# Patient Record
Sex: MALE | Race: WHITE | NOT HISPANIC OR LATINO | Employment: FULL TIME | URBAN - METROPOLITAN AREA
[De-identification: names, ages, dates, MRNs, and addresses within clinical notes are randomized per-mention and may not be internally consistent; named-entity substitution may affect disease eponyms.]

---

## 2017-01-02 ENCOUNTER — DOCUMENTATION ONLY (OUTPATIENT)
Dept: NEUROLOGY | Facility: CLINIC | Age: 52
End: 2017-01-02

## 2017-02-13 ENCOUNTER — TELEPHONE (OUTPATIENT)
Dept: NEUROLOGY | Facility: CLINIC | Age: 52
End: 2017-02-13

## 2017-07-05 ENCOUNTER — ALLIED HEALTH/NURSE VISIT (OUTPATIENT)
Dept: NEUROLOGY | Facility: CLINIC | Age: 52
End: 2017-07-05

## 2017-07-05 ENCOUNTER — OFFICE VISIT (OUTPATIENT)
Dept: NEUROLOGY | Facility: CLINIC | Age: 52
End: 2017-07-05

## 2017-07-05 VITALS
HEIGHT: 72 IN | WEIGHT: 210 LBS | HEART RATE: 76 BPM | DIASTOLIC BLOOD PRESSURE: 83 MMHG | BODY MASS INDEX: 28.44 KG/M2 | SYSTOLIC BLOOD PRESSURE: 124 MMHG

## 2017-07-05 DIAGNOSIS — G61.81 CIDP (CHRONIC INFLAMMATORY DEMYELINATING POLYNEUROPATHY) (H): Primary | ICD-10-CM

## 2017-07-05 DIAGNOSIS — Z71.9 VISIT FOR COUNSELING: Primary | ICD-10-CM

## 2017-07-05 RX ORDER — CALCIUM CARBONATE 500(1250)
TABLET,CHEWABLE ORAL
COMMUNITY
Start: 2016-02-23 | End: 2019-08-20

## 2017-07-05 RX ORDER — DIPHENHYDRAMINE HCL 25 MG
25 CAPSULE ORAL EVERY 6 HOURS PRN
COMMUNITY
Start: 2015-12-01 | End: 2021-05-11

## 2017-07-05 RX ORDER — ACETAMINOPHEN 500 MG
1000 TABLET ORAL EVERY 6 HOURS PRN
COMMUNITY
Start: 2013-09-25

## 2017-07-05 ASSESSMENT — ENCOUNTER SYMPTOMS
DIZZINESS: 1
POLYPHAGIA: 0
MYALGIAS: 1
LIGHT-HEADEDNESS: 0
NUMBNESS: 1
EYE WATERING: 0
TASTE DISTURBANCE: 0
SYNCOPE: 0
NECK MASS: 0
SHORTNESS OF BREATH: 0
POSTURAL DYSPNEA: 1
INCREASED ENERGY: 1
TREMORS: 0
SNORES LOUDLY: 0
DOUBLE VISION: 0
DISTURBANCES IN COORDINATION: 1
TROUBLE SWALLOWING: 0
POLYDIPSIA: 1
EXERCISE INTOLERANCE: 1
NIGHT SWEATS: 0
HALLUCINATIONS: 0
SINUS PAIN: 0
DECREASED CONCENTRATION: 1
WEIGHT LOSS: 0
ORTHOPNEA: 1
INSOMNIA: 1
CLAUDICATION: 0
WHEEZING: 0
DECREASED APPETITE: 0
NERVOUS/ANXIOUS: 1
ARTHRALGIAS: 1
SMELL DISTURBANCE: 0
HYPERTENSION: 0
SLEEP DISTURBANCES DUE TO BREATHING: 0
WEAKNESS: 1
FEVER: 0
WEIGHT GAIN: 0
EYE PAIN: 0
COUGH: 1
HEADACHES: 1
MEMORY LOSS: 0
CHILLS: 0
JOINT SWELLING: 1
PANIC: 1
EYE REDNESS: 0
DYSPNEA ON EXERTION: 1
ALTERED TEMPERATURE REGULATION: 1
NECK PAIN: 1
NAIL CHANGES: 0
STIFFNESS: 1
SEIZURES: 0
EYE IRRITATION: 0
MUSCLE CRAMPS: 0
TINGLING: 1
MUSCLE WEAKNESS: 1
SPEECH CHANGE: 0
LOSS OF CONSCIOUSNESS: 0
HYPOTENSION: 0
SPUTUM PRODUCTION: 1
BACK PAIN: 1
HOARSE VOICE: 1
LEG SWELLING: 1
SKIN CHANGES: 1
SORE THROAT: 0
COUGH DISTURBING SLEEP: 0
SINUS CONGESTION: 1
RESPIRATORY PAIN: 0
FATIGUE: 1
PARALYSIS: 0
DEPRESSION: 1
LEG PAIN: 1
POOR WOUND HEALING: 0
HEMOPTYSIS: 0
TACHYCARDIA: 0
PALPITATIONS: 0

## 2017-07-05 ASSESSMENT — PAIN SCALES - GENERAL: PAINLEVEL: NO PAIN (0)

## 2017-07-05 NOTE — PROGRESS NOTES
"2017      Son MYRONNikia MD Cindy   [facility]    [address] [place]  [address]         RE:                Emiliano Aguiar    MRN:             [id number]    :             1965             Dear Dr. White:         I had the pleasure to see Emiliano Aguiar in followup at the [facility].  He has CIDP and he is on IVIG treatment 1 g/kg every 4 weeks, which has produced major improvement and has maintained him in a state of clinical remission based on his objective neurological examination. The last 4 months have been quite eventful. He now has near constant numbness and tingling at his hands and feet, which has been gradually occurring over the last 1 year. In 2017, he took a new job at the [employer]. It was a full time job, 40 hours a week. He soon realized he did not have the endurance required for this job position. At the end of his shift, his entire legs would feel numb from the hip down, his balance was worse and he sustained a few falls, fortunately without serious injury. 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 He cannot go to his work the week he receives the single IVIG infusion, because two to three days later, he develops joint pains and headaches. Please note that he is currently on Privigen. He developed a serious reaction to Gammagard in 2013 with headache, vomiting, and \"anaphylaxis\". We have attempted premedication with Tylenol/Benadryl, and he even takes analgesics for a couple of days after the infusion is completed, but the emergence of the pain syndrome cannot be prevented. Please also note that 8 weeks of pulse IV weekly methyprednisolone did not help his CIDP in , and before switching to Privigen, he was on every 2 week plasmapheresis; the reason I wanted him to restart IVIG was to prevent complications " associated with long term PLEX and catheter placement. He lost his long term disability coverage, and he is very worried about the possibility of losing his SSDI. He has not spoken to a  about those issues yet.    Medications were reviewed and are as per Epic record       On a focused physical examination, vitals are as follows: /83 (BP Location: Left arm, Patient Position: Sitting, Cuff Size: Adult Large)  Pulse 76  Ht 1.829 m (6')  Wt 95.3 kg (210 lb)  BMI 28.48 kg/m2.    On neuro exam he has intact strength in proximal and distal upper and lower extremities.  He has 12+ seconds of vibration at the toes and over 25 at the index fingers.  Position sensation is normal.  He stands with feet abducted with mild instability and a possible positive Romberg sign. His tandem gait is worse than last year; he cannot even take 2 steps without assistance, whereas he could take 5-6 previously.      In summary, Mr. Aguiar has CIDP, and he likely has a relapse; his tandem gait is worse than last year, and he associates the worsening with the short time he worked full time in the [employer] in 4/2017. I would like to increase his IVIG dose from 1g/kg every 4 weeks, to 1g/kg every 3 weeks. Because of the headaches and joint pains after IVIG, it may be helpful to break the dose in two, and infuse him with 0.5 g/kg daily x2 days, instead of 1g/kg at a single infusion. He will complete a RODS-CIDP score form today and we will scan it into the EMR and repeat the same assessment upon follow-up. I will also have him discuss his disability options with our . It is my impression from today's visit that he currently is unable to work full time, but capable of working on a less physically demanding job position part-time, for no more than 20 hours/week, 3 weeks per month.   He will return to our Clinic for follow-up in 3-4 months or earlier if necessary. Total time spent for patient care 25 minutes; more than  half was counseling.      Sincerely,        MD NICHOLAS Juarez MD      Answers for HPI/ROS submitted by the patient on 7/5/2017   General Symptoms: Yes  Skin Symptoms: Yes  HENT Symptoms: Yes  EYE SYMPTOMS: Yes  HEART SYMPTOMS: Yes  LUNG SYMPTOMS: Yes  INTESTINAL SYMPTOMS: No  URINARY SYMPTOMS: No  REPRODUCTIVE SYMPTOMS: No  SKELETAL SYMPTOMS: Yes  BLOOD SYMPTOMS: No  NERVOUS SYSTEM SYMPTOMS: Yes  MENTAL HEALTH SYMPTOMS: Yes  Fever: No  Loss of appetite: No  Weight loss: No  Weight gain: No  Fatigue: Yes  Night sweats: No  Chills: No  Increased stress: Yes  Excessive hunger: No  Excessive thirst: Yes  Feeling hot or cold when others believe the temperature is normal: Yes  Loss of height: No  Post-operative complications: No  Surgical site pain: No  Hallucinations: No  Change in or Loss of Energy: Yes  Hyperactivity: No  Confusion: No  Changes in hair: No  Changes in moles/birth marks: Yes  Itching: Yes  Rashes: No  Changes in nails: No  Acne: No  Change in facial hair: No  Warts: No  Non-healing sores: No  Scarring: No  Flaking of skin: Yes  Color changes of hands/feet in cold : No  Sun sensitivity: No  Skin thickening: No  Ear pain: No  Ear discharge: No  Hearing loss: No  Tinnitus: Yes  Nosebleeds: No  Congestion: Yes  Sinus pain: No  Trouble swallowing: No   Voice hoarseness: Yes  Mouth sores: No  Sore throat: No  Tooth pain: No  Gum tenderness: No  Bleeding gums: No  Change in taste: No  Change in sense of smell: No  Dry mouth: No  Hearing aid used: No  Neck lump: No  Eye pain: No  Vision loss: No  Dry eyes: No  Watery eyes: No  Eye bulging: No  Double vision: No  Flashing of lights: No  Spots: No  Floaters: Yes  Redness: No  Crossed eyes: No  Tunnel Vision: No  Yellowing of eyes: No  Eye irritation: No  Cough: Yes  Sputum or phlegm: Yes  Coughing up blood: No  Difficulty breating or shortness of breath: No  Snoring: No  Wheezing: No  Difficulty breathing on exertion:  Yes  Respiratory pain: No  Nighttime Cough: No  Difficulty breathing when lying flat: Yes  Chest pain or pressure: No  Fast or irregular heartbeat: No  Pain in legs with walking: Yes  Swelling in feet or ankles: Yes  Trouble breathing while lying down: Yes  Fingers or Toes appear blue: No  High blood pressure: No  Low blood pressure: No  Fainting: No  Murmurs: No  Chest pain on exertion: No  Chest pain at rest: No  Cramping pain in leg during exercise: No  Pacemaker: No  Varicose veins: No  Edema or swelling: No  Fast heart beat: No  Wake up at night with shortness of breath: No  Heart flutters: No  Light-headedness: No  Exercise intolerance: Yes  Back pain: Yes  Muscle aches: Yes  Neck pain: Yes  Swollen joints: Yes  Joint pain: Yes  Bone pain: No  Muscle cramps: No  Muscle weakness: Yes  Joint stiffness: Yes  Bone fracture: No  Trouble with coordination: Yes  Dizziness or trouble with balance: Yes  Fainting or black-out spells: No  Memory loss: No  Headache: Yes  Seizures: No  Speech problems: No  Tingling: Yes  Tremor: No  Weakness: Yes  Difficulty walking: Yes  Paralysis: No  Numbness: Yes  Nervous or Anxious: Yes  Depression: Yes  Trouble sleeping: Yes  Trouble thinking or concentrating: Yes  Mood changes: Yes  Panic attacks: Yes

## 2017-07-05 NOTE — MR AVS SNAPSHOT
After Visit Summary   7/5/2017    Emiliano Aguiar    MRN: [id number]           Patient Information     Date Of Birth          1965        Visit Information        Provider Department      7/5/2017 11:34 AM Marilu Szymanski LICSW [facility]        Today's Diagnoses     Visit for counseling    -  1       Follow-ups after your visit        Your next 10 appointments already scheduled     Oct 18, 2017 10:20 AM CDT   (Arrive by 10:05 AM)   Return Neuropathy Visit with MD QUYEN [facility] ([facility] [place])    [address] [place] [id number] [phone]              Who to contact     Please call your clinic at [phone] to:    Ask questions about your health    Make or cancel appointments    Discuss your medicines    Learn about your test results    Speak to your doctor   If you have compliments or concerns about an experience at your clinic, or if you wish to file a complaint, please contact [facility] Patient Relations at [phone] or email us at [email or web address]         Additional Information About Your Visit        MyChart Information     [employer] gives you secure access to your electronic health record. If you see a primary care provider, you can also send messages to your care team and make appointments. If you have questions, please call your primary care clinic.  If you do not have a primary care provider, please call [phone] and they will assist you.      [employer] is an electronic gateway that provides easy, online access to your medical records. With [employer], you can request a clinic appointment, read your test results, renew a prescription or communicate with your care team.     To access your existing account, please contact your [facility] or call [phone] for assistance.        Care EveryWhere ID     This is your  Care EveryWhere ID. This could be used by other organizations to access your [facility] medical records  [id number]         Blood Pressure from Last 3 Encounters:   07/05/17 124/83   07/06/16 137/76   01/06/16 131/69    Weight from Last 3 Encounters:   07/05/17 95.3 kg (210 lb)   12/28/16 95.3 kg (210 lb)   01/06/16 95.3 kg (210 lb)              Today, you had the following     No orders found for display       Primary Care Provider Office Phone # Fax #    Alexander Spenser White [phone]       [facility] [address] [place] [address]        Equal Access to Services     MOISES SUBRAMANIAN : Tanner Roberts, wamary smyth, qaybta kaalmada adecam, concepción haddad. So [facility] [phone].    ATENCIÓN: Si habla español, tiene a krishnamurthy disposición servicios gratuitos de asistencia lingüística. Llame al [phone].    We comply with applicable federal civil rights laws and [place] laws. We do not discriminate on the basis of race, color, national origin, age, disability sex, sexual orientation or gender identity.            Thank you!     Thank you for choosing [facility]UR[facility]  for your care. Our goal is always to provide you with excellent care. Hearing back from our patients is one way we can continue to improve our services. Please take a few minutes to complete the written survey that you may receive in the mail after your visit with us. Thank you!             Your Updated Medication List - Protect others around you: Learn how to safely use, store and throw away your medicines at www.disposemymeds.org.          This list is accurate as of: 7/5/17 11:59 AM.  Always use your most recent med list.                   Brand Name Dispense Instructions for use Diagnosis    acetaminophen 500 MG tablet    TYLENOL     Take 1,000 mg by mouth        CALCI-CHEW 1250 (500 CA) MG Chew   Generic drug:  calcium carbonate           diphenhydrAMINE 25 MG capsule     BENADRYL     Take 25 mg by mouth        TUMS PO      Take by mouth as needed

## 2017-07-05 NOTE — PATIENT INSTRUCTIONS
If you have questions or concerns following today's appointment, please contact   Winter [facility]tt at [phone].    For more urgent concerns, contact the neurology department triage line at [phone] option 3.    Follow up with Dr. Godoy in 4 months.    Medication:   IVIG 1gram/kg every 3 weeks (broken into 0.5gm/kg daily over 2 days). We will update orders with your infusion center.     We now have a  available to help patients with psychosocial needs, supportive counseling, advanced care planning, and insurance and/or disability questions. You can reach JEFFREY Zapata, LICSW at [phone].

## 2017-07-05 NOTE — NURSING NOTE
Chief Complaint   Patient presents with     RECHECK     [facility] RETURN - NEUROPATHY     Sheba Garcia MA

## 2017-07-05 NOTE — LETTER
"2017        RE: Emiliano Aguiar  [address]   [facility] [address]     Dear Colleague,    Thank you for referring your patient, Emiliano Aguiar, to the [facility] NEUROLOGY at [facility]. Please see a copy of my visit note below.    2017      Son DEBBIE White MD   [facility]    [address] [place]  [address]         RE:                Emiliano Aguiar    MRN:             [id number]    :             1965             Dear Dr. White:         I had the pleasure to see Emiliano Aguiar in followup at the [facility].  He has CIDP and he is on IVIG treatment 1 g/kg every 4 weeks, which has produced major improvement and has maintained him in a state of clinical remission based on his objective neurological examination. The last 4 months have been quite eventful. He now has near constant numbness and tingling at his hands and feet, which has been gradually occurring over the last 1 year. In 2017, he took a new job at the [employer]. It was a full time job, 40 hours a week. He soon realized he did not have the endurance required for this job position. At the end of his shift, his entire legs would feel numb from the hip down, his balance was worse and he sustained a few falls, fortunately without serious injury. 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 He cannot go to his work the week he receives the single IVIG infusion, because two to three days later, he develops joint pains and headaches. Please note that he is currently on Privigen. He developed a serious reaction to Gammagard in 2013 with headache, vomiting, and \"anaphylaxis\". We have attempted premedication with Tylenol/Benadryl, and he even takes analgesics for a couple of days after the infusion is completed, but the emergence of the " pain syndrome cannot be prevented. Please also note that 8 weeks of pulse IV weekly methyprednisolone did not help his CIDP in 2014, and before switching to Privigen, he was on every 2 week plasmapheresis; the reason I wanted him to restart IVIG was to prevent complications associated with long term PLEX and catheter placement. He lost his long term disability coverage, and he is very worried about the possibility of losing his SSDI. He has not spoken to a  about those issues yet.    Medications were reviewed and are as per Epic record       On a focused physical examination, vitals are as follows: /83 (BP Location: Left arm, Patient Position: Sitting, Cuff Size: Adult Large)  Pulse 76  Ht 1.829 m (6')  Wt 95.3 kg (210 lb)  BMI 28.48 kg/m2.    On neuro exam he has intact strength in proximal and distal upper and lower extremities.  He has 12+ seconds of vibration at the toes and over 25 at the index fingers.  Position sensation is normal.  He stands with feet abducted with mild instability and a possible positive Romberg sign. His tandem gait is worse than last year; he cannot even take 2 steps without assistance, whereas he could take 5-6 previously.      In summary, Mr. Aguiar has CIDP, and he likely has a relapse; his tandem gait is worse than last year, and he associates the worsening with the short time he worked full time in the [employer] in 4/2017. I would like to increase his IVIG dose from 1g/kg every 4 weeks, to 1g/kg every 3 weeks. Because of the headaches and joint pains after IVIG, it may be helpful to break the dose in two, and infuse him with 0.5 g/kg daily x2 days, instead of 1g/kg at a single infusion. He will complete a RODS-CIDP score form today and we will scan it into the EMR and repeat the same assessment upon follow-up. I will also have him discuss his disability options with our . It is my impression from today's visit that he currently is unable to work full  time, but capable of working on a less physically demanding job position part-time, for no more than 20 hours/week, 3 weeks per month.   He will return to our Clinic for follow-up in 3-4 months or earlier if necessary. Total time spent for patient care 25 minutes; more than half was counseling.      Sincerely,        Nixon Godoy MD

## 2017-07-05 NOTE — PROGRESS NOTES
Social Work:  D/I: Request from Dr Go to see Pt related to his disability benefits. Met w/Pt and his friend Chayo in Neuro clinic. He receives SSDI and was receiving LTD from a former employer until last week due to conflicting info they had from physicians related to his ability to work. He has had more issues and now Dr NAPIER indicates that he can only work part time. He has found a job he can do that also gives him flexibility related to how he is health is. He has engaged in Vocational Rehab and is on the ticket to work program.   Encouraged him to appeal the denial from his LTD insurance and Dr NAPIER would provide the needed documentation re his ability to work only part time. He will contact them and request that they fax Dr NAPIER forms he needs to complete.  Also provided info re [employer] a foundation that assists pts with CIDP with premiums and copays.  A/P: Pt experiencing difficulty working due to health issues. Wants to be able to work and feels he can manage now on a part time basis. Discussed his LTD and encouraged him to appeal the denial. Pt plans to do that and f/u as needed.

## 2017-07-07 ENCOUNTER — CARE COORDINATION (OUTPATIENT)
Dept: NEUROLOGY | Facility: CLINIC | Age: 52
End: 2017-07-07

## 2017-07-07 DIAGNOSIS — G61.81 CIDP (CHRONIC INFLAMMATORY DEMYELINATING POLYNEUROPATHY) (H): Primary | ICD-10-CM

## 2017-07-07 RX ORDER — DIPHENHYDRAMINE HCL 25 MG
25-50 CAPSULE ORAL ONCE
Status: CANCELLED
Start: 2017-07-07 | End: 2017-07-07

## 2017-07-07 RX ORDER — DIPHENHYDRAMINE HCL 25 MG
50 CAPSULE ORAL EVERY 6 HOURS PRN
Status: CANCELLED | OUTPATIENT
Start: 2017-07-07

## 2017-07-07 RX ORDER — ACETAMINOPHEN 325 MG/1
650 TABLET ORAL ONCE
Status: CANCELLED
Start: 2017-07-07 | End: 2017-07-07

## 2017-11-01 ENCOUNTER — TELEPHONE (OUTPATIENT)
Dept: NEUROLOGY | Facility: CLINIC | Age: 52
End: 2017-11-01

## 2017-11-01 ENCOUNTER — OFFICE VISIT (OUTPATIENT)
Dept: NEUROLOGY | Facility: CLINIC | Age: 52
End: 2017-11-01

## 2017-11-01 VITALS
RESPIRATION RATE: 18 BRPM | WEIGHT: 210 LBS | HEART RATE: 71 BPM | HEIGHT: 72 IN | DIASTOLIC BLOOD PRESSURE: 84 MMHG | BODY MASS INDEX: 28.44 KG/M2 | OXYGEN SATURATION: 98 % | SYSTOLIC BLOOD PRESSURE: 135 MMHG

## 2017-11-01 DIAGNOSIS — G61.81 CIDP (CHRONIC INFLAMMATORY DEMYELINATING POLYNEUROPATHY) (H): Primary | ICD-10-CM

## 2017-11-01 ASSESSMENT — PAIN SCALES - GENERAL: PAINLEVEL: NO PAIN (1)

## 2017-11-01 NOTE — TELEPHONE ENCOUNTER
Updated IVIG plan to reflect 1 g/kg every 4 weeks given in a single day. Routed to Dr. Godoy for review and signature. Will fax to infusion center once done.     Sandra Esteban RN Care Coordinator

## 2017-11-01 NOTE — PROGRESS NOTES
2017      Son DEBBIE White MD   [facility]    [address] [place] [address]      RE: Emiliano Aguiar   MRN: [id number]   : 1965      Dear Dr. White:      I had the pleasure to see Mr. Aguiar in followup at the [facility] today.  He has CIDP on IVIG treatment.  We have made some changes lately.  I last saw him in 2017.  At that time he was on 1 g/kg every 4 weeks, but since 2017 he was reporting increasing fatigue, gait unsteadiness, falls, numbness and tingling.  This was associated with taking a new job at the [employer] working full-time.  He then had to switch to part-time job, 20 hours a week 3 weeks per month.  He applied for long-term disability, but was declined.  He was also describing reactions after IVIG infusion consisting of headaches, arthralgias and myalgias which were peaking in a delayed fashion, occurring about 3-4 days after the IVIG infusion and lasting for the whole weekend or for a good 3 days after that.  Those occur despite premedication with Tylenol and Benadryl, which the patient continues the next days after receiving the infusion.  Because of worsening of his CIDP, I felt it would be appropriate to increase his IVIG frequency to every 3 weeks.  Because of his side effects which are somewhat dose dependent, I suggested breaking the IVIG dose down to 2 doses of 0.5 gram per kilogram and administer them over 2 consecutive days.  He did so, but he felt that the side effects were worse after this approach. The peak of the headaches and myalgias/arthralgias was still occurring 3-4 days after the infusion, but they would last through half of the next week.  He does not feel that his activities of daily living, balance, sensory symptoms, etc., have improved by increasing the IVIG from 1 gram g/kg every 4 weeks to 1 g/kg every 3.  At the same time he does not have any new neurological symptoms. He does not want to  appeal his long-term disability denial at this point.      CURRENT MEDICATIONS:   Reviewed and are as per Epic record.        His RODS- CIDP score as completed by the patient today was 34/48.  When he submitted the score in July it was 30/48, so this is a 4-point improvement indeed, despite the fact the patient says his overall impression is unchanged.     PHYSICAL EXAMINATION:   VITAL SIGNS:   Blood pressure of 135/84.  Pulse is 71 and regular.  Respiratory rate 18.  O2 sat 98% on room air.  Weight 95.3 kilos.  Height is 182 and he now endorses no pain.   NEUROLOGIC EXAMINATION:   Things are about the same as last time.  Vibration is detected for a good 10 seconds at both great toes, and over 25 at the index fingers.  Position sensation is normal at the toes.  Strength is 5/5 in proximal and distal muscle groups of upper and lower extremities.  He has a possible positive Romberg sign and difficulty standing with feet adducted.  His tandem gait is hard.  He can do about 3 or 4 steps with little assistance, but he is unsteady.      In summary, Mr. Aguiar has CIDP.  There were some interesting observations the patient reported, that are quite difficult to explain medically and physiologically.  First of all, he did not enjoy any significant subjective overall improvement by increasing the IVIG from 1 gram g/kg every 4 weeks to every 3 weeks, yet his RODS CIDP score reported by the patient himself is improved by 4-points, which is a meaningful change. I also have a hard time understanding why breaking down the IVIG into 2 doses led to more severe and protracted delayed side effects including headaches, joint pains, etc.  In any case, based on the patient's report, I think it is appropriate to do the IVIG in a single dose of 1 g/kg and he himself asked to be tapered to every 4 weeks.  It is my understanding that probably the side effects of the IVIG infusions are more disabling than the CIDP itself at this point of  time.  The joint pains and headaches that he gets are difficult to treat and they occur despite taking Tylenol regularly.  He does not feel he needs Tylenol #3 or oxycodone to deal with those and I would advise not taking NSAIDs because they can increase the risk of aseptic meningitis in individuals taking IVIG.  Those side effects are not life threatening and do not preclude him from getting the infusions.  I think he must remain on maintenance IVIG because previous attempts of tapering the dose led to clinical worsening and I think he is dependent.  He will continue on 1g/kg q4 weeks and he will return to clinic for followup in 6 months.      Sincerely,       MD NICHOLAS Andrade MD             D: 2017 09:33   T: 2017 10:53   MT: KATJA      Name:     LUTHER HUGHES   MRN:      [id number]        Account:      [id number]   :      1965           Service Date: 2017      Document: [id number]

## 2017-11-01 NOTE — MR AVS SNAPSHOT
After Visit Summary   11/1/2017    Emiliano Aguiar    MRN: [id number]           Patient Information     Date Of Birth          1965        Visit Information        Provider Department      11/1/2017 9:20 AM Nixon Godoy MD [facility] Neurology         Follow-ups after your visit        Your next 10 appointments already scheduled     May 02, 2018  9:50 AM CDT   (Arrive by 9:35 AM)   Return Neuropathy Visit with Nixon Godoy MD   [facility] Neurology ([facility])    [address] [place] [address] [phone]              Who to contact     Please call your clinic at [phone] to:    Ask questions about your health    Make or cancel appointments    Discuss your medicines    Learn about your test results    Speak to your doctor   If you have compliments or concerns about an experience at your clinic, or if you wish to file a complaint, please contact [facility] Patient Relations at [phone] or email us at [email or web address]         Additional Information About Your Visit        MyChart Information     [email or web address] gives you secure access to your electronic health record. If you see a primary care provider, you can also send messages to your care team and make appointments. If you have questions, please call your primary care clinic.  If you do not have a primary care provider, please call [phone] and they will assist you.      [employer] is an electronic gateway that provides easy, online access to your medical records. With [employer], you can request a clinic appointment, read your test results, renew a prescription or communicate with your care team.     To access your existing account, please contact your [facility] or call [phone] for assistance.        Care EveryWhere ID     This is your Care EveryWhere ID. This could be used by other  organizations to access your [place] medical records  [id number]        Your Vitals Were     Pulse Respirations Height Pulse Oximetry BMI (Body Mass Index)       71 18 1.829 m (6') 98% 28.48 kg/m2        Blood Pressure from Last 3 Encounters:   11/01/17 135/84   07/05/17 124/83   07/06/16 137/76    Weight from Last 3 Encounters:   11/01/17 95.3 kg (210 lb)   07/05/17 95.3 kg (210 lb)   12/28/16 95.3 kg (210 lb)              Today, you had the following     No orders found for display       Primary Care Provider Office Phone # Fax #    Alexander Spenser White [phone]       [facility] [address] [place] [address]        Equal Access to Services     [facility]: Hadii aad ku hadasho Soomaali, waaxda luqadaha, qaybta kaalmada adeegyada, concepción cano . So [facility] [phone].    ATENCIÓN: Si habla español, tiene a krishnamurthy disposición servicios gratuitos de asistencia lingüística. Sincere al [phone].    We comply with applicable federal civil rights laws and [place] laws. We do not discriminate on the basis of race, color, national origin, age, disability, sex, sexual orientation, or gender identity.            Thank you!     Thank you for choosing [facility] NEUROLOGY  for your care. Our goal is always to provide you with excellent care. Hearing back from our patients is one way we can continue to improve our services. Please take a few minutes to complete the written survey that you may receive in the mail after your visit with us. Thank you!             Your Updated Medication List - Protect others around you: Learn how to safely use, store and throw away your medicines at www.disposemymeds.org.          This list is accurate as of: 11/1/17  9:30 AM.  Always use your most recent med list.                   Brand Name Dispense Instructions for use Diagnosis    acetaminophen 500 MG tablet    TYLENOL     Take 1,000 mg by mouth        CALCI-CHEW 1250 (500 CA)  MG Chew   Generic drug:  calcium carbonate           diphenhydrAMINE 25 MG capsule    BENADRYL     Take 25 mg by mouth        TUMS PO      Take by mouth as needed

## 2017-11-01 NOTE — LETTER
2017       RE: Emiliano Aguiar  [address]   [place] [address]     Dear Colleague,    Thank you for referring your patient, Emiliano Aguiar, to the [facility] NEUROLOGY at [facility]. Please see a copy of my visit note below.    2017      Son DEBBIE White MD   [facility]    [address] [place] [address]      RE: Emiliano Aguiar   MRN: [id number]   : 1965      Dear Dr. White:      I had the pleasure to see Mr. Aguiar in followup at the [facility] today.  He has CIDP on IVIG treatment.  We have made some changes lately.  I last saw him in 2017.  At that time he was on 1 g/kg every 4 weeks, but since 2017 he was reporting increasing fatigue, gait unsteadiness, falls, numbness and tingling.  This was associated with taking a new job at the [employer] working full-time.  He then had to switch to part-time job, 20 hours a week 3 weeks per month.  He applied for long-term disability, but was declined.  He was also describing reactions after IVIG infusion consisting of headaches, arthralgias and myalgias which were peaking in a delayed fashion, occurring about 3-4 days after the IVIG infusion and lasting for the whole weekend or for a good 3 days after that.  Those occur despite premedication with Tylenol and Benadryl, which the patient continues the next days after receiving the infusion.  Because of worsening of his CIDP, I felt it would be appropriate to increase his IVIG frequency to every 3 weeks.  Because of his side effects which are somewhat dose dependent, I suggested breaking the IVIG dose down to 2 doses of 0.5 gram per kilogram and administer them over 2 consecutive days.  He did so, but he felt that the side effects were worse after this approach. The peak of the headaches and myalgias/arthralgias was still occurring 3-4 days after the infusion, but they would last through half of  the next week.  He does not feel that his activities of daily living, balance, sensory symptoms, etc., have improved by increasing the IVIG from 1 gram g/kg every 4 weeks to 1 g/kg every 3.  At the same time he does not have any new neurological symptoms. He does not want to appeal his long-term disability denial at this point.      CURRENT MEDICATIONS:   Reviewed and are as per Epic record.        His RODS- CIDP score as completed by the patient today was 34/48.  When he submitted the score in July it was 30/48, so this is a 4-point improvement indeed, despite the fact the patient says his overall impression is unchanged.     PHYSICAL EXAMINATION:   VITAL SIGNS:   Blood pressure of 135/84.  Pulse is 71 and regular.  Respiratory rate 18.  O2 sat 98% on room air.  Weight 95.3 kilos.  Height is 182 and he now endorses no pain.   NEUROLOGIC EXAMINATION:   Things are about the same as last time.  Vibration is detected for a good 10 seconds at both great toes, and over 25 at the index fingers.  Position sensation is normal at the toes.  Strength is 5/5 in proximal and distal muscle groups of upper and lower extremities.  He has a possible positive Romberg sign and difficulty standing with feet adducted.  His tandem gait is hard.  He can do about 3 or 4 steps with little assistance, but he is unsteady.      In summary, Mr. Aguiar has CIDP.  There were some interesting observations the patient reported, that are quite difficult to explain medically and physiologically.  First of all, he did not enjoy any significant subjective overall improvement by increasing the IVIG from 1 gram g/kg every 4 weeks to every 3 weeks, yet his RODS CIDP score reported by the patient himself is improved by 4-points, which is a meaningful change. I also have a hard time understanding why breaking down the IVIG into 2 doses led to more severe and protracted delayed side effects including headaches, joint pains, etc.  In any case, based on the  patient's report, I think it is appropriate to do the IVIG in a single dose of 1 g/kg and he himself asked to be tapered to every 4 weeks.  It is my understanding that probably the side effects of the IVIG infusions are more disabling than the CIDP itself at this point of time.  The joint pains and headaches that he gets are difficult to treat and they occur despite taking Tylenol regularly.  He does not feel he needs Tylenol #3 or oxycodone to deal with those and I would advise not taking NSAIDs because they can increase the risk of aseptic meningitis in individuals taking IVIG.  Those side effects are not life threatening and do not preclude him from getting the infusions.  I think he must remain on maintenance IVIG because previous attempts of tapering the dose led to clinical worsening and I think he is dependent.  He will continue on 1g/kg q4 weeks and he will return to clinic for followup in 6 months.      Sincerely,       MD NICHOLAS Andrade MD             D: 2017 09:33   T: 2017 10:53   MT: [id number]      Name:     LUTHER HUGHES   MRN:      [id number]        Account:      [id number]   :      1965           Service Date: 2017      Document: [id number]        Again, thank you for allowing me to participate in the care of your patient.      Sincerely,    Nicholas Godoy MD

## 2017-11-03 NOTE — TELEPHONE ENCOUNTER
Updated therapy plan faxed to Dr. Solorzano at [facility] (fax: [phone]).    Sandra Esteban, JAIRON Care Coordinator

## 2017-11-15 NOTE — TELEPHONE ENCOUNTER
Received the following message from the triage nurse:     Caller name: Mariam [facility] [phone]   fax [phone]     scheduled for 12/12     Description of issue/question:   New IVIG orders for 4 weeks     Dr. Solorzano is to cosign therapy plan. I received a fax last week stating that he cosigned these orders but never received the signed copy. Call placed and left [facility] for Dr. Solorzano's nurse (phone: [phone]; fax: ) requesting a copy of this.

## 2017-11-16 NOTE — TELEPHONE ENCOUNTER
Refaxed IVIG therapy plan to Dr. Solorzano (phone: [phone]; fax: [phone]) to have him physically sign the hard copy and asked his nurse to forward to [facility] (fax: [phone]).     Sandra Esteban RN Care Coordinator

## 2018-06-19 ASSESSMENT — ENCOUNTER SYMPTOMS
MUSCLE WEAKNESS: 0
WHEEZING: 0
POOR WOUND HEALING: 0
NECK MASS: 0
DIARRHEA: 0
POLYPHAGIA: 0
MYALGIAS: 0
NECK PAIN: 0
COUGH DISTURBING SLEEP: 0
RECTAL PAIN: 0
STIFFNESS: 1
SORE THROAT: 0
HEMOPTYSIS: 0
TREMORS: 0
MEMORY LOSS: 0
FATIGUE: 1
TINGLING: 1
MUSCLE CRAMPS: 0
VOMITING: 0
INCREASED ENERGY: 1
POLYDIPSIA: 0
TASTE DISTURBANCE: 0
NIGHT SWEATS: 0
HEARTBURN: 1
BLOOD IN STOOL: 0
BACK PAIN: 1
PANIC: 0
ARTHRALGIAS: 1
JAUNDICE: 0
PARALYSIS: 0
SMELL DISTURBANCE: 0
CONSTIPATION: 1
BOWEL INCONTINENCE: 0
NAIL CHANGES: 0
LOSS OF CONSCIOUSNESS: 0
SINUS CONGESTION: 1
SPEECH CHANGE: 0
COUGH: 1
ALTERED TEMPERATURE REGULATION: 1
CHILLS: 0
WEAKNESS: 1
WEIGHT LOSS: 0
NUMBNESS: 1
SINUS PAIN: 0
POSTURAL DYSPNEA: 0
DYSPNEA ON EXERTION: 0
DECREASED APPETITE: 0
BLOATING: 1
SPUTUM PRODUCTION: 1
ABDOMINAL PAIN: 0
HALLUCINATIONS: 0
SEIZURES: 0
SNORES LOUDLY: 1
DISTURBANCES IN COORDINATION: 0
DIZZINESS: 1
WEIGHT GAIN: 1
SHORTNESS OF BREATH: 0
HOARSE VOICE: 1
SKIN CHANGES: 0
TROUBLE SWALLOWING: 0
FEVER: 0
INSOMNIA: 1
HEADACHES: 1
DECREASED CONCENTRATION: 0
JOINT SWELLING: 0
NAUSEA: 0
DEPRESSION: 0
NERVOUS/ANXIOUS: 1

## 2018-06-20 ENCOUNTER — OFFICE VISIT (OUTPATIENT)
Dept: NEUROLOGY | Facility: CLINIC | Age: 53
End: 2018-06-20
Payer: COMMERCIAL

## 2018-06-20 VITALS
HEART RATE: 86 BPM | SYSTOLIC BLOOD PRESSURE: 132 MMHG | HEIGHT: 72 IN | BODY MASS INDEX: 28.7 KG/M2 | DIASTOLIC BLOOD PRESSURE: 81 MMHG | WEIGHT: 211.9 LBS

## 2018-06-20 DIAGNOSIS — G61.81 CIDP (CHRONIC INFLAMMATORY DEMYELINATING POLYNEUROPATHY) (H): ICD-10-CM

## 2018-06-20 DIAGNOSIS — M54.16 RIGHT LUMBAR RADICULOPATHY: Primary | ICD-10-CM

## 2018-06-20 ASSESSMENT — PAIN SCALES - GENERAL: PAINLEVEL: NO PAIN (0)

## 2018-06-20 NOTE — MR AVS SNAPSHOT
After Visit Summary   6/20/2018    Emiliano Aguiar    MRN: [id number]           Patient Information     Date Of Birth          1965        Visit Information        Provider Department      6/20/2018 9:20 AM Nixon Godoy MD [facility] Neurology        Today's Diagnoses     Right lumbar radiculopathy    -  1       Follow-ups after your visit        Follow-up notes from your care team     Return in about 4 months (around 10/20/2018).      Your next 10 appointments already scheduled     Oct 10, 2018  9:20 AM CDT   (Arrive by 9:05 AM)   Return Neuropathy Visit with Nixon Godoy MD   [facility] Neurology ([facility])    [address] [address] [phone]              Who to contact     Please call your clinic at [phone] to:    Ask questions about your health    Make or cancel appointments    Discuss your medicines    Learn about your test results    Speak to your doctor            Additional Information About Your Visit        MyChart Information     [employer] gives you secure access to your electronic health record. If you see a primary care provider, you can also send messages to your care team and make appointments. If you have questions, please call your primary care clinic.  If you do not have a primary care provider, please call [phone] and they will assist you.      [employer] is an electronic gateway that provides easy, online access to your medical records. With [employer], you can request a clinic appointment, read your test results, renew a prescription or communicate with your care team.     To access your existing account, please contact your [facility] or call [phone] for assistance.        Care EveryWhere ID     This is your Care EveryWhere ID. This could be used by other organizations to access your [place] medical records  [id number]        Your Vitals  Were     Pulse Height BMI (Body Mass Index)             86 1.829 m (6') 28.74 kg/m2          Blood Pressure from Last 3 Encounters:   06/20/18 132/81   11/01/17 135/84   07/05/17 124/83    Weight from Last 3 Encounters:   06/20/18 96.1 kg (211 lb 14.4 oz)   11/01/17 95.3 kg (210 lb)   07/05/17 95.3 kg (210 lb)               Primary Care Provider Office Phone # Fax #    Alexander Spenser White [phone]       [facility] [address] [place] [address]        Equal Access to Services     [facility] : Hadii too Roberts, wamary smyth, qaybta kaalmada fausto, concepción haddad. So [facility] [phone].    ATENCIÓN: Si habla español, tiene a krishnamurthy disposición servicios gratuitos de asistencia lingüística. Llame al [phone].    We comply with applicable federal civil rights laws and [place] laws. We do not discriminate on the basis of race, color, national origin, age, disability, sex, sexual orientation, or gender identity.            Thank you!     Thank you for choosing [facility] NEUROLOGY  for your care. Our goal is always to provide you with excellent care. Hearing back from our patients is one way we can continue to improve our services. Please take a few minutes to complete the written survey that you may receive in the mail after your visit with us. Thank you!             Your Updated Medication List - Protect others around you: Learn how to safely use, store and throw away your medicines at www.disposemymeds.org.          This list is accurate as of 6/20/18  9:57 AM.  Always use your most recent med list.                   Brand Name Dispense Instructions for use Diagnosis    acetaminophen 500 MG tablet    TYLENOL     Take 1,000 mg by mouth every 6 hours as needed        CALCI-CHEW 1250 (500 Ca) MG Chew   Generic drug:  calcium carbonate           diphenhydrAMINE 25 MG capsule    BENADRYL     Take 25 mg by mouth every 6 hours as needed         TUMS PO      Take by mouth as needed

## 2018-06-20 NOTE — PROGRESS NOTES
Service Date: 2018        Son MYRON Cindy JORGE   [facility]    [address]    [place] [address]      RE: Emiliano Aguiar   MRN: [id number]    : 1965              Dear Dr. White:        I had the pleasure to see Mr. Aguiar in followup at the [facility] for his CIDP for which he is on IVIG treatment now 1 g/kg every 4 weeks. He has had some fluctuations of his CIDP in the last year.  At his last visit we had him on 1 g/kg every 3 weeks and he was not necessarily better, but he complained that the side effects of IVIG were lasting too long. Those included headaches which have a migrainous quality, joint pains and occasional back pain as well as malaise, fatigue, nausea and they can last up to 5-6 days after each infusion.  Because he is still working it was very impractical for him to do those infusions every 3 weeks; the side effects ended up costing him several days off his job. Therefore, we transitioned him back to every 4 weeks.  His overall impression compared to 2017 is that things have not changed a lot.  His gait is about the same and so is his hand function and sensation.  He has not had any falls, and denies dysphagia. He has a little more back pain now, which sometimes radiates to the right thigh, posterior calf down and the foot with more numbness and tingling in the distal right toes.  He says that this symptom occurred early on when he was diagnosed with the CIDP, and it initially was thought to represent compressive lumbar radiculopathy.  I do not really have any images of that time available to know what is the case.  He emphasizes that the back pain again occurs the day after he receives the IVIG infusion and typically lasts about a week to 8 days and then gradually resolves, so it is not a constant symptom for the whole month.  He has not been on subcutaneous IVIG.  He told me that he had a history of an arteriovenous  malformation of the brain for which he was last imaged 10 years ago.  He has not had constant headaches, seizures, episodes of loss of consciousness, hemiparesis, visual defect, speech disturbance or anything else suggestive that the malformation would be active.  Medications were reviewed and are as per Epic record.     RODS CIDP score as filled out by the patient a few days ago was 29 out of 48 which was quite lower than his last visit with me in 11/2017 when it was 34, but about the same as 07/2017 when it was 30.       On physical exam his blood pressure is 132/81, pulse 86 and regular.  Weight 96.1 kilos, height is 182 and he endorses no pain.  On neuro exam he is awake, alert and oriented x3.  His strength is 5 out of 5 in upper extremities proximally and distally.  In the lower extremities, he has some motor impersistence with hip flexion, knee extension, but actually 5 out of 5 strength in both muscles.  Knee flexion is also 5 and so is foot dorsiflexion and great toe extension.  Tone normal.  His vibration is still detectable for 12+ seconds at the toes and 25+ seconds at the index fingers, unchanged from last visit.  Position sensation at the toes is probably normal.  When he stands, he has a positive Romberg and his tandem gait is even more difficult than his last visit.  He can barely take a step in tandem now.        My impression is that Emiliano's CIDP may actually be slightly worse than his previous visit.  His RODS score is 5 points down from 11/2017, although it similar to 07/2017. His postural stability is also slightly worse.  Point is what to do with him.  The obvious first reaction would be to increase his IVIG frequency but he cannot do this for the reasons mentioned above.  A trial of pulse corticosteroids that we did 3 years ago was unsuccessful with appropriate doses given for 3 months.  He was initially on plasmapheresis in 2015, but this cannot be administered at the same time with IVIG, and  I really do not want him to stop the immunoglobulin because it was the only treatment that helped bring his CIDP into remission.   I told Luther that while there is some objective deterioration from 2017, he himself does not feel that this has impacted his ADLs to such a dramatic extent. Therefore, it may be prudent to do nothing right now, other than renewing his current treatment. I told him, however, that if he continues to decline, we should consider additional treatment- if not steroids or PLEX, this would be some sort of oral or IV immunosuppression. I would also like him to undergo an MRI of the lumbar spine with contrast because I want to see if his back pain radiating to the right leg is a compressive radiculopathy or an inflammatory one.  Actually, imaging with contrast would be very helpful to tell between the two, and this can affect our treatment plan.      He will follow up in our clinic in 3 months or sooner if necessary.  One additional thought may be to transition him to subcutaneous immunoglobulin.  I do not know if he will experience less of the joint pains or headaches with the subcutaneous treatment, but it is possible.  I did not have time to discuss this with the patient today, but I will call him or send him a [employer] message to touch on this topic.  Lastly, I will contact my Neurosurgery or stroke colleagues and inquire whether there is any indication to reimage his arteriovenous malformation of the brain which has not been checked in almost a decade.   TT spent for patient care 15 minutes; more than half was counseling.     Sincerely,        MD NICHOLAS Juarez MD             D: 2018   T: 2018   MT: ratna      Name:     LUTHER HUGHES   MRN:      [id number]        Account:      [id number]   :      1965           Service Date: 2018      Document: [id number]      Answers for HPI/ROS submitted by the patient on 2018    General Symptoms: Yes  Skin Symptoms: Yes  HENT Symptoms: Yes  EYE SYMPTOMS: No  HEART SYMPTOMS: No  LUNG SYMPTOMS: Yes  INTESTINAL SYMPTOMS: Yes  URINARY SYMPTOMS: No  REPRODUCTIVE SYMPTOMS: No  SKELETAL SYMPTOMS: Yes  BLOOD SYMPTOMS: No  NERVOUS SYSTEM SYMPTOMS: Yes  MENTAL HEALTH SYMPTOMS: Yes  Fever: No  Loss of appetite: No  Weight loss: No  Weight gain: Yes  Fatigue: Yes  Night sweats: No  Chills: No  Increased stress: Yes  Excessive hunger: No  Excessive thirst: No  Feeling hot or cold when others believe the temperature is normal: Yes  Loss of height: No  Post-operative complications: No  Surgical site pain: No  Hallucinations: No  Change in or Loss of Energy: Yes  Hyperactivity: No  Confusion: No  Changes in hair: No  Changes in moles/birth marks: No  Itching: Yes  Rashes: No  Changes in nails: No  Acne: No  Change in facial hair: No  Warts: No  Non-healing sores: No  Scarring: No  Flaking of skin: Yes  Color changes of hands/feet in cold : No  Sun sensitivity: No  Skin thickening: No  Ear pain: No  Ear discharge: No  Hearing loss: No  Tinnitus: Yes  Nosebleeds: No  Congestion: Yes  Sinus pain: No  Trouble swallowing: No   Voice hoarseness: Yes  Mouth sores: No  Sore throat: No  Tooth pain: Yes  Gum tenderness: Yes  Bleeding gums: No  Change in taste: No  Change in sense of smell: No  Dry mouth: No  Hearing aid used: No  Neck lump: No  Cough: Yes  Sputum or phlegm: Yes  Coughing up blood: No  Difficulty breating or shortness of breath: No  Snoring: Yes  Wheezing: No  Difficulty breathing on exertion: No  Nighttime Cough: No  Difficulty breathing when lying flat: No  Heart burn or indigestion: Yes  Nausea: No  Vomiting: No  Abdominal pain: No  Bloating: Yes  Constipation: Yes  Diarrhea: No  Blood in stool: No  Black stools: No  Rectal or Anal pain: No  Fecal incontinence: No  Yellowing of skin or eyes: No  Vomit with blood: No  Change in stools: No  Back pain: Yes  Muscle aches: No  Neck pain: No  Swollen  joints: No  Joint pain: Yes  Bone pain: No  Muscle cramps: No  Muscle weakness: No  Joint stiffness: Yes  Bone fracture: No  Trouble with coordination: No  Dizziness or trouble with balance: Yes  Fainting or black-out spells: No  Memory loss: No  Headache: Yes  Seizures: No  Speech problems: No  Tingling: Yes  Tremor: No  Weakness: Yes  Difficulty walking: Yes  Paralysis: No  Numbness: Yes  Nervous or Anxious: Yes  Depression: No  Trouble sleeping: Yes  Trouble thinking or concentrating: No  Mood changes: No  Panic attacks: No

## 2018-06-20 NOTE — LETTER
2018       RE: Emiliano Aguiar  [address]  [facility] [address]     Dear Colleague,    Thank you for referring your patient, Emiliano Aguiar, to the [facility] NEUROLOGY at [facility]. Please see a copy of my visit note below.    Krishna  STEF        Service Date: 2018        Son MYRON White MD   [facility]    [address] [place] [address]      RE: Emiliano Aguiar   MRN: [id number]    : 1965              Dear Dr. White:        I had the pleasure to see Mr. 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 He has had some fluctuations of his CIDP in the last year.  At his last visit we had him on 1 g/kg every 3 weeks and he was not necessarily better, but he complained that the side effects of IVIG were lasting too long. Those included headaches which have a migrainous quality, joint pains and occasional back pain as well as malaise, fatigue, nausea and they can last up to 5-6 days after each infusion.  Because he is still working it was very impractical for him to do those infusions every 3 weeks; the side effects ended up costing him several days off his job. Therefore, we transitioned him back to every 4 weeks.  His overall impression compared to 2017 is that things have not changed a lot.  His gait is about the same and so is his hand function and sensation.  He has not had any falls, and denies dysphagia. He has a little more back pain now, which sometimes radiates to the right thigh, posterior calf down and the foot with more numbness and tingling in the distal right toes.  He says that this symptom occurred early on when he was diagnosed with the CIDP, and it initially was thought to represent compressive lumbar radiculopathy.  I do not really have any images of that time available to know what is the case.  He  emphasizes that the back pain again occurs the day after he receives the IVIG infusion and typically lasts about a week to 8 days and then gradually resolves, so it is not a constant symptom for the whole month.  He has not been on subcutaneous IVIG.  He told me that he had a history of an arteriovenous malformation of the brain for which he was last imaged 10 years ago.  He has not had constant headaches, seizures, episodes of loss of consciousness, hemiparesis, visual defect, speech disturbance or anything else suggestive that the malformation would be active.  Medications were reviewed and are as per Epic record.     RODS CIDP score as filled out by the patient a few days ago was 29 out of 48 which was quite lower than his last visit with me in 11/2017 when it was 34, but about the same as 07/2017 when it was 30.       On physical exam his blood pressure is 132/81, pulse 86 and regular.  Weight 96.1 kilos, height is 182 and he endorses no pain.  On neuro exam he is awake, alert and oriented x3.  His strength is 5 out of 5 in upper extremities proximally and distally.  In the lower extremities, he has some motor impersistence with hip flexion, knee extension, but actually 5 out of 5 strength in both muscles.  Knee flexion is also 5 and so is foot dorsiflexion and great toe extension.  Tone normal.  His vibration is still detectable for 12+ seconds at the toes and 25+ seconds at the index fingers, unchanged from last visit.  Position sensation at the toes is probably normal.  When he stands, he has a positive Romberg and his tandem gait is even more difficult than his last visit.  He can barely take a step in tandem now.        My impression is that Emiliano's CIDP may actually be slightly worse than his previous visit.  His RODS score is 5 points down from 11/2017, although it similar to 07/2017. His postural stability is also slightly worse.  Point is what to do with him.  The obvious first reaction would be to  increase his IVIG frequency but he cannot do this for the reasons mentioned above.  A trial of pulse corticosteroids that we did 3 years ago was unsuccessful with appropriate doses given for 3 months.  He was initially on plasmapheresis in 2015, but this cannot be administered at the same time with IVIG, and I really do not want him to stop the immunoglobulin because it was the only treatment that helped bring his CIDP into remission.   I told Emiliano that while there is some objective deterioration from 11/2017, he himself does not feel that this has impacted his ADLs to such a dramatic extent. Therefore, it may be prudent to do nothing right now, other than renewing his current treatment. I told him, however, that if he continues to decline, we should consider additional treatment- if not steroids or PLEX, this would be some sort of oral or IV immunosuppression. I would also like him to undergo an MRI of the lumbar spine with contrast because I want to see if his back pain radiating to the right leg is a compressive radiculopathy or an inflammatory one.  Actually, imaging with contrast would be very helpful to tell between the two, and this can affect our treatment plan.      He will follow up in our clinic in 3 months or sooner if necessary.  One additional thought may be to transition him to subcutaneous immunoglobulin.  I do not know if he will experience less of the joint pains or headaches with the subcutaneous treatment, but it is possible.  I did not have time to discuss this with the patient today, but I will call him or send him a Ratify message to touch on this topic.  Lastly, I will contact my Neurosurgery or stroke colleagues and inquire whether there is any indication to reimage his arteriovenous malformation of the brain which has not been checked in almost a decade.   TT spent for patient care 15 minutes; more than half was counseling.     Sincerely,        MD NICHOLAS Juarez  MELO BOWDEN MD             D: 2018   T: 2018   MT: tb      Name:     LUTHER HUGHES   MRN:      [id number]        Account:      [id number]   :      1965           Service Date: 2018      Document: [id number]      Again, thank you for allowing me to participate in the care of your patient.      Sincerely,    Nixon Bowden MD

## 2018-07-16 RX ORDER — DIPHENHYDRAMINE HCL 25 MG
50 CAPSULE ORAL ONCE
Status: CANCELLED | OUTPATIENT
Start: 2018-07-16

## 2018-07-16 RX ORDER — ACETAMINOPHEN 325 MG/1
650 TABLET ORAL ONCE
Status: CANCELLED
Start: 2018-07-16

## 2018-10-09 ENCOUNTER — PATIENT OUTREACH (OUTPATIENT)
Dept: CARE COORDINATION | Facility: CLINIC | Age: 53
End: 2018-10-09

## 2018-10-10 ENCOUNTER — OFFICE VISIT (OUTPATIENT)
Dept: NEUROLOGY | Facility: CLINIC | Age: 53
End: 2018-10-10
Payer: COMMERCIAL

## 2018-10-10 VITALS
HEIGHT: 72 IN | SYSTOLIC BLOOD PRESSURE: 148 MMHG | RESPIRATION RATE: 16 BRPM | DIASTOLIC BLOOD PRESSURE: 88 MMHG | HEART RATE: 71 BPM | OXYGEN SATURATION: 97 % | TEMPERATURE: 97.9 F | BODY MASS INDEX: 28.65 KG/M2 | WEIGHT: 211.5 LBS

## 2018-10-10 DIAGNOSIS — G61.81 CIDP (CHRONIC INFLAMMATORY DEMYELINATING POLYNEUROPATHY) (H): Primary | ICD-10-CM

## 2018-10-10 ASSESSMENT — PAIN SCALES - GENERAL: PAINLEVEL: NO PAIN (0)

## 2018-10-10 NOTE — LETTER
10/10/2018       RE: Emiliano Aguiar  [address]  [facility] [address]     Dear Colleague,    Thank you for referring your patient, Emiliano Aguiar, to the [facility] NEUROLOGY at [facility]. Please see a copy of my visit note below.    Service Date: 10/10/2018      Son DEBBIE White MD   [facility]    [address] [place] [address]      RE: Emiliano Aguiar     MRN: [id number]   : 1965      Dear Dr. White::      I had the pleasure to see Emiliano Aguiar in followup at the [facility] for his CIDP, for which he is on IVIG 1 g/kg every 4 weeks.  Compared to a few months ago, he does not appreciate any significant change.  A major problem remains side effects from IVIG including arthralgia, knee pain, back pain and tingling that occur predictably after the IV infusions for up to 5-6 days, and cost him a few days at work in the past.  That is the reason I did not want him to be treated every 3 weeks, because he was losing a lot of time from work.  Previously, pulse corticosteroids given for 3 months failed.  Plasmapheresis worked for a short period of time, but I did not like the idea of administering this as long-term treatment.  He tells me he has some pain in the hands and feet too, with neuropathic qualities which lately occurs more frequently.     MEDICATIONS:  Reviewed and are as per Epic record.      PHYSICAL EXAMINATION:   VITAL SIGNS:  His blood pressure is 148/88.  Pulse 71 and regular.  Respiratory rate 16.  Temperature 97.9 Fahrenheit oral.  O2 sat 97% on room air.  Weight 95.9 kilos.  Height is 182.  He endorses no pain.      His last IVIG dose was 2018, close to 4 weeks prior to today.  A RODS CIDP score filled by the patient was 27/48, 2 points lower than his last visit.     NEUROLOGIC:  His strength is 5/5 in upper and lower extremities proximally and distally.  In the lower extremities, there is a  quite prominent motor impersistence but he is able to momentarily give full power in all muscle groups.  Tone is normal.  Vibration is a little less than his last visit, now about 9 seconds at the right great toe and 7-8 at the left.  It is over 23 seconds at the index fingers, unchanged.  Position sensation of the toes is mildly impaired on both sides, a little worse than last time.  He has a positive Romberg sign.  He can take about 2-3 steps of tandem but very hesitantly, about the same as last time.      In summary, Mr. Aguiar's CIDP in my opinion has shown a mild decline over the last year as indicated by gradual decline of the RODS CIDP score and slight worsening of his balance and distal vibration sensation; however, a lot of the dysfunction he reports may really have to do with the side effects of the IVIG treatment.      We discussed what the alternative options would be.  One would be to provide oral steroids sparing immunosuppressant drugs or IV monoclonal antibodies such as rituximab, although the evidence for efficacy of those in CIDP is modest at the very best.  The other option is to try subcutaneous immunoglobulin.  A recent randomized controlled trial showed that stable patients with CIDP can generally be transitioned safely from IVIG to ScIg.  I wonder whether giving a weekly smaller load of immunoglobulin may lead to less of the side effects including back pain and arthralgia that he was experiencing with intravenous immunoglobulin.  I told him that this is my main motivation for switching his treatment and he is willing to do this trial.  I will make sure it is approved by his insurance and contact him.  I calculated the weekly dose of subcutaneous immunoglobulin (Hizentra) that he will require at 32.8 grams.  He will return to our clinic for followup in 3 months or sooner if needed.      Sincerely,       Nixon Godoy MD

## 2018-10-10 NOTE — MR AVS SNAPSHOT
After Visit Summary   10/10/2018    Emiliano Aguiar    MRN: [id number]           Patient Information     Date Of Birth          1965        Visit Information        Provider Department      10/10/2018 9:20 AM Nixon Godoy MD [facility] Neurology         Follow-ups after your visit        Follow-up notes from your care team     Return in about 3 months (around 1/10/2019).      Your next 10 appointments already scheduled     Jan 16, 2019  9:50 AM CST   (Arrive by 9:35 AM)   Return Neuropathy Visit with Nixon Godoy MD   [facility] Neurology ([facility])    [address]  [address] [facility] [address] [phone]              Who to contact     Please call your clinic at [phone] to:    Ask questions about your health    Make or cancel appointments    Discuss your medicines    Learn about your test results    Speak to your doctor            Additional Information About Your Visit        MyChart Information     VISUALPLANT gives you secure access to your electronic health record. If you see a primary care provider, you can also send messages to your care team and make appointments. If you have questions, please call your primary care clinic.  If you do not have a primary care provider, please call [phone] and they will assist you.      VISUALPLANT is an electronic gateway that provides easy, online access to your medical records. With [employer]ANT, you can request a clinic appointment, read your test results, renew a prescription or communicate with your care team.     To access your existing account, please contact your [facility] or call [phone] for assistance.        Care EveryWhere ID     This is your Care EveryWhere ID. This could be used by other organizations to access your [place] medical records  [id number]        Your Vitals Were     Pulse Temperature Respirations Height Pulse Oximetry  BMI (Body Mass Index)    71 97.9  F (36.6  C) (Oral) 16 1.829 m (6') 97% 28.68 kg/m2       Blood Pressure from Last 3 Encounters:   10/10/18 148/88   06/20/18 132/81   11/01/17 135/84    Weight from Last 3 Encounters:   10/10/18 95.9 kg (211 lb 8 oz)   06/20/18 96.1 kg (211 lb 14.4 oz)   11/01/17 95.3 kg (210 lb)              Today, you had the following     No orders found for display       Primary Care Provider Office Phone # Fax #    Alexander Spenser White [phone]       [facility] [address] [place] [address]        Equal Access to Services     MOISES SUBRAMANIAN : Hadii too melendezo Sosammy, waaxda luqadaha, qaybta kaalmada adeegyada, concepción cano . So [facility] [phone].    ATENCIÓN: Si habla español, tiene a krishnamurthy disposición servicios gratuitos de asistencia lingüística. Llame al [phone].    We comply with applicable federal civil rights laws and [place] laws. We do not discriminate on the basis of race, color, national origin, age, disability, sex, sexual orientation, or gender identity.            Thank you!     Thank you for choosing [facility] NEUROLOGY  for your care. Our goal is always to provide you with excellent care. Hearing back from our patients is one way we can continue to improve our services. Please take a few minutes to complete the written survey that you may receive in the mail after your visit with us. Thank you!             Your Updated Medication List - Protect others around you: Learn how to safely use, store and throw away your medicines at www.disposemymeds.org.          This list is accurate as of 10/10/18  9:33 AM.  Always use your most recent med list.                   Brand Name Dispense Instructions for use Diagnosis    acetaminophen 500 MG tablet    TYLENOL     Take 1,000 mg by mouth every 6 hours as needed        CALCI-CHEW 1250 (500 Ca) MG Chew   Generic drug:  calcium carbonate           diphenhydrAMINE 25 MG  capsule    BENADRYL     Take 25 mg by mouth every 6 hours as needed        TUMS PO      Take by mouth as needed

## 2018-10-10 NOTE — NURSING NOTE
Chief Complaint   Patient presents with     NEUROPATHY     [facility] RETURN- NEUROPATHY- CIDP 4 MONTH F/U   Preventive Care:    Colorectal Cancer Screening: During our visit today, we discussed that it is recommended you receive colorectal cancer screening. Please call or make an appointment with your primary care provider to discuss this. You may also call the [employer] [employer] scheduling line ([phone]) to set up a colonoscopy appointment.      Carlos Becerra, EMT

## 2018-10-10 NOTE — PROGRESS NOTES
Service Date: 10/10/2018      Son DEBBIE MD Cindy   [facility]    [address] [place] [address]      RE: Emiliano Aguiar     MRN: [id number]   : 1965      Dear Dr. White::      I had the pleasure to see Emiliano Aguiar in followup at the [facility] for his CIDP, for which he is on IVIG 1 g/kg every 4 weeks.  Compared to a few months ago, he does not appreciate any significant change.  A major problem remains side effects from IVIG including arthralgia, knee pain, back pain and tingling that occur predictably after the IV infusions for up to 5-6 days, and cost him a few days at work in the past.  That is the reason I did not want him to be treated every 3 weeks, because he was losing a lot of time from work.  Previously, pulse corticosteroids given for 3 months failed.  Plasmapheresis worked for a short period of time, but I did not like the idea of administering this as long-term treatment.  He tells me he has some pain in the hands and feet too, with neuropathic qualities which lately occurs more frequently.     MEDICATIONS:  Reviewed and are as per Epic record.      PHYSICAL EXAMINATION:   VITAL SIGNS:  His blood pressure is 148/88.  Pulse 71 and regular.  Respiratory rate 16.  Temperature 97.9 Fahrenheit oral.  O2 sat 97% on room air.  Weight 95.9 kilos.  Height is 182.  He endorses no pain.      His last IVIG dose was 2018, close to 4 weeks prior to today.  A RODS CIDP score filled by the patient was 27/48, 2 points lower than his last visit.     NEUROLOGIC:  His strength is 5/5 in upper and lower extremities proximally and distally.  In the lower extremities, there is a quite prominent motor impersistence but he is able to momentarily give full power in all muscle groups.  Tone is normal.  Vibration is a little less than his last visit, now about 9 seconds at the right great toe and 7-8 at the left.  It is over 23 seconds at the index fingers,  unchanged.  Position sensation of the toes is mildly impaired on both sides, a little worse than last time.  He has a positive Romberg sign.  He can take about 2-3 steps of tandem but very hesitantly, about the same as last time.      In summary, Mr. Aguiar's CIDP in my opinion has shown a mild decline over the last year as indicated by gradual decline of the RODS CIDP score and slight worsening of his balance and distal vibration sensation; however, a lot of the dysfunction he reports may really have to do with the side effects of the IVIG treatment.      We discussed what the alternative options would be.  One would be to provide oral steroids sparing immunosuppressant drugs or IV monoclonal antibodies such as rituximab, although the evidence for efficacy of those in CIDP is modest at the very best.  The other option is to try subcutaneous immunoglobulin.  A recent randomized controlled trial showed that stable patients with CIDP can generally be transitioned safely from IVIG to ScIg.  I wonder whether giving a weekly smaller load of immunoglobulin may lead to less of the side effects including back pain and arthralgia that he was experiencing with intravenous immunoglobulin.  I told him that this is my main motivation for switching his treatment and he is willing to do this trial.  I will make sure it is approved by his insurance and contact him.  I calculated the weekly dose of subcutaneous immunoglobulin (Hizentra) that he will require at 32.8 grams.  He will return to our clinic for followup in 3 months or sooner if needed.      Sincerely,       MD NICHOLAS Andrade MD             D: 10/10/2018   T: 10/10/2018   MT: KATJA      Name:     LUTHER AGUIAR   MRN:      [id number]        Account:      [id number]   :      1965           Service Date: 10/10/2018      Document: [id number]

## 2018-10-11 ENCOUNTER — TELEPHONE (OUTPATIENT)
Dept: NEUROLOGY | Facility: CLINIC | Age: 53
End: 2018-10-11

## 2018-10-11 NOTE — TELEPHONE ENCOUNTER
Patient was seen by Dr. Godoy yesterday and the decision was made to transition him from IVIg to SCIg 32g weekly. Hizentra order placed by Dr. Godoy. Contacted [facility] to see if they can service the patient. They will look into this and get back to me.     His next IVIg dose will be 10/16/18 and patient should plan to receive this. He should start SCIg 1 week after his last IVIg dose.

## 2018-10-12 NOTE — TELEPHONE ENCOUNTER
Spoke with Emiliano and informed him of lack of coverage for SCIg by Medicare. Although he is disappointed, he understands. He will continue IVIg as ordered until Dr. Godoy says otherwise.     He is wondering if there is anything he could take prior to his infusions to offset the side effects of: headache, weakness, fatigue, joint pain, nausea. He currently pre-medicates with APAP and Benadryl. No steroid pre-med.   Dr. Godoy, let me know if you recommend anything for him. I also told him you would be in touch with further suggestions.

## 2018-10-12 NOTE — TELEPHONE ENCOUNTER
[facility] Call Center    Phone Message    May a detailed message be left on voicemail: yes    Reason for Call: Other: Pt returning call to Sandra.     Action Taken: Message routed to:  [facility]

## 2018-10-12 NOTE — TELEPHONE ENCOUNTER
If taking tylenol and Benadryl pre-infusion does not help, it may be reasonable to try a Tylenol #3 (contains hydrocodone) every 8 hours on the day of the infusion as well as for 2-3 more days thereafter. I d rather the Tylenol #3 prescription comes through his primary's office because it is a controlled substance. Thanks

## 2018-10-12 NOTE — TELEPHONE ENCOUNTER
Called patient and left M asking for a call back. We will have to continue IVIg for the time being as SCIg is not covered by Medicare.

## 2018-10-12 NOTE — TELEPHONE ENCOUNTER
Yes, renew IVIG order, and I will call Emiliano with further suggestions after I think about it a little bit ( I am not exactly sure what further medications we could add to control his CIDP better). Thanks

## 2018-10-15 ENCOUNTER — HOME INFUSION (PRE-WILLOW HOME INFUSION) (OUTPATIENT)
Dept: PHARMACY | Facility: CLINIC | Age: 53
End: 2018-10-15

## 2018-10-15 ENCOUNTER — TELEPHONE (OUTPATIENT)
Dept: NEUROLOGY | Facility: CLINIC | Age: 53
End: 2018-10-15

## 2018-10-15 NOTE — TELEPHONE ENCOUNTER
Patient's insurance will not cover SCIg and this Rx was mistakenly sent to [facility] pharmacy. Called pharmacy and let them know they can disregard.

## 2018-10-15 NOTE — TELEPHONE ENCOUNTER
Health Call Center    Phone Message    May a detailed message be left on voicemail: yes    Reason for Call: Medication Question or concern regarding medication   Prescription Clarification  Name of Medication: Immune Globulin, Human, (HIZENTRA) 10 GM/50ML SOLN  Prescribing Provider: Dr. Godoy    Pharmacy: [facility] #[id number] - [place], [place] - [address]   What on the order needs clarification? [facility] needs complete orders for medication. They are faxing over a form that needs to be filled out.    Action Taken: Message routed to:  [facility] ([facility] Neurology

## 2018-10-15 NOTE — TELEPHONE ENCOUNTER
Left [facility] for Emiliano informing him of Dr. Godoy recommendation below for Tylenol #3 and that his PCP can prescribe this. Asked for a call back with questions.

## 2018-10-15 NOTE — PROGRESS NOTES
Therapy: SQ IG (IVIG - but given SQ)   Insurance: [employer]   IVIG is Dx driven  and method must be on [place] (per Medicare) if pt met criteria, however, pt is getting subcutaneous no coverage in home w Medicare  Ded: $  Met: $    Co-Insurance:   Max Out of Pocket: $  Met: $    Please contact Intake with any questions, [phone] or In Basket pool, [facility] ([phone]).  In reference to clinic referral made on 10/12/18 to check IVIG subcutaneous coverage

## 2019-01-02 ENCOUNTER — OFFICE VISIT (OUTPATIENT)
Dept: NEUROLOGY | Facility: CLINIC | Age: 54
End: 2019-01-02
Payer: COMMERCIAL

## 2019-01-02 VITALS
BODY MASS INDEX: 27.22 KG/M2 | WEIGHT: 201 LBS | HEART RATE: 68 BPM | HEIGHT: 72 IN | DIASTOLIC BLOOD PRESSURE: 80 MMHG | SYSTOLIC BLOOD PRESSURE: 133 MMHG | OXYGEN SATURATION: 96 %

## 2019-01-02 DIAGNOSIS — M79.2 NEUROPATHIC PAIN: Primary | ICD-10-CM

## 2019-01-02 DIAGNOSIS — G61.81 CIDP (CHRONIC INFLAMMATORY DEMYELINATING POLYNEUROPATHY) (H): ICD-10-CM

## 2019-01-02 RX ORDER — DULOXETIN HYDROCHLORIDE 30 MG/1
CAPSULE, DELAYED RELEASE ORAL
Qty: 60 CAPSULE | Refills: 1 | Status: SHIPPED | OUTPATIENT
Start: 2019-01-02 | End: 2019-08-20

## 2019-01-02 ASSESSMENT — MIFFLIN-ST. JEOR: SCORE: 1789.73

## 2019-01-02 ASSESSMENT — PAIN SCALES - GENERAL: PAINLEVEL: NO PAIN (0)

## 2019-01-02 NOTE — LETTER
2019       RE: Emiliano Aguiar  [address]  [facility] [address]     Dear Colleague,    Thank you for referring your patient, Emiliano Aguiar, to the [facility] NEUROLOGY at [facility]. Please see a copy of my visit note below.    Service Date: 2019       Son MARTHA White MD    [facility]   [address] [place] [address]       RE:    Emiliano Aguiar   MRN:  [id number]   :  1965      Dear Dr. White:      I had the pleasure to see Emiliano Aguiar in followup at the [facility] for his CIDP for which he is on IVIG 1 g/kg every 4 weeks.  Compared to a few months ago, he does not appreciate any changes.  A major problem remains side effects from IVIG including headaches, arthralgias, knee and back pain and tingling that occur predictably after the infusions for a good week and cost him several days of work.  Previously, pulse corticosteroids given as 1000 mg of Solu-Medrol weekly x8 weeks failed, and plasmapheresis worked for a short period of time.  Because of the possibility that subcutaneous immunoglobulin may have less of the aforementioned side effects, we tried to transition him to SCIG last time, but I heard that it was not approved by his insurance.  I will look into this again.  Therefore, we continued IVIG.        He feels his balance and strength are about the same. He told me that he tried CBD oil that he purchased through [place], at 90 mg daily. his had a positive effect and the headaches did not occur after the IVIG infusions.  The joint pains were much better.  He is wondering whether this is an option in [place].      MEDICATIONS:  Reviewed and are as per Epic record.      RODS CIDP score is 29/48; see EMR for detailed answers.     PHYSICAL EXAMINATION:   VITAL SIGNS:  Blood pressure 133/80, pulse 68 and regular, O2 sat 96% on room air, weight 91.2 kg, height is 182 cm and he  endorses no pain.   NEUROLOGIC:  Things are unchanged from last visit.  He has 5/5 strength in upper and lower extremities proximally and distally.  Tone is normal.  Vibration is 11 seconds at the toes bilaterally.  It is over 23 at the index fingers.  Position sensation at the toes look normal to me today, but he does have a positive Romberg sign and he can take no more than 2-3 steps of tandem, with very significant hesitance.        In summary, Mr. Aguiar's CIDP is stable.  He has a lot of side effects from the IVIG and a little wearing off, especially between the 2nd and 4th week of the infusion.  At this point, I will reattempt to contact his insurance and see why subcutaneous immunoglobulin was denied and whether we can appeal this.  Pending this, I will prescribe him duloxetine for neuropathic and other forms of pain.  We went over in detail the side effects of SNRIs including anorexia, weight loss, nausea, insomnia, erectile dysfunction and mood changes.  He was a little leery to take an SSRI because 28 years ago, he took an antidepressant and he attempted suicide.  I told him that the risk of suicidality with SNRIs used to treat neuropathic pain is generally very low, to the point it is not a major factor in my decision to administer it to patients.  Overall, I have prescribed Cymbalta for years with good safety record.  I would elect this drug over gabapentin or Lyrica because it has less of the sedative side effects of the latter two drugs.  We will start at 30 mg daily for 2 weeks and then increase to 60 mg daily.        Mr. Aguiar concurs with the above plan.  He will return to clinic for followup in 3 months. TT spent for patient care 15 minutes; more than half was counseling.      Sincerely,      Nixon Godoy MD       D: 2019   T: 2019   MT: GEORGINA      Name:     LUTHER AGUIAR   MRN:      [id number]        Account:      [id number]   :      1965           Service Date:  01/02/2019      Document: [id number]

## 2019-01-02 NOTE — NURSING NOTE
Chief Complaint   Patient presents with     RECHECK     FOLLOW UP NEUROPATHY       Erma Person MA

## 2019-01-02 NOTE — PROGRESS NOTES
Service Date: 2019       Son MARTHA White MD    [facility]   [address] [place] [address]       RE:    Emiliano Aguiar   MRN:  [id number]   :  1965      Dear Dr. White:      I had the pleasure to see Emiliano Aguiar in followup at the [facility] for his CIDP for which he is on IVIG 1 g/kg every 4 weeks.  Compared to a few months ago, he does not appreciate any changes.  A major problem remains side effects from IVIG including headaches, arthralgias, knee and back pain and tingling that occur predictably after the infusions for a good week and cost him several days of work.  Previously, pulse corticosteroids given as 1000 mg of Solu-Medrol weekly x8 weeks failed, and plasmapheresis worked for a short period of time.  Because of the possibility that subcutaneous immunoglobulin may have less of the aforementioned side effects, we tried to transition him to SCIG last time, but I heard that it was not approved by his insurance.  I will look into this again.  Therefore, we continued IVIG.        He feels his balance and strength are about the same. He told me that he tried CBD oil that he purchased through [place], at 90 mg daily. his had a positive effect and the headaches did not occur after the IVIG infusions.  The joint pains were much better.  He is wondering whether this is an option in [place].      MEDICATIONS:  Reviewed and are as per Epic record.      RODS CIDP score is 29/48; see EMR for detailed answers.     PHYSICAL EXAMINATION:   VITAL SIGNS:  Blood pressure 133/80, pulse 68 and regular, O2 sat 96% on room air, weight 91.2 kg, height is 182 cm and he endorses no pain.   NEUROLOGIC:  Things are unchanged from last visit.  He has 5/5 strength in upper and lower extremities proximally and distally.  Tone is normal.  Vibration is 11 seconds at the toes bilaterally.  It is over 23 at the index fingers.  Position sensation at  the toes look normal to me today, but he does have a positive Romberg sign and he can take no more than 2-3 steps of tandem, with very significant hesitance.        In summary, Mr. Aguiar's CIDP is stable.  He has a lot of side effects from the IVIG and a little wearing off, especially between the 2nd and 4th week of the infusion.  At this point, I will reattempt to contact his insurance and see why subcutaneous immunoglobulin was denied and whether we can appeal this.  Pending this, I will prescribe him duloxetine for neuropathic and other forms of pain.  We went over in detail the side effects of SNRIs including anorexia, weight loss, nausea, insomnia, erectile dysfunction and mood changes.  He was a little leery to take an SSRI because 28 years ago, he took an antidepressant and he attempted suicide.  I told him that the risk of suicidality with SNRIs used to treat neuropathic pain is generally very low, to the point it is not a major factor in my decision to administer it to patients.  Overall, I have prescribed Cymbalta for years with good safety record.  I would elect this drug over gabapentin or Lyrica because it has less of the sedative side effects of the latter two drugs.  We will start at 30 mg daily for 2 weeks and then increase to 60 mg daily.        Mr. Aguiar concurs with the above plan.  He will return to clinic for followup in 3 months. TT spent for patient care 15 minutes; more than half was counseling.      Sincerely,         MD NICHOLAS Andrade MD             D: 2019   T: 2019   MT: GEORGINA      Name:     LUTHER AGUIAR   MRN:      [id number]        Account:      [id number]   :      1965           Service Date: 2019      Document: [id number]

## 2019-04-03 ENCOUNTER — OFFICE VISIT (OUTPATIENT)
Dept: NEUROLOGY | Facility: CLINIC | Age: 54
End: 2019-04-03
Payer: COMMERCIAL

## 2019-04-03 VITALS — SYSTOLIC BLOOD PRESSURE: 158 MMHG | HEART RATE: 69 BPM | DIASTOLIC BLOOD PRESSURE: 89 MMHG | OXYGEN SATURATION: 97 %

## 2019-04-03 DIAGNOSIS — R27.0 ATAXIA: Primary | ICD-10-CM

## 2019-04-03 DIAGNOSIS — R74.8 ABNORMAL LEVELS OF OTHER SERUM ENZYMES: ICD-10-CM

## 2019-04-03 DIAGNOSIS — G61.81 CIDP (CHRONIC INFLAMMATORY DEMYELINATING POLYNEUROPATHY) (H): ICD-10-CM

## 2019-04-03 DIAGNOSIS — R27.0 ATAXIA: ICD-10-CM

## 2019-04-03 LAB
FOLATE SERPL-MCNC: 8.7 NG/ML
VIT B12 SERPL-MCNC: 487 PG/ML (ref 193–986)

## 2019-04-03 ASSESSMENT — PAIN SCALES - GENERAL: PAINLEVEL: MODERATE PAIN (4)

## 2019-04-03 NOTE — LETTER
4/3/2019       RE: Emiliano Aguiar  [address]  [facility] [address]     Dear Colleague,    Thank you for referring your patient, Emiliano Aguiar, to the [facility] NEUROLOGY at [facility]. Please see a copy of my visit note below.    [facility]rom[facility]uropathy [facility]  04/03/19    Mr. Emiliano Aguiar presents in followup at the [facility] for his CIDP for which he is on IVIG 1 g/kg every 4 weeks. Patient was last seen January 2, 2019.  Compared to a few months ago, he does not appreciate any major changes. He does not fall and walks independently. He feels his balance and strength are about the same. His last IVIg dose was yesterday. Wear off occurs 1 week prior to his next dose. He will get worsening of his balance, fatigue and numbness and tingling in the fingertips and feet. He has not tried Cymbalta that was prescribed during his prior visit as he is apprehensive of the side effects. A major problem remains side effects from IVIG including headaches, arthralgias, knee and back pain and tingling that occur predictably after the infusions for a good week and cost him several days of work per month. He reports that CBD oil that he purchased through [place], at 90 mg daily has had a positive effect and the headaches did not occur after the IVIG infusions and joint pains were not as severe. Previously, pulse corticosteroids given as 1000 mg of Solu-Medrol weekly x8 weeks failed, and plasmapheresis worked for a short period of time.  Because of the possibility that subcutaneous immunoglobulin may have less of the aforementioned side effects, we tried to transition him to SCIG last time, but it was denied by his insurance.    Past Medical History:   Diagnosis Date     Anxiety state, unspecified      Bipolar affective (H)      Depressive disorder, not elsewhere classified      GERD (gastroesophageal reflux  disease)        Current Outpatient Medications   Medication     acetaminophen (TYLENOL) 500 MG tablet     calcium carbonate (CALCI-CHEW) 1250 (500 CA) MG CHEW     Calcium Carbonate Antacid (TUMS PO)     diphenhydrAMINE (BENADRYL) 25 MG capsule     DULoxetine (CYMBALTA) 30 MG capsule     Immune Globulin, Human, (HIZENTRA) 10 GM/50ML SOLN     No current facility-administered medications for this visit.      January 2 2019- RODS CIDP score is 29/48; see EMR for detailed answers.  April 3 2019- RODS CIDP score is 25/48    PHYSICAL EXAMINATION:     /89 (BP Location: Right arm, Patient Position: Sitting, Cuff Size: Adult Regular)   Pulse 69   SpO2 97%     NEUROLOGIC:  Things are unchanged from last visit.  He has 5/5 strength in upper and lower extremities proximally and distally.      Tone is normal.  Vibration is 11 seconds at the toes bilaterally.  It is over 23 at the index fingers.  Position sensation was normal, but he does have a positive Romberg sign and he can take no more than 2-3 steps of tandem, with very significant hesitance.     In summary, Mr. Aguiar's CIDP is stable.  He has a lot of side effects from the IVIG and a little wearing off, especially after 3rd week post infusion. SCIg was denied by his insurance. We discussed a trial of Lyrica or Gabapentin for paresthesias and pain but he would like to defer trying any medications at this time. We will check B12, folate, Vitamin E and copper levels to rule out nutritional causes of unsteadiness. He will continue his current IVIg regimen of 1g/kg every 4 weeks and return to clinic in 6 months.      Patient seen and evaluated with Dr. Jayne Bejarano DO  Neuromuscular Fellow    ATTENDING ADDENDUM: Patient seen and examined with Fellow Dr Bejarano at the Neuropathy Clinic today. Agree with her assessment and plan as above. TT spent for patient care 15 minutes; more than half was counseling.     Sincerely,    Nixon Godoy MD

## 2019-04-03 NOTE — NURSING NOTE
Chief Complaint   Patient presents with     RECHECK     [facility] RETURN - 3 MONTH FOLLOW UP       Osorio Issa, EMT

## 2019-04-03 NOTE — PROGRESS NOTES
[facility] Neuromuscular Neuropathy Clinic  04/03/19    Mr. Emiliano Aguiar presents in followup at the [facility] for his CIDP for which he is on IVIG 1 g/kg every 4 weeks. Patient was last seen January 2, 2019.  Compared to a few months ago, he does not appreciate any major changes. He does not fall and walks independently. He feels his balance and strength are about the same. His last IVIg dose was yesterday. Wear off occurs 1 week prior to his next dose. He will get worsening of his balance, fatigue and numbness and tingling in the fingertips and feet. He has not tried Cymbalta that was prescribed during his prior visit as he is apprehensive of the side effects. A major problem remains side effects from IVIG including headaches, arthralgias, knee and back pain and tingling that occur predictably after the infusions for a good week and cost him several days of work per month. He reports that CBD oil that he purchased through [place], at 90 mg daily has had a positive effect and the headaches did not occur after the IVIG infusions and joint pains were not as severe. Previously, pulse corticosteroids given as 1000 mg of Solu-Medrol weekly x8 weeks failed, and plasmapheresis worked for a short period of time.  Because of the possibility that subcutaneous immunoglobulin may have less of the aforementioned side effects, we tried to transition him to SCIG last time, but it was denied by his insurance.    Past Medical History:   Diagnosis Date     Anxiety state, unspecified      Bipolar affective (H)      Depressive disorder, not elsewhere classified      GERD (gastroesophageal reflux disease)        Current Outpatient Medications   Medication     acetaminophen (TYLENOL) 500 MG tablet     calcium carbonate (CALCI-CHEW) 1250 (500 CA) MG CHEW     Calcium Carbonate Antacid (TUMS PO)     diphenhydrAMINE (BENADRYL) 25 MG capsule     DULoxetine (CYMBALTA) 30 MG capsule      Immune Globulin, Human, (HIZENTRA) 10 GM/50ML SOLN     No current facility-administered medications for this visit.      January 2 2019- RODS CIDP score is 29/48; see EMR for detailed answers.  April 3 2019- RODS CIDP score is 25/48    PHYSICAL EXAMINATION:     /89 (BP Location: Right arm, Patient Position: Sitting, Cuff Size: Adult Regular)   Pulse 69   SpO2 97%     NEUROLOGIC:  Things are unchanged from last visit.  He has 5/5 strength in upper and lower extremities proximally and distally.      Tone is normal.  Vibration is 11 seconds at the toes bilaterally.  It is over 23 at the index fingers.  Position sensation was normal, but he does have a positive Romberg sign and he can take no more than 2-3 steps of tandem, with very significant hesitance.     In summary, Mr. Aguiar's CIDP is stable.  He has a lot of side effects from the IVIG and a little wearing off, especially after 3rd week post infusion. SCIg was denied by his insurance. We discussed a trial of Lyrica or Gabapentin for paresthesias and pain but he would like to defer trying any medications at this time. We will check B12, folate, Vitamin E and copper levels to rule out nutritional causes of unsteadiness. He will continue his current IVIg regimen of 1g/kg every 4 weeks and return to clinic in 6 months.      Patient seen and evaluated with Dr. Jayne Bejarano DO  Neuromuscular Fellow    ATTENDING ADDENDUM: Patient seen and examined with Fellow Dr Bejarano at the Neuropathy Clinic today. Agree with her assessment and plan as above. TT spent for patient care 15 minutes; more than half was counseling. Nixon Godoy MD

## 2019-04-05 LAB
A-TOCOPHEROL VIT E SERPL-MCNC: 8.2 MG/L (ref 5.5–18)
BETA+GAMMA TOCOPHEROL SERPL-MCNC: 1.2 MG/L (ref 0–6)
COPPER SERPL-MCNC: 90 UG/DL (ref 70–140)

## 2019-06-13 ENCOUNTER — OFFICE VISIT (OUTPATIENT)
Dept: NEUROLOGY | Facility: CLINIC | Age: 54
End: 2019-06-13
Payer: COMMERCIAL

## 2019-06-13 ENCOUNTER — ANCILLARY PROCEDURE (OUTPATIENT)
Dept: GENERAL RADIOLOGY | Facility: CLINIC | Age: 54
End: 2019-06-13
Attending: FAMILY MEDICINE
Payer: COMMERCIAL

## 2019-06-13 ENCOUNTER — OFFICE VISIT (OUTPATIENT)
Dept: ORTHOPEDICS | Facility: CLINIC | Age: 54
End: 2019-06-13
Payer: COMMERCIAL

## 2019-06-13 VITALS
DIASTOLIC BLOOD PRESSURE: 75 MMHG | WEIGHT: 204 LBS | HEART RATE: 64 BPM | SYSTOLIC BLOOD PRESSURE: 127 MMHG | BODY MASS INDEX: 27.67 KG/M2

## 2019-06-13 VITALS
BODY MASS INDEX: 27.72 KG/M2 | WEIGHT: 204.4 LBS | OXYGEN SATURATION: 97 % | DIASTOLIC BLOOD PRESSURE: 75 MMHG | SYSTOLIC BLOOD PRESSURE: 127 MMHG | HEART RATE: 63 BPM

## 2019-06-13 DIAGNOSIS — M25.562 ACUTE PAIN OF BOTH KNEES: Primary | ICD-10-CM

## 2019-06-13 DIAGNOSIS — G61.81 CIDP (CHRONIC INFLAMMATORY DEMYELINATING POLYNEUROPATHY) (H): Primary | ICD-10-CM

## 2019-06-13 DIAGNOSIS — M25.561 ACUTE PAIN OF BOTH KNEES: Primary | ICD-10-CM

## 2019-06-13 DIAGNOSIS — R45.2: ICD-10-CM

## 2019-06-13 ASSESSMENT — PATIENT HEALTH QUESTIONNAIRE - PHQ9: SUM OF ALL RESPONSES TO PHQ QUESTIONS 1-9: 13

## 2019-06-13 ASSESSMENT — PAIN SCALES - GENERAL: PAINLEVEL: MODERATE PAIN (4)

## 2019-06-13 NOTE — NURSING NOTE
Chief Complaint   Patient presents with     RECHECK     [facility] RETURN - NEUROPATHY       Osorio Issa, EMT

## 2019-06-13 NOTE — PROGRESS NOTES
SPORTS & ORTHOPEDIC WALK-IN VISIT 6/13/2019    Primary Care Physician: Dr. White     Reason for visit:     What part of your body is injured / painful?  left hip, knee and ankle - AND lower back     What caused the injury /pain? Thought it was from his Disease but DR doesn't think that    How long ago did your injury occur or pain begin? Consistent with disease- 5 years     What are your most bothersome symptoms? Pain, Numbness, Tingling and Giving way or instability    How would you characterize your symptom?  Irritation and numbness- localized in joint     What makes your symptoms better? Rest    What makes your symptoms worse? Standing, Walking and Movement    Have you been previously seen for this problem? Yes, Neurology     Medical History:    Any recent changes to your medical history? No    Any new medication prescribed since last visit? No    Have you had surgery on this body part before? No    Social History:    Occupation: [employer]     Handedness: Right    Exercise: None    Review of Systems:    Do you have fever, chills, weight loss? No    Do you have any vision problems? No    Do you have any chest pain or edema? No    Do you have any shortness of breath or wheezing?  No    Do you have stomach problems? No    Do you have any numbness or focal weakness? Yes, in legs and feet     Do you have diabetes? No    Do you have problems with bleeding or clotting? No    Do you have an rashes or other skin lesions? No

## 2019-06-13 NOTE — LETTER
6/13/2019       RE: Emiliano Aguiar  [address]  [facility] [address]     Dear Colleague,    Thank you for referring your patient, Emiliano Aguiar, to the [facility] NEUROLOGY [facility]. Please see a copy of my visit note below.    [facility]rom[facility] Neuropathy [facility]  06/13/19    Mr. Emiliano Aguiar presents in followup at the [facility]athy Clinic for his CIDP for which he is on IVIG 1 g/kg every 4 weeks. Since he was last seen April 3, 2019. He has had lower back pain in the past which is mild but gets worse after IVIg. The increased intensity lasts for 5 days after then subsides. Now for the past 2-3 weeks, it is constant at that higher intensity. The pain does not radiate. For the past 2-3 weeks, he feels he has gradually developed felt your knee and ankle would give out when walking. His last IVIg dose was 5/28/2019. Balance seems to be a bit worse. He has more twitching in the arms and he feels he's dropping objects from his hands for this same time period as well. No saddle anesthesia, change in bowel or bladder habits, new weakness or new paresthesias. He has had no illness or recent travel before or during this time period as well. No major stressors in his life; his mom is getting surgery next week.     Treatment history:   He has side effects from IVIG including headaches, arthralgias, knee and back pain and tingling that occur predictably after the infusions for a good week and cost him several days of work per month. Previously, pulse corticosteroids given as 1000 mg of Solu-Medrol weekly x8 weeks failed, and plasmapheresis worked for a short period of time. SCIg was denied by his insurance. We discussed a trial of Lyrica or Gabapentin for paresthesias and pain but he would like to defer trying any medications at this time    Labs: 4/2019 B12, folate, Vitamin E and copper levels- normal     Past Medical  History:   Diagnosis Date     Anxiety state, unspecified      Bipolar affective (H)      Depressive disorder, not elsewhere classified      GERD (gastroesophageal reflux disease)        Current Outpatient Medications   Medication     acetaminophen (TYLENOL) 500 MG tablet     calcium carbonate (CALCI-CHEW) 1250 (500 CA) MG CHEW     Calcium Carbonate Antacid (TUMS PO)     diphenhydrAMINE (BENADRYL) 25 MG capsule     DULoxetine (CYMBALTA) 30 MG capsule     Immune Globulin, Human, (HIZENTRA) 10 GM/50ML SOLN     No current facility-administered medications for this visit.        January 2 2019- RODS CIDP score is 29/48; see EMR for detailed answers.  April 3 2019- RODS CIDP score is 25/48  June 13 2019 RODS CIDP score is 25/48    : 29 kg on the right, 30kg on the left.     PHYSICAL EXAMINATION:     /75 (BP Location: Left arm, Patient Position: Sitting, Cuff Size: Adult Regular)   Pulse 63   Wt 92.7 kg (204 lb 6.4 oz)   SpO2 97%   BMI 27.72 kg/m       NEUROLOGIC:  Things are unchanged from last visit.  He has 5/5 strength in upper and lower extremities proximally and distally.      Tone is normal.  Vibration is 11 seconds at the toes bilaterally.  It is over 20 seconds at the index fingers. Position sensation was normal, but he does have a positive Romberg sign and he can take no more than 2-3 steps of tandem, with very significant hesitance. Reduced pinprick in the distal dosrum of the feet bilaterally. He has an antalgic gait. He had exquisite pain with palpation of the lateral right knee and medial left knee. He had pain in the hip with Fabry's maneuver on the left.     In summary, Mr. An CIDP is stable. His primary concern today is left hip, knee and ankle buckling and pain that he perceives as weakness. His neuromuscular exam and RODS CIDP score are unchanged. His symptoms seem to be of a musculoskeletal origin. He was agreeable to evaluation at the walk-In sports medicine clinic. He will  continue his current IVIg regimen of 1g/kg every 4 weeks and return to clinic in 6 months.      Patient seen and evaluated with Dr. Jayne Bejarano DO  Neuromuscular Fellow    ATTENDING ADDENDUM: Patient seen and examined with Fellow Dr Bejarano on 6/13/2019 at the Neuropathy Clinic. I agree with her assessment and plan as above. TT spent for patient care 15 minutes; more than half was counseling. I am not convinced that the worsening symptoms Mr Aguiar is reporting are due to worsening CIDP. I think they may be musculoskeletal in origin. I asked him to see Sports Medicine. I will continue the same IVIG regimen. Follow up as above. Nixon Godoy MD    Again, thank you for allowing me to participate in the care of your patient.      Sincerely,    Nixon Godoy MD

## 2019-06-13 NOTE — LETTER
2019       RE: Emiliano Aguiar  [address]  [facility] [address]     Dear Colleague,    Thank you for referring your patient, Emiliano Aguiar, to the [facility] SPORTS AND ORTHOPAEDIC WALK [facility] CLINIC [facility]. Please see a copy of my visit note below.          SPORTS & ORTHOPEDIC WALK-IN VISIT 2019    Primary Care Physician: Dr. White     Reason for visit:     What part of your body is injured / painful?  left hip, knee and ankle - AND lower back     What caused the injury /pain? Thought it was from his Disease but DR doesn't think that    How long ago did your injury occur or pain begin? Consistent with disease- 5 years     What are your most bothersome symptoms? Pain, Numbness, Tingling and Giving way or instability    How would you characterize your symptom?  Irritation and numbness- localized in joint     What makes your symptoms better? Rest    What makes your symptoms worse? Standing, Walking and Movement    Have you been previously seen for this problem? Yes, Neurology     Medical History:    Any recent changes to your medical history? No    Any new medication prescribed since last visit? No    Have you had surgery on this body part before? No    Social History:    Occupation: Direct Support directive     Handedness: Right    Exercise: None    Review of Systems:    Do you have fever, chills, weight loss? No    Do you have any vision problems? No    Do you have any chest pain or edema? No    Do you have any shortness of breath or wheezing?  No    Do you have stomach problems? No    Do you have any numbness or focal weakness? Yes, in legs and feet     Do you have diabetes? No    Do you have problems with bleeding or clotting? No    Do you have an rashes or other skin lesions? No           [facility]  Orthopedics  Herman Garsia MD  2019     Name: Emiliano Aguiar  MRN: [id number]  Age: 54 year old  : 1965  Referring provider: Referred Self      Chief Complaint: Pain of the Left Ankle; Pain of the Left Hip; and Pain of the Left Knee    History of Present Illness:   Emiliano Aguiar is a 54 year old male with a history of CIPD who presents today for evaluation of left ankle, hip and knee pain. The patient reports for the past 5 years he has experienced left ankle and hip and knee pain that began around the same time as CIPD. Today he specifically reports distal left thigh and lateral left hip and left ankle. The patient also associates weakness with the pain. He was seen by neurology earlier today where clinical findings did not align with the pain symptoms that he presents with today. Increased activity level, ascending stairs, and bending exacerbates the pain. He also reports that the pain symptoms are exacerbated around the same time as his monthly IVIG treatments for CIPD. Pain is localized over the posterior aspect of the left knee with radiation up to the left hip and down to the left ankle. 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 He has tried to treat the pain with chiropractic intervention.     Review of Systems:   A 10-point review of systems was obtained and is negative except for as noted in the HPI.     Medications:     Current Outpatient Medications:      acetaminophen (TYLENOL) 500 MG tablet, Take 1,000 mg by mouth every 6 hours as needed , Disp: , Rfl:      calcium carbonate (CALCI-CHEW) 1250 (500 CA) MG CHEW, , Disp: , Rfl:      Calcium Carbonate Antacid (TUMS PO), Take by mouth as needed, Disp: , Rfl:      diphenhydrAMINE (BENADRYL) 25 MG capsule, Take 25 mg by mouth every 6 hours as needed , Disp: , Rfl:      DULoxetine (CYMBALTA) 30 MG capsule, Take 1 capsule daily for 2 weeks, then 2 capsules daily (in the morning) (Patient not taking: Reported on 4/3/2019), Disp: 60 capsule, Rfl: 1     Immune Globulin, Human, (HIZENTRA) 10 GM/50ML SOLN, Inject 160 mLs  (32 g) Subcutaneous once a week (Patient not taking: Reported on 6/13/2019), Disp: 640 mL, Rfl: 3    Allergies:  Gammagard    Past Medical History:  Anxiety state  Bipolar affective  Depressive disorder  GERD  CIPD    Past Surgical History:  No pertinent surgical history reported.     Social History:  Patient lives with wife. Doesn't exercise. Works as a  with people with disabilities. He admits to prior tobacco use and admits to weekend alcohol use.     Family History:  No pertinent family history reported.    Physical Examination:  Blood pressure 127/75, pulse 64, weight 92.5 kg (204 lb).  General: Patient is alert, No acute distress, pleasant and conversational.  Skin: Intact without erythema or ecchymosis.   Left Knee:  No effusion or soft tissue swelling. Range of motion 0-135 degrees without restriction or reported pain, though some crepitus noted. No medial or lateral facet joint tenderness. No posterior medial or posterior lateral joint line tenderness. Negative bounce test. Negative forced flexion test. Negative Halina's. No ligamentous laxity or pain with valgus or varus stress. Negative Lachman's, anterior drawer, and posterior drawer. Full Isometric quad strength, extensor mechanism in place. Neurovascularly intact in the lower extremity. Hip and ankle with full active range of motion and are non-tender.    Gait: Antalgic gait    General: Alert, pleasant, no distress. sitting comfortably in chair  Back/Spine: No tenderness to palpation of spinous processes, or paraspinous musculature of lumbar spine. Full range of motion with flexion, extension, twisting, and side bending without pain. Straight leg raise negative bilaterally. Mild hamstring tightness noted. no pain in back with OSMANI testing bilaterally.   Neuro: Sensation intact to light tough on bilateral lower extremities. Can stand on toes and heel walk without difficulty. Able to squat without difficulty.     PHQ-9 SCORE  9/26/2007 6/13/2019   PHQ-9 Total Score 9 -   PHQ-9 Total Score - 13     Imaging:   Radiographs of the bilateral knees - 3 views (06/13/2019)  Impression:  1. No acute osseous abnormality.  2. No substantial degenerative change.    I have independently reviewed the above imaging studies; the results were discussed with the patient.     Assessment:   54 year old male with left knee instability and buckling. 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    Plan:   Discussed the diagnosis and treatment options with the patient.   - Recommended course of physical therapy and use of a knee sleeve for symptomatic relief  - Follow up in 6 weeks     Additionally discussed elevated PHQ-9 with patient. He contracts for safety and denies feeling depressed, but does admit to feeling more anxious about the progression of his disease recently. He was provided contact information and a behavioral health referral should he be interested in follow up.     Herman Garsia MD    Scribe Disclosure:  I, Elizabeth Barber, am serving as a scribe to document services personally performed by Herman Garsia MD at this visit, based upon the provider's statements to me. All documentation has been reviewed by the aforementioned provider prior to being entered into the official medical record.    Again, thank you for allowing me to participate in the care of your patient.      Sincerely,    Herman Garsia MD

## 2019-06-13 NOTE — PROGRESS NOTES
[facility] Neuromuscular Neuropathy Clinic  06/13/19    Mr. Emiliano Aguiar presents in followup at the [facility] for his CIDP for which he is on IVIG 1 g/kg every 4 weeks. Since he was last seen April 3, 2019. He has had lower back pain in the past which is mild but gets worse after IVIg. The increased intensity lasts for 5 days after then subsides. Now for the past 2-3 weeks, it is constant at that higher intensity. The pain does not radiate. For the past 2-3 weeks, he feels he has gradually developed felt your knee and ankle would give out when walking. His last IVIg dose was 5/28/2019. Balance seems to be a bit worse. He has more twitching in the arms and he feels he's dropping objects from his hands for this same time period as well. No saddle anesthesia, change in bowel or bladder habits, new weakness or new paresthesias. He has had no illness or recent travel before or during this time period as well. No major stressors in his life; his mom is getting surgery next week.     Treatment history:   He has side effects from IVIG including headaches, arthralgias, knee and back pain and tingling that occur predictably after the infusions for a good week and cost him several days of work per month. Previously, pulse corticosteroids given as 1000 mg of Solu-Medrol weekly x8 weeks failed, and plasmapheresis worked for a short period of time. SCIg was denied by his insurance. We discussed a trial of Lyrica or Gabapentin for paresthesias and pain but he would like to defer trying any medications at this time    Labs: 4/2019 B12, folate, Vitamin E and copper levels- normal     Past Medical History:   Diagnosis Date     Anxiety state, unspecified      Bipolar affective (H)      Depressive disorder, not elsewhere classified      GERD (gastroesophageal reflux disease)        Current Outpatient Medications   Medication     acetaminophen (TYLENOL) 500 MG tablet      calcium carbonate (CALCI-CHEW) 1250 (500 CA) MG CHEW     Calcium Carbonate Antacid (TUMS PO)     diphenhydrAMINE (BENADRYL) 25 MG capsule     DULoxetine (CYMBALTA) 30 MG capsule     Immune Globulin, Human, (HIZENTRA) 10 GM/50ML SOLN     No current facility-administered medications for this visit.        January 2 2019- RODS CIDP score is 29/48; see EMR for detailed answers.  April 3 2019- RODS CIDP score is 25/48  June 13 2019 RODS CIDP score is 25/48    : 29 kg on the right, 30kg on the left.     PHYSICAL EXAMINATION:     /75 (BP Location: Left arm, Patient Position: Sitting, Cuff Size: Adult Regular)   Pulse 63   Wt 92.7 kg (204 lb 6.4 oz)   SpO2 97%   BMI 27.72 kg/m      NEUROLOGIC:  Things are unchanged from last visit.  He has 5/5 strength in upper and lower extremities proximally and distally.      Tone is normal.  Vibration is 11 seconds at the toes bilaterally.  It is over 20 seconds at the index fingers. Position sensation was normal, but he does have a positive Romberg sign and he can take no more than 2-3 steps of tandem, with very significant hesitance. Reduced pinprick in the distal dosrum of the feet bilaterally. He has an antalgic gait. He had exquisite pain with palpation of the lateral right knee and medial left knee. He had pain in the hip with Fabry's maneuver on the left.     In summary, Mr. Eisenbergs CIDP is stable. His primary concern today is left hip, knee and ankle buckling and pain that he perceives as weakness. His neuromuscular exam and RODS CIDP score are unchanged. His symptoms seem to be of a musculoskeletal origin. He was agreeable to evaluation at the walk-In sports medicine clinic. He will continue his current IVIg regimen of 1g/kg every 4 weeks and return to clinic in 6 months.      Patient seen and evaluated with Dr. Jayne Bejarano DO  Neuromuscular Fellow    ATTENDING ADDENDUM: Patient seen and examined with Fellow Dr Bejarano on 6/13/2019 at the Neuropathy  Clinic. I agree with her assessment and plan as above. TT spent for patient care 15 minutes; more than half was counseling. I am not convinced that the worsening symptoms Mr Aguiar is reporting are due to worsening CIDP. I think they may be musculoskeletal in origin. I asked him to see Sports Medicine. I will continue the same IVIG regimen. Follow up as above. Nixon Godoy MD

## 2019-06-13 NOTE — PATIENT INSTRUCTIONS
I think your recent problem is most likely orthopedic (problems with the hip and knee joints) and not so much worsening CIDP.    Please continue your current IVIG regimen, and go to the Sports Medicine Clinic today at the 4th Floor to see an Orthopedic. They accept walk-ins.    Follow up as scheduled.

## 2019-06-14 ENCOUNTER — TELEPHONE (OUTPATIENT)
Dept: BEHAVIORAL HEALTH | Facility: CLINIC | Age: 54
End: 2019-06-14

## 2019-06-14 NOTE — TELEPHONE ENCOUNTER
I spoke with client regarding Dr. Herman Quintero's referral to the Behavioral/Spiritual  Health Team due to client scoring 13 on PHQ 9. Client denies ever feeling depressed and did not want to set up an appointment with either a therapist or for a psychiatric consultation.

## 2019-06-24 ENCOUNTER — THERAPY VISIT (OUTPATIENT)
Dept: PHYSICAL THERAPY | Facility: CLINIC | Age: 54
End: 2019-06-24
Payer: COMMERCIAL

## 2019-06-24 DIAGNOSIS — M25.561 ACUTE PAIN OF BOTH KNEES: ICD-10-CM

## 2019-06-24 DIAGNOSIS — M25.562 ACUTE PAIN OF BOTH KNEES: ICD-10-CM

## 2019-06-24 DIAGNOSIS — G89.29 CHRONIC BILATERAL LOW BACK PAIN, WITH SCIATICA PRESENCE UNSPECIFIED: ICD-10-CM

## 2019-06-24 DIAGNOSIS — G89.29 CHRONIC PAIN OF LEFT KNEE: ICD-10-CM

## 2019-06-24 DIAGNOSIS — M25.551 HIP PAIN, BILATERAL: ICD-10-CM

## 2019-06-24 DIAGNOSIS — M25.562 CHRONIC PAIN OF LEFT KNEE: ICD-10-CM

## 2019-06-24 DIAGNOSIS — M25.552 HIP PAIN, BILATERAL: ICD-10-CM

## 2019-06-24 DIAGNOSIS — M54.5 CHRONIC BILATERAL LOW BACK PAIN, WITH SCIATICA PRESENCE UNSPECIFIED: ICD-10-CM

## 2019-06-24 PROBLEM — M54.50 CHRONIC LOW BACK PAIN: Status: ACTIVE | Noted: 2019-06-24

## 2019-06-24 PROCEDURE — 97163 PT EVAL HIGH COMPLEX 45 MIN: CPT | Mod: GP | Performed by: PHYSICAL THERAPIST

## 2019-06-24 PROCEDURE — 97110 THERAPEUTIC EXERCISES: CPT | Mod: GP | Performed by: PHYSICAL THERAPIST

## 2019-06-24 ASSESSMENT — ACTIVITIES OF DAILY LIVING (ADL)
GO UP STAIRS: ACTIVITY IS FAIRLY DIFFICULT
WEAKNESS: THE SYMPTOM AFFECTS MY ACTIVITY MODERATELY
GIVING WAY, BUCKLING OR SHIFTING OF KNEE: THE SYMPTOM AFFECTS MY ACTIVITY MODERATELY
SQUAT: ACTIVITY IS FAIRLY DIFFICULT
STIFFNESS: THE SYMPTOM AFFECTS MY ACTIVITY MODERATELY
LIMPING: THE SYMPTOM AFFECTS MY ACTIVITY MODERATELY
GO DOWN STAIRS: ACTIVITY IS FAIRLY DIFFICULT
SWELLING: THE SYMPTOM AFFECTS MY ACTIVITY SLIGHTLY
HOW_WOULD_YOU_RATE_THE_OVERALL_FUNCTION_OF_YOUR_KNEE_DURING_YOUR_USUAL_DAILY_ACTIVITIES?: NEARLY NORMAL
STAND: ACTIVITY IS SOMEWHAT DIFFICULT
KNEE_ACTIVITY_OF_DAILY_LIVING_SUM: 35
RISE FROM A CHAIR: ACTIVITY IS MINIMALLY DIFFICULT
KNEEL ON THE FRONT OF YOUR KNEE: ACTIVITY IS FAIRLY DIFFICULT
SIT WITH YOUR KNEE BENT: ACTIVITY IS MINIMALLY DIFFICULT
HOW_WOULD_YOU_RATE_THE_CURRENT_FUNCTION_OF_YOUR_KNEE_DURING_YOUR_USUAL_DAILY_ACTIVITIES_ON_A_SCALE_FROM_0_TO_100_WITH_100_BEING_YOUR_LEVEL_OF_KNEE_FUNCTION_PRIOR_TO_YOUR_INJURY_AND_0_BEING_THE_INABILITY_TO_PERFORM_ANY_OF_YOUR_USUAL_DAILY_ACTIVITIES?: 0
AS_A_RESULT_OF_YOUR_KNEE_INJURY,_HOW_WOULD_YOU_RATE_YOUR_CURRENT_LEVEL_OF_DAILY_ACTIVITY?: ABNORMAL
RAW_SCORE: 35
KNEE_ACTIVITY_OF_DAILY_LIVING_SCORE: 50
PAIN: THE SYMPTOM AFFECTS MY ACTIVITY MODERATELY
WALK: ACTIVITY IS SOMEWHAT DIFFICULT

## 2019-06-24 NOTE — PROGRESS NOTES
[facility] Initial Evaluation  Subjective:  Pt reports that last week when he went to work, his L LE gave out on him and he was concerned about a relapse of CIDP.  Typically he gets B low back and hip pain with his CIDP, however last week his pain was all focused into the L LE from his low back down to his ankle.  Currently pain is in B hips, knees and low back with some pain into L ankle.  He always gets exacerbations of pain with IVIG treatments - he will be getting a treatment tomorrow -once/month.    The history is provided by the patient. No  was used.   Emiliano Aguiar being seen for To see a strengthening program will help with pain.   Problem began 6/17/2019 (re-exacerabation). Where condition occurred: other.Problem occurred: initially began with diagnosis of CIDP and has gradually worsened over time  and reported as 8/10 on pain scale.  Pertinent medical history includes:  Other. Other medical history details: CIDP - has had since 2013.       Other medications details: IVIC treatments once/month for CIDP.   Primary job tasks include:  Driving. Other job/home tasks details: prolonged sitting.  Pain is described as aching and stabbing (stabbing pain with exacerbations or high levels of activity) and is intermittent. Pain is the same all the time. Since onset symptoms are gradually worsening. Special tests:  X-ray (normal of knees). Previous treatment includes physical therapy. There was moderate improvement following previous treatment.   Patient is Support professional for disabled. Restrictions include:  Working in normal job without restrictions.    Barriers include:  None as reported by patient.  Red flags:  None as reported by patient.  Type of problem:  Bilateral knees (L>R)   Occurance: being diagnosed with CIDP. This is a chronic condition    Patient reports pain:  In the joint. Radiates to:  Ankle, knee, hip and low back (medial L ankle, lateral/medial knee,  middle of low back, B lateral hip at illiac crest). Associated symptoms:  Loss of strength, numbness and tingling (numbness/tingling in LE's and UE's with CIDP). Symptoms are exacerbated by activity (heat/humidity) and relieved by rest.                      Objective:  System         Lumbar/SI Evaluation  ROM:      Strength: poor core strength                                                      Hip Evaluation    Hip Strength:    Flexion:   Left: 4/5   Pain:  Right: 4/5   Pain:                    Extension:  Left: 3+/5  Pain:Right: 3/5    Pain:    Abduction:  Left: 4+/5     Pain:Right: 4/5    Pain:    Internal Rotation:  Left: 4+/5    Pain:Right: 4/5   Pain:  External Rotation:  Left: 4+/5   Pain:  Right: 4+/5   Pain:  Knee Flexion:  Left: 5/5   Pain:Right: 5/5   Pain:  Knee Extension:  Left: 4+/5   Pain:Right: 4+/5    Pain:                       General     ROS    Assessment/Plan:    Patient is a 54 year old male with lumbar, both sides hip and both sides knee complaints.    Patient has the following significant findings with corresponding treatment plan.                Diagnosis 1:  LBP/B hip pain/B knee pain  Pain -  hot/cold therapy  Decreased strength - therapeutic exercise, therapeutic activities and home program  Impaired muscle performance - neuro re-education and home program  Decreased function - therapeutic activities and home program    Therapy Evaluation Codes:   Cumulative Therapy Evaluation is: Low complexity.    Previous and current functional limitations:  (See Goal Flow Sheet for this information)    Short term and Long term goals: (See Goal Flow Sheet for this information)     Communication ability:  Patient appears to be able to clearly communicate and understand verbal and written communication and follow directions correctly.  Treatment Explanation - The following has been discussed with the patient:   RX ordered/plan of care  Anticipated outcomes  Possible risks and side effects  This patient  would benefit from PT intervention to resume normal activities.   Rehab potential is fair due to progressive neurological disease as co-morbidity.    Frequency:  2 X a month, once daily  Duration:  for 3 months  Discharge Plan:  Achieve all LTG.  Independent in home treatment program.  Reach maximal therapeutic benefit.    Please refer to the daily flowsheet for treatment today, total treatment time and time spent performing 1:1 timed codes.

## 2019-07-10 ENCOUNTER — THERAPY VISIT (OUTPATIENT)
Dept: PHYSICAL THERAPY | Facility: CLINIC | Age: 54
End: 2019-07-10
Payer: COMMERCIAL

## 2019-07-10 DIAGNOSIS — M25.562 CHRONIC PAIN OF LEFT KNEE: ICD-10-CM

## 2019-07-10 DIAGNOSIS — G89.29 CHRONIC BILATERAL LOW BACK PAIN, WITH SCIATICA PRESENCE UNSPECIFIED: ICD-10-CM

## 2019-07-10 DIAGNOSIS — M25.552 HIP PAIN, BILATERAL: ICD-10-CM

## 2019-07-10 DIAGNOSIS — M54.5 CHRONIC BILATERAL LOW BACK PAIN, WITH SCIATICA PRESENCE UNSPECIFIED: ICD-10-CM

## 2019-07-10 DIAGNOSIS — G89.29 CHRONIC PAIN OF LEFT KNEE: ICD-10-CM

## 2019-07-10 DIAGNOSIS — M25.551 HIP PAIN, BILATERAL: ICD-10-CM

## 2019-07-10 PROCEDURE — 97110 THERAPEUTIC EXERCISES: CPT | Mod: GP | Performed by: PHYSICAL THERAPIST

## 2019-07-10 PROCEDURE — 97112 NEUROMUSCULAR REEDUCATION: CPT | Mod: GP | Performed by: PHYSICAL THERAPIST

## 2019-07-18 ENCOUNTER — TELEPHONE (OUTPATIENT)
Dept: NEUROLOGY | Facility: CLINIC | Age: 54
End: 2019-07-18

## 2019-07-18 RX ORDER — ACETAMINOPHEN 325 MG/1
650 TABLET ORAL ONCE
Status: CANCELLED
Start: 2019-07-18

## 2019-07-18 RX ORDER — DIPHENHYDRAMINE HCL 25 MG
50 CAPSULE ORAL ONCE
Status: CANCELLED | OUTPATIENT
Start: 2019-07-18

## 2019-07-18 NOTE — TELEPHONE ENCOUNTER
Orders signed by Dr. Godoy. These have been faxed to Mariam at [facility] at [phone]; she will work to get Dr. Solorzano's cosignature.

## 2019-07-18 NOTE — TELEPHONE ENCOUNTER
Rec'd a vmm from Mariam (direct:[phone]) at [facility] [place] at [phone], Fax:  [phone], who states that the patient's IVIG orders are expiring. IVIG therapy plan routed to Dr. Godoy for signature; once signed, will fax to Mariam.

## 2019-07-23 ENCOUNTER — INFUSION THERAPY VISIT (OUTPATIENT)
Dept: INFUSION THERAPY | Facility: CLINIC | Age: 54
End: 2019-07-23
Attending: PSYCHIATRY & NEUROLOGY
Payer: COMMERCIAL

## 2019-07-23 VITALS
BODY MASS INDEX: 27.72 KG/M2 | HEART RATE: 59 BPM | RESPIRATION RATE: 16 BRPM | SYSTOLIC BLOOD PRESSURE: 143 MMHG | WEIGHT: 204.4 LBS | OXYGEN SATURATION: 97 % | DIASTOLIC BLOOD PRESSURE: 80 MMHG | TEMPERATURE: 97.7 F

## 2019-07-23 DIAGNOSIS — G61.81 CIDP (CHRONIC INFLAMMATORY DEMYELINATING POLYNEUROPATHY) (H): Primary | ICD-10-CM

## 2019-07-23 PROCEDURE — 96375 TX/PRO/DX INJ NEW DRUG ADDON: CPT

## 2019-07-23 PROCEDURE — 96366 THER/PROPH/DIAG IV INF ADDON: CPT

## 2019-07-23 PROCEDURE — 25000128 H RX IP 250 OP 636: Performed by: PSYCHIATRY & NEUROLOGY

## 2019-07-23 PROCEDURE — 96365 THER/PROPH/DIAG IV INF INIT: CPT

## 2019-07-23 PROCEDURE — 25000132 ZZH RX MED GY IP 250 OP 250 PS 637: Performed by: PSYCHIATRY & NEUROLOGY

## 2019-07-23 RX ORDER — ACETAMINOPHEN 325 MG/1
650 TABLET ORAL ONCE
Status: COMPLETED | OUTPATIENT
Start: 2019-07-23 | End: 2019-07-23

## 2019-07-23 RX ORDER — DIPHENHYDRAMINE HCL 25 MG
25 CAPSULE ORAL ONCE
Status: COMPLETED | OUTPATIENT
Start: 2019-07-23 | End: 2019-07-23

## 2019-07-23 RX ORDER — ACETAMINOPHEN 325 MG/1
650 TABLET ORAL ONCE
Status: CANCELLED
Start: 2019-07-23

## 2019-07-23 RX ORDER — DIPHENHYDRAMINE HCL 25 MG
50 CAPSULE ORAL ONCE
Status: CANCELLED | OUTPATIENT
Start: 2019-07-23

## 2019-07-23 RX ADMIN — SODIUM CHLORIDE 250 ML: 9 INJECTION, SOLUTION INTRAVENOUS at 08:56

## 2019-07-23 RX ADMIN — HYDROCORTISONE SODIUM SUCCINATE 100 MG: 100 INJECTION, POWDER, FOR SOLUTION INTRAMUSCULAR; INTRAVENOUS at 09:04

## 2019-07-23 RX ADMIN — HUMAN IMMUNOGLOBULIN G 80 G: 40 LIQUID INTRAVENOUS at 09:07

## 2019-07-23 RX ADMIN — ACETAMINOPHEN 650 MG: 325 TABLET ORAL at 08:55

## 2019-07-23 RX ADMIN — DIPHENHYDRAMINE HYDROCHLORIDE 25 MG: 25 CAPSULE ORAL at 08:56

## 2019-07-23 NOTE — PATIENT INSTRUCTIONS
Biologic Infusion Post Education:   Call the triage nurse at your clinic or seek medical attention if you have chills and/or temperature greater than or equal to 100.5, uncontrolled nausea/vomiting, diarrhea, constipation, dizziness, shortness of breath, chest pain, heart palpitations, weakness or any other new or concerning symptoms, questions or concerns.    You cannot have any live virus vaccines prior to or during treatment or up to 6 months post infusion.    If you have an upcoming surgery, medical procedure or dental procedure during treatment, this should be discussed with your ordering physician and your surgeon/dentist.    If you are having any concerning symptom, if you are unsure if you should get your next infusion or wish to speak to a provider before your next infusion, please call your care coordinator or triage nurse at your clinic to notify them so we can adequately serve you.

## 2019-07-23 NOTE — PROGRESS NOTES
Infusion Nursing Note:  Emiliano Aguiar presents today for IVIG.    Patient seen by provider today: No    present during visit today: Not Applicable.    Note:  weakness, numbness, tingling start retuning when it gets close to time for next IVIG infusion.  Typically gets intense head pressure, fatigue and chills for up to 5 days following IVIG.  Takes 25 mg po Benadryl, tylenol, solu cortef as premeds    Intravenous Access:  Peripheral IV placed.    Treatment Conditions:  Biological Infusion Checklist:  ~~~ NOTE: If the patient answers yes to any of the questions below, hold the infusion and contact ordering provider or on-call provider.    1. Have you recently had an elevated temperature, fever, chills, productive cough, coughing for 3 weeks or longer or hemoptysis, abnormal vital signs, night sweats,  chest pain or have you noticed a decrease in your appetite, unexplained weight loss or fatigue? No  2. Do you have any open wounds or new incisions? No  3. Do you have any recent or upcoming hospitalizations, surgeries or dental procedures? No  4. Do you currently have or recently have had any signs of illness or infection or are you on any antibiotics? No  5. Have you had any new, sudden or worsening abdominal pain? No  6. Have you or anyone in your household received a live vaccination in the past 4 weeks? Please note:  No live vaccines while on biologic/chemotherapy until 6 months after the last treatment.  Patient can receive the flu vaccine (shot only) and the pneumovax.  It is optimal for the patient to get these vaccines mid cycle, but they can be given at any time as long as it is not on the day of the infusion. No  7. Have you recently been diagnosed with any new nervous system diseases (ie. Multiple sclerosis, Guillain [place], seizures, neurological changes) or cancer diagnosis? No  8. Are you on any form of radiation or chemotherapy? No  9. Are you pregnant or breast feeding or do you have plans  of pregnancy in the future? No  10. Have you been having any signs of worsening depression or suicidal ideations?  (benlysta only) No  11. Have there been any other new onset medical symptoms? No        Post Infusion Assessment:  Patient tolerated infusion without incident.  Infusion started at 0.5 ml/kg/hr  Increased by 0.5 ml/kg/hr every 15 minutes to max of 4 ml/kg/hr     Blood return noted pre and post infusion.  Site patent and intact, free from redness, edema or discomfort.  No evidence of extravasations.  Access discontinued per protocol.    Biologic Infusion Post Education: Call the triage nurse at your clinic or seek medical attention if you have chills and/or temperature greater than or equal to 100.5, uncontrolled nausea/vomiting, diarrhea, constipation, dizziness, shortness of breath, chest pain, heart palpitations, weakness or any other new or concerning symptoms, questions or concerns.  You cannot have any live virus vaccines prior to or during treatment or up to 6 months post infusion.  If you have an upcoming surgery, medical procedure or dental procedure during treatment, this should be discussed with your ordering physician and your surgeon/dentist.  If you are having any concerning symptom, if you are unsure if you should get your next infusion or wish to speak to a provider before your next infusion, please call your care coordinator or triage nurse at your clinic to notify them so we can adequately serve you.       Discharge Plan:   Discharge instructions reviewed with: Patient.  Patient and/or family verbalized understanding of discharge instructions and all questions answered.  Copy of AVS reviewed with patient and/or family.  Patient will return in 4 weeks for next appointment.  Patient discharged in stable condition accompanied by: self.  Departure Mode: Ambulatory.    Whit Riddle RN

## 2019-07-25 ENCOUNTER — TELEPHONE (OUTPATIENT)
Dept: NEUROLOGY | Facility: CLINIC | Age: 54
End: 2019-07-25

## 2019-07-25 NOTE — TELEPHONE ENCOUNTER
Prior Authorization Infusion/Clinic Administered Request    Location: [facility]  Diagnosis and ICD: CIDP G61.81  Drug/Therapy: IVIG 1 g/kg every 4 weeks    Previously Tried and Failed Therapies:     Date of provider note with supporting information: 6/13/19    Urgency (When is the patient scheduled?):     Would you like to include any research articles?         If yes please call [phone] for further instructions about sending that information

## 2019-08-20 ENCOUNTER — INFUSION THERAPY VISIT (OUTPATIENT)
Dept: INFUSION THERAPY | Facility: CLINIC | Age: 54
End: 2019-08-20
Attending: PSYCHIATRY & NEUROLOGY
Payer: COMMERCIAL

## 2019-08-20 VITALS
DIASTOLIC BLOOD PRESSURE: 82 MMHG | HEART RATE: 59 BPM | BODY MASS INDEX: 27.4 KG/M2 | SYSTOLIC BLOOD PRESSURE: 145 MMHG | RESPIRATION RATE: 16 BRPM | TEMPERATURE: 97.8 F | WEIGHT: 202 LBS | OXYGEN SATURATION: 97 %

## 2019-08-20 DIAGNOSIS — G61.81 CIDP (CHRONIC INFLAMMATORY DEMYELINATING POLYNEUROPATHY) (H): Primary | ICD-10-CM

## 2019-08-20 PROCEDURE — 96365 THER/PROPH/DIAG IV INF INIT: CPT

## 2019-08-20 PROCEDURE — 96366 THER/PROPH/DIAG IV INF ADDON: CPT

## 2019-08-20 PROCEDURE — 25000132 ZZH RX MED GY IP 250 OP 250 PS 637: Performed by: PSYCHIATRY & NEUROLOGY

## 2019-08-20 PROCEDURE — 25000128 H RX IP 250 OP 636: Performed by: PSYCHIATRY & NEUROLOGY

## 2019-08-20 PROCEDURE — 96375 TX/PRO/DX INJ NEW DRUG ADDON: CPT

## 2019-08-20 RX ORDER — ACETAMINOPHEN 325 MG/1
650 TABLET ORAL ONCE
Status: CANCELLED
Start: 2019-08-20

## 2019-08-20 RX ORDER — DIPHENHYDRAMINE HCL 25 MG
50 CAPSULE ORAL ONCE
Status: CANCELLED | OUTPATIENT
Start: 2019-08-20

## 2019-08-20 RX ORDER — ACETAMINOPHEN 325 MG/1
650 TABLET ORAL ONCE
Status: COMPLETED | OUTPATIENT
Start: 2019-08-20 | End: 2019-08-20

## 2019-08-20 RX ORDER — DIPHENHYDRAMINE HCL 25 MG
50 CAPSULE ORAL ONCE
Status: COMPLETED | OUTPATIENT
Start: 2019-08-20 | End: 2019-08-20

## 2019-08-20 RX ADMIN — ACETAMINOPHEN 650 MG: 325 TABLET ORAL at 08:31

## 2019-08-20 RX ADMIN — HYDROCORTISONE SODIUM SUCCINATE 100 MG: 100 INJECTION, POWDER, FOR SOLUTION INTRAMUSCULAR; INTRAVENOUS at 08:32

## 2019-08-20 RX ADMIN — DIPHENHYDRAMINE HYDROCHLORIDE 25 MG: 25 CAPSULE ORAL at 08:31

## 2019-08-20 RX ADMIN — HUMAN IMMUNOGLOBULIN G 80 G: 40 LIQUID INTRAVENOUS at 08:50

## 2019-08-20 NOTE — PROGRESS NOTES
Infusion Nursing Note:  Emiliano Aguiar presents today for IVIG and pre-meds.    Patient seen by provider today: No   present during visit today: Not Applicable.    Note: Feels like the IV steroid helped the initial symptoms of headache for around 24 hours but after 24H the headache came and lasted about a wk.  The neuropathy feels worse now.  Will try another dose of the lower IVIG and if his symptoms continue to get worse he will contact his MD for a larger dose.      Intravenous Access:  Peripheral IV placed.    Treatment Conditions:  Biological Infusion Checklist:  ~~~ NOTE: If the patient answers yes to any of the questions below, hold the infusion and contact ordering provider or on-call provider.    1. Have you recently had an elevated temperature, fever, chills, productive cough, coughing for 3 weeks or longer or hemoptysis, abnormal vital signs, night sweats,  chest pain or have you noticed a decrease in your appetite, unexplained weight loss or fatigue? No  2. Do you have any open wounds or new incisions? No  3. Do you have any recent or upcoming hospitalizations, surgeries or dental procedures? No  4. Do you currently have or recently have had any signs of illness or infection or are you on any antibiotics? No  5. Have you had any new, sudden or worsening abdominal pain? No  6. Have you or anyone in your household received a live vaccination in the past 4 weeks? Please note:  No live vaccines while on biologic/chemotherapy until 6 months after the last treatment.  Patient can receive the flu vaccine (shot only) and the pneumovax.  It is optimal for the patient to get these vaccines mid cycle, but they can be given at any time as long as it is not on the day of the infusion. No  7. Have you recently been diagnosed with any new nervous system diseases (ie. Multiple sclerosis, Guillain [place], seizures, neurological changes) or cancer diagnosis? No  8. Are you on any form of radiation or  chemotherapy? No  9. Are you pregnant or breast feeding or do you have plans of pregnancy in the future? No  10. Have you been having any signs of worsening depression or suicidal ideations?  (benlysta only) No  11. Have there been any other new onset medical symptoms? No        Post Infusion Assessment:  Patient tolerated infusion without incident.  Blood return noted pre and post infusion.  Site patent and intact, free from redness, edema or discomfort.  No evidence of extravasations.  Access discontinued per protocol.  Biologic Infusion Post Education: Call the triage nurse at your clinic or seek medical attention if you have chills and/or temperature greater than or equal to 100.5, uncontrolled nausea/vomiting, diarrhea, constipation, dizziness, shortness of breath, chest pain, heart palpitations, weakness or any other new or concerning symptoms, questions or concerns.  You cannot have any live virus vaccines prior to or during treatment or up to 6 months post infusion.  If you have an upcoming surgery, medical procedure or dental procedure during treatment, this should be discussed with your ordering physician and your surgeon/dentist.  If you are having any concerning symptom, if you are unsure if you should get your next infusion or wish to speak to a provider before your next infusion, please call your care coordinator or triage nurse at your clinic to notify them so we can adequately serve you.       Discharge Plan:   Discharge instructions reviewed with: Patient.  Patient discharged in stable condition accompanied by: self.  Departure Mode: Ambulatory.  Next infusion scheduled for 9/17/19.    ADELE TAYLOR RN

## 2019-09-17 ENCOUNTER — INFUSION THERAPY VISIT (OUTPATIENT)
Dept: INFUSION THERAPY | Facility: CLINIC | Age: 54
End: 2019-09-17
Attending: PSYCHIATRY & NEUROLOGY
Payer: COMMERCIAL

## 2019-09-17 VITALS
HEART RATE: 74 BPM | WEIGHT: 202.7 LBS | RESPIRATION RATE: 16 BRPM | BODY MASS INDEX: 27.49 KG/M2 | OXYGEN SATURATION: 96 % | DIASTOLIC BLOOD PRESSURE: 80 MMHG | TEMPERATURE: 98.1 F | SYSTOLIC BLOOD PRESSURE: 151 MMHG

## 2019-09-17 DIAGNOSIS — G61.81 CIDP (CHRONIC INFLAMMATORY DEMYELINATING POLYNEUROPATHY) (H): Primary | ICD-10-CM

## 2019-09-17 PROCEDURE — 96365 THER/PROPH/DIAG IV INF INIT: CPT

## 2019-09-17 PROCEDURE — 25000128 H RX IP 250 OP 636: Performed by: PSYCHIATRY & NEUROLOGY

## 2019-09-17 PROCEDURE — 25000132 ZZH RX MED GY IP 250 OP 250 PS 637: Performed by: PSYCHIATRY & NEUROLOGY

## 2019-09-17 PROCEDURE — 96366 THER/PROPH/DIAG IV INF ADDON: CPT

## 2019-09-17 RX ORDER — ACETAMINOPHEN 325 MG/1
650 TABLET ORAL ONCE
Status: CANCELLED
Start: 2019-10-15

## 2019-09-17 RX ORDER — DIPHENHYDRAMINE HCL 25 MG
50 CAPSULE ORAL ONCE
Status: COMPLETED | OUTPATIENT
Start: 2019-09-17 | End: 2019-09-17

## 2019-09-17 RX ORDER — DIPHENHYDRAMINE HCL 25 MG
50 CAPSULE ORAL ONCE
Status: CANCELLED | OUTPATIENT
Start: 2019-10-15

## 2019-09-17 RX ORDER — ACETAMINOPHEN 325 MG/1
650 TABLET ORAL ONCE
Status: COMPLETED | OUTPATIENT
Start: 2019-09-17 | End: 2019-09-17

## 2019-09-17 RX ADMIN — HUMAN IMMUNOGLOBULIN G 80 G: 40 LIQUID INTRAVENOUS at 09:05

## 2019-09-17 RX ADMIN — SODIUM CHLORIDE 250 ML: 9 INJECTION, SOLUTION INTRAVENOUS at 08:36

## 2019-09-17 RX ADMIN — DIPHENHYDRAMINE HYDROCHLORIDE 25 MG: 25 CAPSULE ORAL at 08:32

## 2019-09-17 RX ADMIN — ACETAMINOPHEN 650 MG: 325 TABLET ORAL at 08:32

## 2019-09-17 NOTE — PROGRESS NOTES
Infusion Nursing Note:  Emiliano Aguiar presents today for IVIG.    Patient seen by provider today: No   present during visit today: Not Applicable.    Note: Patient has been getting IVIG for at least 5 years.  He continues to struggle with significant side effects from the IVIG.  He has to take one week off of work every time he gets it due to the side effects.  He gets headaches, joint aches, chills and hives, all for several days.  He did get IV solucortef as a premed with the two previous infusions which did not help and he thought it actually prolonged the symptoms.  Xin in pharmacy suggested a few things.  He could try subcutaneous IVIG or splitting the dose over two days or IV hydration.  Patient said he can't get subcutaneous because it's not covered by insurance and he has tried splitting the dose and it made the symptoms worse.  I called Dr. Godoy to see if he can try IV hydration.  He said no as he doesn't think any of the symptoms are due to dehydration and doesn't think it will help.  Patient has appointment to see him in October and will discuss again.    Increased infusion by 0.5mg/kg/hr with max dose of 4mg/kg/hr.    Intravenous Access:  Peripheral IV placed.    Treatment Conditions:  Biological Infusion Checklist:  ~~~ NOTE: If the patient answers yes to any of the questions below, hold the infusion and contact ordering provider or on-call provider.    1. Have you recently had an elevated temperature, fever, chills, productive cough, coughing for 3 weeks or longer or hemoptysis, abnormal vital signs, night sweats,  chest pain or have you noticed a decrease in your appetite, unexplained weight loss or fatigue? No  2. Do you have any open wounds or new incisions? No  3. Do you have any recent or upcoming hospitalizations, surgeries or dental procedures? No  4. Do you currently have or recently have had any signs of illness or infection or are you on any antibiotics? No  5. Have  you had any new, sudden or worsening abdominal pain? No  6. Have you or anyone in your household received a live vaccination in the past 4 weeks? Please note:  No live vaccines while on biologic/chemotherapy until 6 months after the last treatment.  Patient can receive the flu vaccine (shot only) and the pneumovax.  It is optimal for the patient to get these vaccines mid cycle, but they can be given at any time as long as it is not on the day of the infusion. No  7. Have you recently been diagnosed with any new nervous system diseases (ie. Multiple sclerosis, Guillain [place], seizures, neurological changes) or cancer diagnosis? No  8. Are you on any form of radiation or chemotherapy? No  9. Are you pregnant or breast feeding or do you have plans of pregnancy in the future? No  10. Have you been having any signs of worsening depression or suicidal ideations?  (benlysta only) No  11. Have there been any other new onset medical symptoms? No        Post Infusion Assessment:  Patient tolerated infusion without incident.  Blood return noted pre and post infusion.  Site patent and intact, free from redness, edema or discomfort.  No evidence of extravasations.  Access discontinued per protocol.      Biologic Infusion Post Education: Call the triage nurse at your clinic or seek medical attention if you have chills and/or temperature greater than or equal to 100.5, uncontrolled nausea/vomiting, diarrhea, constipation, dizziness, shortness of breath, chest pain, heart palpitations, weakness or any other new or concerning symptoms, questions or concerns.  You cannot have any live virus vaccines prior to or during treatment or up to 6 months post infusion.  If you have an upcoming surgery, medical procedure or dental procedure during treatment, this should be discussed with your ordering physician and your surgeon/dentist.  If you are having any concerning symptom, if you are unsure if you should get your next infusion or wish to  speak to a provider before your next infusion, please call your care coordinator or triage nurse at your clinic to notify them so we can adequately serve you.       Discharge Plan:   Copy of AVS reviewed with patient and/or family.  Patient will return 10/15/19 for next appointment.  Patient discharged in stable condition accompanied by: self.  Departure Mode: Ambulatory.    Brenda Bui, RN, RN

## 2019-10-02 ENCOUNTER — OFFICE VISIT (OUTPATIENT)
Dept: NEUROLOGY | Facility: CLINIC | Age: 54
End: 2019-10-02
Payer: COMMERCIAL

## 2019-10-02 VITALS
WEIGHT: 202 LBS | RESPIRATION RATE: 16 BRPM | HEIGHT: 71 IN | HEART RATE: 64 BPM | DIASTOLIC BLOOD PRESSURE: 86 MMHG | SYSTOLIC BLOOD PRESSURE: 137 MMHG | OXYGEN SATURATION: 98 % | BODY MASS INDEX: 28.28 KG/M2

## 2019-10-02 DIAGNOSIS — G61.81 CIDP (CHRONIC INFLAMMATORY DEMYELINATING POLYNEUROPATHY) (H): Primary | ICD-10-CM

## 2019-10-02 ASSESSMENT — PAIN SCALES - GENERAL: PAINLEVEL: NO PAIN (0)

## 2019-10-02 ASSESSMENT — MIFFLIN-ST. JEOR: SCORE: 1773.4

## 2019-10-02 NOTE — PROGRESS NOTES
"Service Date: 10/02/2019      Son MARTHA White MD    [facility]   [address] [place] [address]       RE:      Emiliano Aguiar   MRN:   [id number]   :   1965      Dear Dr. White:      I had the pleasure to see Emiliano Aguiar in followup at the [facility] for his CIDP for which he is on IVIG 1 g/kg every 4 weeks.  Compared to a few months ago, he does not appreciate any changes. Recently there was a shortage of IVIG nationally and he was transitioned to the [facility]. His dose had to be adjusted from 95 g to 80 g due to the shortage. Fortunately the change did not lead to a CIDP relapse, although he feels that he may have a mild \"wear-off\" around the third week after infusion with more paresthesias and pain. His balance is still not very good but he does not want to use a cane; he prefers holding on a firm surface when needed. He has not had any falls lately. His insurance denied subcutaneous Ig coverarge.     MEDICATIONS:  Reviewed and are as per Epic record.      RODS CIDP score is 27/48, unchanged from previous visits; see EMR for detailed answers.   score with James Dynamometer is 30 kg on the right hand, 24 on the left hand (right unchanged, left slightly lower than previous measurement).     PHYSICAL EXAMINATION:   VITAL SIGNS:  /86   Pulse 64   Resp 16   Ht 1.803 m (5' 11\")   Wt 91.6 kg (202 lb)   SpO2 98%   BMI 28.17 kg/m       NEUROLOGIC:  Things are unchanged from last visit.  He has 5/5 strength in upper and lower extremities proximally and distally.  Tone is normal.  Vibration is 11 seconds at the right great toe, 9 left, and over 23 seconds at the index fingers.  Position sensation at the toes look normal to me today, but he does have a positive Romberg sign and he can take no more than 2-3 steps of tandem, with very significant hesitance.        In summary, Mr. Aguiar's CIDP is stable.  We will continue IVIG at 1 " g/kg every 4 weeks. When the shortage issue has resolved, I would like him to return to the 95 gram dose. He will return to clinic for followup in 4 months. TT spent for patient care 10 minutes; more than half was counseling.      Sincerely,         Nixon Godoy MD

## 2019-10-02 NOTE — NURSING NOTE
Chief Complaint   Patient presents with     NEUROPATHY     [facility] RETURN NEUROPATHY 6 MONTH F/U CIDP       Carlos Becerra, EMT

## 2019-10-02 NOTE — LETTER
"     RE: Emiliano Aguiar  [address]  [facility] [address]     Dear Colleague,    Thank you for referring your patient, Emiliano Aguiar, to the [facility] at [facility]. Please see a copy of my visit note below.    Service Date: 10/02/2019      Son MARTHA White MD    [facility]   [address] [place] [address]       RE:      Emiliano Aguiar   MRN:   [id number]   :   1965      Dear Dr. White:      I had the pleasure to see Emiliano Aguiar in followup at the [facility] for his CIDP for which he is on IVIG 1 g/kg every 4 weeks.  Compared to a few months ago, he does not appreciate any changes. Recently there was a shortage of IVIG nationally and he was transitioned to the [facility]. His dose had to be adjusted from 95 g to 80 g due to the shortage. Fortunately the change did not lead to a CIDP relapse, although he feels that he may have a mild \"wear-off\" around the third week after infusion with more paresthesias and pain. His balance is still not very good but he does not want to use a cane; he prefers holding on a firm surface when needed. He has not had any falls lately. His insurance denied subcutaneous Ig coverarge.     MEDICATIONS:  Reviewed and are as per Epic record.      RODS CIDP score is 27/48, unchanged from previous visits; see EMR for detailed answers.   score with James Dynamometer is 30 kg on the right hand, 24 on the left hand (right unchanged, left slightly lower than previous measurement).     PHYSICAL EXAMINATION:   VITAL SIGNS:  /86   Pulse 64   Resp 16   Ht 1.803 m (5' 11\")   Wt 91.6 kg (202 lb)   SpO2 98%   BMI 28.17 kg/m        NEUROLOGIC:  Things are unchanged from last visit.  He has 5/5 strength in upper and lower extremities proximally and distally.  Tone is normal.  Vibration is 11 seconds at the right great toe, 9 left, and over 23 seconds at the index " fingers.  Position sensation at the toes look normal to me today, but he does have a positive Romberg sign and he can take no more than 2-3 steps of tandem, with very significant hesitance.        In summary, Mr. Aguiar's CIDP is stable.   We will continue IVIG at 1 g/kg every 4 weeks. When the shortage issue has resolved, I would like him to return to the 95 gram dose. He will return to clinic for followup in 4 months. TT spent for patient care 10 minutes; more than half was counseling.      Sincerely,      Nixon Godoy MD

## 2019-10-15 ENCOUNTER — INFUSION THERAPY VISIT (OUTPATIENT)
Dept: INFUSION THERAPY | Facility: CLINIC | Age: 54
End: 2019-10-15
Attending: PSYCHIATRY & NEUROLOGY
Payer: COMMERCIAL

## 2019-10-15 VITALS
OXYGEN SATURATION: 98 % | DIASTOLIC BLOOD PRESSURE: 86 MMHG | WEIGHT: 207.2 LBS | BODY MASS INDEX: 28.9 KG/M2 | RESPIRATION RATE: 16 BRPM | SYSTOLIC BLOOD PRESSURE: 136 MMHG | TEMPERATURE: 97.8 F | HEART RATE: 68 BPM

## 2019-10-15 DIAGNOSIS — G61.81 CIDP (CHRONIC INFLAMMATORY DEMYELINATING POLYNEUROPATHY) (H): Primary | ICD-10-CM

## 2019-10-15 PROCEDURE — 96365 THER/PROPH/DIAG IV INF INIT: CPT

## 2019-10-15 PROCEDURE — 25000128 H RX IP 250 OP 636: Performed by: PSYCHIATRY & NEUROLOGY

## 2019-10-15 PROCEDURE — 96366 THER/PROPH/DIAG IV INF ADDON: CPT

## 2019-10-15 PROCEDURE — 25000132 ZZH RX MED GY IP 250 OP 250 PS 637: Performed by: PSYCHIATRY & NEUROLOGY

## 2019-10-15 RX ORDER — DIPHENHYDRAMINE HCL 25 MG
50 CAPSULE ORAL ONCE
Status: CANCELLED | OUTPATIENT
Start: 2019-11-12

## 2019-10-15 RX ORDER — DIPHENHYDRAMINE HCL 25 MG
50 CAPSULE ORAL ONCE
Status: COMPLETED | OUTPATIENT
Start: 2019-10-15 | End: 2019-10-15

## 2019-10-15 RX ORDER — ACETAMINOPHEN 325 MG/1
650 TABLET ORAL ONCE
Status: CANCELLED
Start: 2019-11-12

## 2019-10-15 RX ORDER — ACETAMINOPHEN 325 MG/1
650 TABLET ORAL ONCE
Status: COMPLETED | OUTPATIENT
Start: 2019-10-15 | End: 2019-10-15

## 2019-10-15 RX ADMIN — ACETAMINOPHEN 650 MG: 325 TABLET ORAL at 08:28

## 2019-10-15 RX ADMIN — DIPHENHYDRAMINE HYDROCHLORIDE 25 MG: 25 CAPSULE ORAL at 08:28

## 2019-10-15 RX ADMIN — HUMAN IMMUNOGLOBULIN G 75 G: 40 LIQUID INTRAVENOUS at 08:50

## 2019-10-15 ASSESSMENT — PAIN SCALES - GENERAL: PAINLEVEL: NO PAIN (0)

## 2019-10-15 NOTE — PROGRESS NOTES
Infusion Nursing Note:  Emiliano Aguiar presents today for IVIG and pre-meds.    Patient seen by provider today: No   present during visit today: Not Applicable.    Note: No change in neuropathy.  IVIG started at 0.5ml/kg/hr and increased by 0.5ml/kg/hr every 15min. for a max of 4ml/kg/hr.    Intravenous Access:  Peripheral IV placed.    Treatment Conditions:  Biological Infusion Checklist:  ~~~ NOTE: If the patient answers yes to any of the questions below, hold the infusion and contact ordering provider or on-call provider.    1. Have you recently had an elevated temperature, fever, chills, productive cough, coughing for 3 weeks or longer or hemoptysis, abnormal vital signs, night sweats,  chest pain or have you noticed a decrease in your appetite, unexplained weight loss or fatigue? No  2. Do you have any open wounds or new incisions? No  3. Do you have any recent or upcoming hospitalizations, surgeries or dental procedures? No  4. Do you currently have or recently have had any signs of illness or infection or are you on any antibiotics? No  5. Have you had any new, sudden or worsening abdominal pain? No  6. Have you or anyone in your household received a live vaccination in the past 4 weeks? Please note:  No live vaccines while on biologic/chemotherapy until 6 months after the last treatment.  Patient can receive the flu vaccine (shot only) and the pneumovax.  It is optimal for the patient to get these vaccines mid cycle, but they can be given at any time as long as it is not on the day of the infusion. No  7. Have you recently been diagnosed with any new nervous system diseases (ie. Multiple sclerosis, Guillain [place], seizures, neurological changes) or cancer diagnosis? No  8. Are you on any form of radiation or chemotherapy? No  9. Are you pregnant or breast feeding or do you have plans of pregnancy in the future? No  10. Have you been having any signs of worsening depression or suicidal ideations?   (benlysta only) No  11. Have there been any other new onset medical symptoms? No        Post Infusion Assessment:  Patient tolerated infusion without incident.  Blood return noted pre and post infusion.  Site patent and intact, free from redness, edema or discomfort.  No evidence of extravasations.  Access discontinued per protocol.  Biologic Infusion Post Education: Call the triage nurse at your clinic or seek medical attention if you have chills and/or temperature greater than or equal to 100.5, uncontrolled nausea/vomiting, diarrhea, constipation, dizziness, shortness of breath, chest pain, heart palpitations, weakness or any other new or concerning symptoms, questions or concerns.  You cannot have any live virus vaccines prior to or during treatment or up to 6 months post infusion.  If you have an upcoming surgery, medical procedure or dental procedure during treatment, this should be discussed with your ordering physician and your surgeon/dentist.  If you are having any concerning symptom, if you are unsure if you should get your next infusion or wish to speak to a provider before your next infusion, please call your care coordinator or triage nurse at your clinic to notify them so we can adequately serve you.       Discharge Plan:   Discharge instructions reviewed with: Patient.  Patient discharged in stable condition accompanied by: self.  Departure Mode: Ambulatory.  Next infusion scheduled for 11/12/19.      ADELE TAYLOR, RN, RN

## 2019-10-16 PROBLEM — M25.552 HIP PAIN, BILATERAL: Status: RESOLVED | Noted: 2019-06-24 | Resolved: 2019-10-16

## 2019-10-16 PROBLEM — M54.50 CHRONIC LOW BACK PAIN: Status: RESOLVED | Noted: 2019-06-24 | Resolved: 2019-10-16

## 2019-10-16 PROBLEM — G89.29 CHRONIC LOW BACK PAIN: Status: RESOLVED | Noted: 2019-06-24 | Resolved: 2019-10-16

## 2019-10-16 PROBLEM — M25.551 HIP PAIN, BILATERAL: Status: RESOLVED | Noted: 2019-06-24 | Resolved: 2019-10-16

## 2019-10-16 PROBLEM — M25.562 CHRONIC PAIN OF LEFT KNEE: Status: RESOLVED | Noted: 2019-06-24 | Resolved: 2019-10-16

## 2019-10-16 PROBLEM — G89.29 CHRONIC PAIN OF LEFT KNEE: Status: RESOLVED | Noted: 2019-06-24 | Resolved: 2019-10-16

## 2019-10-16 NOTE — PROGRESS NOTES
Emiliano Aguiar was seen 2 times for evaluation and treatment.  Patient did not return for further treatment and current status is unknown.  Due to short treatment duration, no objective or functional changes were made.  Please see goal flow sheet from episode noted date below and initial evaluation for further information.    Linked Episodes   Type: Episode: Status: Noted: Resolved: Last update: Updated by:   PHYSICAL THERAPY LBP/Brown/[id number] Active 6/24/2019  7/10/2019  2:18 PM Brandy Dickinson PT      Comments:

## 2019-11-08 ENCOUNTER — HEALTH MAINTENANCE LETTER (OUTPATIENT)
Age: 54
End: 2019-11-08

## 2019-11-12 ENCOUNTER — INFUSION THERAPY VISIT (OUTPATIENT)
Dept: INFUSION THERAPY | Facility: CLINIC | Age: 54
End: 2019-11-12
Attending: PSYCHIATRY & NEUROLOGY
Payer: COMMERCIAL

## 2019-11-12 VITALS
WEIGHT: 205.03 LBS | HEART RATE: 67 BPM | TEMPERATURE: 97.2 F | DIASTOLIC BLOOD PRESSURE: 93 MMHG | BODY MASS INDEX: 28.6 KG/M2 | RESPIRATION RATE: 16 BRPM | SYSTOLIC BLOOD PRESSURE: 162 MMHG

## 2019-11-12 DIAGNOSIS — G61.81 CIDP (CHRONIC INFLAMMATORY DEMYELINATING POLYNEUROPATHY) (H): Primary | ICD-10-CM

## 2019-11-12 PROCEDURE — 25000132 ZZH RX MED GY IP 250 OP 250 PS 637: Performed by: PSYCHIATRY & NEUROLOGY

## 2019-11-12 PROCEDURE — 96366 THER/PROPH/DIAG IV INF ADDON: CPT

## 2019-11-12 PROCEDURE — 96365 THER/PROPH/DIAG IV INF INIT: CPT

## 2019-11-12 PROCEDURE — 25000128 H RX IP 250 OP 636: Performed by: PSYCHIATRY & NEUROLOGY

## 2019-11-12 RX ORDER — DIPHENHYDRAMINE HCL 25 MG
50 CAPSULE ORAL ONCE
Status: CANCELLED | OUTPATIENT
Start: 2019-12-04

## 2019-11-12 RX ORDER — ACETAMINOPHEN 325 MG/1
650 TABLET ORAL ONCE
Status: COMPLETED | OUTPATIENT
Start: 2019-11-12 | End: 2019-11-12

## 2019-11-12 RX ORDER — DIPHENHYDRAMINE HCL 25 MG
50 CAPSULE ORAL ONCE
Status: COMPLETED | OUTPATIENT
Start: 2019-11-12 | End: 2019-11-12

## 2019-11-12 RX ORDER — ACETAMINOPHEN 325 MG/1
650 TABLET ORAL ONCE
Status: CANCELLED
Start: 2019-12-04

## 2019-11-12 RX ADMIN — DIPHENHYDRAMINE HYDROCHLORIDE 25 MG: 25 CAPSULE ORAL at 08:23

## 2019-11-12 RX ADMIN — HUMAN IMMUNOGLOBULIN G 75 G: 40 LIQUID INTRAVENOUS at 08:37

## 2019-11-12 RX ADMIN — ACETAMINOPHEN 650 MG: 325 TABLET ORAL at 08:23

## 2019-11-12 NOTE — PROGRESS NOTES
Infusion Nursing Note:  Emiliano Aguiar presents today for IVIG and premeds (Tylenol and 25 mg of Benadryl only).    Patient seen by provider today: No   present during visit today: Not Applicable.    Note: Patient feels like neuropathy is worse since dose of IVIG has been lowered (IVIG shortage). Patient states he gets horrible headaches that make him nauseated and sick for the next 4-6 days; doctors are aware.    Infusion Rate: Started at 0.5 ml/kg/hr every 15 minutes, increased by 0.5 ml/kg/hr every 15 minutes, to a max of 4 ml/kg/hr.    Intravenous Access:  Peripheral IV placed.    Treatment Conditions:  Biological Infusion Checklist:  ~~~ NOTE: If the patient answers yes to any of the questions below, hold the infusion and contact ordering provider or on-call provider.    1. Have you recently had an elevated temperature, fever, chills, productive cough, coughing for 3 weeks or longer or hemoptysis, abnormal vital signs, night sweats,  chest pain or have you noticed a decrease in your appetite, unexplained weight loss or fatigue? No  2. Do you have any open wounds or new incisions? No  3. Do you have any recent or upcoming hospitalizations, surgeries or dental procedures? No  4. Do you currently have or recently have had any signs of illness or infection or are you on any antibiotics? No  5. Have you had any new, sudden or worsening abdominal pain? No  6. Have you or anyone in your household received a live vaccination in the past 4 weeks? Please note:  No live vaccines while on biologic/chemotherapy until 6 months after the last treatment.  Patient can receive the flu vaccine (shot only) and the pneumovax.  It is optimal for the patient to get these vaccines mid cycle, but they can be given at any time as long as it is not on the day of the infusion. No  7. Have you recently been diagnosed with any new nervous system diseases (ie. Multiple sclerosis, Guillain [place], seizures, neurological changes) or  cancer diagnosis? No  8. Are you on any form of radiation or chemotherapy? No  9. Are you pregnant or breast feeding or do you have plans of pregnancy in the future? No  10. Have you been having any signs of worsening depression or suicidal ideations?  (benlysta only) No  11. Have there been any other new onset medical symptoms? No        Post Infusion Assessment:  Patient tolerated infusion without incident.  Blood return noted pre and post infusion.  Site patent and intact, free from redness, edema or discomfort.  No evidence of extravasations.  Access discontinued per protocol.    Biologic Infusion Post Education: Call the triage nurse at your clinic or seek medical attention if you have chills and/or temperature greater than or equal to 100.5, uncontrolled nausea/vomiting, diarrhea, constipation, dizziness, shortness of breath, chest pain, heart palpitations, weakness or any other new or concerning symptoms, questions or concerns.  You cannot have any live virus vaccines prior to or during treatment or up to 6 months post infusion.  If you have an upcoming surgery, medical procedure or dental procedure during treatment, this should be discussed with your ordering physician and your surgeon/dentist.  If you are having any concerning symptom, if you are unsure if you should get your next infusion or wish to speak to a provider before your next infusion, please call your care coordinator or triage nurse at your clinic to notify them so we can adequately serve you.       Discharge Plan:   Discharge instructions reviewed with: Patient.  Patient and/or family verbalized understanding of discharge instructions and all questions answered.  AVS to patient via [employer]  Patient will return 12/3/19 for next appointment. Patient will stop at the  on the way out to reschedule his appt to the 3rd. He is receiving this every 3 weeks right now while there is a shortage.  Patient discharged in stable condition  accompanied by: self.  Departure Mode: Ambulatory.    Lynda Lion RN

## 2019-12-04 ENCOUNTER — INFUSION THERAPY VISIT (OUTPATIENT)
Dept: INFUSION THERAPY | Facility: CLINIC | Age: 54
End: 2019-12-04
Attending: PSYCHIATRY & NEUROLOGY
Payer: COMMERCIAL

## 2019-12-04 VITALS
TEMPERATURE: 97.9 F | DIASTOLIC BLOOD PRESSURE: 75 MMHG | WEIGHT: 206.1 LBS | HEART RATE: 61 BPM | OXYGEN SATURATION: 98 % | RESPIRATION RATE: 16 BRPM | BODY MASS INDEX: 28.75 KG/M2 | SYSTOLIC BLOOD PRESSURE: 120 MMHG

## 2019-12-04 DIAGNOSIS — G61.81 CIDP (CHRONIC INFLAMMATORY DEMYELINATING POLYNEUROPATHY) (H): Primary | ICD-10-CM

## 2019-12-04 PROCEDURE — 96366 THER/PROPH/DIAG IV INF ADDON: CPT

## 2019-12-04 PROCEDURE — 96365 THER/PROPH/DIAG IV INF INIT: CPT

## 2019-12-04 PROCEDURE — 25000132 ZZH RX MED GY IP 250 OP 250 PS 637: Performed by: PSYCHIATRY & NEUROLOGY

## 2019-12-04 PROCEDURE — 25000128 H RX IP 250 OP 636: Performed by: PSYCHIATRY & NEUROLOGY

## 2019-12-04 RX ORDER — ACETAMINOPHEN 325 MG/1
650 TABLET ORAL ONCE
Status: CANCELLED
Start: 2019-12-04

## 2019-12-04 RX ORDER — DIPHENHYDRAMINE HCL 25 MG
50 CAPSULE ORAL ONCE
Status: CANCELLED | OUTPATIENT
Start: 2019-12-04

## 2019-12-04 RX ORDER — DIPHENHYDRAMINE HCL 25 MG
50 CAPSULE ORAL ONCE
Status: COMPLETED | OUTPATIENT
Start: 2019-12-04 | End: 2019-12-04

## 2019-12-04 RX ORDER — ACETAMINOPHEN 325 MG/1
650 TABLET ORAL ONCE
Status: COMPLETED | OUTPATIENT
Start: 2019-12-04 | End: 2019-12-04

## 2019-12-04 RX ADMIN — ACETAMINOPHEN 650 MG: 325 TABLET ORAL at 08:56

## 2019-12-04 RX ADMIN — HUMAN IMMUNOGLOBULIN G 80 G: 40 LIQUID INTRAVENOUS at 09:02

## 2019-12-04 RX ADMIN — DIPHENHYDRAMINE HYDROCHLORIDE 25 MG: 25 CAPSULE ORAL at 08:56

## 2019-12-04 NOTE — PROGRESS NOTES
Infusion Nursing Note:  Emiliano Aguiar presents today for IVIG.    Patient seen by provider today: No   present during visit today: Not Applicable.    Note: since IVIG shortage, Emiliano has gotten a smaller dose.  Has developed more weakness and unsteadiness.  Will start on Q 3 weeks schedule.  Receiving 80 Gm today.    Intravenous Access:  Peripheral IV placed.    Treatment Conditions:  Biological Infusion Checklist:  ~~~ NOTE: If the patient answers yes to any of the questions below, hold the infusion and contact ordering provider or on-call provider.    1. Have you recently had an elevated temperature, fever, chills, productive cough, coughing for 3 weeks or longer or hemoptysis, abnormal vital signs, night sweats,  chest pain or have you noticed a decrease in your appetite, unexplained weight loss or fatigue? No  2. Do you have any open wounds or new incisions? No  3. Do you have any recent or upcoming hospitalizations, surgeries or dental procedures? No  4. Do you currently have or recently have had any signs of illness or infection or are you on any antibiotics? No  5. Have you had any new, sudden or worsening abdominal pain? No  6. Have you or anyone in your household received a live vaccination in the past 4 weeks? Please note:  No live vaccines while on biologic/chemotherapy until 6 months after the last treatment.  Patient can receive the flu vaccine (shot only) and the pneumovax.  It is optimal for the patient to get these vaccines mid cycle, but they can be given at any time as long as it is not on the day of the infusion. No  7. Have you recently been diagnosed with any new nervous system diseases (ie. Multiple sclerosis, Guillain [place], seizures, neurological changes) or cancer diagnosis? No  8. Are you on any form of radiation or chemotherapy? No  9. Are you pregnant or breast feeding or do you have plans of pregnancy in the future? No  10. Have you been having any signs of worsening  depression or suicidal ideations?  (benlysta only) No  11. Have there been any other new onset medical symptoms? No  Infusion started at 0.5 ml/kg/hr  Increased by 0.5 ml/kg/hr every 15 minutes to max of 4 ml/kg/hr         Post Infusion Assessment:  Patient tolerated infusion without incident.  Blood return noted pre and post infusion.  Site patent and intact, free from redness, edema or discomfort.  No evidence of extravasations.  Access discontinued per protocol.  Biologic Infusion Post Education: Call the triage nurse at your clinic or seek medical attention if you have chills and/or temperature greater than or equal to 100.5, uncontrolled nausea/vomiting, diarrhea, constipation, dizziness, shortness of breath, chest pain, heart palpitations, weakness or any other new or concerning symptoms, questions or concerns.  You cannot have any live virus vaccines prior to or during treatment or up to 6 months post infusion.  If you have an upcoming surgery, medical procedure or dental procedure during treatment, this should be discussed with your ordering physician and your surgeon/dentist.  If you are having any concerning symptom, if you are unsure if you should get your next infusion or wish to speak to a provider before your next infusion, please call your care coordinator or triage nurse at your clinic to notify them so we can adequately serve you.     Discharge Plan:   Discharge instructions reviewed with: Patient.  AVS to patient via [employer].  Patient will return 12/26 for next appointment.   Patient discharged in stable condition accompanied by: self.  Departure Mode: Ambulatory.  Whit Riddle RN

## 2019-12-26 ENCOUNTER — INFUSION THERAPY VISIT (OUTPATIENT)
Dept: INFUSION THERAPY | Facility: CLINIC | Age: 54
End: 2019-12-26
Attending: PSYCHIATRY & NEUROLOGY
Payer: COMMERCIAL

## 2019-12-26 VITALS
BODY MASS INDEX: 28.45 KG/M2 | DIASTOLIC BLOOD PRESSURE: 84 MMHG | WEIGHT: 204 LBS | SYSTOLIC BLOOD PRESSURE: 143 MMHG | RESPIRATION RATE: 16 BRPM | HEART RATE: 66 BPM | TEMPERATURE: 97.6 F | OXYGEN SATURATION: 98 %

## 2019-12-26 DIAGNOSIS — G61.81 CIDP (CHRONIC INFLAMMATORY DEMYELINATING POLYNEUROPATHY) (H): Primary | ICD-10-CM

## 2019-12-26 PROCEDURE — 96366 THER/PROPH/DIAG IV INF ADDON: CPT

## 2019-12-26 PROCEDURE — 96365 THER/PROPH/DIAG IV INF INIT: CPT

## 2019-12-26 PROCEDURE — 25000128 H RX IP 250 OP 636: Performed by: PSYCHIATRY & NEUROLOGY

## 2019-12-26 PROCEDURE — 25000132 ZZH RX MED GY IP 250 OP 250 PS 637: Performed by: PSYCHIATRY & NEUROLOGY

## 2019-12-26 RX ORDER — ACETAMINOPHEN 325 MG/1
650 TABLET ORAL ONCE
Status: CANCELLED
Start: 2019-12-26

## 2019-12-26 RX ORDER — ACETAMINOPHEN 325 MG/1
650 TABLET ORAL ONCE
Status: COMPLETED | OUTPATIENT
Start: 2019-12-26 | End: 2019-12-26

## 2019-12-26 RX ORDER — DIPHENHYDRAMINE HCL 25 MG
50 CAPSULE ORAL ONCE
Status: COMPLETED | OUTPATIENT
Start: 2019-12-26 | End: 2019-12-26

## 2019-12-26 RX ORDER — DIPHENHYDRAMINE HCL 25 MG
50 CAPSULE ORAL ONCE
Status: CANCELLED | OUTPATIENT
Start: 2019-12-26

## 2019-12-26 RX ADMIN — DIPHENHYDRAMINE HYDROCHLORIDE 25 MG: 25 CAPSULE ORAL at 08:17

## 2019-12-26 RX ADMIN — ACETAMINOPHEN 650 MG: 325 TABLET ORAL at 08:17

## 2019-12-26 RX ADMIN — HUMAN IMMUNOGLOBULIN G 75 G: 40 LIQUID INTRAVENOUS at 08:28

## 2019-12-26 NOTE — PROGRESS NOTES
Infusion Nursing Note:  Emiliano Aguiar presents today for IVIG.    Patient seen by provider today: No   present during visit today: Not Applicable.    Note: IVIG increased by 0.5 ml/kg/hr every 15 minutes with max of 4 ml/kg/hr.    Intravenous Access:  Peripheral IV placed.    Treatment Conditions:  Biological Infusion Checklist:  ~~~ NOTE: If the patient answers yes to any of the questions below, hold the infusion and contact ordering provider or on-call provider.    1. Have you recently had an elevated temperature, fever, chills, productive cough, coughing for 3 weeks or longer or hemoptysis, abnormal vital signs, night sweats,  chest pain or have you noticed a decrease in your appetite, unexplained weight loss or fatigue? No  2. Do you have any open wounds or new incisions? No  3. Do you have any recent or upcoming hospitalizations, surgeries or dental procedures? No  4. Do you currently have or recently have had any signs of illness or infection or are you on any antibiotics? No  5. Have you had any new, sudden or worsening abdominal pain? No  6. Have you or anyone in your household received a live vaccination in the past 4 weeks? Please note:  No live vaccines while on biologic/chemotherapy until 6 months after the last treatment.  Patient can receive the flu vaccine (shot only) and the pneumovax.  It is optimal for the patient to get these vaccines mid cycle, but they can be given at any time as long as it is not on the day of the infusion. No  7. Have you recently been diagnosed with any new nervous system diseases (ie. Multiple sclerosis, Guillain [place], seizures, neurological changes) or cancer diagnosis? No  8. Are you on any form of radiation or chemotherapy? No  9. Are you pregnant or breast feeding or do you have plans of pregnancy in the future? No  10. Have you been having any signs of worsening depression or suicidal ideations?  (benlysta only) No  11. Have there been any other new onset  medical symptoms? No        Post Infusion Assessment:  Patient tolerated infusion without incident.  Blood return noted pre and post infusion.  Site patent and intact, free from redness, edema or discomfort.  No evidence of extravasations.  Access discontinued per protocol.  Biologic Infusion Post Education: Call the triage nurse at your clinic or seek medical attention if you have chills and/or temperature greater than or equal to 100.5, uncontrolled nausea/vomiting, diarrhea, constipation, dizziness, shortness of breath, chest pain, heart palpitations, weakness or any other new or concerning symptoms, questions or concerns.  You cannot have any live virus vaccines prior to or during treatment or up to 6 months post infusion.  If you have an upcoming surgery, medical procedure or dental procedure during treatment, this should be discussed with your ordering physician and your surgeon/dentist.  If you are having any concerning symptom, if you are unsure if you should get your next infusion or wish to speak to a provider before your next infusion, please call your care coordinator or triage nurse at your clinic to notify them so we can adequately serve you.       Discharge Plan:   Patient declined prescription refills.  Discharge instructions reviewed with: Patient.  Patient and/or family verbalized understanding of discharge instructions and all questions answered.  Copy of AVS reviewed with patient and/or family.  Patient will make next appointment upon leaving.  Patient discharged in stable condition accompanied by: self.  Departure Mode: Ambulatory.    Neida Dolan RN

## 2020-01-15 ENCOUNTER — INFUSION THERAPY VISIT (OUTPATIENT)
Dept: INFUSION THERAPY | Facility: CLINIC | Age: 55
End: 2020-01-15
Attending: PSYCHIATRY & NEUROLOGY
Payer: COMMERCIAL

## 2020-01-15 VITALS
OXYGEN SATURATION: 98 % | SYSTOLIC BLOOD PRESSURE: 143 MMHG | RESPIRATION RATE: 16 BRPM | TEMPERATURE: 97.4 F | WEIGHT: 207 LBS | DIASTOLIC BLOOD PRESSURE: 89 MMHG | BODY MASS INDEX: 28.87 KG/M2 | HEART RATE: 67 BPM

## 2020-01-15 DIAGNOSIS — G61.81 CIDP (CHRONIC INFLAMMATORY DEMYELINATING POLYNEUROPATHY) (H): Primary | ICD-10-CM

## 2020-01-15 PROCEDURE — 96365 THER/PROPH/DIAG IV INF INIT: CPT

## 2020-01-15 PROCEDURE — 96375 TX/PRO/DX INJ NEW DRUG ADDON: CPT

## 2020-01-15 PROCEDURE — 25000128 H RX IP 250 OP 636: Performed by: PSYCHIATRY & NEUROLOGY

## 2020-01-15 PROCEDURE — 96366 THER/PROPH/DIAG IV INF ADDON: CPT

## 2020-01-15 PROCEDURE — 25000132 ZZH RX MED GY IP 250 OP 250 PS 637: Performed by: PSYCHIATRY & NEUROLOGY

## 2020-01-15 RX ORDER — DIPHENHYDRAMINE HCL 25 MG
50 CAPSULE ORAL ONCE
Status: DISCONTINUED | OUTPATIENT
Start: 2020-01-15 | End: 2020-01-15 | Stop reason: DRUGHIGH

## 2020-01-15 RX ORDER — ACETAMINOPHEN 325 MG/1
650 TABLET ORAL ONCE
Status: CANCELLED
Start: 2020-02-04

## 2020-01-15 RX ORDER — DIPHENHYDRAMINE HCL 25 MG
50 CAPSULE ORAL ONCE
Status: CANCELLED | OUTPATIENT
Start: 2020-01-15

## 2020-01-15 RX ORDER — DIPHENHYDRAMINE HCL 25 MG
25 CAPSULE ORAL ONCE
Status: COMPLETED | OUTPATIENT
Start: 2020-01-15 | End: 2020-01-15

## 2020-01-15 RX ORDER — ACETAMINOPHEN 325 MG/1
650 TABLET ORAL ONCE
Status: COMPLETED | OUTPATIENT
Start: 2020-01-15 | End: 2020-01-15

## 2020-01-15 RX ADMIN — ACETAMINOPHEN 650 MG: 325 TABLET ORAL at 08:11

## 2020-01-15 RX ADMIN — HYDROCORTISONE SODIUM SUCCINATE 100 MG: 100 INJECTION, POWDER, FOR SOLUTION INTRAMUSCULAR; INTRAVENOUS at 08:29

## 2020-01-15 RX ADMIN — HUMAN IMMUNOGLOBULIN G 75 G: 40 LIQUID INTRAVENOUS at 08:50

## 2020-01-15 RX ADMIN — DIPHENHYDRAMINE HYDROCHLORIDE 25 MG: 25 CAPSULE ORAL at 08:29

## 2020-01-15 NOTE — PROGRESS NOTES
Infusion Nursing Note:  Emiliano Aguiar presents today for IVIG.    Patient seen by provider today: No   present during visit today: Not Applicable.    Note: IVIG started at 0.5 ml/kg/hr and increased by 0.5 ml/kg/hr every 15 minutes with max of 4 ml/kg/hr .    Intravenous Access:  Peripheral IV placed.    Treatment Conditions:  Biological Infusion Checklist:  ~~~ NOTE: If the patient answers yes to any of the questions below, hold the infusion and contact ordering provider or on-call provider.    1. Have you recently had an elevated temperature, fever, chills, productive cough, coughing for 3 weeks or longer or hemoptysis, abnormal vital signs, night sweats,  chest pain or have you noticed a decrease in your appetite, unexplained weight loss or fatigue? No  2. Do you have any open wounds or new incisions? No  3. Do you have any recent or upcoming hospitalizations, surgeries or dental procedures? No  4. Do you currently have or recently have had any signs of illness or infection or are you on any antibiotics? No  5. Have you had any new, sudden or worsening abdominal pain? No  6. Have you or anyone in your household received a live vaccination in the past 4 weeks? Please note:  No live vaccines while on biologic/chemotherapy until 6 months after the last treatment.  Patient can receive the flu vaccine (shot only) and the pneumovax.  It is optimal for the patient to get these vaccines mid cycle, but they can be given at any time as long as it is not on the day of the infusion. No  7. Have you recently been diagnosed with any new nervous system diseases (ie. Multiple sclerosis, Guillain [place], seizures, neurological changes) or cancer diagnosis? No  8. Are you on any form of radiation or chemotherapy? No  9. Are you pregnant or breast feeding or do you have plans of pregnancy in the future? No  10. Have you been having any signs of worsening depression or suicidal ideations?  (benlysta only) No  11. Have  there been any other new onset medical symptoms? No        Post Infusion Assessment:  Patient tolerated infusion without incident.  Blood return noted pre and post infusion.  Site patent and intact, free from redness, edema or discomfort.  No evidence of extravasations.  Access discontinued per protocol.  Biologic Infusion Post Education: Call the triage nurse at your clinic or seek medical attention if you have chills and/or temperature greater than or equal to 100.5, uncontrolled nausea/vomiting, diarrhea, constipation, dizziness, shortness of breath, chest pain, heart palpitations, weakness or any other new or concerning symptoms, questions or concerns.  You cannot have any live virus vaccines prior to or during treatment or up to 6 months post infusion.  If you have an upcoming surgery, medical procedure or dental procedure during treatment, this should be discussed with your ordering physician and your surgeon/dentist.  If you are having any concerning symptom, if you are unsure if you should get your next infusion or wish to speak to a provider before your next infusion, please call your care coordinator or triage nurse at your clinic to notify them so we can adequately serve you.       Discharge Plan:   Patient declined prescription refills.  Discharge instructions reviewed with: Patient.  Patient and/or family verbalized understanding of discharge instructions and all questions answered.  Copy of AVS reviewed with patient and/or family.  Patient will return 2/4/2020 for next appointment.  Patient discharged in stable condition accompanied by: self.  Departure Mode: Ambulatory.    Neida Dolan RN

## 2020-02-04 ENCOUNTER — INFUSION THERAPY VISIT (OUTPATIENT)
Dept: INFUSION THERAPY | Facility: CLINIC | Age: 55
End: 2020-02-04
Attending: PSYCHIATRY & NEUROLOGY
Payer: COMMERCIAL

## 2020-02-04 VITALS
HEART RATE: 60 BPM | DIASTOLIC BLOOD PRESSURE: 83 MMHG | WEIGHT: 205.1 LBS | OXYGEN SATURATION: 98 % | TEMPERATURE: 98.3 F | SYSTOLIC BLOOD PRESSURE: 130 MMHG | RESPIRATION RATE: 16 BRPM | BODY MASS INDEX: 28.61 KG/M2

## 2020-02-04 DIAGNOSIS — G61.81 CIDP (CHRONIC INFLAMMATORY DEMYELINATING POLYNEUROPATHY) (H): Primary | ICD-10-CM

## 2020-02-04 PROCEDURE — 25000132 ZZH RX MED GY IP 250 OP 250 PS 637: Performed by: PSYCHIATRY & NEUROLOGY

## 2020-02-04 PROCEDURE — 25000128 H RX IP 250 OP 636: Performed by: PSYCHIATRY & NEUROLOGY

## 2020-02-04 PROCEDURE — 96366 THER/PROPH/DIAG IV INF ADDON: CPT

## 2020-02-04 PROCEDURE — 96375 TX/PRO/DX INJ NEW DRUG ADDON: CPT

## 2020-02-04 PROCEDURE — 96365 THER/PROPH/DIAG IV INF INIT: CPT

## 2020-02-04 RX ORDER — DIPHENHYDRAMINE HCL 25 MG
50 CAPSULE ORAL ONCE
Status: COMPLETED | OUTPATIENT
Start: 2020-02-04 | End: 2020-02-04

## 2020-02-04 RX ORDER — DIPHENHYDRAMINE HCL 25 MG
50 CAPSULE ORAL ONCE
Status: CANCELLED | OUTPATIENT
Start: 2020-02-25

## 2020-02-04 RX ORDER — ACETAMINOPHEN 325 MG/1
650 TABLET ORAL ONCE
Status: CANCELLED
Start: 2020-02-25

## 2020-02-04 RX ORDER — ACETAMINOPHEN 325 MG/1
650 TABLET ORAL ONCE
Status: COMPLETED | OUTPATIENT
Start: 2020-02-04 | End: 2020-02-04

## 2020-02-04 RX ADMIN — ACETAMINOPHEN 650 MG: 325 TABLET ORAL at 08:33

## 2020-02-04 RX ADMIN — DIPHENHYDRAMINE HYDROCHLORIDE 25 MG: 25 CAPSULE ORAL at 08:33

## 2020-02-04 RX ADMIN — HUMAN IMMUNOGLOBULIN G 75 G: 40 LIQUID INTRAVENOUS at 08:58

## 2020-02-04 ASSESSMENT — PAIN SCALES - GENERAL: PAINLEVEL: NO PAIN (0)

## 2020-02-04 NOTE — PROGRESS NOTES
Infusion Nursing Note:  Emiliano Aguiar presents today for IVIG/Tylenol/Benadryl.    Patient seen by provider today: No   present during visit today: Not Applicable.    Note: Patient states the every 3 wk. infusions are helping his numbness/weakness.  This has been improving.  Continues to have SE from IVIG for around 6 days after each infusion.  IVIG started at 0.5ml/kg/hr and increased by 0.5ml/kg/hr every 15 minutes for a max of 4ml/kg/hr.    Intravenous Access:  Peripheral IV placed.    Treatment Conditions:  Biological Infusion Checklist:  ~~~ NOTE: If the patient answers yes to any of the questions below, hold the infusion and contact ordering provider or on-call provider.    1. Have you recently had an elevated temperature, fever, chills, productive cough, coughing for 3 weeks or longer or hemoptysis, abnormal vital signs, night sweats,  chest pain or have you noticed a decrease in your appetite, unexplained weight loss or fatigue? No  2. Do you have any open wounds or new incisions? No  3. Do you have any recent or upcoming hospitalizations, surgeries or dental procedures? No  4. Do you currently have or recently have had any signs of illness or infection or are you on any antibiotics? No  5. Have you had any new, sudden or worsening abdominal pain? No  6. Have you or anyone in your household received a live vaccination in the past 4 weeks? Please note:  No live vaccines while on biologic/chemotherapy until 6 months after the last treatment.  Patient can receive the flu vaccine (shot only) and the pneumovax.  It is optimal for the patient to get these vaccines mid cycle, but they can be given at any time as long as it is not on the day of the infusion. No  7. Have you recently been diagnosed with any new nervous system diseases (ie. Multiple sclerosis, Guillain [place], seizures, neurological changes) or cancer diagnosis? No  8. Are you on any form of radiation or chemotherapy? No  9. Are you  pregnant or breast feeding or do you have plans of pregnancy in the future? No  10. Have you been having any signs of worsening depression or suicidal ideations?  (benlysta only) No  11. Have there been any other new onset medical symptoms? No        Post Infusion Assessment:  Patient tolerated infusion without incident.  Blood return noted pre and post infusion.  Site patent and intact, free from redness, edema or discomfort.  No evidence of extravasations.  Access discontinued per protocol.       Discharge Plan:   Discharge instructions reviewed with: Patient.  Patient discharged in stable condition accompanied by: self.  Departure Mode: Ambulatory.  Next infusion scheduled for 2/25/2020.    ADELE TAYLOR RN

## 2020-02-05 ENCOUNTER — OFFICE VISIT (OUTPATIENT)
Dept: NEUROLOGY | Facility: CLINIC | Age: 55
End: 2020-02-05
Payer: COMMERCIAL

## 2020-02-05 VITALS
BODY MASS INDEX: 28.59 KG/M2 | SYSTOLIC BLOOD PRESSURE: 138 MMHG | WEIGHT: 205 LBS | HEART RATE: 63 BPM | OXYGEN SATURATION: 98 % | DIASTOLIC BLOOD PRESSURE: 83 MMHG

## 2020-02-05 DIAGNOSIS — G61.81 CIDP (CHRONIC INFLAMMATORY DEMYELINATING POLYNEUROPATHY) (H): ICD-10-CM

## 2020-02-05 DIAGNOSIS — G61.81 CIDP (CHRONIC INFLAMMATORY DEMYELINATING POLYNEUROPATHY) (H): Primary | ICD-10-CM

## 2020-02-05 LAB
BASOPHILS # BLD AUTO: 0 10E9/L (ref 0–0.2)
BASOPHILS NFR BLD AUTO: 0.6 %
CREAT SERPL-MCNC: 0.84 MG/DL (ref 0.66–1.25)
DIFFERENTIAL METHOD BLD: ABNORMAL
EOSINOPHIL # BLD AUTO: 0 10E9/L (ref 0–0.7)
EOSINOPHIL NFR BLD AUTO: 1.1 %
ERYTHROCYTE [DISTWIDTH] IN BLOOD BY AUTOMATED COUNT: 12.8 % (ref 10–15)
GFR SERPL CREATININE-BSD FRML MDRD: >90 ML/MIN/{1.73_M2}
HCT VFR BLD AUTO: 45.4 % (ref 40–53)
HGB BLD-MCNC: 15 G/DL (ref 13.3–17.7)
IMM GRANULOCYTES # BLD: 0 10E9/L (ref 0–0.4)
IMM GRANULOCYTES NFR BLD: 0.6 %
LYMPHOCYTES # BLD AUTO: 1 10E9/L (ref 0.8–5.3)
LYMPHOCYTES NFR BLD AUTO: 29 %
MCH RBC QN AUTO: 30.2 PG (ref 26.5–33)
MCHC RBC AUTO-ENTMCNC: 33 G/DL (ref 31.5–36.5)
MCV RBC AUTO: 91 FL (ref 78–100)
MONOCYTES # BLD AUTO: 0.3 10E9/L (ref 0–1.3)
MONOCYTES NFR BLD AUTO: 8.8 %
NEUTROPHILS # BLD AUTO: 2.1 10E9/L (ref 1.6–8.3)
NEUTROPHILS NFR BLD AUTO: 59.9 %
NRBC # BLD AUTO: 0 10*3/UL
NRBC BLD AUTO-RTO: 0 /100
PLATELET # BLD AUTO: 211 10E9/L (ref 150–450)
RBC # BLD AUTO: 4.97 10E12/L (ref 4.4–5.9)
WBC # BLD AUTO: 3.5 10E9/L (ref 4–11)

## 2020-02-05 ASSESSMENT — PATIENT HEALTH QUESTIONNAIRE - PHQ9: SUM OF ALL RESPONSES TO PHQ QUESTIONS 1-9: 3

## 2020-02-05 ASSESSMENT — PAIN SCALES - GENERAL: PAINLEVEL: NO PAIN (0)

## 2020-02-05 NOTE — PROGRESS NOTES
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  Emiliano was stable until about 3 months ago, when he noticed some worsening of his symptoms between the third and fourth week after the IVIG infusion, with more numbness, weakness, gait instability and pain.  I had intended to give him 1 g/kg of IVIG every 4 weeks based on actual body weight, but because of the national shortage of IVIG, he was dosed based on ideal weight, so he ended up taking instead of 95 grams, 75 grams.  This was probably the factor behind his mild worsening.  I then decided to switch him to every 3 weeks of IVIG and he started doing this in 11/2019.  He is getting the same dose, but every 21 days (75 grams).  He feels that this has clearly helped and overall his neurological symptoms are now stable and he is pleased with the effect.  He continues to have headaches and joint pains for a good 5-6 days after each IVIG dose, but he would rather take this as opposed to the worsening of his CIDP symptoms.      CURRENT MEDICATIONS:  Reviewed and are as per Epic record.      PHYSICAL EXAMINATION:   VITAL SIGNS:  Blood pressure is 138/83, pulse 63 and regular.  Weight 92.9 kg.  O2 sat 98% on room air.  RODS CIDP score was 27, entirely unchanged from his last visit and so is  score which was 30 kg on the right and 24 on the left.     NEUROLOGIC:  Neurologic exam is stable, showing strength of 5/5 in upper and lower extremities proximally and distally.  His vibration is about 8-9 seconds at the toes bilaterally.  Joint position sensation is intact at the toes.  Vibration is intact at the index fingers at 23+ seconds.  He can stand up from a chair without assistance even with arms crossed on the chest, but he has a positive Romberg sign and great difficulty doing tandem; he can barely do 2 steps.      In summary, Mr. Aguiar's CIDP  is neurologically stable.  I will see him in followup in 4 months and we will not change anything in his IVIG dose right now.  As a precaution, I will check a CBC and creatinine because rarely we see mild kidney dysfunction or leukopenia related to chronic IVIG use.  This is uncommon and I would not plan to screen him regularly unless lab abnormalities are identified today. TT spent for patient care 15 minutes; more than half was counseling.       cc:      Alexander White MD    [facility] [address] [place] [address]      Addendum: Creatinine normal. CBC showed mild leukopenia (total WBC 3.5, ANC 2.1). The number is not immediately concerning and I would not change his IVIG dose right now. I will recheck this in about 6 months. ANDREIA BOWDEN MD             D: 2020   T: 2020   MT: GEORGINA      Name:     LUTHER HUGHES   MRN:      [id number]        Account:      [id number]   :      1965           Service Date: 2020      Document: [id number]

## 2020-02-05 NOTE — NURSING NOTE
Chief Complaint   Patient presents with     NEUROPATHY     [facility] RETURN NEUROPATHY - 4 MO F/U      Madeleine Odom, EMT

## 2020-02-05 NOTE — LETTER
2/5/2020       RE: Emiliano Aguiar  [address]  [facility] [address]     Dear Colleague,    Thank you for referring your patient, Emiliano Aguiar, to the [facility]UR[facility] at [facility]. Please see a copy of my visit note below.    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  Emiliano was stable until about 3 months ago, when he noticed some worsening of his symptoms between the third and fourth week after the IVIG infusion, with more numbness, weakness, gait instability and pain.  I had intended to give him 1 g/kg of IVIG every 4 weeks based on actual body weight, but because of the national shortage of IVIG, he was dosed based on ideal weight, so he ended up taking instead of 95 grams, 75 grams.  This was probably the factor behind his mild worsening.  I then decided to switch him to every 3 weeks of IVIG and he started doing this in 11/2019.  He is getting the same dose, but every 21 days (75 grams).  He feels that this has clearly helped and overall his neurological symptoms are now stable and he is pleased with the effect.  He continues to have headaches and joint pains for a good 5-6 days after each IVIG dose, but he would rather take this as opposed to the worsening of his CIDP symptoms.      CURRENT MEDICATIONS:  Reviewed and are as per Epic record.      PHYSICAL EXAMINATION:   VITAL SIGNS:  Blood pressure is 138/83, pulse 63 and regular.  Weight 92.9 kg.  O2 sat 98% on room air.  RODS CIDP score was 27, entirely unchanged from his last visit and so is  score which was 30 kg on the right and 24 on the left.     NEUROLOGIC:  Neurologic exam is stable, showing strength of 5/5 in upper and lower extremities proximally and distally.  His vibration is about 8-9 seconds at the toes bilaterally.  Joint position sensation is intact at the toes.   Vibration is intact at the index fingers at 23+ seconds.  He can stand up from a chair without assistance even with arms crossed on the chest, but he has a positive Romberg sign and great difficulty doing tandem; he can barely do 2 steps.      In summary, Mr. Aguiar's CIDP is neurologically stable.  I will see him in followup in 4 months and we will not change anything in his IVIG dose right now.  As a precaution, I will check a CBC and creatinine because rarely we see mild kidney dysfunction or leukopenia related to chronic IVIG use.  This is uncommon and I would not plan to screen him regularly unless lab abnormalities are identified today. TT spent for patient care 15 minutes; more than half was counseling.       cc:      Alexander White MD    [facility] [address] [place] [address]      Addendum: Creatinine normal. CBC showed mild leukopenia (total WBC 3.5, ANC 2.1). The number is not immediately concerning and I would not change his IVIG dose right now. I will recheck this in about 6 months. ANDREIA BOWDEN MD          D: 2020   T: 2020   MT: GEORGINA      Name:     LUTHER AGUIAR   MRN:      [id number]        Account:      [id number]   :      1965           Service Date: 2020      Document: [id number]

## 2020-02-23 ENCOUNTER — HEALTH MAINTENANCE LETTER (OUTPATIENT)
Age: 55
End: 2020-02-23

## 2020-02-25 ENCOUNTER — INFUSION THERAPY VISIT (OUTPATIENT)
Dept: INFUSION THERAPY | Facility: CLINIC | Age: 55
End: 2020-02-25
Attending: PSYCHIATRY & NEUROLOGY
Payer: COMMERCIAL

## 2020-02-25 VITALS
SYSTOLIC BLOOD PRESSURE: 141 MMHG | HEART RATE: 98 BPM | DIASTOLIC BLOOD PRESSURE: 91 MMHG | TEMPERATURE: 97 F | OXYGEN SATURATION: 98 % | WEIGHT: 209.22 LBS | BODY MASS INDEX: 29.18 KG/M2 | RESPIRATION RATE: 16 BRPM

## 2020-02-25 DIAGNOSIS — G61.81 CIDP (CHRONIC INFLAMMATORY DEMYELINATING POLYNEUROPATHY) (H): Primary | ICD-10-CM

## 2020-02-25 PROCEDURE — 25000132 ZZH RX MED GY IP 250 OP 250 PS 637: Performed by: PSYCHIATRY & NEUROLOGY

## 2020-02-25 PROCEDURE — 96365 THER/PROPH/DIAG IV INF INIT: CPT

## 2020-02-25 PROCEDURE — 25000128 H RX IP 250 OP 636: Performed by: PSYCHIATRY & NEUROLOGY

## 2020-02-25 PROCEDURE — 96366 THER/PROPH/DIAG IV INF ADDON: CPT

## 2020-02-25 RX ORDER — DIPHENHYDRAMINE HCL 25 MG
50 CAPSULE ORAL ONCE
Status: CANCELLED | OUTPATIENT
Start: 2020-03-17

## 2020-02-25 RX ORDER — ACETAMINOPHEN 325 MG/1
650 TABLET ORAL ONCE
Status: CANCELLED
Start: 2020-03-17

## 2020-02-25 RX ORDER — ACETAMINOPHEN 325 MG/1
650 TABLET ORAL ONCE
Status: COMPLETED | OUTPATIENT
Start: 2020-02-25 | End: 2020-02-25

## 2020-02-25 RX ORDER — DIPHENHYDRAMINE HCL 25 MG
50 CAPSULE ORAL ONCE
Status: COMPLETED | OUTPATIENT
Start: 2020-02-25 | End: 2020-02-25

## 2020-02-25 RX ADMIN — ACETAMINOPHEN 650 MG: 325 TABLET ORAL at 08:25

## 2020-02-25 RX ADMIN — DIPHENHYDRAMINE HYDROCHLORIDE 25 MG: 25 CAPSULE ORAL at 08:24

## 2020-02-25 RX ADMIN — HUMAN IMMUNOGLOBULIN G 75 G: 40 LIQUID INTRAVENOUS at 08:37

## 2020-02-25 NOTE — PROGRESS NOTES
Infusion Nursing Note:  Emiliano Aguiar presents today for IVIG.    Patient seen by provider today: No   present during visit today: Not Applicable.    Note: Patient feeling much better since back on a every 3 week infusion schedule (helping with numbness and weakness). Continues to have headaches following infusion for about the next 6 days.   IVIG started at 0.5 ml/kg/hr and increased by 0.5 ml/kg/hr every 15 minutes, to a max of 4 ml/kg/hr.    Intravenous Access:  Peripheral IV placed.    Treatment Conditions:  Biological Infusion Checklist:  ~~~ NOTE: If the patient answers yes to any of the questions below, hold the infusion and contact ordering provider or on-call provider.    1. Have you recently had an elevated temperature, fever, chills, productive cough, coughing for 3 weeks or longer or hemoptysis, abnormal vital signs, night sweats,  chest pain or have you noticed a decrease in your appetite, unexplained weight loss or fatigue? No  2. Do you have any open wounds or new incisions? No  3. Do you have any recent or upcoming hospitalizations, surgeries or dental procedures? No  4. Do you currently have or recently have had any signs of illness or infection or are you on any antibiotics? No  5. Have you had any new, sudden or worsening abdominal pain? No  6. Have you or anyone in your household received a live vaccination in the past 4 weeks? Please note:  No live vaccines while on biologic/chemotherapy until 6 months after the last treatment.  Patient can receive the flu vaccine (shot only) and the pneumovax.  It is optimal for the patient to get these vaccines mid cycle, but they can be given at any time as long as it is not on the day of the infusion. No  7. Have you recently been diagnosed with any new nervous system diseases (ie. Multiple sclerosis, Guillain [place], seizures, neurological changes) or cancer diagnosis? No  8. Are you on any form of radiation or chemotherapy? No  9. Are you  pregnant or breast feeding or do you have plans of pregnancy in the future? No  10. Have you been having any signs of worsening depression or suicidal ideations?  (benlysta only) No  11. Have there been any other new onset medical symptoms? No        Post Infusion Assessment:  Patient tolerated infusion without incident.  Blood return noted pre and post infusion.  Site patent and intact, free from redness, edema or discomfort.  No evidence of extravasations.  Access discontinued per protocol.  Biologic Infusion Post Education: Call the triage nurse at your clinic or seek medical attention if you have chills and/or temperature greater than or equal to 100.5, uncontrolled nausea/vomiting, diarrhea, constipation, dizziness, shortness of breath, chest pain, heart palpitations, weakness or any other new or concerning symptoms, questions or concerns.  You cannot have any live virus vaccines prior to or during treatment or up to 6 months post infusion.  If you have an upcoming surgery, medical procedure or dental procedure during treatment, this should be discussed with your ordering physician and your surgeon/dentist.  If you are having any concerning symptom, if you are unsure if you should get your next infusion or wish to speak to a provider before your next infusion, please call your care coordinator or triage nurse at your clinic to notify them so we can adequately serve you.       Discharge Plan:   Discharge instructions reviewed with: Patient.  Copy of AVS reviewed with patient and/or family.  Patient will return 3/17/20 for next appointment.  Patient discharged in stable condition accompanied by: self.  Departure Mode: Ambulatory.    Lynda Lion RN

## 2020-03-17 ENCOUNTER — INFUSION THERAPY VISIT (OUTPATIENT)
Dept: INFUSION THERAPY | Facility: CLINIC | Age: 55
End: 2020-03-17
Attending: PSYCHIATRY & NEUROLOGY
Payer: COMMERCIAL

## 2020-03-17 VITALS
TEMPERATURE: 97 F | SYSTOLIC BLOOD PRESSURE: 141 MMHG | HEART RATE: 66 BPM | WEIGHT: 207.67 LBS | BODY MASS INDEX: 28.96 KG/M2 | RESPIRATION RATE: 16 BRPM | OXYGEN SATURATION: 97 % | DIASTOLIC BLOOD PRESSURE: 86 MMHG

## 2020-03-17 DIAGNOSIS — G61.81 CIDP (CHRONIC INFLAMMATORY DEMYELINATING POLYNEUROPATHY) (H): Primary | ICD-10-CM

## 2020-03-17 PROCEDURE — 96365 THER/PROPH/DIAG IV INF INIT: CPT

## 2020-03-17 PROCEDURE — 96366 THER/PROPH/DIAG IV INF ADDON: CPT

## 2020-03-17 PROCEDURE — 25000128 H RX IP 250 OP 636: Performed by: PSYCHIATRY & NEUROLOGY

## 2020-03-17 PROCEDURE — 25000132 ZZH RX MED GY IP 250 OP 250 PS 637: Performed by: PSYCHIATRY & NEUROLOGY

## 2020-03-17 RX ORDER — DIPHENHYDRAMINE HCL 25 MG
50 CAPSULE ORAL ONCE
Status: CANCELLED | OUTPATIENT
Start: 2020-04-07

## 2020-03-17 RX ORDER — DIPHENHYDRAMINE HCL 25 MG
50 CAPSULE ORAL ONCE
Status: COMPLETED | OUTPATIENT
Start: 2020-03-17 | End: 2020-03-17

## 2020-03-17 RX ORDER — ACETAMINOPHEN 325 MG/1
650 TABLET ORAL ONCE
Status: COMPLETED | OUTPATIENT
Start: 2020-03-17 | End: 2020-03-17

## 2020-03-17 RX ORDER — ACETAMINOPHEN 325 MG/1
650 TABLET ORAL ONCE
Status: CANCELLED
Start: 2020-04-07

## 2020-03-17 RX ADMIN — HUMAN IMMUNOGLOBULIN G 75 G: 40 LIQUID INTRAVENOUS at 08:47

## 2020-03-17 RX ADMIN — ACETAMINOPHEN 650 MG: 325 TABLET ORAL at 08:37

## 2020-03-17 RX ADMIN — DIPHENHYDRAMINE HYDROCHLORIDE 25 MG: 25 CAPSULE ORAL at 08:37

## 2020-03-17 NOTE — PROGRESS NOTES
Infusion Nursing Note:  Emiliano Aguiar presents today for IVIG, and premeds.    Patient seen by provider today: No   present during visit today: Not Applicable.    Note: Continues to have headaches following infusion for about the next 6 days.   IVIG started at 0.5 ml/kg/hr and increased by 0.5 ml/kg/hr every 15 minutes, to a max of 4 ml/kg/hr.    Intravenous Access:  Peripheral IV placed.    Treatment Conditions:  Biological Infusion Checklist:  ~~~ NOTE: If the patient answers yes to any of the questions below, hold the infusion and contact ordering provider or on-call provider.    1. Have you recently had an elevated temperature, fever, chills, productive cough, coughing for 3 weeks or longer or hemoptysis, abnormal vital signs, night sweats,  chest pain or have you noticed a decrease in your appetite, unexplained weight loss or fatigue? No  2. Do you have any open wounds or new incisions? No  3. Do you have any recent or upcoming hospitalizations, surgeries or dental procedures? No  4. Do you currently have or recently have had any signs of illness or infection or are you on any antibiotics? Yes, Patient was on Augmentin for a sinus infection. Pharmacy aware and not concerned and stated ok to go ahead with infusion. (Patient finished with antibiotic on 3/14/20)  5. Have you had any new, sudden or worsening abdominal pain? No  6. Have you or anyone in your household received a live vaccination in the past 4 weeks? Please note:  No live vaccines while on biologic/chemotherapy until 6 months after the last treatment.  Patient can receive the flu vaccine (shot only) and the pneumovax.  It is optimal for the patient to get these vaccines mid cycle, but they can be given at any time as long as it is not on the day of the infusion. No  7. Have you recently been diagnosed with any new nervous system diseases (ie. Multiple sclerosis, Guillain [place], seizures, neurological changes) or cancer diagnosis?  No  8. Are you on any form of radiation or chemotherapy? No  9. Are you pregnant or breast feeding or do you have plans of pregnancy in the future? No  10. Have you been having any signs of worsening depression or suicidal ideations?  (benlysta only) No  11. Have there been any other new onset medical symptoms? No        Post Infusion Assessment:  Patient tolerated infusion without incident.  Blood return noted pre and post infusion.  Site patent and intact, free from redness, edema or discomfort.  No evidence of extravasations.  Access discontinued per protocol.    Biologic Infusion Post Education: Call the triage nurse at your clinic or seek medical attention if you have chills and/or temperature greater than or equal to 100.5, uncontrolled nausea/vomiting, diarrhea, constipation, dizziness, shortness of breath, chest pain, heart palpitations, weakness or any other new or concerning symptoms, questions or concerns.  You cannot have any live virus vaccines prior to or during treatment or up to 6 months post infusion.  If you have an upcoming surgery, medical procedure or dental procedure during treatment, this should be discussed with your ordering physician and your surgeon/dentist.  If you are having any concerning symptom, if you are unsure if you should get your next infusion or wish to speak to a provider before your next infusion, please call your care coordinator or triage nurse at your clinic to notify them so we can adequately serve you.       Discharge Plan:   Discharge instructions reviewed with: Patient.  AVS to patient via [employer].  Patient will return 4/7/20 for next appointment.   Patient discharged in stable condition accompanied by: self.  Departure Mode: Ambulatory.    Lynda Lion RN

## 2020-04-07 ENCOUNTER — INFUSION THERAPY VISIT (OUTPATIENT)
Dept: INFUSION THERAPY | Facility: CLINIC | Age: 55
End: 2020-04-07
Attending: PSYCHIATRY & NEUROLOGY
Payer: COMMERCIAL

## 2020-04-07 VITALS
SYSTOLIC BLOOD PRESSURE: 113 MMHG | OXYGEN SATURATION: 96 % | TEMPERATURE: 96.8 F | RESPIRATION RATE: 16 BRPM | HEART RATE: 66 BPM | WEIGHT: 208.6 LBS | DIASTOLIC BLOOD PRESSURE: 77 MMHG | BODY MASS INDEX: 29.09 KG/M2

## 2020-04-07 DIAGNOSIS — G61.81 CIDP (CHRONIC INFLAMMATORY DEMYELINATING POLYNEUROPATHY) (H): Primary | ICD-10-CM

## 2020-04-07 PROCEDURE — 96365 THER/PROPH/DIAG IV INF INIT: CPT

## 2020-04-07 PROCEDURE — 96366 THER/PROPH/DIAG IV INF ADDON: CPT

## 2020-04-07 PROCEDURE — 25000132 ZZH RX MED GY IP 250 OP 250 PS 637: Mod: GY | Performed by: PSYCHIATRY & NEUROLOGY

## 2020-04-07 PROCEDURE — 25000128 H RX IP 250 OP 636: Performed by: PSYCHIATRY & NEUROLOGY

## 2020-04-07 RX ORDER — DIPHENHYDRAMINE HCL 25 MG
50 CAPSULE ORAL ONCE
Status: CANCELLED | OUTPATIENT
Start: 2020-04-07

## 2020-04-07 RX ORDER — DIPHENHYDRAMINE HCL 25 MG
25 CAPSULE ORAL ONCE
Status: COMPLETED | OUTPATIENT
Start: 2020-04-07 | End: 2020-04-07

## 2020-04-07 RX ORDER — DIPHENHYDRAMINE HCL 25 MG
25 CAPSULE ORAL ONCE
Status: CANCELLED | OUTPATIENT
Start: 2020-04-07

## 2020-04-07 RX ORDER — ACETAMINOPHEN 325 MG/1
650 TABLET ORAL ONCE
Status: CANCELLED
Start: 2020-04-07

## 2020-04-07 RX ORDER — DIPHENHYDRAMINE HCL 25 MG
50 CAPSULE ORAL ONCE
Status: DISCONTINUED | OUTPATIENT
Start: 2020-04-07 | End: 2020-04-07 | Stop reason: DRUGHIGH

## 2020-04-07 RX ORDER — ACETAMINOPHEN 325 MG/1
650 TABLET ORAL ONCE
Status: COMPLETED | OUTPATIENT
Start: 2020-04-07 | End: 2020-04-07

## 2020-04-07 RX ADMIN — DIPHENHYDRAMINE HYDROCHLORIDE 25 MG: 25 CAPSULE ORAL at 08:26

## 2020-04-07 RX ADMIN — ACETAMINOPHEN 650 MG: 325 TABLET ORAL at 08:26

## 2020-04-07 RX ADMIN — HUMAN IMMUNOGLOBULIN G 75 G: 40 LIQUID INTRAVENOUS at 08:40

## 2020-04-07 NOTE — PROGRESS NOTES
Infusion Nursing Note:  Emiliano Aguiar presents today for IVIG.    Patient seen by provider today: No   present during visit today: Not Applicable.    Note: N/A.    Intravenous Access:  Peripheral IV placed.    Treatment Conditions:  Biological Infusion Checklist:  ~~~ NOTE: If the patient answers yes to any of the questions below, hold the infusion and contact ordering provider or on-call provider.    1. Have you recently had an elevated temperature, fever, chills, productive cough, coughing for 3 weeks or longer or hemoptysis, abnormal vital signs, night sweats,  chest pain or have you noticed a decrease in your appetite, unexplained weight loss or fatigue? No  2. Do you have any open wounds or new incisions? No  3. Do you have any recent or upcoming hospitalizations, surgeries or dental procedures? No  4. Do you currently have or recently have had any signs of illness or infection or are you on any antibiotics? No  5. Have you had any new, sudden or worsening abdominal pain? No  6. Have you or anyone in your household received a live vaccination in the past 4 weeks? Please note:  No live vaccines while on biologic/chemotherapy until 6 months after the last treatment.  Patient can receive the flu vaccine (shot only) and the pneumovax.  It is optimal for the patient to get these vaccines mid cycle, but they can be given at any time as long as it is not on the day of the infusion. No  7. Have you recently been diagnosed with any new nervous system diseases (ie. Multiple sclerosis, Guillain [place], seizures, neurological changes) or cancer diagnosis? No  8. Are you on any form of radiation or chemotherapy? No  9. Are you pregnant or breast feeding or do you have plans of pregnancy in the future? No  10. Have you been having any signs of worsening depression or suicidal ideations?  (benlysta only) No  11. Have there been any other new onset medical symptoms? No        Post Infusion Assessment:  Patient  tolerated infusion without incident.  Site patent and intact, free from redness, edema or discomfort.  No evidence of extravasations.  Access discontinued per protocol.    Biologic Infusion Post Education: Call the triage nurse at your clinic or seek medical attention if you have chills and/or temperature greater than or equal to 100.5, uncontrolled nausea/vomiting, diarrhea, constipation, dizziness, shortness of breath, chest pain, heart palpitations, weakness or any other new or concerning symptoms, questions or concerns.  You cannot have any live virus vaccines prior to or during treatment or up to 6 months post infusion.  If you have an upcoming surgery, medical procedure or dental procedure during treatment, this should be discussed with your ordering physician and your surgeon/dentist.  If you are having any concerning symptom, if you are unsure if you should get your next infusion or wish to speak to a provider before your next infusion, please call your care coordinator or triage nurse at your clinic to notify them so we can adequately serve you.       Discharge Plan:   AVS to patient via "[employer]HART.  Patient will return 4/28 for next appointment.   Patient discharged in stable condition accompanied by: self.  Departure Mode: Ambulatory.    Brenda Chew RN

## 2020-04-28 ENCOUNTER — INFUSION THERAPY VISIT (OUTPATIENT)
Dept: INFUSION THERAPY | Facility: CLINIC | Age: 55
End: 2020-04-28
Attending: PSYCHIATRY & NEUROLOGY
Payer: COMMERCIAL

## 2020-04-28 VITALS
BODY MASS INDEX: 29.37 KG/M2 | WEIGHT: 210.6 LBS | RESPIRATION RATE: 16 BRPM | SYSTOLIC BLOOD PRESSURE: 125 MMHG | DIASTOLIC BLOOD PRESSURE: 80 MMHG | TEMPERATURE: 97.7 F | HEART RATE: 64 BPM | OXYGEN SATURATION: 97 %

## 2020-04-28 DIAGNOSIS — G61.81 CIDP (CHRONIC INFLAMMATORY DEMYELINATING POLYNEUROPATHY) (H): Primary | ICD-10-CM

## 2020-04-28 PROCEDURE — 25000132 ZZH RX MED GY IP 250 OP 250 PS 637: Performed by: PSYCHIATRY & NEUROLOGY

## 2020-04-28 PROCEDURE — 96366 THER/PROPH/DIAG IV INF ADDON: CPT

## 2020-04-28 PROCEDURE — 25000128 H RX IP 250 OP 636: Performed by: PSYCHIATRY & NEUROLOGY

## 2020-04-28 PROCEDURE — 96365 THER/PROPH/DIAG IV INF INIT: CPT

## 2020-04-28 RX ORDER — DIPHENHYDRAMINE HCL 25 MG
25 CAPSULE ORAL ONCE
Status: COMPLETED | OUTPATIENT
Start: 2020-04-28 | End: 2020-04-28

## 2020-04-28 RX ORDER — DIPHENHYDRAMINE HCL 25 MG
25 CAPSULE ORAL ONCE
Status: CANCELLED | OUTPATIENT
Start: 2020-05-19

## 2020-04-28 RX ORDER — ACETAMINOPHEN 325 MG/1
650 TABLET ORAL ONCE
Status: CANCELLED
Start: 2020-05-19

## 2020-04-28 RX ORDER — ACETAMINOPHEN 325 MG/1
650 TABLET ORAL ONCE
Status: COMPLETED | OUTPATIENT
Start: 2020-04-28 | End: 2020-04-28

## 2020-04-28 RX ADMIN — DIPHENHYDRAMINE HYDROCHLORIDE 25 MG: 25 CAPSULE ORAL at 08:27

## 2020-04-28 RX ADMIN — HUMAN IMMUNOGLOBULIN G 75 G: 40 LIQUID INTRAVENOUS at 08:45

## 2020-04-28 RX ADMIN — ACETAMINOPHEN 650 MG: 325 TABLET ORAL at 08:27

## 2020-04-28 NOTE — PROGRESS NOTES
Infusion Nursing Note:  Emiliano Aguiar presents today for IVIG.    Patient seen by provider today: No   present during visit today: Not Applicable.    Note: IVIG started at 0.5 mg/kg/hr and increased by 0.5 every 15 minutes for max of 4 mg/kg/hr..    Intravenous Access:  Peripheral IV placed.    Treatment Conditions:  Biological Infusion Checklist:  ~~~ NOTE: If the patient answers yes to any of the questions below, hold the infusion and contact ordering provider or on-call provider.    1. Have you recently had an elevated temperature, fever, chills, productive cough, coughing for 3 weeks or longer or hemoptysis, abnormal vital signs, night sweats,  chest pain or have you noticed a decrease in your appetite, unexplained weight loss or fatigue? No  2. Do you have any open wounds or new incisions? No  3. Do you have any recent or upcoming hospitalizations, surgeries or dental procedures? No  4. Do you currently have or recently have had any signs of illness or infection or are you on any antibiotics? No  5. Have you had any new, sudden or worsening abdominal pain? No  6. Have you or anyone in your household received a live vaccination in the past 4 weeks? Please note:  No live vaccines while on biologic/chemotherapy until 6 months after the last treatment.  Patient can receive the flu vaccine (shot only) and the pneumovax.  It is optimal for the patient to get these vaccines mid cycle, but they can be given at any time as long as it is not on the day of the infusion. No  7. Have you recently been diagnosed with any new nervous system diseases (ie. Multiple sclerosis, Guillain [place], seizures, neurological changes) or cancer diagnosis? No  8. Are you on any form of radiation or chemotherapy? No  9. Are you pregnant or breast feeding or do you have plans of pregnancy in the future? No  10. Have you been having any signs of worsening depression or suicidal ideations?  (benlysta only) No  11. Have there been  any other new onset medical symptoms? No        Post Infusion Assessment:  Patient tolerated infusion without incident.  Blood return noted pre and post infusion.  Site patent and intact, free from redness, edema or discomfort.  No evidence of extravasations.  Access discontinued per protocol.  Biologic Infusion Post Education: Call the triage nurse at your clinic or seek medical attention if you have chills and/or temperature greater than or equal to 100.5, uncontrolled nausea/vomiting, diarrhea, constipation, dizziness, shortness of breath, chest pain, heart palpitations, weakness or any other new or concerning symptoms, questions or concerns.  You cannot have any live virus vaccines prior to or during treatment or up to 6 months post infusion.  If you have an upcoming surgery, medical procedure or dental procedure during treatment, this should be discussed with your ordering physician and your surgeon/dentist.  If you are having any concerning symptom, if you are unsure if you should get your next infusion or wish to speak to a provider before your next infusion, please call your care coordinator or triage nurse at your clinic to notify them so we can adequately serve you.       Discharge Plan:   Patient declined prescription refills.  Copy of AVS reviewed with patient and/or family.  Patient will return 5/19 for next appointment.  Patient discharged in stable condition accompanied by: self.  Departure Mode: Ambulatory.    Carli Pulido RN

## 2020-05-19 ENCOUNTER — INFUSION THERAPY VISIT (OUTPATIENT)
Dept: INFUSION THERAPY | Facility: CLINIC | Age: 55
End: 2020-05-19
Attending: PSYCHIATRY & NEUROLOGY
Payer: COMMERCIAL

## 2020-05-19 VITALS
SYSTOLIC BLOOD PRESSURE: 127 MMHG | DIASTOLIC BLOOD PRESSURE: 79 MMHG | OXYGEN SATURATION: 97 % | BODY MASS INDEX: 28.72 KG/M2 | WEIGHT: 205.9 LBS | HEART RATE: 58 BPM | TEMPERATURE: 98 F | RESPIRATION RATE: 16 BRPM

## 2020-05-19 DIAGNOSIS — G61.81 CIDP (CHRONIC INFLAMMATORY DEMYELINATING POLYNEUROPATHY) (H): Primary | ICD-10-CM

## 2020-05-19 PROCEDURE — 25000128 H RX IP 250 OP 636: Performed by: PSYCHIATRY & NEUROLOGY

## 2020-05-19 PROCEDURE — 96365 THER/PROPH/DIAG IV INF INIT: CPT

## 2020-05-19 PROCEDURE — 96366 THER/PROPH/DIAG IV INF ADDON: CPT

## 2020-05-19 PROCEDURE — 25000132 ZZH RX MED GY IP 250 OP 250 PS 637: Performed by: PSYCHIATRY & NEUROLOGY

## 2020-05-19 RX ORDER — DIPHENHYDRAMINE HCL 25 MG
25 CAPSULE ORAL ONCE
Status: CANCELLED | OUTPATIENT
Start: 2020-05-19

## 2020-05-19 RX ORDER — DIPHENHYDRAMINE HCL 25 MG
25 CAPSULE ORAL ONCE
Status: COMPLETED | OUTPATIENT
Start: 2020-05-19 | End: 2020-05-19

## 2020-05-19 RX ORDER — ACETAMINOPHEN 325 MG/1
650 TABLET ORAL ONCE
Status: CANCELLED
Start: 2020-05-19

## 2020-05-19 RX ORDER — ACETAMINOPHEN 325 MG/1
650 TABLET ORAL ONCE
Status: COMPLETED | OUTPATIENT
Start: 2020-05-19 | End: 2020-05-19

## 2020-05-19 RX ADMIN — ACETAMINOPHEN 650 MG: 325 TABLET ORAL at 08:26

## 2020-05-19 RX ADMIN — HUMAN IMMUNOGLOBULIN G 75 G: 40 LIQUID INTRAVENOUS at 08:40

## 2020-05-19 RX ADMIN — DIPHENHYDRAMINE HYDROCHLORIDE 25 MG: 25 CAPSULE ORAL at 08:26

## 2020-05-19 ASSESSMENT — PAIN SCALES - GENERAL: PAINLEVEL: NO PAIN (0)

## 2020-05-19 NOTE — PROGRESS NOTES
Infusion Nursing Note:  Emiliano Aguiar presents today for IVIG.    Patient seen by provider today: No   present during visit today: Not Applicable.    Note: IVIG started at 0.5 mg/kg/hr and increased by 0.5 mg/kg/he every 15 minutes for max of 4 mg/kg/hr..    Intravenous Access:  Peripheral IV placed.    Treatment Conditions:  Biological Infusion Checklist:  ~~~ NOTE: If the patient answers yes to any of the questions below, hold the infusion and contact ordering provider or on-call provider.    1. Have you recently had an elevated temperature, fever, chills, productive cough, coughing for 3 weeks or longer or hemoptysis, abnormal vital signs, night sweats,  chest pain or have you noticed a decrease in your appetite, unexplained weight loss or fatigue? No  2. Do you have any open wounds or new incisions? No  3. Do you have any recent or upcoming hospitalizations, surgeries or dental procedures? No  4. Do you currently have or recently have had any signs of illness or infection or are you on any antibiotics? No  5. Have you had any new, sudden or worsening abdominal pain? No  6. Have you or anyone in your household received a live vaccination in the past 4 weeks? Please note:  No live vaccines while on biologic/chemotherapy until 6 months after the last treatment.  Patient can receive the flu vaccine (shot only) and the pneumovax.  It is optimal for the patient to get these vaccines mid cycle, but they can be given at any time as long as it is not on the day of the infusion. No  7. Have you recently been diagnosed with any new nervous system diseases (ie. Multiple sclerosis, Guillain [place], seizures, neurological changes) or cancer diagnosis? No  8. Are you on any form of radiation or chemotherapy? No  9. Are you pregnant or breast feeding or do you have plans of pregnancy in the future? No  10. Have you been having any signs of worsening depression or suicidal ideations?  (benlysta only) No  11. Have  there been any other new onset medical symptoms? No        Post Infusion Assessment:  Patient tolerated infusion without incident.  Blood return noted pre and post infusion.  Site patent and intact, free from redness, edema or discomfort.  No evidence of extravasations.  Access discontinued per protocol.       Discharge Plan:   Patient declined prescription refills.  Copy of AVS reviewed with patient and/or family.  Patient will return 6/9 for next appointment.  Patient discharged in stable condition accompanied by: self.  Departure Mode: Ambulatory.    Carli Pulido RN

## 2020-06-09 ENCOUNTER — INFUSION THERAPY VISIT (OUTPATIENT)
Dept: INFUSION THERAPY | Facility: CLINIC | Age: 55
End: 2020-06-09
Attending: PSYCHIATRY & NEUROLOGY
Payer: COMMERCIAL

## 2020-06-09 VITALS
TEMPERATURE: 97.6 F | OXYGEN SATURATION: 94 % | RESPIRATION RATE: 16 BRPM | SYSTOLIC BLOOD PRESSURE: 122 MMHG | BODY MASS INDEX: 28.76 KG/M2 | HEART RATE: 61 BPM | DIASTOLIC BLOOD PRESSURE: 75 MMHG | WEIGHT: 206.2 LBS

## 2020-06-09 DIAGNOSIS — G61.81 CIDP (CHRONIC INFLAMMATORY DEMYELINATING POLYNEUROPATHY) (H): Primary | ICD-10-CM

## 2020-06-09 PROCEDURE — 25000132 ZZH RX MED GY IP 250 OP 250 PS 637: Performed by: PSYCHIATRY & NEUROLOGY

## 2020-06-09 PROCEDURE — 96365 THER/PROPH/DIAG IV INF INIT: CPT

## 2020-06-09 PROCEDURE — A9270 NON-COVERED ITEM OR SERVICE: HCPCS | Mod: GY | Performed by: PSYCHIATRY & NEUROLOGY

## 2020-06-09 PROCEDURE — 96366 THER/PROPH/DIAG IV INF ADDON: CPT

## 2020-06-09 PROCEDURE — 25000128 H RX IP 250 OP 636: Performed by: PSYCHIATRY & NEUROLOGY

## 2020-06-09 RX ORDER — ACETAMINOPHEN 325 MG/1
650 TABLET ORAL ONCE
Status: CANCELLED
Start: 2020-06-30

## 2020-06-09 RX ORDER — ACETAMINOPHEN 325 MG/1
650 TABLET ORAL ONCE
Status: COMPLETED | OUTPATIENT
Start: 2020-06-09 | End: 2020-06-09

## 2020-06-09 RX ORDER — DIPHENHYDRAMINE HCL 25 MG
25 CAPSULE ORAL ONCE
Status: COMPLETED | OUTPATIENT
Start: 2020-06-09 | End: 2020-06-09

## 2020-06-09 RX ORDER — DIPHENHYDRAMINE HCL 25 MG
25 CAPSULE ORAL ONCE
Status: CANCELLED | OUTPATIENT
Start: 2020-06-30

## 2020-06-09 RX ADMIN — HUMAN IMMUNOGLOBULIN G 75 G: 40 LIQUID INTRAVENOUS at 08:53

## 2020-06-09 RX ADMIN — DIPHENHYDRAMINE HYDROCHLORIDE 25 MG: 25 CAPSULE ORAL at 08:25

## 2020-06-09 RX ADMIN — ACETAMINOPHEN 650 MG: 325 TABLET ORAL at 08:25

## 2020-06-09 ASSESSMENT — PAIN SCALES - GENERAL: PAINLEVEL: NO PAIN (0)

## 2020-06-09 NOTE — PROGRESS NOTES
Infusion Nursing Note:  Emiliano Aguiar presents today for IVIG.    Patient seen by provider today: No   present during visit today: Not Applicable.    Note: Pt reports that he has developed hives, fatigue, and headache for a few days after the infusion. Benadryl and Tylenol are effective. This has happened for the past 5-gamal infusions, patient believes. He thinks this started happening when the Privigen was put in a bag rather than in multiple glass bottles. Discussed with pharmacy; nothing is added to the Privigen and bag is latex-free. Did give today's IVIG from the glass bottles rather than putting into the bag; instructed patient to monitor symptoms and let us know if any improvements this time. He will also discuss with Dr Godoy at tomorrow's appointment. He declined the PRN SoluCortef, stating it made the symptoms last longer when he used it before.    Feels that inflammation and neuropathy have improved overall since moving from every 4 week infusions to every 3 weeks, but still persist.    Intravenous Access:  Peripheral IV placed.    Treatment Conditions:  Biological Infusion Checklist:  ~~~ NOTE: If the patient answers yes to any of the questions below, hold the infusion and contact ordering provider or on-call provider.    1. Have you recently had an elevated temperature, fever, chills, productive cough, coughing for 3 weeks or longer or hemoptysis, abnormal vital signs, night sweats,  chest pain or have you noticed a decrease in your appetite, unexplained weight loss or fatigue? No  2. Do you have any open wounds or new incisions? No  3. Do you have any recent or upcoming hospitalizations, surgeries or dental procedures? No  4. Do you currently have or recently have had any signs of illness or infection or are you on any antibiotics? No  5. Have you had any new, sudden or worsening abdominal pain? No  6. Have you or anyone in your household received a live vaccination in the past 4  weeks? Please note:  No live vaccines while on biologic/chemotherapy until 6 months after the last treatment.  Patient can receive the flu vaccine (shot only) and the pneumovax.  It is optimal for the patient to get these vaccines mid cycle, but they can be given at any time as long as it is not on the day of the infusion. No  7. Have you recently been diagnosed with any new nervous system diseases (ie. Multiple sclerosis, Guillain [place], seizures, neurological changes) or cancer diagnosis? No  8. Are you on any form of radiation or chemotherapy? No  9. Are you pregnant or breast feeding or do you have plans of pregnancy in the future? No  10. Have you been having any signs of worsening depression or suicidal ideations?  (benlysta only) No  11. Have there been any other new onset medical symptoms?     Post Infusion Assessment:  Patient tolerated infusion without incident.  Blood return noted pre and post infusion.  Site patent and intact, free from redness, edema or discomfort.  No evidence of extravasations.  Access discontinued per protocol.     Discharge Plan:   Discharge instructions reviewed with: Patient.  Patient and/or family verbalized understanding of discharge instructions and all questions answered.  AVS to patient via [employer].  Patient will return 6/30 for next appointment.   Patient discharged in stable condition accompanied by: self.  Departure Mode: Ambulatory.    Josselin Chavira RN

## 2020-06-10 ENCOUNTER — TELEPHONE (OUTPATIENT)
Dept: NEUROLOGY | Facility: CLINIC | Age: 55
End: 2020-06-10

## 2020-06-10 ENCOUNTER — VIRTUAL VISIT (OUTPATIENT)
Dept: NEUROLOGY | Facility: CLINIC | Age: 55
End: 2020-06-10
Payer: COMMERCIAL

## 2020-06-10 DIAGNOSIS — G61.81 CIDP (CHRONIC INFLAMMATORY DEMYELINATING POLYNEUROPATHY) (H): Primary | ICD-10-CM

## 2020-06-10 NOTE — LETTER
"6/10/2020     RE: Emiliano Aguiar  [address] [place] [id number]     Dear Colleague,    Thank you for referring your patient, Emiliano Aguiar, to the [facility] at [facility]. Please see a copy of my visit note below.    Emiliano Aguiar is a 55 year old male who is being evaluated via a billable video visit.      The patient has been notified of following:     \"This video visit will be conducted via a call between you and your physician/provider. We have found that certain health care needs can be provided without the need for an in-person physical exam.  This service lets us provide the care you need with a video conversation.  If a prescription is necessary we can send it directly to your pharmacy.  If lab work is needed we can place an order for that and you can then stop by our lab to have the test done at a later time.    Video visits are billed at different rates depending on your insurance coverage.  Please reach out to your insurance provider with any questions.    If during the course of the call the physician/provider feels a video visit is not appropriate, you will not be charged for this service.\"    Patient has given verbal consent for Video visit? Yes    How would you like to obtain your AVS? LailaGladstone    Patient would like the video invitation sent by: Send to e-mail at: [email or web address]    Will anyone else be joining your video visit? No        Video-Visit Details    Type of service:  Video Visit    Video Start Time: 9.28 am  Video End Time: 9.39 am    Originating Location (pt. Location): Home    Distant Location (provider location):  [facility]     Platform used for Video Visit: Jayna Godoy MD       Service Date: 06/10/2020      HISTORY OF PRESENT ILLNESS:  I had the pleasure to evaluate Emiliano via billable video visit today. In person visit was not recommended due to ongoing COVID-19 pandemic. 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 He is currently on IVIG 1 g/kg every 3 weeks and he is doing well on it. He feels this frequency and dose serves him better than every 4 weeks- he has no wearing off phenomena. Overall his symptoms are stable. He continues to experience joint pains and occasional headaches that last several days after each IVIG infusion. Interestingly, he developed hives after the last infusion. He has never had hives with the same IVIG product (Privigen) which he has been taking for a long time now. He observed that during the last infusion, they administered him a bag of IVIG instead of the usual bottle. The hives are not too serious and tend to dissipate quickly after he takes 1 Benadryl tablet. No respiratory symptoms reported.  When we met on 2/5/2020, I had checked a CBC and it showed mild leukopenia with WBC count of 3.5. He rechecked it a month later (3/2020) and it was normal- 5.6.      CURRENT MEDICATIONS:  Reviewed and are as per Epic record.      RODS CIDP today was 27, identical to last visit.     In summary, Mr. Aguiar's CIDP is neurologically stable.  I will see him in followup in 6 months and we will not change anything in his IVIG dose right now.  He should observe whether getting the IVIG the usual way (bottle administered IV)- leads to hives or not and he will let me know. I am not rechecking his CBC since his leukopenia was transient and resolved while continuing the IVIG. TT spent on video call 11 minutes (start 9.28 am, end 9.39 am); more than half was counseling.      NICHOLAS BOWDEN MD      cc:      Alexander White MD    [facility]   [id number] [place] [id number]                Again, thank you for allowing me to participate in the care of your patient.      Sincerely,    Nicholas Bowden MD

## 2020-06-10 NOTE — TELEPHONE ENCOUNTER
Patient had a virtual visit with Dr. Godoy today and per MD, he would like to continue IVIG 1 g/kg every 3 weeks.     Therapy plan updated on behalf of MD. Pt infuses at [facility].

## 2020-06-10 NOTE — PROGRESS NOTES
"Emiliano Aguiar is a 55 year old male who is being evaluated via a billable video visit.      The patient has been notified of following:     \"This video visit will be conducted via a call between you and your physician/provider. We have found that certain health care needs can be provided without the need for an in-person physical exam.  This service lets us provide the care you need with a video conversation.  If a prescription is necessary we can send it directly to your pharmacy.  If lab work is needed we can place an order for that and you can then stop by our lab to have the test done at a later time.    Video visits are billed at different rates depending on your insurance coverage.  Please reach out to your insurance provider with any questions.    If during the course of the call the physician/provider feels a video visit is not appropriate, you will not be charged for this service.\"    Patient has given verbal consent for Video visit? Yes    How would you like to obtain your AVS? Kortney    Patient would like the video invitation sent by: Send to e-mail at: [email or web address]    Will anyone else be joining your video visit? No        Video-Visit Details    Type of service:  Video Visit    Video Start Time: 9.28 am  Video End Time: 9.39 am    Originating Location (pt. Location): Home    Distant Location (provider location):   [facility]     Platform used for Video Visit: Jayna Godoy MD       Service Date: 06/10/2020      HISTORY OF PRESENT ILLNESS:  I had the pleasure to evaluate Emiliano via billable video visit today. In person visit was not recommended due to ongoing COVID-19 pandemic. 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 He is currently on IVIG 1 g/kg every 3 weeks and he is doing well on it. He feels this frequency and dose serves him better than every 4 weeks- he has no wearing off phenomena. Overall his symptoms are stable. He continues to experience joint pains and occasional " headaches that last several days after each IVIG infusion. Interestingly, he developed hives after the last infusion. He has never had hives with the same IVIG product (Privigen) which he has been taking for a long time now. He observed that during the last infusion, they administered him a bag of IVIG instead of the usual bottle. The hives are not too serious and tend to dissipate quickly after he takes 1 Benadryl tablet. No respiratory symptoms reported.  When we met on 2/5/2020, I had checked a CBC and it showed mild leukopenia with WBC count of 3.5. He rechecked it a month later (3/2020) and it was normal- 5.6.      CURRENT MEDICATIONS:  Reviewed and are as per Epic record.      RODS CIDP today was 27, identical to last visit.     In summary, Mr. Aguiar's CIDP is neurologically stable.  I will see him in followup in 6 months and we will not change anything in his IVIG dose right now.  He should observe whether getting the IVIG the usual way (bottle administered IV)- leads to hives or not and he will let me know. I am not rechecking his CBC since his leukopenia was transient and resolved while continuing the IVIG. TT spent on video call 11 minutes (start 9.28 am, end 9.39 am); more than half was counseling.       cc:      Alexander White MD    [facility]   [address] [place] [address]         NICHOLAS BOWDEN MD

## 2020-06-30 ENCOUNTER — INFUSION THERAPY VISIT (OUTPATIENT)
Dept: INFUSION THERAPY | Facility: CLINIC | Age: 55
End: 2020-06-30
Attending: PSYCHIATRY & NEUROLOGY
Payer: COMMERCIAL

## 2020-06-30 VITALS
HEART RATE: 52 BPM | WEIGHT: 204.8 LBS | DIASTOLIC BLOOD PRESSURE: 82 MMHG | BODY MASS INDEX: 28.56 KG/M2 | TEMPERATURE: 97.6 F | SYSTOLIC BLOOD PRESSURE: 135 MMHG | OXYGEN SATURATION: 100 % | RESPIRATION RATE: 16 BRPM

## 2020-06-30 DIAGNOSIS — G61.81 CIDP (CHRONIC INFLAMMATORY DEMYELINATING POLYNEUROPATHY) (H): Primary | ICD-10-CM

## 2020-06-30 PROCEDURE — 96365 THER/PROPH/DIAG IV INF INIT: CPT

## 2020-06-30 PROCEDURE — 25800030 ZZH RX IP 258 OP 636: Performed by: PSYCHIATRY & NEUROLOGY

## 2020-06-30 PROCEDURE — 96366 THER/PROPH/DIAG IV INF ADDON: CPT

## 2020-06-30 PROCEDURE — 25000132 ZZH RX MED GY IP 250 OP 250 PS 637: Performed by: PSYCHIATRY & NEUROLOGY

## 2020-06-30 PROCEDURE — 25000128 H RX IP 250 OP 636: Performed by: PSYCHIATRY & NEUROLOGY

## 2020-06-30 PROCEDURE — 96375 TX/PRO/DX INJ NEW DRUG ADDON: CPT

## 2020-06-30 RX ORDER — ACETAMINOPHEN 325 MG/1
650 TABLET ORAL ONCE
Status: COMPLETED | OUTPATIENT
Start: 2020-06-30 | End: 2020-06-30

## 2020-06-30 RX ORDER — ACETAMINOPHEN 325 MG/1
650 TABLET ORAL ONCE
Status: CANCELLED
Start: 2020-07-21

## 2020-06-30 RX ORDER — DIPHENHYDRAMINE HCL 25 MG
25 CAPSULE ORAL ONCE
Status: CANCELLED | OUTPATIENT
Start: 2020-07-21

## 2020-06-30 RX ORDER — DIPHENHYDRAMINE HCL 25 MG
25 CAPSULE ORAL ONCE
Status: COMPLETED | OUTPATIENT
Start: 2020-06-30 | End: 2020-06-30

## 2020-06-30 RX ORDER — DIPHENHYDRAMINE HYDROCHLORIDE 50 MG/ML
50 INJECTION INTRAMUSCULAR; INTRAVENOUS
Status: COMPLETED | OUTPATIENT
Start: 2020-06-30 | End: 2020-06-30

## 2020-06-30 RX ADMIN — DIPHENHYDRAMINE HYDROCHLORIDE 25 MG: 25 CAPSULE ORAL at 08:27

## 2020-06-30 RX ADMIN — ACETAMINOPHEN 650 MG: 325 TABLET ORAL at 08:27

## 2020-06-30 RX ADMIN — SODIUM CHLORIDE 250 ML: 9 INJECTION, SOLUTION INTRAVENOUS at 09:05

## 2020-06-30 RX ADMIN — HYDROCORTISONE SODIUM SUCCINATE 100 MG: 100 INJECTION, POWDER, FOR SOLUTION INTRAMUSCULAR; INTRAVENOUS at 09:12

## 2020-06-30 RX ADMIN — DIPHENHYDRAMINE HYDROCHLORIDE 25 MG: 50 INJECTION INTRAMUSCULAR; INTRAVENOUS at 09:26

## 2020-06-30 RX ADMIN — HUMAN IMMUNOGLOBULIN G 75 G: 40 LIQUID INTRAVENOUS at 08:34

## 2020-06-30 ASSESSMENT — PAIN SCALES - GENERAL: PAINLEVEL: NO PAIN (0)

## 2020-06-30 NOTE — PROGRESS NOTES
Infusion Nursing Note:  Emiliano Aguiar presents today for IVIG.    Patient seen by provider today: No   present during visit today: Not Applicable.    Note: Patient denies s/sx of infection.  Indicated he didn't get hives after his last IVIG infusion and received the IVIG in the bottles instead of the bag.  Will administer IVIG in the bottles again today.   Patient had been getting hives after the infusions and wasn't sure what had changed or why he was getting them.  Thought maybe it was from the bags we had been putting the IVIG in.  Didn't get hives after his last IVIG.  Around 30 minutes after starting IVIG infusion patient was noted to have hives on forehead, neck  and face.  Thought they probably started around 5 minutes after starting the IVIG but didn't mention it.   IVIG stopped.  Solu-Cortef given and 25mg of IV Benadryl given.  Hives improved.  Denied difficulty breathing or any other symptoms.  IVIG restarted at 0.25ml/kg/hr and increased by 0.5ml/kg/hr every 15 minutes for a max dose of 4ml/kg/hr.  Patient tolerated the remainder of the infusion.  Dr. Godoy notified and will plan to call patient to discuss pre-medication prior to next IVIG infusion.  Patient aware he should take Benadryl at home if needed.  Indicated we can put the IVIG in a bag with his next infusion.      Intravenous Access:  Peripheral IV placed.    Treatment Conditions:  Biological Infusion Checklist:  ~~~ NOTE: If the patient answers yes to any of the questions below, hold the infusion and contact ordering provider or on-call provider.    1. Have you recently had an elevated temperature, fever, chills, productive cough, coughing for 3 weeks or longer or hemoptysis, abnormal vital signs, night sweats,  chest pain or have you noticed a decrease in your appetite, unexplained weight loss or fatigue? No  2. Do you have any open wounds or new incisions? No  3. Do you have any recent or upcoming hospitalizations,  surgeries or dental procedures? No  4. Do you currently have or recently have had any signs of illness or infection or are you on any antibiotics? No  5. Have you had any new, sudden or worsening abdominal pain? No  6. Have you or anyone in your household received a live vaccination in the past 4 weeks? Please note:  No live vaccines while on biologic/chemotherapy until 6 months after the last treatment.  Patient can receive the flu vaccine (shot only) and the pneumovax.  It is optimal for the patient to get these vaccines mid cycle, but they can be given at any time as long as it is not on the day of the infusion. No  7. Have you recently been diagnosed with any new nervous system diseases (ie. Multiple sclerosis, Guillain [place], seizures, neurological changes) or cancer diagnosis? No  8. Are you on any form of radiation or chemotherapy? No  9. Are you pregnant or breast feeding or do you have plans of pregnancy in the future? No  10. Have you been having any signs of worsening depression or suicidal ideations?  (benlysta only) No  11. Have there been any other new onset medical symptoms? No        Post Infusion Assessment:  Blood return noted pre and post infusion.  Site patent and intact, free from redness, edema or discomfort.  No evidence of extravasations.  Access discontinued per protocol.  Biologic Infusion Post Education: Call the triage nurse at your clinic or seek medical attention if you have chills and/or temperature greater than or equal to 100.5, uncontrolled nausea/vomiting, diarrhea, constipation, dizziness, shortness of breath, chest pain, heart palpitations, weakness or any other new or concerning symptoms, questions or concerns.  You cannot have any live virus vaccines prior to or during treatment or up to 6 months post infusion.  If you have an upcoming surgery, medical procedure or dental procedure during treatment, this should be discussed with your ordering physician and your  surgeon/dentist.  If you are having any concerning symptom, if you are unsure if you should get your next infusion or wish to speak to a provider before your next infusion, please call your care coordinator or triage nurse at your clinic to notify them so we can adequately serve you.       Discharge Plan:   Discharge instructions reviewed with: Patient.  Patient discharged in stable condition accompanied by: self.  Departure Mode: Ambulatory.  Next infusion scheduled for 7/21/2020.    ADELE TAYLOR RN

## 2020-07-21 ENCOUNTER — INFUSION THERAPY VISIT (OUTPATIENT)
Dept: INFUSION THERAPY | Facility: CLINIC | Age: 55
End: 2020-07-21
Attending: PSYCHIATRY & NEUROLOGY
Payer: COMMERCIAL

## 2020-07-21 VITALS
DIASTOLIC BLOOD PRESSURE: 81 MMHG | SYSTOLIC BLOOD PRESSURE: 139 MMHG | HEART RATE: 68 BPM | OXYGEN SATURATION: 97 % | RESPIRATION RATE: 16 BRPM | WEIGHT: 203.96 LBS | TEMPERATURE: 97.6 F | BODY MASS INDEX: 28.45 KG/M2

## 2020-07-21 DIAGNOSIS — G61.81 CIDP (CHRONIC INFLAMMATORY DEMYELINATING POLYNEUROPATHY) (H): Primary | ICD-10-CM

## 2020-07-21 PROCEDURE — 25000128 H RX IP 250 OP 636: Performed by: PSYCHIATRY & NEUROLOGY

## 2020-07-21 PROCEDURE — 99207 ZZC NO CHARGE NURSE ONLY: CPT

## 2020-07-21 PROCEDURE — 96365 THER/PROPH/DIAG IV INF INIT: CPT

## 2020-07-21 PROCEDURE — 25800030 ZZH RX IP 258 OP 636: Performed by: PSYCHIATRY & NEUROLOGY

## 2020-07-21 PROCEDURE — 96375 TX/PRO/DX INJ NEW DRUG ADDON: CPT

## 2020-07-21 PROCEDURE — 96366 THER/PROPH/DIAG IV INF ADDON: CPT

## 2020-07-21 PROCEDURE — 25000132 ZZH RX MED GY IP 250 OP 250 PS 637: Mod: GY | Performed by: PSYCHIATRY & NEUROLOGY

## 2020-07-21 RX ORDER — ACETAMINOPHEN 325 MG/1
650 TABLET ORAL ONCE
Status: COMPLETED | OUTPATIENT
Start: 2020-07-21 | End: 2020-07-21

## 2020-07-21 RX ORDER — DIPHENHYDRAMINE HCL 25 MG
25 CAPSULE ORAL ONCE
Status: CANCELLED | OUTPATIENT
Start: 2020-08-11

## 2020-07-21 RX ORDER — ACETAMINOPHEN 325 MG/1
650 TABLET ORAL ONCE
Status: CANCELLED
Start: 2020-08-11

## 2020-07-21 RX ORDER — DIPHENHYDRAMINE HCL 25 MG
50 CAPSULE ORAL ONCE
Status: COMPLETED | OUTPATIENT
Start: 2020-07-21 | End: 2020-07-21

## 2020-07-21 RX ADMIN — HYDROCORTISONE SODIUM SUCCINATE 100 MG: 100 INJECTION, POWDER, FOR SOLUTION INTRAMUSCULAR; INTRAVENOUS at 08:54

## 2020-07-21 RX ADMIN — ACETAMINOPHEN 650 MG: 325 TABLET ORAL at 08:51

## 2020-07-21 RX ADMIN — SODIUM CHLORIDE 250 ML: 9 INJECTION, SOLUTION INTRAVENOUS at 08:54

## 2020-07-21 RX ADMIN — HUMAN IMMUNOGLOBULIN G 75 G: 40 LIQUID INTRAVENOUS at 09:40

## 2020-07-21 RX ADMIN — DIPHENHYDRAMINE HYDROCHLORIDE 50 MG: 25 CAPSULE ORAL at 08:52

## 2020-07-21 NOTE — PROGRESS NOTES
Infusion Nursing Note:  Emiliano Aguiar presents today for Q 3 week IVIG.    Patient seen by provider today: No   present during visit today: Not Applicable.    Note:  Patient reports is feeling well. Denies fevers, chills or signs of infection.  Hives:  Patient reports that he needed to take Tylenol/Benadryl at home after IVIG, which is his normal routine, and it resolved his hives.  Patient is asking to receive Benadryl 50mg PO instead of 25mg PO.    Order for Pre-Meds:  Writer contacted Dr. Go to clarify if any additional pre-meds were needed due to patient experiencing hives while receiving his IVIG on 6/30/20 (current pre-med orders are for Tylenol 650mg PO + Benadryl 25mg PO). TORB Dr. Go/Aubree Varner RN:  Give Tylenol 650mg PO, Benadryl 50mg PO + Solu-Cortef 100mg IVP. Prescription for 2-3 day course of Benadryl and steroid will be sent to pharmacy per Dr. Go.    IVIG started at 0.5ml/kg/hr for 15 minutes, then increased by 0.5ml/kg/hr Q 15 minutes until maximum rate of 4ml/kg/hr.  Patient tolerated IVIG today without incident.  No hives noted.    Benadryl + Medrol Dose Pack sent to [facility] in [place] per Dr. Go.    Intravenous Access:  Peripheral IV placed.  PIV site C/D/I.  PIV flushes well + excellent blood return.    Treatment Conditions:  Biological Infusion Checklist:  ~~~ NOTE: If the patient answers yes to any of the questions below, hold the infusion and contact ordering provider or on-call provider.    1. Have you recently had an elevated temperature, fever, chills, productive cough, coughing for 3 weeks or longer or hemoptysis, abnormal vital signs, night sweats,  chest pain or have you noticed a decrease in your appetite, unexplained weight loss or fatigue? No  2. Do you have any open wounds or new incisions? No  3. Do you have any recent or upcoming hospitalizations, surgeries or dental procedures? No  4. Do you currently have or recently have  had any signs of illness or infection or are you on any antibiotics? No  5. Have you had any new, sudden or worsening abdominal pain? No  6. Have you or anyone in your household received a live vaccination in the past 4 weeks? Please note:  No live vaccines while on biologic/chemotherapy until 6 months after the last treatment.  Patient can receive the flu vaccine (shot only) and the pneumovax.  It is optimal for the patient to get these vaccines mid cycle, but they can be given at any time as long as it is not on the day of the infusion. No  7. Have you recently been diagnosed with any new nervous system diseases (ie. Multiple sclerosis, Guillain [place], seizures, neurological changes) or cancer diagnosis? No  8. Are you on any form of radiation or chemotherapy? No  9. Are you pregnant or breast feeding or do you have plans of pregnancy in the future? N/A  10. Have you been having any signs of worsening depression or suicidal ideations?  (benlysta only) No  11. Have there been any other new onset medical symptoms? No    Post Infusion Assessment:  Patient tolerated infusion without incident.  Blood return noted pre and post infusion.  Site patent and intact, free from redness, edema or discomfort.  No evidence of extravasations.  Access discontinued per protocol.     Discharge Plan:   Discharge instructions reviewed with: Patient.  Patient and/or family verbalized understanding of discharge instructions and all questions answered.  Patient discharged in stable condition accompanied by: self.  Departure Mode: Ambulatory.  8/11/20 + 9/1/20: IVIG.    Aubree Varner RN, BSN, OCN on 7/21/2020 at 2:20 PM

## 2020-08-11 ENCOUNTER — INFUSION THERAPY VISIT (OUTPATIENT)
Dept: INFUSION THERAPY | Facility: CLINIC | Age: 55
End: 2020-08-11
Attending: PSYCHIATRY & NEUROLOGY
Payer: COMMERCIAL

## 2020-08-11 VITALS
OXYGEN SATURATION: 98 % | WEIGHT: 202.8 LBS | DIASTOLIC BLOOD PRESSURE: 73 MMHG | BODY MASS INDEX: 28.28 KG/M2 | TEMPERATURE: 97.9 F | RESPIRATION RATE: 16 BRPM | HEART RATE: 61 BPM | SYSTOLIC BLOOD PRESSURE: 118 MMHG

## 2020-08-11 DIAGNOSIS — G61.81 CIDP (CHRONIC INFLAMMATORY DEMYELINATING POLYNEUROPATHY) (H): Primary | ICD-10-CM

## 2020-08-11 PROCEDURE — 25000128 H RX IP 250 OP 636: Performed by: PSYCHIATRY & NEUROLOGY

## 2020-08-11 PROCEDURE — 96375 TX/PRO/DX INJ NEW DRUG ADDON: CPT

## 2020-08-11 PROCEDURE — 25000132 ZZH RX MED GY IP 250 OP 250 PS 637: Performed by: PSYCHIATRY & NEUROLOGY

## 2020-08-11 PROCEDURE — 96365 THER/PROPH/DIAG IV INF INIT: CPT

## 2020-08-11 PROCEDURE — 96366 THER/PROPH/DIAG IV INF ADDON: CPT

## 2020-08-11 RX ORDER — DIPHENHYDRAMINE HCL 25 MG
50 CAPSULE ORAL ONCE
Status: CANCELLED | OUTPATIENT
Start: 2020-08-11

## 2020-08-11 RX ORDER — ACETAMINOPHEN 325 MG/1
650 TABLET ORAL ONCE
Status: CANCELLED
Start: 2020-08-11

## 2020-08-11 RX ORDER — ACETAMINOPHEN 325 MG/1
650 TABLET ORAL ONCE
Status: COMPLETED | OUTPATIENT
Start: 2020-08-11 | End: 2020-08-11

## 2020-08-11 RX ORDER — DIPHENHYDRAMINE HCL 25 MG
25 CAPSULE ORAL ONCE
Status: CANCELLED | OUTPATIENT
Start: 2020-08-11

## 2020-08-11 RX ORDER — DIPHENHYDRAMINE HCL 25 MG
50 CAPSULE ORAL ONCE
Status: COMPLETED | OUTPATIENT
Start: 2020-08-11 | End: 2020-08-11

## 2020-08-11 RX ADMIN — HYDROCORTISONE SODIUM SUCCINATE 100 MG: 100 INJECTION, POWDER, FOR SOLUTION INTRAMUSCULAR; INTRAVENOUS at 08:41

## 2020-08-11 RX ADMIN — DIPHENHYDRAMINE HYDROCHLORIDE 50 MG: 25 CAPSULE ORAL at 08:40

## 2020-08-11 RX ADMIN — HUMAN IMMUNOGLOBULIN G 75 G: 40 LIQUID INTRAVENOUS at 09:37

## 2020-08-11 RX ADMIN — ACETAMINOPHEN 650 MG: 325 TABLET ORAL at 08:39

## 2020-08-11 ASSESSMENT — PAIN SCALES - GENERAL: PAINLEVEL: NO PAIN (0)

## 2020-08-11 NOTE — PROGRESS NOTES
"Infusion Nursing Note:  Emiliano Aguiar presents today for IVIG.    Patient seen by provider today: No   present during visit today: Not Applicable.    Note: After reviewing recent infusion notes, asked Dr Godoy what he would like done going forward for Benadryl premed and post-infusion steroid script:    \"ok to change Benadryl to 50 mg. I will refill his dose pack. \"   per InBasket message from Dr Godoy    When talking with patient upon arrival, the hives had persisted beyond the 6-day steroid dose pack, and he had required Benadryl as recently as yesterday morning. Per Dr Godoy, daily Zyrtec may be needed, and he will call patient.    Today's infusion:  Premeds given at 0840. At 0905, patient began itching and developed hives on forehead and L neck. By 0930, hives and itching had resolved. After discussing with patient, IVIG started at 0937 and tolerated infusion well. Infusion started at 0.5 ml/kg/hr. Increased by 0.5 ml/kg/hr every 15 minutes to max of 4 ml/kg/hr. Patent will continue with Benadryl and steroid dose pack, and will try daily Zyrtec. If he doesn't hear from Dr Godyo by tomorrow afternoon, will call his office.    Intravenous Access:  Peripheral IV placed.    Treatment Conditions:  Biological Infusion Checklist:  ~~~ NOTE: If the patient answers yes to any of the questions below, hold the infusion and contact ordering provider or on-call provider.    1. Have you recently had an elevated temperature, fever, chills, productive cough, coughing for 3 weeks or longer or hemoptysis, abnormal vital signs, night sweats,  chest pain or have you noticed a decrease in your appetite, unexplained weight loss or fatigue? No  2. Do you have any open wounds or new incisions? No  3. Do you have any recent or upcoming hospitalizations, surgeries or dental procedures? No  4. Do you currently have or recently have had any signs of illness or infection or are you on any antibiotics? " No  5. Have you had any new, sudden or worsening abdominal pain? No  6. Have you or anyone in your household received a live vaccination in the past 4 weeks? Please note:  No live vaccines while on biologic/chemotherapy until 6 months after the last treatment.  Patient can receive the flu vaccine (shot only) and the pneumovax.  It is optimal for the patient to get these vaccines mid cycle, but they can be given at any time as long as it is not on the day of the infusion. No  7. Have you recently been diagnosed with any new nervous system diseases (ie. Multiple sclerosis, Guillain [place], seizures, neurological changes) or cancer diagnosis? No  8. Are you on any form of radiation or chemotherapy? No  9. Are you pregnant or breast feeding or do you have plans of pregnancy in the future? No  10. Have you been having any signs of worsening depression or suicidal ideations?  (benlysta only) No  11. Have there been any other new onset medical symptoms? No  Post Infusion Assessment:  Patient tolerated infusion without incident.  Blood return noted pre and post infusion.  Site patent and intact, free from redness, edema or discomfort.  No evidence of extravasations.  Access discontinued per protocol.     Discharge Plan:   Discharge instructions reviewed with: Patient.  Patient and/or family verbalized understanding of discharge instructions and all questions answered.  AVS to patient via [employer].  Patient will return 9/1 for next appointment.   Patient discharged in stable condition accompanied by: self (stated he felt ok to drive). Ambulatory.    Josselin Chavira RN

## 2020-09-01 ENCOUNTER — INFUSION THERAPY VISIT (OUTPATIENT)
Dept: INFUSION THERAPY | Facility: CLINIC | Age: 55
End: 2020-09-01
Attending: PSYCHIATRY & NEUROLOGY
Payer: COMMERCIAL

## 2020-09-01 VITALS
WEIGHT: 203.5 LBS | BODY MASS INDEX: 28.38 KG/M2 | TEMPERATURE: 97.4 F | HEART RATE: 66 BPM | OXYGEN SATURATION: 99 % | DIASTOLIC BLOOD PRESSURE: 82 MMHG | RESPIRATION RATE: 16 BRPM | SYSTOLIC BLOOD PRESSURE: 150 MMHG

## 2020-09-01 DIAGNOSIS — G61.81 CIDP (CHRONIC INFLAMMATORY DEMYELINATING POLYNEUROPATHY) (H): Primary | ICD-10-CM

## 2020-09-01 PROCEDURE — 25800030 ZZH RX IP 258 OP 636: Performed by: PSYCHIATRY & NEUROLOGY

## 2020-09-01 PROCEDURE — 96366 THER/PROPH/DIAG IV INF ADDON: CPT

## 2020-09-01 PROCEDURE — 25000128 H RX IP 250 OP 636: Performed by: PSYCHIATRY & NEUROLOGY

## 2020-09-01 PROCEDURE — 25000132 ZZH RX MED GY IP 250 OP 250 PS 637: Mod: GY | Performed by: PSYCHIATRY & NEUROLOGY

## 2020-09-01 PROCEDURE — 96365 THER/PROPH/DIAG IV INF INIT: CPT

## 2020-09-01 PROCEDURE — 96375 TX/PRO/DX INJ NEW DRUG ADDON: CPT

## 2020-09-01 RX ORDER — DIPHENHYDRAMINE HCL 25 MG
50 CAPSULE ORAL ONCE
Status: COMPLETED | OUTPATIENT
Start: 2020-09-01 | End: 2020-09-01

## 2020-09-01 RX ORDER — DIPHENHYDRAMINE HCL 25 MG
50 CAPSULE ORAL ONCE
Status: CANCELLED | OUTPATIENT
Start: 2020-09-22

## 2020-09-01 RX ORDER — ACETAMINOPHEN 325 MG/1
650 TABLET ORAL ONCE
Status: COMPLETED | OUTPATIENT
Start: 2020-09-01 | End: 2020-09-01

## 2020-09-01 RX ORDER — ACETAMINOPHEN 325 MG/1
650 TABLET ORAL ONCE
Status: CANCELLED
Start: 2020-09-22

## 2020-09-01 RX ADMIN — SODIUM CHLORIDE 250 ML: 9 INJECTION, SOLUTION INTRAVENOUS at 08:53

## 2020-09-01 RX ADMIN — HUMAN IMMUNOGLOBULIN G 75 G: 40 LIQUID INTRAVENOUS at 09:05

## 2020-09-01 RX ADMIN — HYDROCORTISONE SODIUM SUCCINATE 100 MG: 100 INJECTION, POWDER, FOR SOLUTION INTRAMUSCULAR; INTRAVENOUS at 08:40

## 2020-09-01 RX ADMIN — ACETAMINOPHEN 650 MG: 325 TABLET ORAL at 08:30

## 2020-09-01 RX ADMIN — DIPHENHYDRAMINE HYDROCHLORIDE 50 MG: 25 CAPSULE ORAL at 08:30

## 2020-09-01 ASSESSMENT — PAIN SCALES - GENERAL: PAINLEVEL: NO PAIN (0)

## 2020-09-01 NOTE — PROGRESS NOTES
Infusion Nursing Note:  Emiliano Aguiar presents today for IVIG and pre-meds.    Patient seen by provider today: No   present during visit today: Not Applicable.    Note: Tylenol/Benadryl/Solu-Cortef given 30 min prior to start of IVIG per orders.  Patient states he took oral Benadryl for 3 days after his last IVIG and didn't get hives.  Once he stopped the Benadryl the hives returned.  They resolve once he takes Benadryl.  Had hives last on Thursday.  Usually happen in the AM and resolve with Benadryl.  Hasn't tried Zyrtec yet but will plan to try that once he uses more of the Benadryl.  Didn't receive the Medrol Dose Pack after his last infusion.  Doesn't think he needs it because the Benadryl controls the hives.      Intravenous Access:  Peripheral IV placed.    Treatment Conditions:  Biological Infusion Checklist:  ~~~ NOTE: If the patient answers yes to any of the questions below, hold the infusion and contact ordering provider or on-call provider.    1. Have you recently had an elevated temperature, fever, chills, productive cough, coughing for 3 weeks or longer or hemoptysis, abnormal vital signs, night sweats,  chest pain or have you noticed a decrease in your appetite, unexplained weight loss or fatigue? No  2. Do you have any open wounds or new incisions? No  3. Do you have any recent or upcoming hospitalizations, surgeries or dental procedures? No  4. Do you currently have or recently have had any signs of illness or infection or are you on any antibiotics? No  5. Have you had any new, sudden or worsening abdominal pain? No  6. Have you or anyone in your household received a live vaccination in the past 4 weeks? Please note:  No live vaccines while on biologic/chemotherapy until 6 months after the last treatment.  Patient can receive the flu vaccine (shot only) and the pneumovax.  It is optimal for the patient to get these vaccines mid cycle, but they can be given at any time as long as it is  not on the day of the infusion. No  7. Have you recently been diagnosed with any new nervous system diseases (ie. Multiple sclerosis, Guillain [place], seizures, neurological changes) or cancer diagnosis? No  8. Are you on any form of radiation or chemotherapy? No  9. Are you pregnant or breast feeding or do you have plans of pregnancy in the future? No  10. Have you been having any signs of worsening depression or suicidal ideations?  (benlysta only) No  11. Have there been any other new onset medical symptoms? No        Post Infusion Assessment:  Patient tolerated infusion without incident.  Blood return noted pre and post infusion.  Site patent and intact, free from redness, edema or discomfort.  No evidence of extravasations.  Access discontinued per protocol.  Biologic Infusion Post Education: Call the triage nurse at your clinic or seek medical attention if you have chills and/or temperature greater than or equal to 100.5, uncontrolled nausea/vomiting, diarrhea, constipation, dizziness, shortness of breath, chest pain, heart palpitations, weakness or any other new or concerning symptoms, questions or concerns.  You cannot have any live virus vaccines prior to or during treatment or up to 6 months post infusion.  If you have an upcoming surgery, medical procedure or dental procedure during treatment, this should be discussed with your ordering physician and your surgeon/dentist.  If you are having any concerning symptom, if you are unsure if you should get your next infusion or wish to speak to a provider before your next infusion, please call your care coordinator or triage nurse at your clinic to notify them so we can adequately serve you.       Discharge Plan:   Discharge instructions reviewed with: Patient.  Patient discharged in stable condition accompanied by: self.  Departure Mode: Ambulatory.  Next infusion scheduled for 9/22/2020.    ADELE TAYLOR RN

## 2020-09-22 ENCOUNTER — INFUSION THERAPY VISIT (OUTPATIENT)
Dept: INFUSION THERAPY | Facility: CLINIC | Age: 55
End: 2020-09-22
Attending: PSYCHIATRY & NEUROLOGY
Payer: COMMERCIAL

## 2020-09-22 VITALS
DIASTOLIC BLOOD PRESSURE: 80 MMHG | OXYGEN SATURATION: 97 % | SYSTOLIC BLOOD PRESSURE: 126 MMHG | HEART RATE: 60 BPM | TEMPERATURE: 97.1 F

## 2020-09-22 DIAGNOSIS — G61.81 CIDP (CHRONIC INFLAMMATORY DEMYELINATING POLYNEUROPATHY) (H): Primary | ICD-10-CM

## 2020-09-22 PROCEDURE — 25000128 H RX IP 250 OP 636: Performed by: PSYCHIATRY & NEUROLOGY

## 2020-09-22 PROCEDURE — 96365 THER/PROPH/DIAG IV INF INIT: CPT

## 2020-09-22 PROCEDURE — 25000132 ZZH RX MED GY IP 250 OP 250 PS 637: Performed by: PSYCHIATRY & NEUROLOGY

## 2020-09-22 PROCEDURE — 96366 THER/PROPH/DIAG IV INF ADDON: CPT

## 2020-09-22 RX ORDER — ACETAMINOPHEN 325 MG/1
650 TABLET ORAL ONCE
Status: COMPLETED | OUTPATIENT
Start: 2020-09-22 | End: 2020-09-22

## 2020-09-22 RX ORDER — ACETAMINOPHEN 325 MG/1
650 TABLET ORAL ONCE
Status: CANCELLED
Start: 2020-10-13

## 2020-09-22 RX ORDER — DIPHENHYDRAMINE HCL 25 MG
50 CAPSULE ORAL ONCE
Status: CANCELLED | OUTPATIENT
Start: 2020-10-13

## 2020-09-22 RX ORDER — DIPHENHYDRAMINE HCL 25 MG
50 CAPSULE ORAL ONCE
Status: COMPLETED | OUTPATIENT
Start: 2020-09-22 | End: 2020-09-22

## 2020-09-22 RX ADMIN — DIPHENHYDRAMINE HYDROCHLORIDE 50 MG: 25 CAPSULE ORAL at 08:17

## 2020-09-22 RX ADMIN — ACETAMINOPHEN 650 MG: 325 TABLET ORAL at 08:17

## 2020-09-22 RX ADMIN — HUMAN IMMUNOGLOBULIN G 75 G: 40 LIQUID INTRAVENOUS at 08:35

## 2020-09-22 NOTE — PROGRESS NOTES
"Infusion Nursing Note:  Emiliano Aguiar presents today for IVIG with premeds.    Patient seen by provider today: No   present during visit today: Not Applicable.    Note: Pt states he continues to take po Benadryl post IVIG as needed.  States hives will come and go.  May be gone for a few days and then return.  Last needed to take Benadryl 5 days ago    States the IVIG really helps with his fatigue and LE weakness.  Starts to really notice the LE weakness typically a few days prior to receiving next IVIG dose.  Describes his legs as feeling \"wobbly like a baby deer learning to walk\".    Intravenous Access:  Peripheral IV placed.    Treatment Conditions:  Biological Infusion Checklist:  ~~~ NOTE: If the patient answers yes to any of the questions below, hold the infusion and contact ordering provider or on-call provider.    1. Have you recently had an elevated temperature, fever, chills, productive cough, coughing for 3 weeks or longer or hemoptysis, abnormal vital signs, night sweats,  chest pain or have you noticed a decrease in your appetite, unexplained weight loss or fatigue? No  2. Do you have any open wounds or new incisions? No  3. Do you have any recent or upcoming hospitalizations, surgeries or dental procedures? No  4. Do you currently have or recently have had any signs of illness or infection or are you on any antibiotics? No  5. Have you had any new, sudden or worsening abdominal pain? No  6. Have you or anyone in your household received a live vaccination in the past 4 weeks? Please note:  No live vaccines while on biologic/chemotherapy until 6 months after the last treatment.  Patient can receive the flu vaccine (shot only) and the pneumovax.  It is optimal for the patient to get these vaccines mid cycle, but they can be given at any time as long as it is not on the day of the infusion. No  7. Have you recently been diagnosed with any new nervous system diseases (ie. Multiple sclerosis, " Guillain [place], seizures, neurological changes) or cancer diagnosis? No  8. Are you on any form of radiation or chemotherapy? No  9. Are you pregnant or breast feeding or do you have plans of pregnancy in the future? No  10. Have you been having any signs of worsening depression or suicidal ideations?  (benlysta only) No  11. Have there been any other new onset medical symptoms? No        Post Infusion Assessment:  Patient tolerated infusion without incident.  Blood return noted pre and post infusion.  Site patent and intact, free from redness, edema or discomfort.  No evidence of extravasations.  Access discontinued per protocol.       Discharge Plan:   Discharge instructions reviewed with: Patient.  Patient and/or family verbalized understanding of discharge instructions and all questions answered.  AVS to patient via [employer].  Patient will return 10/13/20 for next dose of IVIG for next appointment.   Patient discharged in stable condition accompanied by: self.  Departure Mode: Ambulatory.    Carli Lopez RN

## 2020-10-13 ENCOUNTER — INFUSION THERAPY VISIT (OUTPATIENT)
Dept: INFUSION THERAPY | Facility: CLINIC | Age: 55
End: 2020-10-13
Attending: PSYCHIATRY & NEUROLOGY
Payer: COMMERCIAL

## 2020-10-13 VITALS
DIASTOLIC BLOOD PRESSURE: 79 MMHG | HEART RATE: 63 BPM | TEMPERATURE: 97.7 F | OXYGEN SATURATION: 96 % | WEIGHT: 207.5 LBS | RESPIRATION RATE: 16 BRPM | BODY MASS INDEX: 28.94 KG/M2 | SYSTOLIC BLOOD PRESSURE: 123 MMHG

## 2020-10-13 DIAGNOSIS — G61.81 CIDP (CHRONIC INFLAMMATORY DEMYELINATING POLYNEUROPATHY) (H): Primary | ICD-10-CM

## 2020-10-13 PROCEDURE — 96365 THER/PROPH/DIAG IV INF INIT: CPT

## 2020-10-13 PROCEDURE — 96366 THER/PROPH/DIAG IV INF ADDON: CPT

## 2020-10-13 PROCEDURE — 250N000013 HC RX MED GY IP 250 OP 250 PS 637: Performed by: PSYCHIATRY & NEUROLOGY

## 2020-10-13 PROCEDURE — 96375 TX/PRO/DX INJ NEW DRUG ADDON: CPT

## 2020-10-13 PROCEDURE — 250N000011 HC RX IP 250 OP 636: Performed by: PSYCHIATRY & NEUROLOGY

## 2020-10-13 RX ORDER — DIPHENHYDRAMINE HCL 25 MG
50 CAPSULE ORAL ONCE
Status: CANCELLED | OUTPATIENT
Start: 2020-11-03

## 2020-10-13 RX ORDER — ACETAMINOPHEN 325 MG/1
650 TABLET ORAL ONCE
Status: COMPLETED | OUTPATIENT
Start: 2020-10-13 | End: 2020-10-13

## 2020-10-13 RX ORDER — ACETAMINOPHEN 325 MG/1
650 TABLET ORAL ONCE
Status: CANCELLED
Start: 2020-11-03

## 2020-10-13 RX ORDER — DIPHENHYDRAMINE HCL 25 MG
50 CAPSULE ORAL ONCE
Status: COMPLETED | OUTPATIENT
Start: 2020-10-13 | End: 2020-10-13

## 2020-10-13 RX ADMIN — DIPHENHYDRAMINE HYDROCHLORIDE 50 MG: 25 CAPSULE ORAL at 08:28

## 2020-10-13 RX ADMIN — ACETAMINOPHEN 650 MG: 325 TABLET ORAL at 08:29

## 2020-10-13 RX ADMIN — HYDROCORTISONE SODIUM SUCCINATE 100 MG: 100 INJECTION, POWDER, FOR SOLUTION INTRAMUSCULAR; INTRAVENOUS at 08:46

## 2020-10-13 RX ADMIN — HUMAN IMMUNOGLOBULIN G 75 G: 40 LIQUID INTRAVENOUS at 09:05

## 2020-10-13 ASSESSMENT — PAIN SCALES - GENERAL: PAINLEVEL: NO PAIN (0)

## 2020-10-13 NOTE — PROGRESS NOTES
Infusion Nursing Note:  Emiliano Aguiar presents today for IVIG.    Patient seen by provider today: No   present during visit today: Not Applicable.    Note: Patient reported itching post prior infusion.Solu-CORTEF given prior to this infusion.   Intravenous Access:  Peripheral IV placed.    Treatment Conditions:  Biological Infusion Checklist:  ~~~ NOTE: If the patient answers yes to any of the questions below, hold the infusion and contact ordering provider or on-call provider.    1. Have you recently had an elevated temperature, fever, chills, productive cough, coughing for 3 weeks or longer or hemoptysis, abnormal vital signs, night sweats,  chest pain or have you noticed a decrease in your appetite, unexplained weight loss or fatigue? No  2. Do you have any open wounds or new incisions? No  3. Do you have any recent or upcoming hospitalizations, surgeries or dental procedures? No  4. Do you currently have or recently have had any signs of illness or infection or are you on any antibiotics? No  5. Have you had any new, sudden or worsening abdominal pain? No  6. Have you or anyone in your household received a live vaccination in the past 4 weeks? Please note:  No live vaccines while on biologic/chemotherapy until 6 months after the last treatment.  Patient can receive the flu vaccine (shot only) and the pneumovax.  It is optimal for the patient to get these vaccines mid cycle, but they can be given at any time as long as it is not on the day of the infusion. No  7. Have you recently been diagnosed with any new nervous system diseases (ie. Multiple sclerosis, Guillain [place], seizures, neurological changes) or cancer diagnosis? No  8. Are you on any form of radiation or chemotherapy? No  9. Are you pregnant or breast feeding or do you have plans of pregnancy in the future? No  10. Have you been having any signs of worsening depression or suicidal ideations?  (benlysta only) No  11. Have there been any  other new onset medical symptoms? No        Post Infusion Assessment:  Patient tolerated infusion without incident.  Blood return noted pre and post infusion.  Site patent and intact, free from redness, edema or discomfort.  No evidence of extravasations.  Access discontinued per protocol.       Discharge Plan:   Copy of AVS reviewed with patient and/or family.  Patient will return 11/3 for next appointment.  Departure Mode: Ambulatory.    Brenda Dumont RN

## 2020-11-03 ENCOUNTER — INFUSION THERAPY VISIT (OUTPATIENT)
Dept: INFUSION THERAPY | Facility: CLINIC | Age: 55
End: 2020-11-03
Attending: PSYCHIATRY & NEUROLOGY
Payer: COMMERCIAL

## 2020-11-03 VITALS
HEART RATE: 63 BPM | TEMPERATURE: 97.2 F | WEIGHT: 206.3 LBS | RESPIRATION RATE: 16 BRPM | SYSTOLIC BLOOD PRESSURE: 179 MMHG | DIASTOLIC BLOOD PRESSURE: 94 MMHG | BODY MASS INDEX: 28.77 KG/M2

## 2020-11-03 DIAGNOSIS — G61.81 CIDP (CHRONIC INFLAMMATORY DEMYELINATING POLYNEUROPATHY) (H): Primary | ICD-10-CM

## 2020-11-03 PROCEDURE — 250N000013 HC RX MED GY IP 250 OP 250 PS 637: Performed by: PSYCHIATRY & NEUROLOGY

## 2020-11-03 PROCEDURE — 96375 TX/PRO/DX INJ NEW DRUG ADDON: CPT

## 2020-11-03 PROCEDURE — 250N000011 HC RX IP 250 OP 636: Performed by: PSYCHIATRY & NEUROLOGY

## 2020-11-03 PROCEDURE — 96366 THER/PROPH/DIAG IV INF ADDON: CPT

## 2020-11-03 PROCEDURE — 96365 THER/PROPH/DIAG IV INF INIT: CPT

## 2020-11-03 RX ORDER — ACETAMINOPHEN 325 MG/1
650 TABLET ORAL ONCE
Status: CANCELLED
Start: 2020-11-03

## 2020-11-03 RX ORDER — DIPHENHYDRAMINE HCL 25 MG
25 CAPSULE ORAL ONCE
Status: COMPLETED | OUTPATIENT
Start: 2020-11-03 | End: 2020-11-03

## 2020-11-03 RX ORDER — DIPHENHYDRAMINE HCL 25 MG
50 CAPSULE ORAL ONCE
Status: CANCELLED | OUTPATIENT
Start: 2020-11-03

## 2020-11-03 RX ORDER — ACETAMINOPHEN 325 MG/1
650 TABLET ORAL ONCE
Status: COMPLETED | OUTPATIENT
Start: 2020-11-03 | End: 2020-11-03

## 2020-11-03 RX ORDER — DIPHENHYDRAMINE HCL 25 MG
50 CAPSULE ORAL ONCE
Status: DISCONTINUED | OUTPATIENT
Start: 2020-11-03 | End: 2020-11-03

## 2020-11-03 RX ADMIN — HUMAN IMMUNOGLOBULIN G 75 G: 40 LIQUID INTRAVENOUS at 08:57

## 2020-11-03 RX ADMIN — DIPHENHYDRAMINE HYDROCHLORIDE 25 MG: 25 CAPSULE ORAL at 08:43

## 2020-11-03 RX ADMIN — HYDROCORTISONE SODIUM SUCCINATE 100 MG: 100 INJECTION, POWDER, FOR SOLUTION INTRAMUSCULAR; INTRAVENOUS at 08:45

## 2020-11-03 RX ADMIN — ACETAMINOPHEN 650 MG: 325 TABLET ORAL at 08:43

## 2020-11-03 NOTE — PROGRESS NOTES
Infusion Nursing Note:  Emiliano Aguiar presents today for IVIG.    Patient seen by provider today: No   present during visit today: Not Applicable.    Note: Headaches noted for several days post infusion..    Intravenous Access:  Peripheral IV placed.    Treatment Conditions:  Biological Infusion Checklist:  ~~~ NOTE: If the patient answers yes to any of the questions below, hold the infusion and contact ordering provider or on-call provider.    1. Have you recently had an elevated temperature, fever, chills, productive cough, coughing for 3 weeks or longer or hemoptysis, abnormal vital signs, night sweats,  chest pain or have you noticed a decrease in your appetite, unexplained weight loss or fatigue? No  2. Do you have any open wounds or new incisions? No  3. Do you have any recent or upcoming hospitalizations, surgeries or dental procedures? No  4. Do you currently have or recently have had any signs of illness or infection or are you on any antibiotics? No  5. Have you had any new, sudden or worsening abdominal pain? No  6. Have you or anyone in your household received a live vaccination in the past 4 weeks? Please note:  No live vaccines while on biologic/chemotherapy until 6 months after the last treatment.  Patient can receive the flu vaccine (shot only) and the pneumovax.  It is optimal for the patient to get these vaccines mid cycle, but they can be given at any time as long as it is not on the day of the infusion. No  7. Have you recently been diagnosed with any new nervous system diseases (ie. Multiple sclerosis, Guillain [place], seizures, neurological changes) or cancer diagnosis? No  8. Are you on any form of radiation or chemotherapy? No  9. Are you pregnant or breast feeding or do you have plans of pregnancy in the future? No  10. Have you been having any signs of worsening depression or suicidal ideations?  (benlysta only) No  11. Have there been any other new onset medical symptoms?  No        Post Infusion Assessment:  Patient tolerated infusion without incident.  Site patent and intact, free from redness, edema or discomfort.  No evidence of extravasations.  Access discontinued per protocol.       Discharge Plan:   Copy of AVS reviewed with patient and/or family.  Patient will return 11/24/2020 for next appointment.  Patient discharged in stable condition accompanied by: self.  Departure Mode: Ambulatory.    Brenda Dumont RN

## 2020-11-24 ENCOUNTER — INFUSION THERAPY VISIT (OUTPATIENT)
Dept: INFUSION THERAPY | Facility: CLINIC | Age: 55
End: 2020-11-24
Attending: PSYCHIATRY & NEUROLOGY
Payer: COMMERCIAL

## 2020-11-24 VITALS
TEMPERATURE: 97.4 F | OXYGEN SATURATION: 97 % | SYSTOLIC BLOOD PRESSURE: 124 MMHG | HEART RATE: 67 BPM | DIASTOLIC BLOOD PRESSURE: 81 MMHG

## 2020-11-24 DIAGNOSIS — G61.81 CIDP (CHRONIC INFLAMMATORY DEMYELINATING POLYNEUROPATHY) (H): Primary | ICD-10-CM

## 2020-11-24 PROCEDURE — 96365 THER/PROPH/DIAG IV INF INIT: CPT

## 2020-11-24 PROCEDURE — 96366 THER/PROPH/DIAG IV INF ADDON: CPT

## 2020-11-24 PROCEDURE — 250N000011 HC RX IP 250 OP 636: Performed by: PSYCHIATRY & NEUROLOGY

## 2020-11-24 PROCEDURE — 250N000013 HC RX MED GY IP 250 OP 250 PS 637: Performed by: PSYCHIATRY & NEUROLOGY

## 2020-11-24 RX ORDER — ACETAMINOPHEN 325 MG/1
650 TABLET ORAL ONCE
Status: CANCELLED
Start: 2020-11-24

## 2020-11-24 RX ORDER — DIPHENHYDRAMINE HCL 25 MG
50 CAPSULE ORAL ONCE
Status: COMPLETED | OUTPATIENT
Start: 2020-11-24 | End: 2020-11-24

## 2020-11-24 RX ORDER — ACETAMINOPHEN 325 MG/1
650 TABLET ORAL ONCE
Status: COMPLETED | OUTPATIENT
Start: 2020-11-24 | End: 2020-11-24

## 2020-11-24 RX ORDER — DIPHENHYDRAMINE HCL 25 MG
50 CAPSULE ORAL ONCE
Status: CANCELLED | OUTPATIENT
Start: 2020-11-24

## 2020-11-24 RX ADMIN — DIPHENHYDRAMINE HYDROCHLORIDE 25 MG: 25 CAPSULE ORAL at 08:22

## 2020-11-24 RX ADMIN — HUMAN IMMUNOGLOBULIN G 75 G: 40 LIQUID INTRAVENOUS at 08:45

## 2020-11-24 RX ADMIN — ACETAMINOPHEN 650 MG: 325 TABLET ORAL at 08:22

## 2020-11-24 NOTE — PROGRESS NOTES
"Infusion Nursing Note:  Emiliano Aguiar presents today for IVIG with premeds.    Patient seen by provider today: No   present during visit today: Not Applicable.    Note: Pt did not want steroid as premed today.  Requested only 25mg Benadryl today.    States he is typically \"down and out\" for about a week after receiving IVIG. Develops terrible meningitis headaches r/t IVIG.  States the alternative of not receiving IVIG is worth the side effects.  Per pt, if he didn't receive IVIG he \"wouldn't be able to walk\".    Infusion started at 0.5mg/kg/hr.  Increased by 0.5mg/kg/hr every 15 minutes to max rate of 4mg/kg/hr    Intravenous Access:  Peripheral IV placed.    Treatment Conditions:  Biological Infusion Checklist:  ~~~ NOTE: If the patient answers yes to any of the questions below, hold the infusion and contact ordering provider or on-call provider.    1. Have you recently had an elevated temperature, fever, chills, productive cough, coughing for 3 weeks or longer or hemoptysis, abnormal vital signs, night sweats,  chest pain or have you noticed a decrease in your appetite, unexplained weight loss or fatigue? No  2. Do you have any open wounds or new incisions? No  3. Do you have any recent or upcoming hospitalizations, surgeries or dental procedures? No  4. Do you currently have or recently have had any signs of illness or infection or are you on any antibiotics? No  5. Have you had any new, sudden or worsening abdominal pain? No   6. Have you or anyone in your household received a live vaccination in the past 4 weeks? Please note:  No live vaccines while on biologic/chemotherapy until 6 months after the last treatment.  Patient can receive the flu vaccine (shot only) and the pneumovax.  It is optimal for the patient to get these vaccines mid cycle, but they can be given at any time as long as it is not on the day of the infusion. No  7. Have you recently been diagnosed with any new nervous system " diseases (ie. Multiple sclerosis, Guillain [place], seizures, neurological changes) or cancer diagnosis? No  8. Are you on any form of radiation or chemotherapy? No  9. Are you pregnant or breast feeding or do you have plans of pregnancy in the future? No  10. Have you been having any signs of worsening depression or suicidal ideations?  (benlysta only) No  11. Have there been any other new onset medical symptoms? No        Post Infusion Assessment:  Patient tolerated infusion without incident.  Blood return noted pre and post infusion.  Site patent and intact, free from redness, edema or discomfort.  No evidence of extravasations.  Access discontinued per protocol.       Discharge Plan:   Discharge instructions reviewed with: Patient.  Patient and/or family verbalized understanding of discharge instructions and all questions answered.  AVS to patient via [employer].  Patient will return 12/15/20 for next dose of IVIG for next appointment.   Patient discharged in stable condition accompanied by: self.  Departure Mode: Ambulatory.    Carli Lopez RN

## 2020-12-06 ENCOUNTER — HEALTH MAINTENANCE LETTER (OUTPATIENT)
Age: 55
End: 2020-12-06

## 2020-12-09 ENCOUNTER — VIRTUAL VISIT (OUTPATIENT)
Dept: NEUROLOGY | Facility: CLINIC | Age: 55
End: 2020-12-09
Payer: COMMERCIAL

## 2020-12-09 DIAGNOSIS — G61.81 CIDP (CHRONIC INFLAMMATORY DEMYELINATING POLYNEUROPATHY) (H): Primary | ICD-10-CM

## 2020-12-09 PROCEDURE — 99213 OFFICE O/P EST LOW 20 MIN: CPT | Mod: 95 | Performed by: PSYCHIATRY & NEUROLOGY

## 2020-12-09 NOTE — LETTER
"12/9/2020       RE: Emiliano Aguiar  [address] [place] [id number]     Dear Colleague,    Thank you for referring your patient, Emiliano Aguiar, to the [facility] [place] at [facility]. Please see a copy of my visit note below.    Emiliano Aguiar is a 55 year old male who is being evaluated via a billable video visit.      The patient has been notified of following:     \"This video visit will be conducted via a call between you and your physician/provider. We have found that certain health care needs can be provided without the need for an in-person physical exam.  This service lets us provide the care you need with a video conversation.  If a prescription is necessary we can send it directly to your pharmacy.  If lab work is needed we can place an order for that and you can then stop by our lab to have the test done at a later time.    Video visits are billed at different rates depending on your insurance coverage.  Please reach out to your insurance provider with any questions.    If during the course of the call the physician/provider feels a video visit is not appropriate, you will not be charged for this service.\"    Patient has given verbal consent for Video visit? yes  How would you like to obtain your AVS? Mychart  If you are dropped from the video visit, the video invite should be resent to: cell  Will anyone else be joining your video visit? no    Video-Visit Details    Type of service:  Video Visit    Video Start Time: 9.22 am  Video End Time: 9.40 am    Originating Location (pt. Location): Home    Distant Location (provider location):  [facility] NEUROLOGY [facility]     Platform used for Video Visit: GODFREY Pruitt      Service Date: 12/09/2020      HISTORY OF PRESENT ILLNESS:  I had the pleasure to evaluate Emiliano via billable video visit today. In person visit was not recommended due to ongoing " COVID-19 pandemic. 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 He is currently on IVIG 1 g/kg every 3 weeks and he is doing well on it. He feels this frequency and dose serves him better than every 4 weeks- he has no wearing off phenomena. Overall his symptoms are stable. He continues to experience joint pains, headaches, and hives (redness, itching and blotching on his face) that last several days after each IVIG infusion. Interestingly, he has found that taking THC/CBD gummies (10 mg/10 mg), often just half or a quarter of a tablet, works much better to reduce those uncomfortable post infusion reactions than steroids or Benadryl. No respiratory symptoms reported. Regarding the CIDP, he feels he is stable. He has had some near-falls lately and he has a cane which he may use when expecting to walk on uneven/slippery surfaces.     CURRENT MEDICATIONS:  Reviewed and are as per Epic record.      RODS CIDP today was 26, only one point lower than 6 months ago.      In summary, Mr. Aguiar's CIDP is neurologically stable.  I will see him in followup in 6 months and we will not change anything in his IVIG dose right now. It is intriguing that taking cannabis minimizes post IVIG headaches and allergic reactions. I am fine if he continues this approach. TT spent on video call 18 minutes (start 9.22 am, end 9.40 am); more than half was counseling.       cc:      Alexander White MD    [facility]   [address] [place] [address]         NICHOLAS BOWDEN MD

## 2020-12-09 NOTE — PROGRESS NOTES
"Emiliano Aguiar is a 55 year old male who is being evaluated via a billable video visit.      The patient has been notified of following:     \"This video visit will be conducted via a call between you and your physician/provider. We have found that certain health care needs can be provided without the need for an in-person physical exam.  This service lets us provide the care you need with a video conversation.  If a prescription is necessary we can send it directly to your pharmacy.  If lab work is needed we can place an order for that and you can then stop by our lab to have the test done at a later time.    Video visits are billed at different rates depending on your insurance coverage.  Please reach out to your insurance provider with any questions.    If during the course of the call the physician/provider feels a video visit is not appropriate, you will not be charged for this service.\"    Patient has given verbal consent for Video visit? yes  How would you like to obtain your AVS? Mychart  If you are dropped from the video visit, the video invite should be resent to: cell  Will anyone else be joining your video visit? no        Video-Visit Details    Type of service:  Video Visit    Video Start Time: 9.22 am  Video End Time: 9.40 am    Originating Location (pt. Location): Home    Distant Location (provider location):  [facility] [place]     Platform used for Video Visit: Jayna Becerra, EMT      Service Date: 12/09/2020      HISTORY OF PRESENT ILLNESS:  I had the pleasure to evaluate Emiliano via billable video visit today. In person visit was not recommended due to ongoing COVID-19 pandemic. 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 He is currently on IVIG 1 g/kg every 3 weeks and he is doing well on it. He feels this frequency and dose serves him better than every 4 weeks- he has no wearing off phenomena. Overall his symptoms are stable. He continues to experience joint pains, headaches, and hives " (redness, itching and blotching on his face) that last several days after each IVIG infusion. Interestingly, he has found that taking THC/CBD gummies (10 mg/10 mg), often just half or a quarter of a tablet, works much better to reduce those uncomfortable post infusion reactions than steroids or Benadryl. No respiratory symptoms reported. Regarding the CIDP, he feels he is stable. He has had some near-falls lately and he has a cane which he may use when expecting to walk on uneven/slippery surfaces.     CURRENT MEDICATIONS:  Reviewed and are as per Epic record.      RODS CIDP today was 26, only one point lower than 6 months ago.      In summary, Mr. Aguiar's CIDP is neurologically stable.  I will see him in followup in 6 months and we will not change anything in his IVIG dose right now. It is intriguing that taking cannabis minimizes post IVIG headaches and allergic reactions. I am fine if he continues this approach. TT spent on video call 18 minutes (start 9.22 am, end 9.40 am); more than half was counseling.       cc:      Alexander White MD    [facility]   [address] [place] [address]         NICHOLAS BOWDEN MD

## 2020-12-15 ENCOUNTER — INFUSION THERAPY VISIT (OUTPATIENT)
Dept: INFUSION THERAPY | Facility: CLINIC | Age: 55
End: 2020-12-15
Attending: PSYCHIATRY & NEUROLOGY
Payer: COMMERCIAL

## 2020-12-15 VITALS
RESPIRATION RATE: 16 BRPM | WEIGHT: 207.2 LBS | BODY MASS INDEX: 28.9 KG/M2 | TEMPERATURE: 96.7 F | OXYGEN SATURATION: 97 % | SYSTOLIC BLOOD PRESSURE: 156 MMHG | DIASTOLIC BLOOD PRESSURE: 79 MMHG | HEART RATE: 61 BPM

## 2020-12-15 DIAGNOSIS — G61.81 CIDP (CHRONIC INFLAMMATORY DEMYELINATING POLYNEUROPATHY) (H): Primary | ICD-10-CM

## 2020-12-15 PROCEDURE — 96366 THER/PROPH/DIAG IV INF ADDON: CPT

## 2020-12-15 PROCEDURE — 250N000011 HC RX IP 250 OP 636: Performed by: PSYCHIATRY & NEUROLOGY

## 2020-12-15 PROCEDURE — 250N000013 HC RX MED GY IP 250 OP 250 PS 637: Performed by: PSYCHIATRY & NEUROLOGY

## 2020-12-15 PROCEDURE — 96375 TX/PRO/DX INJ NEW DRUG ADDON: CPT

## 2020-12-15 PROCEDURE — 96365 THER/PROPH/DIAG IV INF INIT: CPT

## 2020-12-15 RX ORDER — ACETAMINOPHEN 325 MG/1
650 TABLET ORAL ONCE
Status: CANCELLED
Start: 2021-01-05

## 2020-12-15 RX ORDER — DIPHENHYDRAMINE HCL 25 MG
50 CAPSULE ORAL ONCE
Status: CANCELLED | OUTPATIENT
Start: 2021-01-05

## 2020-12-15 RX ORDER — DIPHENHYDRAMINE HCL 25 MG
50 CAPSULE ORAL ONCE
Status: COMPLETED | OUTPATIENT
Start: 2020-12-15 | End: 2020-12-15

## 2020-12-15 RX ORDER — ACETAMINOPHEN 325 MG/1
650 TABLET ORAL ONCE
Status: COMPLETED | OUTPATIENT
Start: 2020-12-15 | End: 2020-12-15

## 2020-12-15 RX ADMIN — DIPHENHYDRAMINE HYDROCHLORIDE 50 MG: 25 CAPSULE ORAL at 08:25

## 2020-12-15 RX ADMIN — ACETAMINOPHEN 650 MG: 325 TABLET ORAL at 08:25

## 2020-12-15 RX ADMIN — HUMAN IMMUNOGLOBULIN G 75 G: 40 LIQUID INTRAVENOUS at 08:49

## 2020-12-15 RX ADMIN — HYDROCORTISONE SODIUM SUCCINATE 100 MG: 100 INJECTION, POWDER, FOR SOLUTION INTRAMUSCULAR; INTRAVENOUS at 08:26

## 2020-12-15 NOTE — PROGRESS NOTES
Infusion Nursing Note:  Emiliano Aguiar presents today for IVIG.    Patient seen by provider today: No   present during visit today: Not Applicable.    Note: Patient requested tylenol, benadryl and solu-cortef for premeds this time.  Continues to have itching and headaches for a few days after IVIG.  Has tried multiple things but nothing has helped.     Started infusion at 0.5ml/kg/hr and increased by 0.5ml/kg/hr, max rate 4ml/kg/hr.    Intravenous Access:  Peripheral IV placed.    Treatment Conditions:  Biological Infusion Checklist:  ~~~ NOTE: If the patient answers yes to any of the questions below, hold the infusion and contact ordering provider or on-call provider.    1. Have you recently had an elevated temperature, fever, chills, productive cough, coughing for 3 weeks or longer or hemoptysis, abnormal vital signs, night sweats,  chest pain or have you noticed a decrease in your appetite, unexplained weight loss or fatigue? No  2. Do you have any open wounds or new incisions? No  3. Do you have any recent or upcoming hospitalizations, surgeries or dental procedures? No  4. Do you currently have or recently have had any signs of illness or infection or are you on any antibiotics? No  5. Have you had any new, sudden or worsening abdominal pain? No  6. Have you or anyone in your household received a live vaccination in the past 4 weeks? Please note:  No live vaccines while on biologic/chemotherapy until 6 months after the last treatment.  Patient can receive the flu vaccine (shot only) and the pneumovax.  It is optimal for the patient to get these vaccines mid cycle, but they can be given at any time as long as it is not on the day of the infusion. No  7. Have you recently been diagnosed with any new nervous system diseases (ie. Multiple sclerosis, Guillain [place], seizures, neurological changes) or cancer diagnosis? No  8. Are you on any form of radiation or chemotherapy? No  9. Are you pregnant or  breast feeding or do you have plans of pregnancy in the future? No  10. Have you been having any signs of worsening depression or suicidal ideations?  (benlysta only) No  11. Have there been any other new onset medical symptoms? No        Post Infusion Assessment:  Patient tolerated infusion without incident.  Blood return noted pre and post infusion.  Site patent and intact, free from redness, edema or discomfort.  No evidence of extravasations.  Access discontinued per protocol.       Discharge Plan:   Copy of AVS reviewed with patient and/or family.  Patient will return 1/5/2021 for next appointment.  Patient discharged in stable condition accompanied by: self.  Departure Mode: Ambulatory.    Brenda Bui RN

## 2021-01-05 ENCOUNTER — HOSPITAL ENCOUNTER (OUTPATIENT)
Facility: CLINIC | Age: 56
Setting detail: SPECIMEN
Discharge: HOME | End: 2021-01-05
Attending: PSYCHIATRY & NEUROLOGY | Admitting: PSYCHIATRY & NEUROLOGY
Payer: COMMERCIAL

## 2021-01-05 ENCOUNTER — INFUSION THERAPY VISIT (OUTPATIENT)
Dept: INFUSION THERAPY | Facility: CLINIC | Age: 56
End: 2021-01-05
Attending: PSYCHIATRY & NEUROLOGY
Payer: COMMERCIAL

## 2021-01-05 VITALS
TEMPERATURE: 97.5 F | OXYGEN SATURATION: 97 % | BODY MASS INDEX: 28.27 KG/M2 | DIASTOLIC BLOOD PRESSURE: 77 MMHG | SYSTOLIC BLOOD PRESSURE: 158 MMHG | HEART RATE: 64 BPM | WEIGHT: 202.7 LBS | RESPIRATION RATE: 16 BRPM

## 2021-01-05 DIAGNOSIS — G61.81 CIDP (CHRONIC INFLAMMATORY DEMYELINATING POLYNEUROPATHY) (H): Primary | ICD-10-CM

## 2021-01-05 PROCEDURE — U0003 INFECTIOUS AGENT DETECTION BY NUCLEIC ACID (DNA OR RNA); SEVERE ACUTE RESPIRATORY SYNDROME CORONAVIRUS 2 (SARS-COV-2) (CORONAVIRUS DISEASE [COVID-19]), AMPLIFIED PROBE TECHNIQUE, MAKING USE OF HIGH THROUGHPUT TECHNOLOGIES AS DESCRIBED BY CMS-2020-01-R: HCPCS | Performed by: PSYCHIATRY & NEUROLOGY

## 2021-01-05 PROCEDURE — 250N000011 HC RX IP 250 OP 636: Performed by: PSYCHIATRY & NEUROLOGY

## 2021-01-05 PROCEDURE — U0005 INFEC AGEN DETEC AMPLI PROBE: HCPCS | Performed by: PSYCHIATRY & NEUROLOGY

## 2021-01-05 PROCEDURE — 250N000013 HC RX MED GY IP 250 OP 250 PS 637: Mod: GY | Performed by: PSYCHIATRY & NEUROLOGY

## 2021-01-05 PROCEDURE — A9270 NON-COVERED ITEM OR SERVICE: HCPCS | Mod: GY | Performed by: PSYCHIATRY & NEUROLOGY

## 2021-01-05 PROCEDURE — 96366 THER/PROPH/DIAG IV INF ADDON: CPT

## 2021-01-05 PROCEDURE — 96365 THER/PROPH/DIAG IV INF INIT: CPT

## 2021-01-05 RX ORDER — ACETAMINOPHEN 325 MG/1
650 TABLET ORAL ONCE
Status: CANCELLED
Start: 2021-01-26

## 2021-01-05 RX ORDER — DIPHENHYDRAMINE HCL 25 MG
50 CAPSULE ORAL ONCE
Status: COMPLETED | OUTPATIENT
Start: 2021-01-05 | End: 2021-01-05

## 2021-01-05 RX ORDER — ACETAMINOPHEN 325 MG/1
650 TABLET ORAL ONCE
Status: COMPLETED | OUTPATIENT
Start: 2021-01-05 | End: 2021-01-05

## 2021-01-05 RX ORDER — DIPHENHYDRAMINE HCL 25 MG
50 CAPSULE ORAL ONCE
Status: CANCELLED | OUTPATIENT
Start: 2021-01-26

## 2021-01-05 RX ADMIN — ACETAMINOPHEN 650 MG: 325 TABLET ORAL at 08:20

## 2021-01-05 RX ADMIN — DIPHENHYDRAMINE HYDROCHLORIDE 50 MG: 25 CAPSULE ORAL at 08:20

## 2021-01-05 RX ADMIN — HUMAN IMMUNOGLOBULIN G 75 G: 40 LIQUID INTRAVENOUS at 09:00

## 2021-01-05 NOTE — PROGRESS NOTES
Infusion Nursing Note:  Emiliano Aguiar presents today for IVIG.    Patient seen by provider today: No   present during visit today: Not Applicable.    Note: Date of last know COVID-19 test was N/A  Patient met parameters for COVID testing today: Yes  Patient received COVID-19 testing today: YesAsymptomatic   Sample obtained from nare: left side  Patient tolerated testing without complication: Yes  .    Intravenous Access:  Peripheral IV placed.    Treatment Conditions:  Biological Infusion Checklist:  ~~~ NOTE: If the patient answers yes to any of the questions below, hold the infusion and contact ordering provider or on-call provider.    1. Have you recently had an elevated temperature, fever, chills, productive cough, coughing for 3 weeks or longer or hemoptysis, abnormal vital signs, night sweats,  chest pain or have you noticed a decrease in your appetite, unexplained weight loss or fatigue? No  2. Do you have any open wounds or new incisions? No  3. Do you have any recent or upcoming hospitalizations, surgeries or dental procedures? No  4. Do you currently have or recently have had any signs of illness or infection or are you on any antibiotics? No  5. Have you had any new, sudden or worsening abdominal pain? No  6. Have you or anyone in your household received a live vaccination in the past 4 weeks? Please note:  No live vaccines while on biologic/chemotherapy until 6 months after the last treatment.  Patient can receive the flu vaccine (shot only) and the pneumovax.  It is optimal for the patient to get these vaccines mid cycle, but they can be given at any time as long as it is not on the day of the infusion. No  7. Have you recently been diagnosed with any new nervous system diseases (ie. Multiple sclerosis, Guillain [place], seizures, neurological changes) or cancer diagnosis? No  8. Are you on any form of radiation or chemotherapy? No  9. Are you pregnant or breast feeding or do you have plans  of pregnancy in the future? No  10. Have you been having any signs of worsening depression or suicidal ideations?  (benlysta only) No  11. Have there been any other new onset medical symptoms? No        Post Infusion Assessment:  Patient tolerated infusion without incident.  Blood return noted pre and post infusion.  Site patent and intact, free from redness, edema or discomfort.  No evidence of extravasations.  Access discontinued per protocol.       Discharge Plan:   Copy of AVS reviewed with patient and/or family.  Patient will return 1/26/2021 for next appointment.  Patient discharged in stable condition accompanied by: self.  Departure Mode: Ambulatory.    Brenda Dumont RN

## 2021-01-06 LAB
SARS-COV-2 RNA RESP QL NAA+PROBE: NOT DETECTED
SPECIMEN SOURCE: NORMAL

## 2021-01-26 ENCOUNTER — INFUSION THERAPY VISIT (OUTPATIENT)
Dept: INFUSION THERAPY | Facility: CLINIC | Age: 56
End: 2021-01-26
Attending: PSYCHIATRY & NEUROLOGY
Payer: COMMERCIAL

## 2021-01-26 ENCOUNTER — HOSPITAL ENCOUNTER (OUTPATIENT)
Facility: CLINIC | Age: 56
Setting detail: SPECIMEN
Discharge: HOME | End: 2021-01-26
Attending: PSYCHIATRY & NEUROLOGY | Admitting: PSYCHIATRY & NEUROLOGY
Payer: COMMERCIAL

## 2021-01-26 VITALS
RESPIRATION RATE: 16 BRPM | BODY MASS INDEX: 28.17 KG/M2 | TEMPERATURE: 97.7 F | DIASTOLIC BLOOD PRESSURE: 78 MMHG | OXYGEN SATURATION: 97 % | HEART RATE: 72 BPM | SYSTOLIC BLOOD PRESSURE: 119 MMHG | WEIGHT: 202 LBS

## 2021-01-26 DIAGNOSIS — G61.81 CIDP (CHRONIC INFLAMMATORY DEMYELINATING POLYNEUROPATHY) (H): Primary | ICD-10-CM

## 2021-01-26 LAB
LABORATORY COMMENT REPORT: NORMAL
SARS-COV-2 RNA RESP QL NAA+PROBE: NEGATIVE
SARS-COV-2 RNA RESP QL NAA+PROBE: NORMAL
SPECIMEN SOURCE: NORMAL
SPECIMEN SOURCE: NORMAL

## 2021-01-26 PROCEDURE — 250N000013 HC RX MED GY IP 250 OP 250 PS 637: Performed by: PSYCHIATRY & NEUROLOGY

## 2021-01-26 PROCEDURE — 96366 THER/PROPH/DIAG IV INF ADDON: CPT

## 2021-01-26 PROCEDURE — U0003 INFECTIOUS AGENT DETECTION BY NUCLEIC ACID (DNA OR RNA); SEVERE ACUTE RESPIRATORY SYNDROME CORONAVIRUS 2 (SARS-COV-2) (CORONAVIRUS DISEASE [COVID-19]), AMPLIFIED PROBE TECHNIQUE, MAKING USE OF HIGH THROUGHPUT TECHNOLOGIES AS DESCRIBED BY CMS-2020-01-R: HCPCS | Performed by: PSYCHIATRY & NEUROLOGY

## 2021-01-26 PROCEDURE — U0005 INFEC AGEN DETEC AMPLI PROBE: HCPCS | Performed by: PSYCHIATRY & NEUROLOGY

## 2021-01-26 PROCEDURE — 96365 THER/PROPH/DIAG IV INF INIT: CPT

## 2021-01-26 PROCEDURE — 250N000011 HC RX IP 250 OP 636: Performed by: PSYCHIATRY & NEUROLOGY

## 2021-01-26 RX ORDER — ACETAMINOPHEN 325 MG/1
650 TABLET ORAL ONCE
Status: COMPLETED | OUTPATIENT
Start: 2021-01-26 | End: 2021-01-26

## 2021-01-26 RX ORDER — ACETAMINOPHEN 325 MG/1
650 TABLET ORAL ONCE
Status: CANCELLED
Start: 2021-02-16

## 2021-01-26 RX ORDER — DIPHENHYDRAMINE HCL 25 MG
50 CAPSULE ORAL ONCE
Status: CANCELLED | OUTPATIENT
Start: 2021-02-16

## 2021-01-26 RX ORDER — DIPHENHYDRAMINE HCL 25 MG
50 CAPSULE ORAL ONCE
Status: COMPLETED | OUTPATIENT
Start: 2021-01-26 | End: 2021-01-26

## 2021-01-26 RX ADMIN — ACETAMINOPHEN 650 MG: 325 TABLET ORAL at 08:17

## 2021-01-26 RX ADMIN — HUMAN IMMUNOGLOBULIN G 75 G: 40 LIQUID INTRAVENOUS at 08:45

## 2021-01-26 RX ADMIN — DIPHENHYDRAMINE HYDROCHLORIDE 50 MG: 25 CAPSULE ORAL at 08:17

## 2021-01-26 NOTE — PROGRESS NOTES
Infusion Nursing Note:  Emiliano Aguiar presents today for IVIG.    Patient seen by provider today: No   present during visit today: Not Applicable.    Note: Patient declined solucortef for premed, did take tylenol and benadryl.  Patient continues to have headaches starting two days after IVIG and rash.  Uses CBD which helps.  Patient did meet criteria for an asymptomatic covid-19 PCR test in infusion today. Patient accepted the covid-19 test.    Intravenous Access:  Peripheral IV placed.    Treatment Conditions:  Biological Infusion Checklist:  ~~~ NOTE: If the patient answers yes to any of the questions below, hold the infusion and contact ordering provider or on-call provider.    1. Have you recently had an elevated temperature, fever, chills, productive cough, coughing for 3 weeks or longer or hemoptysis, abnormal vital signs, night sweats,  chest pain or have you noticed a decrease in your appetite, unexplained weight loss or fatigue? No  2. Do you have any open wounds or new incisions? No  3. Do you have any recent or upcoming hospitalizations, surgeries or dental procedures? No  4. Do you currently have or recently have had any signs of illness or infection or are you on any antibiotics? No  5. Have you had any new, sudden or worsening abdominal pain? No  6. Have you or anyone in your household received a live vaccination in the past 4 weeks? Please note:  No live vaccines while on biologic/chemotherapy until 6 months after the last treatment.  Patient can receive the flu vaccine (shot only) and the pneumovax.  It is optimal for the patient to get these vaccines mid cycle, but they can be given at any time as long as it is not on the day of the infusion. No  7. Have you recently been diagnosed with any new nervous system diseases (ie. Multiple sclerosis, Guillain [place], seizures, neurological changes) or cancer diagnosis? No  8. Are you on any form of radiation or chemotherapy? No  9. Are you  pregnant or breast feeding or do you have plans of pregnancy in the future? No  10. Have you been having any signs of worsening depression or suicidal ideations?  (benlysta only) No  11. Have there been any other new onset medical symptoms? No        Post Infusion Assessment:  Patient tolerated infusion without incident.  Blood return noted pre and post infusion.  Site patent and intact, free from redness, edema or discomfort.  No evidence of extravasations.  Access discontinued per protocol.       Discharge Plan:   AVS to patient via MYCHART.  Patient will return 2/16/21 for next appointment.   Patient discharged in stable condition accompanied by: self.  Departure Mode: Ambulatory.    Brenda Bui RN

## 2021-02-16 ENCOUNTER — INFUSION THERAPY VISIT (OUTPATIENT)
Dept: INFUSION THERAPY | Facility: CLINIC | Age: 56
End: 2021-02-16
Attending: PSYCHIATRY & NEUROLOGY
Payer: COMMERCIAL

## 2021-02-16 VITALS
SYSTOLIC BLOOD PRESSURE: 132 MMHG | HEART RATE: 70 BPM | OXYGEN SATURATION: 97 % | TEMPERATURE: 96 F | DIASTOLIC BLOOD PRESSURE: 77 MMHG

## 2021-02-16 DIAGNOSIS — G61.81 CIDP (CHRONIC INFLAMMATORY DEMYELINATING POLYNEUROPATHY) (H): Primary | ICD-10-CM

## 2021-02-16 PROCEDURE — 96366 THER/PROPH/DIAG IV INF ADDON: CPT

## 2021-02-16 PROCEDURE — 250N000013 HC RX MED GY IP 250 OP 250 PS 637: Mod: GY | Performed by: PSYCHIATRY & NEUROLOGY

## 2021-02-16 PROCEDURE — 250N000011 HC RX IP 250 OP 636: Performed by: PSYCHIATRY & NEUROLOGY

## 2021-02-16 PROCEDURE — 96365 THER/PROPH/DIAG IV INF INIT: CPT

## 2021-02-16 RX ORDER — ACETAMINOPHEN 325 MG/1
650 TABLET ORAL ONCE
Status: COMPLETED | OUTPATIENT
Start: 2021-02-16 | End: 2021-02-16

## 2021-02-16 RX ORDER — DIPHENHYDRAMINE HCL 25 MG
50 CAPSULE ORAL ONCE
Status: COMPLETED | OUTPATIENT
Start: 2021-02-16 | End: 2021-02-16

## 2021-02-16 RX ORDER — ACETAMINOPHEN 325 MG/1
650 TABLET ORAL ONCE
Status: CANCELLED
Start: 2021-03-09

## 2021-02-16 RX ORDER — DIPHENHYDRAMINE HCL 25 MG
50 CAPSULE ORAL ONCE
Status: CANCELLED | OUTPATIENT
Start: 2021-03-09

## 2021-02-16 RX ADMIN — DIPHENHYDRAMINE HYDROCHLORIDE 50 MG: 25 CAPSULE ORAL at 08:11

## 2021-02-16 RX ADMIN — ACETAMINOPHEN 650 MG: 325 TABLET ORAL at 08:11

## 2021-02-16 RX ADMIN — HUMAN IMMUNOGLOBULIN G 75 G: 40 LIQUID INTRAVENOUS at 08:30

## 2021-02-16 NOTE — PROGRESS NOTES
Infusion Nursing Note:  Emiliano Aguiar presents today for IVIG with premeds.    Patient seen by provider today: No   present during visit today: Not Applicable.    Note: Pt states he no longer gets hives post IVIG; he has started taking CBD daily and this has helped this symptom completely.  Will still have headaches and fatigue for a few days post infusion    IVIG initiated at 0.5mg/kg/hr.  Increased by 0.5mg/kg/hr every 15 minutes to max infusion rate of 4.0mg/kg/hr        Intravenous Access:  Peripheral IV placed.    Treatment Conditions:  Biological Infusion Checklist:  ~~~ NOTE: If the patient answers yes to any of the questions below, hold the infusion and contact ordering provider or on-call provider.    1. Have you recently had an elevated temperature, fever, chills, productive cough, coughing for 3 weeks or longer or hemoptysis, abnormal vital signs, night sweats,  chest pain or have you noticed a decrease in your appetite, unexplained weight loss or fatigue? No  2. Do you have any open wounds or new incisions? No  3. Do you have any recent or upcoming hospitalizations, surgeries or dental procedures? No  4. Do you currently have or recently have had any signs of illness or infection or are you on any antibiotics? No  5. Have you had any new, sudden or worsening abdominal pain? No  6. Have you or anyone in your household received a live vaccination in the past 4 weeks? Please note:  No live vaccines while on biologic/chemotherapy until 6 months after the last treatment.  Patient can receive the flu vaccine (shot only) and the pneumovax.  It is optimal for the patient to get these vaccines mid cycle, but they can be given at any time as long as it is not on the day of the infusion. No  7. Have you recently been diagnosed with any new nervous system diseases (ie. Multiple sclerosis, Guillain [place], seizures, neurological changes) or cancer diagnosis? No  8. Are you on any form of radiation or  chemotherapy? No  9. Are you pregnant or breast feeding or do you have plans of pregnancy in the future? No  10. Have you been having any signs of worsening depression or suicidal ideations?  (benlysta only) No  11. Have there been any other new onset medical symptoms? No        Post Infusion Assessment:  Patient tolerated infusion without incident.  Blood return noted pre and post infusion.  Site patent and intact, free from redness, edema or discomfort.  No evidence of extravasations.  Access discontinued per protocol.       Discharge Plan:   Discharge instructions reviewed with: Patient.  Patient and/or family verbalized understanding of discharge instructions and all questions answered.  AVS to patient via [employer].  Patient will return 3/9/21 for next dose of IVIG for next appointment.   Patient discharged in stable condition accompanied by: self.  Departure Mode: Ambulatory.    Carli Lopez RN

## 2021-03-09 ENCOUNTER — INFUSION THERAPY VISIT (OUTPATIENT)
Dept: INFUSION THERAPY | Facility: CLINIC | Age: 56
End: 2021-03-09
Attending: PSYCHIATRY & NEUROLOGY
Payer: COMMERCIAL

## 2021-03-09 VITALS
WEIGHT: 203 LBS | RESPIRATION RATE: 18 BRPM | DIASTOLIC BLOOD PRESSURE: 75 MMHG | SYSTOLIC BLOOD PRESSURE: 156 MMHG | BODY MASS INDEX: 28.31 KG/M2 | TEMPERATURE: 98 F | OXYGEN SATURATION: 98 % | HEART RATE: 66 BPM

## 2021-03-09 DIAGNOSIS — G61.81 CIDP (CHRONIC INFLAMMATORY DEMYELINATING POLYNEUROPATHY) (H): Primary | ICD-10-CM

## 2021-03-09 PROCEDURE — 96366 THER/PROPH/DIAG IV INF ADDON: CPT

## 2021-03-09 PROCEDURE — 250N000011 HC RX IP 250 OP 636: Performed by: PSYCHIATRY & NEUROLOGY

## 2021-03-09 PROCEDURE — 96365 THER/PROPH/DIAG IV INF INIT: CPT

## 2021-03-09 PROCEDURE — 250N000013 HC RX MED GY IP 250 OP 250 PS 637: Mod: GY | Performed by: PSYCHIATRY & NEUROLOGY

## 2021-03-09 RX ORDER — DIPHENHYDRAMINE HCL 25 MG
50 CAPSULE ORAL ONCE
Status: COMPLETED | OUTPATIENT
Start: 2021-03-09 | End: 2021-03-09

## 2021-03-09 RX ORDER — DIPHENHYDRAMINE HCL 25 MG
50 CAPSULE ORAL ONCE
Status: CANCELLED | OUTPATIENT
Start: 2021-03-30

## 2021-03-09 RX ORDER — ACETAMINOPHEN 325 MG/1
650 TABLET ORAL ONCE
Status: COMPLETED | OUTPATIENT
Start: 2021-03-09 | End: 2021-03-09

## 2021-03-09 RX ORDER — ACETAMINOPHEN 325 MG/1
650 TABLET ORAL ONCE
Status: CANCELLED
Start: 2021-03-30

## 2021-03-09 RX ADMIN — ACETAMINOPHEN 650 MG: 325 TABLET ORAL at 08:31

## 2021-03-09 RX ADMIN — DIPHENHYDRAMINE HYDROCHLORIDE 50 MG: 25 CAPSULE ORAL at 08:30

## 2021-03-09 RX ADMIN — HUMAN IMMUNOGLOBULIN G 75 G: 40 LIQUID INTRAVENOUS at 08:49

## 2021-03-09 NOTE — PROGRESS NOTES
Infusion Nursing Note:  Emiliano Aguiar presents today for IVIG.    Patient seen by provider today: No   present during visit today: Not Applicable.    Note: Patient states he no longer gets hives after his IVIG infusions as he is using CBD oil for this.  Continues to get headaches post infusion.   Started infusion at 0.5ml/kg/hr, increased by 0.5ml/hr, max rate 4ml/kg/hr.      Intravenous Access:  Peripheral IV placed.    Treatment Conditions:  Biological Infusion Checklist:  ~~~ NOTE: If the patient answers yes to any of the questions below, hold the infusion and contact ordering provider or on-call provider.    1. Have you recently had an elevated temperature, fever, chills, productive cough, coughing for 3 weeks or longer or hemoptysis, abnormal vital signs, night sweats,  chest pain or have you noticed a decrease in your appetite, unexplained weight loss or fatigue? No  2. Do you have any open wounds or new incisions? No  3. Do you have any recent or upcoming hospitalizations, surgeries or dental procedures? No  4. Do you currently have or recently have had any signs of illness or infection or are you on any antibiotics? No  5. Have you had any new, sudden or worsening abdominal pain? No  6. Have you or anyone in your household received a live vaccination in the past 4 weeks? Please note:  No live vaccines while on biologic/chemotherapy until 6 months after the last treatment.  Patient can receive the flu vaccine (shot only) and the pneumovax.  It is optimal for the patient to get these vaccines mid cycle, but they can be given at any time as long as it is not on the day of the infusion. No  7. Have you recently been diagnosed with any new nervous system diseases (ie. Multiple sclerosis, Guillain [place], seizures, neurological changes) or cancer diagnosis? No  8. Are you on any form of radiation or chemotherapy? No  9. Are you pregnant or breast feeding or do you have plans of pregnancy in the future?  No  10. Have you been having any signs of worsening depression or suicidal ideations?  (benlysta only) No  11. Have there been any other new onset medical symptoms? No        Post Infusion Assessment:  Patient tolerated infusion without incident.  Blood return noted pre and post infusion.  Site patent and intact, free from redness, edema or discomfort.  No evidence of extravasations.  Access discontinued per protocol.       Discharge Plan:   Copy of AVS reviewed with patient and/or family.  Patient will return 3/30/21 for next appointment.  Patient discharged in stable condition accompanied by: self.  Departure Mode: Ambulatory.    Brenda Bui RN

## 2021-03-30 ENCOUNTER — INFUSION THERAPY VISIT (OUTPATIENT)
Dept: INFUSION THERAPY | Facility: CLINIC | Age: 56
End: 2021-03-30
Attending: PSYCHIATRY & NEUROLOGY
Payer: COMMERCIAL

## 2021-03-30 VITALS
OXYGEN SATURATION: 97 % | RESPIRATION RATE: 16 BRPM | TEMPERATURE: 98.1 F | DIASTOLIC BLOOD PRESSURE: 78 MMHG | SYSTOLIC BLOOD PRESSURE: 154 MMHG | HEART RATE: 63 BPM | WEIGHT: 202.2 LBS | BODY MASS INDEX: 28.2 KG/M2

## 2021-03-30 DIAGNOSIS — G61.81 CIDP (CHRONIC INFLAMMATORY DEMYELINATING POLYNEUROPATHY) (H): Primary | ICD-10-CM

## 2021-03-30 PROCEDURE — 96365 THER/PROPH/DIAG IV INF INIT: CPT

## 2021-03-30 PROCEDURE — 96366 THER/PROPH/DIAG IV INF ADDON: CPT

## 2021-03-30 PROCEDURE — 250N000011 HC RX IP 250 OP 636: Performed by: PSYCHIATRY & NEUROLOGY

## 2021-03-30 PROCEDURE — 250N000013 HC RX MED GY IP 250 OP 250 PS 637: Performed by: PSYCHIATRY & NEUROLOGY

## 2021-03-30 RX ORDER — ACETAMINOPHEN 325 MG/1
650 TABLET ORAL ONCE
Status: CANCELLED
Start: 2021-03-30

## 2021-03-30 RX ORDER — ACETAMINOPHEN 325 MG/1
650 TABLET ORAL ONCE
Status: COMPLETED | OUTPATIENT
Start: 2021-03-30 | End: 2021-03-30

## 2021-03-30 RX ORDER — DIPHENHYDRAMINE HCL 25 MG
50 CAPSULE ORAL ONCE
Status: CANCELLED | OUTPATIENT
Start: 2021-03-30

## 2021-03-30 RX ORDER — DIPHENHYDRAMINE HCL 25 MG
50 CAPSULE ORAL ONCE
Status: COMPLETED | OUTPATIENT
Start: 2021-03-30 | End: 2021-03-30

## 2021-03-30 RX ADMIN — DIPHENHYDRAMINE HYDROCHLORIDE 50 MG: 25 CAPSULE ORAL at 08:22

## 2021-03-30 RX ADMIN — ACETAMINOPHEN 650 MG: 325 TABLET ORAL at 08:22

## 2021-03-30 RX ADMIN — HUMAN IMMUNOGLOBULIN G 75 G: 40 LIQUID INTRAVENOUS at 08:24

## 2021-03-30 NOTE — PROGRESS NOTES
Infusion Nursing Note:  Emiliano Aguiar presents today for IVIG   Patient seen by provider today: No   present during visit today: Not Applicable.    Note: feels legs getting weaker about 3 days prior to IVIG..  Patient  did meet criteria for an asymptomatic covid-19 PCR test in infusion today. Patient declined the covid-19 test. Already vaccinated    Intravenous Access:  Peripheral IV placed.    Treatment Conditions:  Biological Infusion Checklist:  ~~~ NOTE: If the patient answers yes to any of the questions below, hold the infusion and contact ordering provider or on-call provider.    1. Have you recently had an elevated temperature, fever, chills, productive cough, coughing for 3 weeks or longer or hemoptysis, abnormal vital signs, night sweats,  chest pain or have you noticed a decrease in your appetite, unexplained weight loss or fatigue? No  2. Do you have any open wounds or new incisions? No  3. Do you have any recent or upcoming hospitalizations, surgeries or dental procedures? No  4. Do you currently have or recently have had any signs of illness or infection or are you on any antibiotics? No  5. Have you had any new, sudden or worsening abdominal pain? No  6. Have you or anyone in your household received a live vaccination in the past 4 weeks? Please note:  No live vaccines while on biologic/chemotherapy until 6 months after the last treatment.  Patient can receive the flu vaccine (shot only) and the pneumovax.  It is optimal for the patient to get these vaccines mid cycle, but they can be given at any time as long as it is not on the day of the infusion. No  7. Have you recently been diagnosed with any new nervous system diseases (ie. Multiple sclerosis, Guillain [place], seizures, neurological changes) or cancer diagnosis? No  8. Are you on any form of radiation or chemotherapy? No  9. Are you pregnant or breast feeding or do you have plans of pregnancy in the future? No  10. Have you been  having any signs of worsening depression or suicidal ideations?  (benlysta only) No  11. Have there been any other new onset medical symptoms? No    Infusion started at 0.5 ml/kg/hr  Increased by 0.5 ml/kg/hr every 15 minutes to max of 4 ml/kg/hr       Post Infusion Assessment:  Patient tolerated infusion without incident.  Blood return noted pre and post infusion.  Site patent and intact, free from redness, edema or discomfort.  No evidence of extravasations.  Access discontinued per protocol.    Biologic Infusion Post Education: Call the triage nurse at your clinic or seek medical attention if you have chills and/or temperature greater than or equal to 100.5, uncontrolled nausea/vomiting, diarrhea, constipation, dizziness, shortness of breath, chest pain, heart palpitations, weakness or any other new or concerning symptoms, questions or concerns.  You cannot have any live virus vaccines prior to or during treatment or up to 6 months post infusion.  If you have an upcoming surgery, medical procedure or dental procedure during treatment, this should be discussed with your ordering physician and your surgeon/dentist.  If you are having any concerning symptom, if you are unsure if you should get your next infusion or wish to speak to a provider before your next infusion, please call your care coordinator or triage nurse at your clinic to notify them so we can adequately serve you.       Discharge Plan:   Discharge instructions reviewed with: Patient.  Patient and/or family verbalized understanding of discharge instructions and all questions answered.  AVS to patient via [employer].  Patient will return 4/20 for next appointment.   Patient discharged in stable condition accompanied by: self.  Departure Mode: Ambulatory.    Whit Riddle RN

## 2021-04-11 ENCOUNTER — HEALTH MAINTENANCE LETTER (OUTPATIENT)
Age: 56
End: 2021-04-11

## 2021-04-20 ENCOUNTER — INFUSION THERAPY VISIT (OUTPATIENT)
Dept: INFUSION THERAPY | Facility: CLINIC | Age: 56
End: 2021-04-20
Attending: COUNSELOR
Payer: COMMERCIAL

## 2021-04-20 VITALS
SYSTOLIC BLOOD PRESSURE: 135 MMHG | DIASTOLIC BLOOD PRESSURE: 86 MMHG | HEART RATE: 68 BPM | BODY MASS INDEX: 28.42 KG/M2 | RESPIRATION RATE: 18 BRPM | TEMPERATURE: 97.9 F | OXYGEN SATURATION: 97 % | WEIGHT: 203.8 LBS

## 2021-04-20 DIAGNOSIS — G61.81 CIDP (CHRONIC INFLAMMATORY DEMYELINATING POLYNEUROPATHY) (H): Primary | ICD-10-CM

## 2021-04-20 PROCEDURE — 250N000011 HC RX IP 250 OP 636: Performed by: PSYCHIATRY & NEUROLOGY

## 2021-04-20 PROCEDURE — 250N000013 HC RX MED GY IP 250 OP 250 PS 637: Performed by: PSYCHIATRY & NEUROLOGY

## 2021-04-20 PROCEDURE — 96365 THER/PROPH/DIAG IV INF INIT: CPT

## 2021-04-20 PROCEDURE — 96366 THER/PROPH/DIAG IV INF ADDON: CPT

## 2021-04-20 RX ORDER — ACETAMINOPHEN 325 MG/1
650 TABLET ORAL ONCE
Status: COMPLETED | OUTPATIENT
Start: 2021-04-20 | End: 2021-04-20

## 2021-04-20 RX ORDER — ACETAMINOPHEN 325 MG/1
650 TABLET ORAL ONCE
Status: CANCELLED
Start: 2021-05-11

## 2021-04-20 RX ORDER — DIPHENHYDRAMINE HCL 25 MG
50 CAPSULE ORAL ONCE
Status: CANCELLED | OUTPATIENT
Start: 2021-05-11

## 2021-04-20 RX ORDER — DIPHENHYDRAMINE HCL 25 MG
50 CAPSULE ORAL ONCE
Status: COMPLETED | OUTPATIENT
Start: 2021-04-20 | End: 2021-04-20

## 2021-04-20 RX ADMIN — ACETAMINOPHEN 650 MG: 325 TABLET ORAL at 08:44

## 2021-04-20 RX ADMIN — HUMAN IMMUNOGLOBULIN G 75 G: 40 LIQUID INTRAVENOUS at 09:14

## 2021-04-20 RX ADMIN — DIPHENHYDRAMINE HYDROCHLORIDE 50 MG: 25 CAPSULE ORAL at 08:44

## 2021-04-20 NOTE — PROGRESS NOTES
Infusion Nursing Note:  Emiliano Aguiar presents today for IVIG.    Patient seen by provider today: No   present during visit today: Not Applicable.    Note: Pre-medications given, except solucorted that pt declined. Rate increased by 0.5mL/Kg/H.   Patient did/ meet criteria for an asymptomatic covid-19 PCR test in infusion today. Patient declined the covid-19 test.    Intravenous Access:  Peripheral IV placed.    Treatment Conditions:  Biological Infusion Checklist:  ~~~ NOTE: If the patient answers yes to any of the questions below, hold the infusion and contact ordering provider or on-call provider.    1. Have you recently had an elevated temperature, fever, chills, productive cough, coughing for 3 weeks or longer or hemoptysis, abnormal vital signs, night sweats,  chest pain or have you noticed a decrease in your appetite, unexplained weight loss or fatigue? No  2. Do you have any open wounds or new incisions? No  3. Do you have any recent or upcoming hospitalizations, surgeries or dental procedures? No  4. Do you currently have or recently have had any signs of illness or infection or are you on any antibiotics? No  5. Have you had any new, sudden or worsening abdominal pain? No  6. Have you or anyone in your household received a live vaccination in the past 4 weeks? Please note:  No live vaccines while on biologic/chemotherapy until 6 months after the last treatment.  Patient can receive the flu vaccine (shot only) and the pneumovax.  It is optimal for the patient to get these vaccines mid cycle, but they can be given at any time as long as it is not on the day of the infusion. No  7. Have you recently been diagnosed with any new nervous system diseases (ie. Multiple sclerosis, Guillain [place], seizures, neurological changes) or cancer diagnosis? No  8. Are you on any form of radiation or chemotherapy? No  9. Are you pregnant or breast feeding or do you have plans of pregnancy in the future?  No  10. Have you been having any signs of worsening depression or suicidal ideations?  (benlysta only) No  11. Have there been any other new onset medical symptoms? No        Post Infusion Assessment:  Patient tolerated infusion without incident.  Site patent and intact, free from redness, edema or discomfort.  No evidence of extravasations.  Access discontinued per protocol.       Discharge Plan:   AVS to patient via MYCH[employer].  Patient will return 5/11/2021 for next appointment.   Patient discharged in stable condition accompanied by: self.  Departure Mode: Ambulatory.    Lisa Eduardo RN

## 2021-05-11 ENCOUNTER — INFUSION THERAPY VISIT (OUTPATIENT)
Dept: INFUSION THERAPY | Facility: CLINIC | Age: 56
End: 2021-05-11
Attending: COUNSELOR
Payer: COMMERCIAL

## 2021-05-11 VITALS
DIASTOLIC BLOOD PRESSURE: 88 MMHG | SYSTOLIC BLOOD PRESSURE: 153 MMHG | HEART RATE: 64 BPM | RESPIRATION RATE: 16 BRPM | WEIGHT: 203.6 LBS | OXYGEN SATURATION: 98 % | BODY MASS INDEX: 28.4 KG/M2 | TEMPERATURE: 97.2 F

## 2021-05-11 DIAGNOSIS — G61.81 CIDP (CHRONIC INFLAMMATORY DEMYELINATING POLYNEUROPATHY) (H): Primary | ICD-10-CM

## 2021-05-11 PROCEDURE — 250N000011 HC RX IP 250 OP 636: Performed by: PSYCHIATRY & NEUROLOGY

## 2021-05-11 PROCEDURE — 96366 THER/PROPH/DIAG IV INF ADDON: CPT

## 2021-05-11 PROCEDURE — 96365 THER/PROPH/DIAG IV INF INIT: CPT

## 2021-05-11 PROCEDURE — 250N000013 HC RX MED GY IP 250 OP 250 PS 637: Performed by: PSYCHIATRY & NEUROLOGY

## 2021-05-11 RX ORDER — DIPHENHYDRAMINE HCL 25 MG
50 CAPSULE ORAL ONCE
Status: COMPLETED | OUTPATIENT
Start: 2021-05-11 | End: 2021-05-11

## 2021-05-11 RX ORDER — ACETAMINOPHEN 325 MG/1
650 TABLET ORAL ONCE
Status: COMPLETED | OUTPATIENT
Start: 2021-05-11 | End: 2021-05-11

## 2021-05-11 RX ORDER — ACETAMINOPHEN 325 MG/1
650 TABLET ORAL ONCE
Status: CANCELLED
Start: 2021-06-01

## 2021-05-11 RX ORDER — DIPHENHYDRAMINE HCL 25 MG
50 CAPSULE ORAL ONCE
Status: CANCELLED | OUTPATIENT
Start: 2021-06-01

## 2021-05-11 RX ADMIN — HUMAN IMMUNOGLOBULIN G 75 G: 40 LIQUID INTRAVENOUS at 09:00

## 2021-05-11 RX ADMIN — DIPHENHYDRAMINE HYDROCHLORIDE 50 MG: 25 CAPSULE ORAL at 08:35

## 2021-05-11 RX ADMIN — ACETAMINOPHEN 650 MG: 325 TABLET ORAL at 08:35

## 2021-05-11 NOTE — PROGRESS NOTES
Infusion Nursing Note:  Emiliano Aguiar presents today for IVIG.    Patient seen by provider today: No   present during visit today: Not Applicable.    Note: Patient declines steroid premed.  Started IVIG at 0.5ml/kg/hr and increased by 0.5ml/kg/hr.      Intravenous Access:  Peripheral IV placed.    Treatment Conditions:  Biological Infusion Checklist:  ~~~ NOTE: If the patient answers yes to any of the questions below, hold the infusion and contact ordering provider or on-call provider.    1. Have you recently had an elevated temperature, fever, chills, productive cough, coughing for 3 weeks or longer or hemoptysis, abnormal vital signs, night sweats,  chest pain or have you noticed a decrease in your appetite, unexplained weight loss or fatigue? No  2. Do you have any open wounds or new incisions? No  3. Do you have any recent or upcoming hospitalizations, surgeries or dental procedures? No  4. Do you currently have or recently have had any signs of illness or infection or are you on any antibiotics? No  5. Have you had any new, sudden or worsening abdominal pain? No  6. Have you or anyone in your household received a live vaccination in the past 4 weeks? Please note:  No live vaccines while on biologic/chemotherapy until 6 months after the last treatment.  Patient can receive the flu vaccine (shot only) and the pneumovax.  It is optimal for the patient to get these vaccines mid cycle, but they can be given at any time as long as it is not on the day of the infusion. No  7. Have you recently been diagnosed with any new nervous system diseases (ie. Multiple sclerosis, Guillain [place], seizures, neurological changes) or cancer diagnosis? No  8. Are you on any form of radiation or chemotherapy? No  9. Are you pregnant or breast feeding or do you have plans of pregnancy in the future? No  10. Have you been having any signs of worsening depression or suicidal ideations?  (benlysta only) No  11. Have there  been any other new onset medical symptoms? No        Post Infusion Assessment:  Patient tolerated infusion without incident.  Blood return noted pre and post infusion.  Site patent and intact, free from redness, edema or discomfort.  No evidence of extravasations.  Access discontinued per protocol.       Discharge Plan:   AVS to patient via MYCHART.  Patient will return 6/1/21 for next appointment.   Patient discharged in stable condition accompanied by: self.  Departure Mode: Ambulatory.    Brenda Bui RN

## 2021-06-01 ENCOUNTER — INFUSION THERAPY VISIT (OUTPATIENT)
Dept: INFUSION THERAPY | Facility: CLINIC | Age: 56
End: 2021-06-01
Attending: COUNSELOR
Payer: COMMERCIAL

## 2021-06-01 VITALS
SYSTOLIC BLOOD PRESSURE: 119 MMHG | RESPIRATION RATE: 16 BRPM | TEMPERATURE: 97.8 F | OXYGEN SATURATION: 98 % | DIASTOLIC BLOOD PRESSURE: 78 MMHG | BODY MASS INDEX: 28.59 KG/M2 | HEART RATE: 67 BPM | WEIGHT: 205 LBS

## 2021-06-01 DIAGNOSIS — G61.81 CIDP (CHRONIC INFLAMMATORY DEMYELINATING POLYNEUROPATHY) (H): Primary | ICD-10-CM

## 2021-06-01 PROCEDURE — 250N000013 HC RX MED GY IP 250 OP 250 PS 637: Performed by: PSYCHIATRY & NEUROLOGY

## 2021-06-01 PROCEDURE — 250N000011 HC RX IP 250 OP 636: Performed by: PSYCHIATRY & NEUROLOGY

## 2021-06-01 PROCEDURE — 96365 THER/PROPH/DIAG IV INF INIT: CPT

## 2021-06-01 PROCEDURE — 96366 THER/PROPH/DIAG IV INF ADDON: CPT

## 2021-06-01 RX ORDER — DIPHENHYDRAMINE HCL 25 MG
50 CAPSULE ORAL ONCE
Status: CANCELLED | OUTPATIENT
Start: 2021-06-23

## 2021-06-01 RX ORDER — ACETAMINOPHEN 325 MG/1
650 TABLET ORAL ONCE
Status: CANCELLED
Start: 2021-06-23

## 2021-06-01 RX ORDER — DIPHENHYDRAMINE HCL 25 MG
50 CAPSULE ORAL ONCE
Status: COMPLETED | OUTPATIENT
Start: 2021-06-01 | End: 2021-06-01

## 2021-06-01 RX ORDER — ACETAMINOPHEN 325 MG/1
650 TABLET ORAL ONCE
Status: COMPLETED | OUTPATIENT
Start: 2021-06-01 | End: 2021-06-01

## 2021-06-01 RX ADMIN — ACETAMINOPHEN 650 MG: 325 TABLET ORAL at 08:19

## 2021-06-01 RX ADMIN — HUMAN IMMUNOGLOBULIN G 75 G: 40 LIQUID INTRAVENOUS at 08:31

## 2021-06-01 RX ADMIN — DIPHENHYDRAMINE HYDROCHLORIDE 50 MG: 25 CAPSULE ORAL at 08:19

## 2021-06-01 NOTE — PROGRESS NOTES
Infusion Nursing Note:  Emiliano Aguiar presents today for IVIG.    Patient seen by provider today: No   present during visit today: Not Applicable.    Note: IVIG started at 0.5mg/kg/hr and increased by 0.5mg/kg/hr every 15 minutes to a max of 4mg/kg/hr.      Intravenous Access:  Peripheral IV placed.    Treatment Conditions:  Biological Infusion Checklist:  ~~~ NOTE: If the patient answers yes to any of the questions below, hold the infusion and contact ordering provider or on-call provider.    1. Have you recently had an elevated temperature, fever, chills, productive cough, coughing for 3 weeks or longer or hemoptysis, abnormal vital signs, night sweats,  chest pain or have you noticed a decrease in your appetite, unexplained weight loss or fatigue? No  2. Do you have any open wounds or new incisions? No  3. Do you have any recent or upcoming hospitalizations, surgeries or dental procedures? No  4. Do you currently have or recently have had any signs of illness or infection or are you on any antibiotics? No  5. Have you had any new, sudden or worsening abdominal pain? No  6. Have you or anyone in your household received a live vaccination in the past 4 weeks? Please note:  No live vaccines while on biologic/chemotherapy until 6 months after the last treatment.  Patient can receive the flu vaccine (shot only) and the pneumovax.  It is optimal for the patient to get these vaccines mid cycle, but they can be given at any time as long as it is not on the day of the infusion. No  7. Have you recently been diagnosed with any new nervous system diseases (ie. Multiple sclerosis, Guillain [place], seizures, neurological changes) or cancer diagnosis? No  8. Are you on any form of radiation or chemotherapy? No  9. Are you pregnant or breast feeding or do you have plans of pregnancy in the future? No  10. Have you been having any signs of worsening depression or suicidal ideations?  (benlysta only) No  11. Have  there been any other new onset medical symptoms? No        Post Infusion Assessment:  Patient tolerated infusion without incident.  Blood return noted pre and post infusion.  Site patent and intact, free from redness, edema or discomfort.  No evidence of extravasations.  Access discontinued per protocol.       Discharge Plan:   Discharge instructions reviewed with: Patient.  Patient and/or family verbalized understanding of discharge instructions and all questions answered.  Copy of AVS reviewed with patient and/or family.  Patient will return 6/23/21 for next appointment.  Patient discharged in stable condition accompanied by: self.  Departure Mode: Ambulatory.      Claudine Ceballos RN

## 2021-06-14 ENCOUNTER — OFFICE VISIT (OUTPATIENT)
Dept: NEUROLOGY | Facility: CLINIC | Age: 56
End: 2021-06-14
Payer: COMMERCIAL

## 2021-06-14 VITALS
DIASTOLIC BLOOD PRESSURE: 78 MMHG | RESPIRATION RATE: 16 BRPM | HEART RATE: 70 BPM | WEIGHT: 207 LBS | SYSTOLIC BLOOD PRESSURE: 132 MMHG | BODY MASS INDEX: 28.04 KG/M2 | OXYGEN SATURATION: 97 % | HEIGHT: 72 IN

## 2021-06-14 DIAGNOSIS — G61.81 CIDP (CHRONIC INFLAMMATORY DEMYELINATING POLYNEUROPATHY) (H): Primary | ICD-10-CM

## 2021-06-14 PROCEDURE — 99213 OFFICE O/P EST LOW 20 MIN: CPT | Mod: GC | Performed by: PSYCHIATRY & NEUROLOGY

## 2021-06-14 ASSESSMENT — PAIN SCALES - GENERAL: PAINLEVEL: NO PAIN (0)

## 2021-06-14 ASSESSMENT — MIFFLIN-ST. JEOR: SCORE: 1801.95

## 2021-06-14 NOTE — PATIENT INSTRUCTIONS
Follow up 1 year  I will ask my nurse to look again into coverage of Hizentra (subcutaneous Ig) by your insurance.   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

## 2021-06-14 NOTE — LETTER
6/14/2021       RE: Emiliano Aguiar  [address]  [place] [address]     Dear Colleague,    Thank you for referring your patient, Emiliano Aguiar, to the [facility] [place] at [facility]. Please see a copy of my visit note below.    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 He is currently on IVIG 1 g/kg every 3 weeks and he is doing well on it. He feels this frequency and dose serves him better than every 4 weeks- he think his symptoms starts to worsen with more tingling and weakness and inflammation and unsteadiness typically 4-5 days before each infusion. Overall his symptoms are stable. He continues to experience joint pains, headaches, and hives (redness, itching and blotching on his face) that last several days after each IVIG infusion. Interestingly, he has found that taking THC/CBD gummies (10 mg/10 mg), often just half or a quarter of a tablet, works much better to reduce those uncomfortable post infusion reactions than steroids or Benadryl. No respiratory symptoms reported. Regarding the CIDP, he feels he is stable. He has had some near-falls lately and he has a cane which he may use when expecting to walk on uneven/slippery surfaces.     Subjective:  2 weeks ago he had the last IVIG dose. Next Wednesday will be the next dose. It works but there are times he will get wear off about 1 week before the next dose.  He has fallen into the wall couple times, one time while he was carrying a case of water when everything went weak he said while he was going upstairs, he felt everything was weak (his legs) but he did not fell down. He got the COVID vaccine in Feb and March (pfizer), during COVID vaccin he had the wearing off symptoms 1.5 week before the next IVIG shot. Right now  he feels some numbness and tingling in hands and feet.        Objective   /78   Pulse 70   Resp 16   Ht 1.829 m (6')   Wt 93.9 kg (207 lb)   SpO2 97%   BMI 28.07 kg/m    General: pt laying comfortably in bed, breathing easily on ra, in NAD   HEENT: no oral ulcers   Chest: cta   Heart: rrr  Abdomen: soft, nt, nd, +BS  Ext: no edema   Skin: no rashes  Neurological examination:  Mental status:  Patient is alert, attentive, and oriented x 3.  Language is coherent and fluent without dysarthria or aphasia.  Memory, comprehension and ability to follow commands were intact.        Cranial nerves: Pupils were round and reacted to light.  Extraocular movements were full. There was no face, jaw, palate or tongue weakness or atrophy. Hearing was grossly intact. Shrugging shoulder is normal    Motor exam: No atrophy or fasciculations.  No action or percussion myotonia or paramyotonia.  Manual muscle testing revealed the following MRC grade muscle power:    Right Left   Neck flexion 5     Neck extension 5     Shoulder ext rotation 5  5    Shoulder abduction 5 5   Elbow extension 5 5   Elbow flexion             5 5   Wrist extension 5 5   Wrist flexion 5 5   Finger extension 5 5   Finger flexion 5 5   FDI 5 5   APB 5 5   Hip extension 5 5    Hip flexion 5 5    Knee extension 5 5   Knee flexion 5 5   Dorsiflexion 5 5   Plantarflexion 5 5   Eversion 5 5   Inversion 5 5   Ext Hallucis Longus 4 + 4 +      Complex motor skills revealed normal coordination.  Finger-nose- finger and heel to shin were intact.       Sensory exam revealed vibration to be  10s/10s at the toes, 20/20s at the fingers. PP reduced ob the feet up to the level of ankles and fingers  Gait : Pt was able to walk on heels, not toes and no tandem without any difficulty.  Positive romberg sign.      Deep tendon reflexes:    Right Left   Triceps     Biceps 1 1   Brachioradialis 1 1   Knee jerk 0 0   Ankle jerk 1 0   Plantar responses were flexor bilaterally.  Flores's negative b/l     Investigations    Lab Results   Component Value Date    PH 7.36 05/21/2008    PO2 156 (H) 05/21/2008    PCO2 32 (L) 05/21/2008    HCO3 18 (L) 05/21/2008          Lab Results   Component Value Date     06/25/2013    Lab Results   Component Value Date    CHLORIDE 97 06/25/2013    Lab Results   Component Value Date    BUN 7 06/25/2013      Lab Results   Component Value Date    POTASSIUM 3.5 06/25/2013    Lab Results   Component Value Date    CO2 28 06/25/2013    Lab Results   Component Value Date    CR 0.84 02/05/2020        No results found for: NTBNPI, NTBNP  Lab Results   Component Value Date    WBC 3.5 (L) 02/05/2020    HGB 15.0 02/05/2020    HCT 45.4 02/05/2020    MCV 91 02/05/2020     02/05/2020     Lab Results   Component Value Date    CR 0.84 02/05/2020     No results found for: DD, DIMER  Lab Results   Component Value Date     06/25/2013    POTASSIUM 3.5 06/25/2013    CHLORIDE 97 06/25/2013    CO2 28 06/25/2013     (H) 06/25/2013     No results found for: SED  Lab Results   Component Value Date    GFRESTIMATED >90 02/05/2020    GFRESTBLACK >90 02/05/2020     Lab Results   Component Value Date     (H) 06/25/2013     Lab Results   Component Value Date    HGB 15.0 02/05/2020     Lab Results   Component Value Date    AST 21 06/25/2013    ALT 40 06/25/2013    ALKPHOS 55 06/25/2013    BILITOTAL 0.9 06/25/2013    BILICONJ 0.0 02/05/2007     Lab Results   Component Value Date    INR 1.12 05/19/2008     No results found for: BILINEONATAL, TCBIL  Lab Results   Component Value Date     02/05/2020     Lab Results   Component Value Date    BUN 7 06/25/2013    CR 0.84 02/05/2020     Lab Results   Component Value Date    TSH 0.58 05/18/2008     Lab Results   Component Value Date    TROPI <0.012 05/18/2008     Lab Results   Component Value Date    URINEKETONE Negative 06/25/2013     Lab Results   Component Value Date    WBC 3.5 (L) 02/05/2020          Impression:  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 He is currently on IVIG 1 g/kg every 3 weeks and he is doing well on it. He feels this frequency and dose serves him better than every 4 weeks- he think his symptoms starts to worsen with more tingling and weakness and inflammation and unsteadiness typically 4-5 days before each infusion. Overall his symptoms are stable. He continues to experience joint pains, headaches, and hives (redness, itching and blotching on his face) that last several days after each IVIG infusion. Interestingly, he has found that taking THC/CBD gummies (10 mg/10 mg), often just half or a quarter of a tablet, works much better to reduce those uncomfortable post infusion reactions than steroids or Benadryl. We will check if his insurance can cover the subcutaneous IVIG and we will get back to him. We discussed today that we would like to continue see him clinic and we are ok to make it every 12 months visit unless he deteriorated and wanted to be seen earlier. He was told flying should be okay.     Patient was seen and discussed with attending neurologist, Dr. Jayne Tatum MD  Neurology G4  [phone]    ATTENDING ADDENDUM: Patient seen and examined with resident Dr Tatum today, 6/14/2021. Agree with his impression and recommendations as above.   Billing MDM level 3 (low) based on: 1) Problems assessed: 1 chronic stable illnesses (CIDP), and 2) Risk: Diagnosis/treatment limited by social determinants of health (distance makes it harder for patient to be seen on q6 months intervals, see documentation above). Nixon Godoy MD         Again, thank you for allowing me to participate in the care of your patient.      Sincerely,    Nixon Godoy MD

## 2021-06-14 NOTE — NURSING NOTE
Chief Complaint   Patient presents with     RECHECK     [facility] Return Neuropathy - 6 month follow up     Margarito Montana

## 2021-06-14 NOTE — PROGRESS NOTES
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 He is currently on IVIG 1 g/kg every 3 weeks and he is doing well on it. He feels this frequency and dose serves him better than every 4 weeks- he think his symptoms starts to worsen with more tingling and weakness and inflammation and unsteadiness typically 4-5 days before each infusion. Overall his symptoms are stable. He continues to experience joint pains, headaches, and hives (redness, itching and blotching on his face) that last several days after each IVIG infusion. Interestingly, he has found that taking THC/CBD gummies (10 mg/10 mg), often just half or a quarter of a tablet, works much better to reduce those uncomfortable post infusion reactions than steroids or Benadryl. No respiratory symptoms reported. Regarding the CIDP, he feels he is stable. He has had some near-falls lately and he has a cane which he may use when expecting to walk on uneven/slippery surfaces.     Subjective:  2 weeks ago he had the last IVIG dose. Next Wednesday will be the next dose. It works but there are times he will get wear off about 1 week before the next dose.  He has fallen into the wall couple times, one time while he was carrying a case of water when everything went weak he said while he was going upstairs, he felt everything was weak (his legs) but he did not fell down. He got the COVID vaccine in Feb and March (pfizer), during COVID vaccin he had the wearing off symptoms 1.5 week before the next IVIG shot. Right now he feels some numbness and tingling in hands and feet.        Objective   /78   Pulse 70   Resp 16   Ht 1.829 m (6')   Wt 93.9 kg (207 lb)   SpO2 97%   BMI 28.07 kg/m    General: pt laying comfortably in bed, breathing easily on ra, in NAD   HEENT: no oral ulcers   Chest: cta   Heart: rrr  Abdomen:  soft, nt, nd, +BS  Ext: no edema   Skin: no rashes  Neurological examination:  Mental status:  Patient is alert, attentive, and oriented x 3.  Language is coherent and fluent without dysarthria or aphasia.  Memory, comprehension and ability to follow commands were intact.        Cranial nerves: Pupils were round and reacted to light.  Extraocular movements were full. There was no face, jaw, palate or tongue weakness or atrophy. Hearing was grossly intact. Shrugging shoulder is normal    Motor exam: No atrophy or fasciculations.  No action or percussion myotonia or paramyotonia.  Manual muscle testing revealed the following MRC grade muscle power:    Right Left   Neck flexion 5     Neck extension 5     Shoulder ext rotation 5  5    Shoulder abduction 5 5   Elbow extension 5 5   Elbow flexion             5 5   Wrist extension 5 5   Wrist flexion 5 5   Finger extension 5 5   Finger flexion 5 5   FDI 5 5   APB 5 5   Hip extension 5 5    Hip flexion 5 5    Knee extension 5 5   Knee flexion 5 5   Dorsiflexion 5 5   Plantarflexion 5 5   Eversion 5 5   Inversion 5 5   Ext Hallucis Longus 4 + 4 +      Complex motor skills revealed normal coordination.  Finger-nose- finger and heel to shin were intact.       Sensory exam revealed vibration to be  10s/10s at the toes, 20/20s at the fingers. PP reduced ob the feet up to the level of ankles and fingers  Gait : Pt was able to walk on heels, not toes and no tandem without any difficulty.  Positive romberg sign.      Deep tendon reflexes:    Right Left   Triceps     Biceps 1 1   Brachioradialis 1 1   Knee jerk 0 0   Ankle jerk 1 0   Plantar responses were flexor bilaterally. Flores's negative b/l     Investigations    Lab Results   Component Value Date    PH 7.36 05/21/2008    PO2 156 (H) 05/21/2008    PCO2 32 (L) 05/21/2008    HCO3 18 (L) 05/21/2008          Lab Results   Component Value Date     06/25/2013    Lab Results   Component Value Date    CHLORIDE 97 06/25/2013     Lab Results   Component Value Date    BUN 7 06/25/2013      Lab Results   Component Value Date    POTASSIUM 3.5 06/25/2013    Lab Results   Component Value Date    CO2 28 06/25/2013    Lab Results   Component Value Date    CR 0.84 02/05/2020        No results found for: NTBNPI, NTBNP  Lab Results   Component Value Date    WBC 3.5 (L) 02/05/2020    HGB 15.0 02/05/2020    HCT 45.4 02/05/2020    MCV 91 02/05/2020     02/05/2020     Lab Results   Component Value Date    CR 0.84 02/05/2020     No results found for: DD, DIMER  Lab Results   Component Value Date     06/25/2013    POTASSIUM 3.5 06/25/2013    CHLORIDE 97 06/25/2013    CO2 28 06/25/2013     (H) 06/25/2013     No results found for: SED  Lab Results   Component Value Date    GFRESTIMATED >90 02/05/2020    GFRESTBLACK >90 02/05/2020     Lab Results   Component Value Date     (H) 06/25/2013     Lab Results   Component Value Date    HGB 15.0 02/05/2020     Lab Results   Component Value Date    AST 21 06/25/2013    ALT 40 06/25/2013    ALKPHOS 55 06/25/2013    BILITOTAL 0.9 06/25/2013    BILICONJ 0.0 02/05/2007     Lab Results   Component Value Date    INR 1.12 05/19/2008     No results found for: BILINEONATAL, TCBIL  Lab Results   Component Value Date     02/05/2020     Lab Results   Component Value Date    BUN 7 06/25/2013    CR 0.84 02/05/2020     Lab Results   Component Value Date    TSH 0.58 05/18/2008     Lab Results   Component Value Date    TROPI <0.012 05/18/2008     Lab Results   Component Value Date    URINEKETONE Negative 06/25/2013     Lab Results   Component Value Date    WBC 3.5 (L) 02/05/2020         Impression:  Emiliano has CIDP. He is currently on IVIG 1 g/kg every 3 weeks and he is doing well on it. He feels this frequency and dose serves him better than every 4 weeks- he think his symptoms starts to worsen with more tingling and weakness and inflammation and unsteadiness typically 4-5 days before each infusion.  Overall his symptoms are stable. He continues to experience joint pains, headaches, and hives (redness, itching and blotching on his face) that last several days after each IVIG infusion. Interestingly, he has found that taking THC/CBD gummies (10 mg/10 mg), often just half or a quarter of a tablet, works much better to reduce those uncomfortable post infusion reactions than steroids or Benadryl. We will check if his insurance can cover the subcutaneous IVIG and we will get back to him. We discussed today that we would like to continue see him clinic and we are ok to make it every 12 months visit unless he deteriorated and wanted to be seen earlier. He was told flying should be okay.     Patient was seen and discussed with attending neurologist, Dr. Jayne Tatum MD  Neurology G4  [phone]    ATTENDING ADDENDUM: Patient seen and examined with resident Dr Tatum today, 6/14/2021. Agree with his impression and recommendations as above.   Billing MDM level 3 (low) based on: 1) Problems assessed: 1 chronic stable illnesses (CIDP), and 2) Risk: Diagnosis/treatment limited by social determinants of health (distance makes it harder for patient to be seen on q6 months intervals, see documentation above). Nixon Godoy MD

## 2021-06-16 ENCOUNTER — MEDICAL CORRESPONDENCE (OUTPATIENT)
Dept: HEALTH INFORMATION MANAGEMENT | Facility: CLINIC | Age: 56
End: 2021-06-16

## 2021-06-23 ENCOUNTER — INFUSION THERAPY VISIT (OUTPATIENT)
Dept: INFUSION THERAPY | Facility: CLINIC | Age: 56
End: 2021-06-23
Attending: INTERNAL MEDICINE
Payer: COMMERCIAL

## 2021-06-23 VITALS
OXYGEN SATURATION: 98 % | TEMPERATURE: 97.9 F | SYSTOLIC BLOOD PRESSURE: 122 MMHG | HEART RATE: 69 BPM | BODY MASS INDEX: 27.9 KG/M2 | WEIGHT: 205.7 LBS | DIASTOLIC BLOOD PRESSURE: 75 MMHG | RESPIRATION RATE: 16 BRPM

## 2021-06-23 DIAGNOSIS — G61.81 CIDP (CHRONIC INFLAMMATORY DEMYELINATING POLYNEUROPATHY) (H): Primary | ICD-10-CM

## 2021-06-23 PROCEDURE — 96365 THER/PROPH/DIAG IV INF INIT: CPT

## 2021-06-23 PROCEDURE — 250N000011 HC RX IP 250 OP 636: Performed by: INTERNAL MEDICINE

## 2021-06-23 PROCEDURE — 250N000013 HC RX MED GY IP 250 OP 250 PS 637: Performed by: PSYCHIATRY & NEUROLOGY

## 2021-06-23 PROCEDURE — 96366 THER/PROPH/DIAG IV INF ADDON: CPT

## 2021-06-23 RX ORDER — DIPHENHYDRAMINE HCL 25 MG
50 CAPSULE ORAL ONCE
Status: CANCELLED | OUTPATIENT
Start: 2021-06-23

## 2021-06-23 RX ORDER — ACETAMINOPHEN 325 MG/1
650 TABLET ORAL ONCE
Status: CANCELLED
Start: 2021-06-23

## 2021-06-23 RX ORDER — DIPHENHYDRAMINE HCL 25 MG
50 CAPSULE ORAL ONCE
Status: COMPLETED | OUTPATIENT
Start: 2021-06-23 | End: 2021-06-23

## 2021-06-23 RX ORDER — ACETAMINOPHEN 325 MG/1
650 TABLET ORAL ONCE
Status: COMPLETED | OUTPATIENT
Start: 2021-06-23 | End: 2021-06-23

## 2021-06-23 RX ADMIN — DIPHENHYDRAMINE HYDROCHLORIDE 50 MG: 25 CAPSULE ORAL at 09:09

## 2021-06-23 RX ADMIN — ACETAMINOPHEN 650 MG: 325 TABLET ORAL at 09:09

## 2021-06-23 RX ADMIN — HUMAN IMMUNOGLOBULIN G 80 G: 40 LIQUID INTRAVENOUS at 09:30

## 2021-06-23 NOTE — PROGRESS NOTES
Infusion Nursing Note:  Emiliano Aguiar presents today for IVIG.    Patient seen by provider today: No   present during visit today: Not Applicable.    Note:  Patient reports is feeling well and offers no new issues or concerns today.  Patient denies fevers, chills or signs of infection.    IVIG Rate:  IVIG started at 0.5mg/kg/hr x 15 minutes, then increased by 0.5mg/kg/hr Q 15 minutes until maximum rate of 4mg/kg/hr.    Intravenous Access:  Peripheral IV placed.  PIV site C/D/I.  PIV flushes well + excellent blood return.    Treatment Conditions:  Biological Infusion Checklist:  ~~~ NOTE: If the patient answers yes to any of the questions below, hold the infusion and contact ordering provider or on-call provider.    1. Have you recently had an elevated temperature, fever, chills, productive cough, coughing for 3 weeks or longer or hemoptysis, abnormal vital signs, night sweats,  chest pain or have you noticed a decrease in your appetite, unexplained weight loss or fatigue? No  2. Do you have any open wounds or new incisions? No  3. Do you have any recent or upcoming hospitalizations, surgeries or dental procedures? No  4. Do you currently have or recently have had any signs of illness or infection or are you on any antibiotics? No  5. Have you had any new, sudden or worsening abdominal pain? No  6. Have you or anyone in your household received a live vaccination in the past 4 weeks? Please note:  No live vaccines while on biologic/chemotherapy until 6 months after the last treatment.  Patient can receive the flu vaccine (shot only) and the pneumovax.  It is optimal for the patient to get these vaccines mid cycle, but they can be given at any time as long as it is not on the day of the infusion. No  7. Have you recently been diagnosed with any new nervous system diseases (ie. Multiple sclerosis, Guillain [place], seizures, neurological changes) or cancer diagnosis? No  8. Are you on any form of radiation or  chemotherapy? No  9. Are you pregnant or breast feeding or do you have plans of pregnancy in the future? N/A  10. Have you been having any signs of worsening depression or suicidal ideations?  (benlysta only) N/A  11. Have there been any other new onset medical symptoms? No        Post Infusion Assessment:  Patient tolerated infusion without incident.  Blood return noted pre and post infusion.  Site patent and intact, free from redness, edema or discomfort.  No evidence of extravasations.  Access discontinued per protocol.       Discharge Plan:   Discharge instructions reviewed with: Patient.  Patient and/or family verbalized understanding of discharge instructions and all questions answered.  Patient discharged in stable condition accompanied by: self.  Departure Mode: Ambulatory.  7/13/21 and 8/3/21: IVIG.    Aubree Varner RN, BSN, OCN on 6/23/2021 at 1:13 PM

## 2021-07-13 ENCOUNTER — INFUSION THERAPY VISIT (OUTPATIENT)
Dept: INFUSION THERAPY | Facility: CLINIC | Age: 56
End: 2021-07-13
Attending: INTERNAL MEDICINE
Payer: COMMERCIAL

## 2021-07-13 VITALS
SYSTOLIC BLOOD PRESSURE: 116 MMHG | DIASTOLIC BLOOD PRESSURE: 66 MMHG | BODY MASS INDEX: 28.09 KG/M2 | OXYGEN SATURATION: 98 % | TEMPERATURE: 97 F | WEIGHT: 207.1 LBS | HEART RATE: 55 BPM

## 2021-07-13 DIAGNOSIS — G61.81 CIDP (CHRONIC INFLAMMATORY DEMYELINATING POLYNEUROPATHY) (H): Primary | ICD-10-CM

## 2021-07-13 PROCEDURE — 250N000011 HC RX IP 250 OP 636: Performed by: INTERNAL MEDICINE

## 2021-07-13 PROCEDURE — 96365 THER/PROPH/DIAG IV INF INIT: CPT

## 2021-07-13 PROCEDURE — 250N000013 HC RX MED GY IP 250 OP 250 PS 637: Performed by: PSYCHIATRY & NEUROLOGY

## 2021-07-13 PROCEDURE — 96366 THER/PROPH/DIAG IV INF ADDON: CPT

## 2021-07-13 RX ORDER — ACETAMINOPHEN 325 MG/1
650 TABLET ORAL ONCE
Status: CANCELLED
Start: 2021-08-03

## 2021-07-13 RX ORDER — ACETAMINOPHEN 325 MG/1
650 TABLET ORAL ONCE
Status: COMPLETED | OUTPATIENT
Start: 2021-07-13 | End: 2021-07-13

## 2021-07-13 RX ORDER — DIPHENHYDRAMINE HCL 25 MG
50 CAPSULE ORAL ONCE
Status: CANCELLED | OUTPATIENT
Start: 2021-08-03

## 2021-07-13 RX ORDER — DIPHENHYDRAMINE HCL 25 MG
50 CAPSULE ORAL ONCE
Status: COMPLETED | OUTPATIENT
Start: 2021-07-13 | End: 2021-07-13

## 2021-07-13 RX ADMIN — DIPHENHYDRAMINE HYDROCHLORIDE 50 MG: 25 CAPSULE ORAL at 08:14

## 2021-07-13 RX ADMIN — ACETAMINOPHEN 650 MG: 325 TABLET ORAL at 08:14

## 2021-07-13 RX ADMIN — HUMAN IMMUNOGLOBULIN G 80 G: 40 LIQUID INTRAVENOUS at 08:26

## 2021-07-13 NOTE — PROGRESS NOTES
Infusion Nursing Note:  Emiliano Aguiar presents today for IVIG with premeds.    Patient seen by provider today: No   present during visit today: Not Applicable.    Note: IVIG initiated at rate of 0.5mg/kg/hr.  Increased by 0.5mg/kg/hr every 15 minutes to max rate of 4.0mg/kg/hr without incident      Intravenous Access:  Peripheral IV placed.    Treatment Conditions:  Biological Infusion Checklist:  ~~~ NOTE: If the patient answers yes to any of the questions below, hold the infusion and contact ordering provider or on-call provider.    1. Have you recently had an elevated temperature, fever, chills, productive cough, coughing for 3 weeks or longer or hemoptysis, abnormal vital signs, night sweats,  chest pain or have you noticed a decrease in your appetite, unexplained weight loss or fatigue? No  2. Do you have any open wounds or new incisions? No  3. Do you have any recent or upcoming hospitalizations, surgeries or dental procedures? No  4. Do you currently have or recently have had any signs of illness or infection or are you on any antibiotics? No  5. Have you had any new, sudden or worsening abdominal pain? No  6. Have you or anyone in your household received a live vaccination in the past 4 weeks? Please note:  No live vaccines while on biologic/chemotherapy until 6 months after the last treatment.  Patient can receive the flu vaccine (shot only) and the pneumovax.  It is optimal for the patient to get these vaccines mid cycle, but they can be given at any time as long as it is not on the day of the infusion. No  7. Have you recently been diagnosed with any new nervous system diseases (ie. Multiple sclerosis, Guillain [place], seizures, neurological changes) or cancer diagnosis? No  8. Are you on any form of radiation or chemotherapy? No  9. Are you pregnant or breast feeding or do you have plans of pregnancy in the future? No  10. Have you been having any signs of worsening depression or suicidal  ideations?  (benlysta only) No  11. Have there been any other new onset medical symptoms? No        Post Infusion Assessment:  Patient tolerated infusion without incident.  Blood return noted pre and post infusion.  Site patent and intact, free from redness, edema or discomfort.  No evidence of extravasations.  Access discontinued per protocol.       Discharge Plan:   Discharge instructions reviewed with: Patient.  Patient and/or family verbalized understanding of discharge instructions and all questions answered.  AVS to patient via [employer].  Patient will return 8/3/21 for next dose of IVIG for next appointment.   Patient discharged in stable condition accompanied by: self.  Departure Mode: Ambulatory.      Carli Lopez RN

## 2021-08-03 ENCOUNTER — INFUSION THERAPY VISIT (OUTPATIENT)
Dept: INFUSION THERAPY | Facility: CLINIC | Age: 56
End: 2021-08-03
Attending: INTERNAL MEDICINE
Payer: COMMERCIAL

## 2021-08-03 VITALS
BODY MASS INDEX: 28.21 KG/M2 | HEART RATE: 52 BPM | SYSTOLIC BLOOD PRESSURE: 126 MMHG | TEMPERATURE: 97.4 F | DIASTOLIC BLOOD PRESSURE: 77 MMHG | RESPIRATION RATE: 16 BRPM | WEIGHT: 208 LBS | OXYGEN SATURATION: 98 %

## 2021-08-03 DIAGNOSIS — G61.81 CIDP (CHRONIC INFLAMMATORY DEMYELINATING POLYNEUROPATHY) (H): Primary | ICD-10-CM

## 2021-08-03 PROCEDURE — 250N000011 HC RX IP 250 OP 636: Performed by: INTERNAL MEDICINE

## 2021-08-03 PROCEDURE — 96365 THER/PROPH/DIAG IV INF INIT: CPT

## 2021-08-03 PROCEDURE — 250N000013 HC RX MED GY IP 250 OP 250 PS 637: Performed by: PSYCHIATRY & NEUROLOGY

## 2021-08-03 PROCEDURE — 96366 THER/PROPH/DIAG IV INF ADDON: CPT

## 2021-08-03 RX ORDER — ACETAMINOPHEN 325 MG/1
650 TABLET ORAL ONCE
Status: COMPLETED | OUTPATIENT
Start: 2021-08-03 | End: 2021-08-03

## 2021-08-03 RX ORDER — DIPHENHYDRAMINE HCL 25 MG
50 CAPSULE ORAL ONCE
Status: COMPLETED | OUTPATIENT
Start: 2021-08-03 | End: 2021-08-03

## 2021-08-03 RX ORDER — ACETAMINOPHEN 325 MG/1
650 TABLET ORAL ONCE
Status: CANCELLED
Start: 2021-08-24

## 2021-08-03 RX ORDER — DIPHENHYDRAMINE HCL 25 MG
50 CAPSULE ORAL ONCE
Status: CANCELLED | OUTPATIENT
Start: 2021-08-24

## 2021-08-03 RX ADMIN — ACETAMINOPHEN 650 MG: 325 TABLET ORAL at 08:20

## 2021-08-03 RX ADMIN — HUMAN IMMUNOGLOBULIN G 75 G: 40 LIQUID INTRAVENOUS at 08:21

## 2021-08-03 RX ADMIN — DIPHENHYDRAMINE HYDROCHLORIDE 50 MG: 25 CAPSULE ORAL at 08:20

## 2021-08-03 NOTE — PROGRESS NOTES
Infusion Nursing Note:  Emiliano Aguiar presents today for IVIG.    Patient seen by provider today: No   present during visit today: Not Applicable.    Note: N/A.    IVIG started at 0.5mg/kg/hr and increased by 0.5mg/kg/hr every 15 minutes to a max of 4mg/kg/hr.      Intravenous Access:  Peripheral IV placed.    Treatment Conditions:  Biological Infusion Checklist:  ~~~ NOTE: If the patient answers yes to any of the questions below, hold the infusion and contact ordering provider or on-call provider.    1. Have you recently had an elevated temperature, fever, chills, productive cough, coughing for 3 weeks or longer or hemoptysis, abnormal vital signs, night sweats,  chest pain or have you noticed a decrease in your appetite, unexplained weight loss or fatigue? No  2. Do you have any open wounds or new incisions? No  3. Do you have any recent or upcoming hospitalizations, surgeries or dental procedures? No  4. Do you currently have or recently have had any signs of illness or infection or are you on any antibiotics? No  5. Have you had any new, sudden or worsening abdominal pain? No  6. Have you or anyone in your household received a live vaccination in the past 4 weeks? Please note:  No live vaccines while on biologic/chemotherapy until 6 months after the last treatment.  Patient can receive the flu vaccine (shot only) and the pneumovax.  It is optimal for the patient to get these vaccines mid cycle, but they can be given at any time as long as it is not on the day of the infusion. No  7. Have you recently been diagnosed with any new nervous system diseases (ie. Multiple sclerosis, Guillain [place], seizures, neurological changes) or cancer diagnosis? No  8. Are you on any form of radiation or chemotherapy? No  9. Are you pregnant or breast feeding or do you have plans of pregnancy in the future? No  10. Have you been having any signs of worsening depression or suicidal ideations?  (benlysta only)  No  11. Have there been any other new onset medical symptoms? No        Post Infusion Assessment:  Patient tolerated infusion without incident.  Blood return noted pre and post infusion.  Site patent and intact, free from redness, edema or discomfort.  No evidence of extravasations.  Access discontinued per protocol.       Discharge Plan:   Discharge instructions reviewed with: Patient.  Patient and/or family verbalized understanding of discharge instructions and all questions answered.  Copy of AVS reviewed with patient and/or family.  Patient will return 8/24/21 for next appointment.  Patient discharged in stable condition accompanied by: self.  Departure Mode: Ambulatory.      Claudine Ceballos RN

## 2021-08-24 ENCOUNTER — INFUSION THERAPY VISIT (OUTPATIENT)
Dept: INFUSION THERAPY | Facility: CLINIC | Age: 56
End: 2021-08-24
Attending: PSYCHIATRY & NEUROLOGY
Payer: COMMERCIAL

## 2021-08-24 VITALS
BODY MASS INDEX: 28.32 KG/M2 | SYSTOLIC BLOOD PRESSURE: 143 MMHG | WEIGHT: 208.8 LBS | RESPIRATION RATE: 16 BRPM | HEART RATE: 62 BPM | TEMPERATURE: 98 F | OXYGEN SATURATION: 98 % | DIASTOLIC BLOOD PRESSURE: 83 MMHG

## 2021-08-24 DIAGNOSIS — G61.81 CIDP (CHRONIC INFLAMMATORY DEMYELINATING POLYNEUROPATHY) (H): Primary | ICD-10-CM

## 2021-08-24 PROCEDURE — 96366 THER/PROPH/DIAG IV INF ADDON: CPT

## 2021-08-24 PROCEDURE — 250N000011 HC RX IP 250 OP 636: Performed by: INTERNAL MEDICINE

## 2021-08-24 PROCEDURE — 96365 THER/PROPH/DIAG IV INF INIT: CPT

## 2021-08-24 PROCEDURE — 250N000013 HC RX MED GY IP 250 OP 250 PS 637: Performed by: PSYCHIATRY & NEUROLOGY

## 2021-08-24 RX ORDER — DIPHENHYDRAMINE HCL 25 MG
50 CAPSULE ORAL ONCE
Status: COMPLETED | OUTPATIENT
Start: 2021-08-24 | End: 2021-08-24

## 2021-08-24 RX ORDER — DIPHENHYDRAMINE HCL 25 MG
50 CAPSULE ORAL ONCE
Status: CANCELLED | OUTPATIENT
Start: 2021-09-14

## 2021-08-24 RX ORDER — ACETAMINOPHEN 325 MG/1
650 TABLET ORAL ONCE
Status: CANCELLED
Start: 2021-09-14

## 2021-08-24 RX ORDER — ACETAMINOPHEN 325 MG/1
650 TABLET ORAL ONCE
Status: COMPLETED | OUTPATIENT
Start: 2021-08-24 | End: 2021-08-24

## 2021-08-24 RX ADMIN — DIPHENHYDRAMINE HYDROCHLORIDE 50 MG: 25 CAPSULE ORAL at 08:26

## 2021-08-24 RX ADMIN — ACETAMINOPHEN 650 MG: 325 TABLET ORAL at 08:26

## 2021-08-24 RX ADMIN — HUMAN IMMUNOGLOBULIN G 75 G: 40 LIQUID INTRAVENOUS at 08:46

## 2021-08-24 NOTE — PROGRESS NOTES
Infusion Nursing Note:  Emiliano Aguiar presents today for IVIG.    Patient seen by provider today: No   present during visit today: Not Applicable.    Note:IVIG started at 0.5ml/kg/hr and increased by 0.5ml/kg/hr every 15 minutes to a max rate of 4ml/kg/hr      Intravenous Access:  Peripheral IV placed.    Treatment Conditions:  Biological Infusion Checklist:  ~~~ NOTE: If the patient answers yes to any of the questions below, hold the infusion and contact ordering provider or on-call provider.    1. Have you recently had an elevated temperature, fever, chills, productive cough, coughing for 3 weeks or longer or hemoptysis, abnormal vital signs, night sweats,  chest pain or have you noticed a decrease in your appetite, unexplained weight loss or fatigue? No  2. Do you have any open wounds or new incisions? No  3. Do you have any recent or upcoming hospitalizations, surgeries or dental procedures? No  4. Do you currently have or recently have had any signs of illness or infection or are you on any antibiotics? No  5. Have you had any new, sudden or worsening abdominal pain? No  6. Have you or anyone in your household received a live vaccination in the past 4 weeks? Please note:  No live vaccines while on biologic/chemotherapy until 6 months after the last treatment.  Patient can receive the flu vaccine (shot only) and the pneumovax.  It is optimal for the patient to get these vaccines mid cycle, but they can be given at any time as long as it is not on the day of the infusion. No  7. Have you recently been diagnosed with any new nervous system diseases (ie. Multiple sclerosis, Guillain [place], seizures, neurological changes) or cancer diagnosis? No  8. Are you on any form of radiation or chemotherapy? No  9. Are you pregnant or breast feeding or do you have plans of pregnancy in the future? No  10. Have you been having any signs of worsening depression or suicidal ideations?  (benlysta only) No  11. Have  there been any other new onset medical symptoms? No        Post Infusion Assessment:  Patient tolerated infusion without incident.  Blood return noted pre and post infusion.  Site patent and intact, free from redness, edema or discomfort.  No evidence of extravasations.  Access discontinued per protocol.       Discharge Plan:   Copy of AVS reviewed with patient and/or family.  Patient will return 9/14/21 for next appointment.  Patient discharged in stable condition accompanied by: self.  Departure Mode: Ambulatory.      Brenda Dumont RN

## 2021-09-10 ENCOUNTER — IMMUNIZATION (OUTPATIENT)
Dept: NURSING | Facility: CLINIC | Age: 56
End: 2021-09-10
Payer: COMMERCIAL

## 2021-09-10 PROCEDURE — 0003A PR COVID VAC PFIZER DIL RECON 30 MCG/0.3 ML IM: CPT

## 2021-09-10 PROCEDURE — 91300 PR COVID VAC PFIZER DIL RECON 30 MCG/0.3 ML IM: CPT

## 2021-09-14 ENCOUNTER — INFUSION THERAPY VISIT (OUTPATIENT)
Dept: INFUSION THERAPY | Facility: CLINIC | Age: 56
End: 2021-09-14
Attending: PSYCHIATRY & NEUROLOGY
Payer: COMMERCIAL

## 2021-09-14 VITALS
TEMPERATURE: 96.9 F | DIASTOLIC BLOOD PRESSURE: 74 MMHG | HEART RATE: 59 BPM | BODY MASS INDEX: 28.32 KG/M2 | WEIGHT: 208.8 LBS | RESPIRATION RATE: 16 BRPM | SYSTOLIC BLOOD PRESSURE: 122 MMHG | OXYGEN SATURATION: 97 %

## 2021-09-14 DIAGNOSIS — G61.81 CIDP (CHRONIC INFLAMMATORY DEMYELINATING POLYNEUROPATHY) (H): Primary | ICD-10-CM

## 2021-09-14 PROCEDURE — 250N000013 HC RX MED GY IP 250 OP 250 PS 637: Performed by: PSYCHIATRY & NEUROLOGY

## 2021-09-14 PROCEDURE — 96366 THER/PROPH/DIAG IV INF ADDON: CPT

## 2021-09-14 PROCEDURE — 96365 THER/PROPH/DIAG IV INF INIT: CPT

## 2021-09-14 PROCEDURE — 250N000011 HC RX IP 250 OP 636: Performed by: PSYCHIATRY & NEUROLOGY

## 2021-09-14 RX ORDER — DIPHENHYDRAMINE HCL 25 MG
50 CAPSULE ORAL ONCE
Status: COMPLETED | OUTPATIENT
Start: 2021-09-14 | End: 2021-09-14

## 2021-09-14 RX ORDER — ACETAMINOPHEN 325 MG/1
650 TABLET ORAL ONCE
Status: CANCELLED
Start: 2021-10-05

## 2021-09-14 RX ORDER — ACETAMINOPHEN 325 MG/1
650 TABLET ORAL ONCE
Status: COMPLETED | OUTPATIENT
Start: 2021-09-14 | End: 2021-09-14

## 2021-09-14 RX ORDER — DIPHENHYDRAMINE HCL 25 MG
50 CAPSULE ORAL ONCE
Status: CANCELLED | OUTPATIENT
Start: 2021-10-05

## 2021-09-14 RX ADMIN — ACETAMINOPHEN 650 MG: 325 TABLET ORAL at 08:52

## 2021-09-14 RX ADMIN — HUMAN IMMUNOGLOBULIN G 80 G: 40 LIQUID INTRAVENOUS at 09:25

## 2021-09-14 RX ADMIN — DIPHENHYDRAMINE HYDROCHLORIDE 50 MG: 25 CAPSULE ORAL at 08:52

## 2021-09-14 NOTE — PROGRESS NOTES
Infusion Nursing Note:  Emiliano Aguiar presents today for IVIG.    Patient seen by provider today: No   present during visit today: Not Applicable.    Note:  Patient reports is feeling well today.  Patient denies fevers, chills or signs of infection.  Covid Booster:  Patient received his Covid Booster on 9/10/21.  8:20am:  Writer placed call to office of Dr. Godoy to confirm if okay to proceed with IVIG today due to recent Covid Booster. 8:40am:  TORB Dr. Godoy/LluviaRN:  Okay to proceed with IVIG today.     IVIG Dose:  IVIG dose rounded up to 80 grams with dose today.  IVIG Rate:  IVIG started at 0.5ml/kg/hr x 15 minutes, then increased by 0.5ml/kg/hr Q 15 minutes until maximum rate of 4ml/kg/hr.      Intravenous Access:  Peripheral IV placed.  PIV site C/D/I.  PIV flushes well + excellent blood return.    Treatment Conditions:  Biological Infusion Checklist:  ~~~ NOTE: If the patient answers yes to any of the questions below, hold the infusion and contact ordering provider or on-call provider.    1. Have you recently had an elevated temperature, fever, chills, productive cough, coughing for 3 weeks or longer or hemoptysis, abnormal vital signs, night sweats,  chest pain or have you noticed a decrease in your appetite, unexplained weight loss or fatigue? No  2. Do you have any open wounds or new incisions? No  3. Do you have any recent or upcoming hospitalizations, surgeries or dental procedures? No  4. Do you currently have or recently have had any signs of illness or infection or are you on any antibiotics? No  5. Have you had any new, sudden or worsening abdominal pain? No  6. Have you or anyone in your household received a live vaccination in the past 4 weeks? Please note:  No live vaccines while on biologic/chemotherapy until 6 months after the last treatment.  Patient can receive the flu vaccine (shot only) and the pneumovax.  It is optimal for the patient to get these vaccines mid  cycle, but they can be given at any time as long as it is not on the day of the infusion. No  7. Have you recently been diagnosed with any new nervous system diseases (ie. Multiple sclerosis, Guillain [place], seizures, neurological changes) or cancer diagnosis? No  8. Are you on any form of radiation or chemotherapy? No  9. Are you pregnant or breast feeding or do you have plans of pregnancy in the future? N/A  10. Have you been having any signs of worsening depression or suicidal ideations?  (benlysta only) N/A  11. Have there been any other new onset medical symptoms? No        Post Infusion Assessment:  Patient tolerated infusion without incident.  Blood return noted pre and post infusion.  Site patent and intact, free from redness, edema or discomfort.  No evidence of extravasations.  Access discontinued per protocol.       Discharge Plan:   Discharge instructions reviewed with: Patient.  Patient and/or family verbalized understanding of discharge instructions and all questions answered.  Patient discharged in stable condition accompanied by: self.  Departure Mode: Ambulatory.  10/5/21: IVIG.    Aubree Varner RN, BSN, OCN on 9/14/2021 at 10:54 AM

## 2021-09-25 ENCOUNTER — HEALTH MAINTENANCE LETTER (OUTPATIENT)
Age: 56
End: 2021-09-25

## 2021-10-05 ENCOUNTER — INFUSION THERAPY VISIT (OUTPATIENT)
Dept: INFUSION THERAPY | Facility: CLINIC | Age: 56
End: 2021-10-05
Attending: PSYCHIATRY & NEUROLOGY
Payer: COMMERCIAL

## 2021-10-05 VITALS
OXYGEN SATURATION: 98 % | WEIGHT: 206 LBS | BODY MASS INDEX: 27.94 KG/M2 | RESPIRATION RATE: 16 BRPM | HEART RATE: 65 BPM | DIASTOLIC BLOOD PRESSURE: 63 MMHG | SYSTOLIC BLOOD PRESSURE: 112 MMHG | TEMPERATURE: 97.3 F

## 2021-10-05 DIAGNOSIS — G61.81 CIDP (CHRONIC INFLAMMATORY DEMYELINATING POLYNEUROPATHY) (H): Primary | ICD-10-CM

## 2021-10-05 PROCEDURE — 250N000011 HC RX IP 250 OP 636: Performed by: INTERNAL MEDICINE

## 2021-10-05 PROCEDURE — 250N000013 HC RX MED GY IP 250 OP 250 PS 637: Performed by: PSYCHIATRY & NEUROLOGY

## 2021-10-05 PROCEDURE — 96365 THER/PROPH/DIAG IV INF INIT: CPT

## 2021-10-05 PROCEDURE — 96366 THER/PROPH/DIAG IV INF ADDON: CPT

## 2021-10-05 RX ORDER — DIPHENHYDRAMINE HCL 25 MG
50 CAPSULE ORAL ONCE
Status: COMPLETED | OUTPATIENT
Start: 2021-10-05 | End: 2021-10-05

## 2021-10-05 RX ORDER — DIPHENHYDRAMINE HCL 25 MG
50 CAPSULE ORAL ONCE
Status: CANCELLED | OUTPATIENT
Start: 2021-10-26

## 2021-10-05 RX ORDER — ACETAMINOPHEN 325 MG/1
650 TABLET ORAL ONCE
Status: COMPLETED | OUTPATIENT
Start: 2021-10-05 | End: 2021-10-05

## 2021-10-05 RX ORDER — ACETAMINOPHEN 325 MG/1
650 TABLET ORAL ONCE
Status: CANCELLED
Start: 2021-10-26

## 2021-10-05 RX ADMIN — DIPHENHYDRAMINE HYDROCHLORIDE 50 MG: 25 CAPSULE ORAL at 08:21

## 2021-10-05 RX ADMIN — ACETAMINOPHEN 650 MG: 325 TABLET ORAL at 08:21

## 2021-10-05 RX ADMIN — HUMAN IMMUNOGLOBULIN G 80 G: 40 LIQUID INTRAVENOUS at 08:25

## 2021-10-05 ASSESSMENT — PAIN SCALES - GENERAL: PAINLEVEL: MILD PAIN (3)

## 2021-10-05 NOTE — PROGRESS NOTES
"Infusion Nursing Note:  Emiliano Aguiar presents today for IVIG.    Patient seen by provider today: No   present during visit today: Not Applicable.    Note: Patient received the Covid booster 4 days prior to his last treatment. States it took 2 weeks for him to \"feel back to normal.\" Reports he had increased fatigue and lack of energy, which has improved now.     Patient IVIG started at 0.5mg/kg/hr and increased by 0.5 mg/kg/hr every 15 minutes for a max of 4 mg/kg/hr.    Intravenous Access:  Peripheral IV placed.    Treatment Conditions:  Biological Infusion Checklist:  ~~~ NOTE: If the patient answers yes to any of the questions below, hold the infusion and contact ordering provider or on-call provider.    1. Have you recently had an elevated temperature, fever, chills, productive cough, coughing for 3 weeks or longer or hemoptysis, abnormal vital signs, night sweats,  chest pain or have you noticed a decrease in your appetite, unexplained weight loss or fatigue? No  2. Do you have any open wounds or new incisions? No  3. Do you have any recent or upcoming hospitalizations, surgeries or dental procedures? No  4. Do you currently have or recently have had any signs of illness or infection or are you on any antibiotics? No  5. Have you had any new, sudden or worsening abdominal pain? No  6. Have you or anyone in your household received a live vaccination in the past 4 weeks? Please note:  No live vaccines while on biologic/chemotherapy until 6 months after the last treatment.  Patient can receive the flu vaccine (shot only) and the pneumovax.  It is optimal for the patient to get these vaccines mid cycle, but they can be given at any time as long as it is not on the day of the infusion. No  7. Have you recently been diagnosed with any new nervous system diseases (ie. Multiple sclerosis, Guillain [place], seizures, neurological changes) or cancer diagnosis? No  8. Are you on any form of radiation or " chemotherapy? No  9. Are you pregnant or breast feeding or do you have plans of pregnancy in the future? No  10. Have you been having any signs of worsening depression or suicidal ideations?  (benlysta only) No  11. Have there been any other new onset medical symptoms? No        Post Infusion Assessment:  Patient tolerated infusion without incident.  Blood return noted pre and post infusion.  Site patent and intact, free from redness, edema or discomfort.  No evidence of extravasations.  Access discontinued per protocol.  Biologic Infusion Post Education: Call the triage nurse at your clinic or seek medical attention if you have chills and/or temperature greater than or equal to 100.5, uncontrolled nausea/vomiting, diarrhea, constipation, dizziness, shortness of breath, chest pain, heart palpitations, weakness or any other new or concerning symptoms, questions or concerns.  You cannot have any live virus vaccines prior to or during treatment or up to 6 months post infusion.  If you have an upcoming surgery, medical procedure or dental procedure during treatment, this should be discussed with your ordering physician and your surgeon/dentist.  If you are having any concerning symptom, if you are unsure if you should get your next infusion or wish to speak to a provider before your next infusion, please call your care coordinator or triage nurse at your clinic to notify them so we can adequately serve you.       Discharge Plan:   Copy of AVS reviewed with patient and/or family.  Patient will return 10/26 for next appointment.  Patient discharged in stable condition accompanied by: self.  Departure Mode: Ambulatory.      Carli Pulido RN

## 2021-10-26 ENCOUNTER — INFUSION THERAPY VISIT (OUTPATIENT)
Dept: INFUSION THERAPY | Facility: CLINIC | Age: 56
End: 2021-10-26
Attending: PSYCHIATRY & NEUROLOGY
Payer: COMMERCIAL

## 2021-10-26 VITALS
DIASTOLIC BLOOD PRESSURE: 83 MMHG | TEMPERATURE: 97.1 F | WEIGHT: 206.7 LBS | HEART RATE: 60 BPM | SYSTOLIC BLOOD PRESSURE: 150 MMHG | BODY MASS INDEX: 28.03 KG/M2 | OXYGEN SATURATION: 99 %

## 2021-10-26 DIAGNOSIS — G61.81 CIDP (CHRONIC INFLAMMATORY DEMYELINATING POLYNEUROPATHY) (H): Primary | ICD-10-CM

## 2021-10-26 PROCEDURE — 250N000011 HC RX IP 250 OP 636: Performed by: INTERNAL MEDICINE

## 2021-10-26 PROCEDURE — 250N000013 HC RX MED GY IP 250 OP 250 PS 637: Performed by: PSYCHIATRY & NEUROLOGY

## 2021-10-26 PROCEDURE — 96366 THER/PROPH/DIAG IV INF ADDON: CPT

## 2021-10-26 PROCEDURE — 96365 THER/PROPH/DIAG IV INF INIT: CPT

## 2021-10-26 RX ORDER — ACETAMINOPHEN 325 MG/1
650 TABLET ORAL ONCE
Status: COMPLETED | OUTPATIENT
Start: 2021-10-26 | End: 2021-10-26

## 2021-10-26 RX ORDER — DIPHENHYDRAMINE HCL 25 MG
50 CAPSULE ORAL ONCE
Status: COMPLETED | OUTPATIENT
Start: 2021-10-26 | End: 2021-10-26

## 2021-10-26 RX ORDER — ACETAMINOPHEN 325 MG/1
650 TABLET ORAL ONCE
Status: CANCELLED
Start: 2021-11-16

## 2021-10-26 RX ORDER — DIPHENHYDRAMINE HCL 25 MG
50 CAPSULE ORAL ONCE
Status: CANCELLED | OUTPATIENT
Start: 2021-11-16

## 2021-10-26 RX ADMIN — DIPHENHYDRAMINE HYDROCHLORIDE 50 MG: 25 CAPSULE ORAL at 08:15

## 2021-10-26 RX ADMIN — HUMAN IMMUNOGLOBULIN G 80 G: 40 LIQUID INTRAVENOUS at 08:23

## 2021-10-26 RX ADMIN — ACETAMINOPHEN 650 MG: 325 TABLET ORAL at 08:15

## 2021-10-26 NOTE — PROGRESS NOTES
Infusion Nursing Note:  Emiliano Aguiar presents today for IVIG.    Patient seen by provider today: No   present during visit today: Not Applicable.    Note: IVIG started at 0.5mg/kg/hr and increased by 0.5mg/kg/hr every 15 minutes to a max of 4mg/kg/hr.      Intravenous Access:  Peripheral IV placed.    Treatment Conditions:  Biological Infusion Checklist:  ~~~ NOTE: If the patient answers yes to any of the questions below, hold the infusion and contact ordering provider or on-call provider.    1. Have you recently had an elevated temperature, fever, chills, productive cough, coughing for 3 weeks or longer or hemoptysis, abnormal vital signs, night sweats,  chest pain or have you noticed a decrease in your appetite, unexplained weight loss or fatigue? No  2. Do you have any open wounds or new incisions? No  3. Do you have any recent or upcoming hospitalizations, surgeries or dental procedures? No  4. Do you currently have or recently have had any signs of illness or infection or are you on any antibiotics? No  5. Have you had any new, sudden or worsening abdominal pain? No  6. Have you or anyone in your household received a live vaccination in the past 4 weeks? Please note:  No live vaccines while on biologic/chemotherapy until 6 months after the last treatment.  Patient can receive the flu vaccine (shot only) and the pneumovax.  It is optimal for the patient to get these vaccines mid cycle, but they can be given at any time as long as it is not on the day of the infusion. No  7. Have you recently been diagnosed with any new nervous system diseases (ie. Multiple sclerosis, Guillain [place], seizures, neurological changes) or cancer diagnosis? No  8. Are you on any form of radiation or chemotherapy? No  9. Are you pregnant or breast feeding or do you have plans of pregnancy in the future? No  10. Have you been having any signs of worsening depression or suicidal ideations?  (benlysta only) No  11. Have  there been any other new onset medical symptoms? No        Post Infusion Assessment:  Patient tolerated infusion without incident.  Blood return noted pre and post infusion.  Site patent and intact, free from redness, edema or discomfort.  No evidence of extravasations.  Access discontinued per protocol.  Biologic Infusion Post Education: Call the triage nurse at your clinic or seek medical attention if you have chills and/or temperature greater than or equal to 100.5, uncontrolled nausea/vomiting, diarrhea, constipation, dizziness, shortness of breath, chest pain, heart palpitations, weakness or any other new or concerning symptoms, questions or concerns.  You cannot have any live virus vaccines prior to or during treatment or up to 6 months post infusion.  If you have an upcoming surgery, medical procedure or dental procedure during treatment, this should be discussed with your ordering physician and your surgeon/dentist.  If you are having any concerning symptom, if you are unsure if you should get your next infusion or wish to speak to a provider before your next infusion, please call your care coordinator or triage nurse at your clinic to notify them so we can adequately serve you.       Discharge Plan:   Discharge instructions reviewed with: Patient.  Patient and/or family verbalized understanding of discharge instructions and all questions answered.  AVS to patient via [employer].  Patient will return 11/16 for next appointment.   Patient discharged in stable condition accompanied by: self.  Departure Mode: Ambulatory.      Azalea Carcamo RN

## 2021-11-16 ENCOUNTER — INFUSION THERAPY VISIT (OUTPATIENT)
Dept: INFUSION THERAPY | Facility: CLINIC | Age: 56
End: 2021-11-16
Attending: PSYCHIATRY & NEUROLOGY
Payer: COMMERCIAL

## 2021-11-16 VITALS
OXYGEN SATURATION: 99 % | HEART RATE: 73 BPM | SYSTOLIC BLOOD PRESSURE: 121 MMHG | DIASTOLIC BLOOD PRESSURE: 71 MMHG | BODY MASS INDEX: 28.33 KG/M2 | WEIGHT: 208.9 LBS | TEMPERATURE: 96.9 F

## 2021-11-16 DIAGNOSIS — G61.81 CIDP (CHRONIC INFLAMMATORY DEMYELINATING POLYNEUROPATHY) (H): Primary | ICD-10-CM

## 2021-11-16 PROCEDURE — 250N000011 HC RX IP 250 OP 636: Performed by: INTERNAL MEDICINE

## 2021-11-16 PROCEDURE — 96366 THER/PROPH/DIAG IV INF ADDON: CPT

## 2021-11-16 PROCEDURE — 250N000013 HC RX MED GY IP 250 OP 250 PS 637: Performed by: PSYCHIATRY & NEUROLOGY

## 2021-11-16 PROCEDURE — 96365 THER/PROPH/DIAG IV INF INIT: CPT

## 2021-11-16 RX ORDER — DIPHENHYDRAMINE HCL 25 MG
50 CAPSULE ORAL ONCE
Status: CANCELLED | OUTPATIENT
Start: 2021-12-07

## 2021-11-16 RX ORDER — ACETAMINOPHEN 325 MG/1
650 TABLET ORAL ONCE
Status: COMPLETED | OUTPATIENT
Start: 2021-11-16 | End: 2021-11-16

## 2021-11-16 RX ORDER — ACETAMINOPHEN 325 MG/1
650 TABLET ORAL ONCE
Status: CANCELLED
Start: 2021-12-07

## 2021-11-16 RX ORDER — DIPHENHYDRAMINE HCL 25 MG
50 CAPSULE ORAL ONCE
Status: COMPLETED | OUTPATIENT
Start: 2021-11-16 | End: 2021-11-16

## 2021-11-16 RX ADMIN — HUMAN IMMUNOGLOBULIN G 75 G: 40 LIQUID INTRAVENOUS at 08:15

## 2021-11-16 RX ADMIN — DIPHENHYDRAMINE HYDROCHLORIDE 50 MG: 25 CAPSULE ORAL at 08:10

## 2021-11-16 RX ADMIN — ACETAMINOPHEN 650 MG: 325 TABLET ORAL at 08:10

## 2021-11-16 NOTE — PROGRESS NOTES
Infusion Nursing Note:  Emiliano Aguiar presents today for IVIG.    Patient seen by provider today: No   present during visit today: Not Applicable.    Note: IVIG started at 0.5mg/kg/hr and increased by 0.5mg/hr every 15 minutes to a max rate of 4mg/kg/hr.      Intravenous Access:  Peripheral IV placed.    Treatment Conditions:  Biological Infusion Checklist:  ~~~ NOTE: If the patient answers yes to any of the questions below, hold the infusion and contact ordering provider or on-call provider.    1. Have you recently had an elevated temperature, fever, chills, productive cough, coughing for 3 weeks or longer or hemoptysis, abnormal vital signs, night sweats,  chest pain or have you noticed a decrease in your appetite, unexplained weight loss or fatigue? No  2. Do you have any open wounds or new incisions? No  3. Do you have any recent or upcoming hospitalizations, surgeries or dental procedures? No  4. Do you currently have or recently have had any signs of illness or infection or are you on any antibiotics? No  5. Have you had any new, sudden or worsening abdominal pain? No  6. Have you or anyone in your household received a live vaccination in the past 4 weeks? Please note:  No live vaccines while on biologic/chemotherapy until 6 months after the last treatment.  Patient can receive the flu vaccine (shot only) and the pneumovax.  It is optimal for the patient to get these vaccines mid cycle, but they can be given at any time as long as it is not on the day of the infusion. No  7. Have you recently been diagnosed with any new nervous system diseases (ie. Multiple sclerosis, Guillain [place], seizures, neurological changes) or cancer diagnosis? No  8. Are you on any form of radiation or chemotherapy? No  9. Are you pregnant or breast feeding or do you have plans of pregnancy in the future? No  10. Have you been having any signs of worsening depression or suicidal ideations?  (benlysta only) No  11. Have  there been any other new onset medical symptoms? No        Post Infusion Assessment:  Patient tolerated infusion without incident.  Blood return noted pre and post infusion.  Site patent and intact, free from redness, edema or discomfort.  No evidence of extravasations.  Access discontinued per protocol.  Biologic Infusion Post Education: Call the triage nurse at your clinic or seek medical attention if you have chills and/or temperature greater than or equal to 100.5, uncontrolled nausea/vomiting, diarrhea, constipation, dizziness, shortness of breath, chest pain, heart palpitations, weakness or any other new or concerning symptoms, questions or concerns.  You cannot have any live virus vaccines prior to or during treatment or up to 6 months post infusion.  If you have an upcoming surgery, medical procedure or dental procedure during treatment, this should be discussed with your ordering physician and your surgeon/dentist.  If you are having any concerning symptom, if you are unsure if you should get your next infusion or wish to speak to a provider before your next infusion, please call your care coordinator or triage nurse at your clinic to notify them so we can adequately serve you.       Discharge Plan:   Discharge instructions reviewed with: Patient.  Patient and/or family verbalized understanding of discharge instructions and all questions answered.  AVS to patient via [employer].  Patient will return 12/7/21 for next IVIG appointment.   Patient discharged in stable condition accompanied by: self.  Departure Mode: Ambulatory.      Azalea Carcamo RN

## 2021-12-07 ENCOUNTER — INFUSION THERAPY VISIT (OUTPATIENT)
Dept: INFUSION THERAPY | Facility: CLINIC | Age: 56
End: 2021-12-07
Attending: PSYCHIATRY & NEUROLOGY
Payer: COMMERCIAL

## 2021-12-07 VITALS
DIASTOLIC BLOOD PRESSURE: 83 MMHG | OXYGEN SATURATION: 98 % | HEART RATE: 65 BPM | RESPIRATION RATE: 16 BRPM | TEMPERATURE: 97.5 F | WEIGHT: 204.37 LBS | SYSTOLIC BLOOD PRESSURE: 156 MMHG | BODY MASS INDEX: 27.72 KG/M2

## 2021-12-07 DIAGNOSIS — G61.81 CIDP (CHRONIC INFLAMMATORY DEMYELINATING POLYNEUROPATHY) (H): Primary | ICD-10-CM

## 2021-12-07 PROCEDURE — 96365 THER/PROPH/DIAG IV INF INIT: CPT

## 2021-12-07 PROCEDURE — 250N000011 HC RX IP 250 OP 636: Performed by: INTERNAL MEDICINE

## 2021-12-07 PROCEDURE — 250N000013 HC RX MED GY IP 250 OP 250 PS 637: Performed by: PSYCHIATRY & NEUROLOGY

## 2021-12-07 PROCEDURE — 96366 THER/PROPH/DIAG IV INF ADDON: CPT

## 2021-12-07 RX ORDER — ACETAMINOPHEN 325 MG/1
650 TABLET ORAL ONCE
Status: CANCELLED
Start: 2021-12-28

## 2021-12-07 RX ORDER — DIPHENHYDRAMINE HCL 25 MG
50 CAPSULE ORAL ONCE
Status: COMPLETED | OUTPATIENT
Start: 2021-12-07 | End: 2021-12-07

## 2021-12-07 RX ORDER — DIPHENHYDRAMINE HCL 25 MG
50 CAPSULE ORAL ONCE
Status: CANCELLED | OUTPATIENT
Start: 2021-12-28

## 2021-12-07 RX ORDER — ACETAMINOPHEN 325 MG/1
650 TABLET ORAL ONCE
Status: COMPLETED | OUTPATIENT
Start: 2021-12-07 | End: 2021-12-07

## 2021-12-07 RX ADMIN — DIPHENHYDRAMINE HYDROCHLORIDE 50 MG: 25 CAPSULE ORAL at 08:22

## 2021-12-07 RX ADMIN — HUMAN IMMUNOGLOBULIN G 80 G: 40 LIQUID INTRAVENOUS at 08:22

## 2021-12-07 RX ADMIN — ACETAMINOPHEN 650 MG: 325 TABLET ORAL at 08:22

## 2021-12-07 NOTE — PROGRESS NOTES
Infusion Nursing Note:  Emiliano Aguiar presents today for IVIG.    Patient seen by provider today: No   present during visit today: Not Applicable.    Note:  IVIG started at 0.5mg/kg/hr and increased by 0.5mg/hr every 15 minutes, to a max rate of 4mg/kg/hr.      Intravenous Access:  Peripheral IV placed.    Treatment Conditions:  Biological Infusion Checklist:  ~~~ NOTE: If the patient answers yes to any of the questions below, hold the infusion and contact ordering provider or on-call provider.    1. Have you recently had an elevated temperature, fever, chills, productive cough, coughing for 3 weeks or longer or hemoptysis, abnormal vital signs, night sweats,  chest pain or have you noticed a decrease in your appetite, unexplained weight loss or fatigue? No  2. Do you have any open wounds or new incisions? No  3. Do you have any recent or upcoming hospitalizations, surgeries or dental procedures? No  4. Do you currently have or recently have had any signs of illness or infection or are you on any antibiotics? No  5. Have you had any new, sudden or worsening abdominal pain? No  6. Have you or anyone in your household received a live vaccination in the past 4 weeks? Please note:  No live vaccines while on biologic/chemotherapy until 6 months after the last treatment.  Patient can receive the flu vaccine (shot only) and the pneumovax.  It is optimal for the patient to get these vaccines mid cycle, but they can be given at any time as long as it is not on the day of the infusion. No  7. Have you recently been diagnosed with any new nervous system diseases (ie. Multiple sclerosis, Guillain [place], seizures, neurological changes) or cancer diagnosis? No  8. Are you on any form of radiation or chemotherapy? No  9. Are you pregnant or breast feeding or do you have plans of pregnancy in the future? No  10. Have you been having any signs of worsening depression or suicidal ideations?  (benlysta only) No  11. Have  there been any other new onset medical symptoms? No        Post Infusion Assessment:  Patient tolerated infusion without incident.  Blood return noted pre and post infusion.  Site patent and intact, free from redness, edema or discomfort.  No evidence of extravasations.  Access discontinued per protocol.  Biologic Infusion Post Education: Call the triage nurse at your clinic or seek medical attention if you have chills and/or temperature greater than or equal to 100.5, uncontrolled nausea/vomiting, diarrhea, constipation, dizziness, shortness of breath, chest pain, heart palpitations, weakness or any other new or concerning symptoms, questions or concerns.  You cannot have any live virus vaccines prior to or during treatment or up to 6 months post infusion.  If you have an upcoming surgery, medical procedure or dental procedure during treatment, this should be discussed with your ordering physician and your surgeon/dentist.  If you are having any concerning symptom, if you are unsure if you should get your next infusion or wish to speak to a provider before your next infusion, please call your care coordinator or triage nurse at your clinic to notify them so we can adequately serve you.       Discharge Plan:   Discharge instructions reviewed with: Patient.  Patient and/or family verbalized understanding of discharge instructions and all questions answered.  AVS to patient via [employer].  Patient will return 12/28/21 for next appointment.   Patient discharged in stable condition accompanied by: self.  Departure Mode: Ambulatory.      Lynda Lion RN

## 2021-12-10 ENCOUNTER — TELEPHONE (OUTPATIENT)
Dept: NEUROLOGY | Facility: CLINIC | Age: 56
End: 2021-12-10
Payer: COMMERCIAL

## 2021-12-10 NOTE — TELEPHONE ENCOUNTER
Emiliano inquired about receiving his IVIG therapy in the home.  Per [facility] he does not meet medicare criteria for home infusions so he will not be eligible, my chart message sent to Emiliano with this information.    Lluvia Barksdale RN

## 2021-12-28 ENCOUNTER — INFUSION THERAPY VISIT (OUTPATIENT)
Dept: INFUSION THERAPY | Facility: CLINIC | Age: 56
End: 2021-12-28
Attending: PSYCHIATRY & NEUROLOGY
Payer: COMMERCIAL

## 2021-12-28 VITALS
TEMPERATURE: 97 F | HEART RATE: 76 BPM | WEIGHT: 201.8 LBS | OXYGEN SATURATION: 97 % | RESPIRATION RATE: 16 BRPM | DIASTOLIC BLOOD PRESSURE: 79 MMHG | SYSTOLIC BLOOD PRESSURE: 124 MMHG | BODY MASS INDEX: 27.37 KG/M2

## 2021-12-28 DIAGNOSIS — G61.81 CIDP (CHRONIC INFLAMMATORY DEMYELINATING POLYNEUROPATHY) (H): Primary | ICD-10-CM

## 2021-12-28 PROCEDURE — 250N000013 HC RX MED GY IP 250 OP 250 PS 637: Performed by: PSYCHIATRY & NEUROLOGY

## 2021-12-28 PROCEDURE — 96366 THER/PROPH/DIAG IV INF ADDON: CPT

## 2021-12-28 PROCEDURE — 250N000011 HC RX IP 250 OP 636: Performed by: INTERNAL MEDICINE

## 2021-12-28 PROCEDURE — 96365 THER/PROPH/DIAG IV INF INIT: CPT

## 2021-12-28 RX ORDER — ACETAMINOPHEN 325 MG/1
650 TABLET ORAL ONCE
Status: COMPLETED | OUTPATIENT
Start: 2021-12-28 | End: 2021-12-28

## 2021-12-28 RX ORDER — DIPHENHYDRAMINE HCL 25 MG
50 CAPSULE ORAL ONCE
Status: COMPLETED | OUTPATIENT
Start: 2021-12-28 | End: 2021-12-28

## 2021-12-28 RX ORDER — ACETAMINOPHEN 325 MG/1
650 TABLET ORAL ONCE
Status: CANCELLED
Start: 2022-01-18

## 2021-12-28 RX ORDER — DIPHENHYDRAMINE HCL 25 MG
50 CAPSULE ORAL ONCE
Status: CANCELLED | OUTPATIENT
Start: 2022-01-18

## 2021-12-28 RX ADMIN — HUMAN IMMUNOGLOBULIN G 75 G: 40 LIQUID INTRAVENOUS at 11:40

## 2021-12-28 RX ADMIN — DIPHENHYDRAMINE HYDROCHLORIDE 50 MG: 25 CAPSULE ORAL at 11:38

## 2021-12-28 RX ADMIN — ACETAMINOPHEN 650 MG: 325 TABLET ORAL at 11:38

## 2021-12-28 ASSESSMENT — PAIN SCALES - GENERAL: PAINLEVEL: NO PAIN (0)

## 2021-12-28 NOTE — PROGRESS NOTES
Infusion Nursing Note:  Emiliano Aguiar presents today for IVIG.    Patient seen by provider today: No   present during visit today: Not Applicable.    Note: N/A.      Intravenous Access:  Peripheral IV placed.    Treatment Conditions:  Biological Infusion Checklist:  ~~~ NOTE: If the patient answers yes to any of the questions below, hold the infusion and contact ordering provider or on-call provider.    1. Have you recently had an elevated temperature, fever, chills, productive cough, coughing for 3 weeks or longer or hemoptysis, abnormal vital signs, night sweats,  chest pain or have you noticed a decrease in your appetite, unexplained weight loss or fatigue? No  2. Do you have any open wounds or new incisions? No  3. Do you have any recent or upcoming hospitalizations, surgeries or dental procedures? No  4. Do you currently have or recently have had any signs of illness or infection or are you on any antibiotics? No  5. Have you had any new, sudden or worsening abdominal pain? No  6. Have you or anyone in your household received a live vaccination in the past 4 weeks? Please note:  No live vaccines while on biologic/chemotherapy until 6 months after the last treatment.  Patient can receive the flu vaccine (shot only) and the pneumovax.  It is optimal for the patient to get these vaccines mid cycle, but they can be given at any time as long as it is not on the day of the infusion. No  7. Have you recently been diagnosed with any new nervous system diseases (ie. Multiple sclerosis, Guillain [place], seizures, neurological changes) or cancer diagnosis? No  8. Are you on any form of radiation or chemotherapy? No  9. Are you pregnant or breast feeding or do you have plans of pregnancy in the future? No  10. Have you been having any signs of worsening depression or suicidal ideations?  (benlysta only) No  11. Have there been any other new onset medical symptoms? No        Post Infusion Assessment:  Patient  tolerated infusion without incident.  Blood return noted pre and post infusion.  Site patent and intact, free from redness, edema or discomfort.  No evidence of extravasations.  Access discontinued per protocol.  Biologic Infusion Post Education: Call the triage nurse at your clinic or seek medical attention if you have chills and/or temperature greater than or equal to 100.5, uncontrolled nausea/vomiting, diarrhea, constipation, dizziness, shortness of breath, chest pain, heart palpitations, weakness or any other new or concerning symptoms, questions or concerns.  You cannot have any live virus vaccines prior to or during treatment or up to 6 months post infusion.  If you have an upcoming surgery, medical procedure or dental procedure during treatment, this should be discussed with your ordering physician and your surgeon/dentist.  If you are having any concerning symptom, if you are unsure if you should get your next infusion or wish to speak to a provider before your next infusion, please call your care coordinator or triage nurse at your clinic to notify them so we can adequately serve you.       Discharge Plan:     Patient will return 1/18 for next appointment.  Patient discharged in stable condition accompanied by: self.  Departure Mode: Ambulatory.      Carli Pulido RN

## 2022-01-18 ENCOUNTER — INFUSION THERAPY VISIT (OUTPATIENT)
Dept: INFUSION THERAPY | Facility: CLINIC | Age: 57
End: 2022-01-18
Attending: PSYCHIATRY & NEUROLOGY
Payer: COMMERCIAL

## 2022-01-18 VITALS
DIASTOLIC BLOOD PRESSURE: 74 MMHG | OXYGEN SATURATION: 97 % | BODY MASS INDEX: 27.27 KG/M2 | TEMPERATURE: 97.1 F | RESPIRATION RATE: 16 BRPM | WEIGHT: 201.1 LBS | SYSTOLIC BLOOD PRESSURE: 129 MMHG | HEART RATE: 76 BPM

## 2022-01-18 DIAGNOSIS — G61.81 CIDP (CHRONIC INFLAMMATORY DEMYELINATING POLYNEUROPATHY) (H): Primary | ICD-10-CM

## 2022-01-18 PROCEDURE — 250N000011 HC RX IP 250 OP 636: Performed by: INTERNAL MEDICINE

## 2022-01-18 PROCEDURE — 96365 THER/PROPH/DIAG IV INF INIT: CPT

## 2022-01-18 PROCEDURE — 96366 THER/PROPH/DIAG IV INF ADDON: CPT

## 2022-01-18 PROCEDURE — 250N000013 HC RX MED GY IP 250 OP 250 PS 637: Performed by: PSYCHIATRY & NEUROLOGY

## 2022-01-18 RX ORDER — ACETAMINOPHEN 325 MG/1
650 TABLET ORAL ONCE
Status: COMPLETED | OUTPATIENT
Start: 2022-01-18 | End: 2022-01-18

## 2022-01-18 RX ORDER — DIPHENHYDRAMINE HCL 25 MG
50 CAPSULE ORAL ONCE
Status: COMPLETED | OUTPATIENT
Start: 2022-01-18 | End: 2022-01-18

## 2022-01-18 RX ORDER — DIPHENHYDRAMINE HCL 25 MG
50 CAPSULE ORAL ONCE
Status: CANCELLED | OUTPATIENT
Start: 2022-02-08

## 2022-01-18 RX ORDER — ACETAMINOPHEN 325 MG/1
650 TABLET ORAL ONCE
Status: CANCELLED
Start: 2022-02-08

## 2022-01-18 RX ADMIN — HUMAN IMMUNOGLOBULIN G 75 G: 40 LIQUID INTRAVENOUS at 08:25

## 2022-01-18 RX ADMIN — DIPHENHYDRAMINE HYDROCHLORIDE 50 MG: 25 CAPSULE ORAL at 08:07

## 2022-01-18 RX ADMIN — ACETAMINOPHEN 650 MG: 325 TABLET ORAL at 08:08

## 2022-01-18 ASSESSMENT — PAIN SCALES - GENERAL: PAINLEVEL: MODERATE PAIN (4)

## 2022-01-18 NOTE — PROGRESS NOTES
Infusion Nursing Note:  Emiliano Aguiar presents today for IVIG.    Patient seen by provider today: No   present during visit today: Not Applicable.    Note: N/A.    Intravenous Access:  Peripheral IV placed.    Treatment Conditions:  Biological Infusion Checklist:  ~~~ NOTE: If the patient answers yes to any of the questions below, hold the infusion and contact ordering provider or on-call provider.    1. Have you recently had an elevated temperature, fever, chills, productive cough, coughing for 3 weeks or longer or hemoptysis, abnormal vital signs, night sweats,  chest pain or have you noticed a decrease in your appetite, unexplained weight loss or fatigue? No  2. Do you have any open wounds or new incisions? No  3. Do you have any recent or upcoming hospitalizations, surgeries or dental procedures? No  4. Do you currently have or recently have had any signs of illness or infection or are you on any antibiotics? No  5. Have you had any new, sudden or worsening abdominal pain? No  6. Have you or anyone in your household received a live vaccination in the past 4 weeks? Please note:  No live vaccines while on biologic/chemotherapy until 6 months after the last treatment.  Patient can receive the flu vaccine (shot only) and the pneumovax.  It is optimal for the patient to get these vaccines mid cycle, but they can be given at any time as long as it is not on the day of the infusion. No  7. Have you recently been diagnosed with any new nervous system diseases (ie. Multiple sclerosis, Guillain [place], seizures, neurological changes) or cancer diagnosis? No  8. Are you on any form of radiation or chemotherapy? No  9. Are you pregnant or breast feeding or do you have plans of pregnancy in the future? No  10. Have you been having any signs of worsening depression or suicidal ideations?  (benlysta only) No  11. Have there been any other new onset medical symptoms? No    Post Infusion Assessment:  Patient  tolerated infusion without incident.  Blood return noted pre and post infusion.  Site patent and intact, free from redness, edema or discomfort.  No evidence of extravasations.  Access discontinued per protocol.    Biologic Infusion Post Education: Call the triage nurse at your clinic or seek medical attention if you have chills and/or temperature greater than or equal to 100.5, uncontrolled nausea/vomiting, diarrhea, constipation, dizziness, shortness of breath, chest pain, heart palpitations, weakness or any other new or concerning symptoms, questions or concerns.  You cannot have any live virus vaccines prior to or during treatment or up to 6 months post infusion.  If you have an upcoming surgery, medical procedure or dental procedure during treatment, this should be discussed with your ordering physician and your surgeon/dentist.  If you are having any concerning symptom, if you are unsure if you should get your next infusion or wish to speak to a provider before your next infusion, please call your care coordinator or triage nurse at your clinic to notify them so we can adequately serve you.     Discharge Plan:   AVS to patient via [employer].  Patient will return 2/8/22 for next appointment.   Patient discharged in stable condition accompanied by: self.  Departure Mode: Ambulatory.      Lisa Eduardo RN

## 2022-02-08 ENCOUNTER — INFUSION THERAPY VISIT (OUTPATIENT)
Dept: INFUSION THERAPY | Facility: CLINIC | Age: 57
End: 2022-02-08
Attending: PSYCHIATRY & NEUROLOGY
Payer: COMMERCIAL

## 2022-02-08 VITALS
WEIGHT: 200.8 LBS | RESPIRATION RATE: 16 BRPM | TEMPERATURE: 97 F | SYSTOLIC BLOOD PRESSURE: 153 MMHG | OXYGEN SATURATION: 99 % | BODY MASS INDEX: 27.23 KG/M2 | DIASTOLIC BLOOD PRESSURE: 85 MMHG | HEART RATE: 62 BPM

## 2022-02-08 DIAGNOSIS — G61.81 CIDP (CHRONIC INFLAMMATORY DEMYELINATING POLYNEUROPATHY) (H): Primary | ICD-10-CM

## 2022-02-08 PROCEDURE — 96365 THER/PROPH/DIAG IV INF INIT: CPT

## 2022-02-08 PROCEDURE — 96366 THER/PROPH/DIAG IV INF ADDON: CPT

## 2022-02-08 PROCEDURE — 250N000011 HC RX IP 250 OP 636: Performed by: INTERNAL MEDICINE

## 2022-02-08 PROCEDURE — 250N000013 HC RX MED GY IP 250 OP 250 PS 637: Performed by: PSYCHIATRY & NEUROLOGY

## 2022-02-08 RX ORDER — DIPHENHYDRAMINE HCL 25 MG
50 CAPSULE ORAL ONCE
Status: CANCELLED | OUTPATIENT
Start: 2022-03-01

## 2022-02-08 RX ORDER — ACETAMINOPHEN 325 MG/1
650 TABLET ORAL ONCE
Status: CANCELLED
Start: 2022-03-01

## 2022-02-08 RX ORDER — ACETAMINOPHEN 325 MG/1
650 TABLET ORAL ONCE
Status: COMPLETED | OUTPATIENT
Start: 2022-02-08 | End: 2022-02-08

## 2022-02-08 RX ORDER — DIPHENHYDRAMINE HCL 25 MG
50 CAPSULE ORAL ONCE
Status: COMPLETED | OUTPATIENT
Start: 2022-02-08 | End: 2022-02-08

## 2022-02-08 RX ADMIN — ACETAMINOPHEN 650 MG: 325 TABLET ORAL at 08:19

## 2022-02-08 RX ADMIN — HUMAN IMMUNOGLOBULIN G 75 G: 40 LIQUID INTRAVENOUS at 08:30

## 2022-02-08 RX ADMIN — DIPHENHYDRAMINE HYDROCHLORIDE 50 MG: 25 CAPSULE ORAL at 08:18

## 2022-02-08 NOTE — PROGRESS NOTES
Infusion Nursing Note:  Emiliano Aguiar presents today for IVIG.    Patient seen by provider today: No   present during visit today: Not Applicable.    Note:  Pre-med with PO tylenol and PO benadryl.    IVIG started at rate of 0.5ml/hg/hr for 15mins and rate increased by 0.5ml/kg/hr [id number] to a max rate of 4ml/kg/hr.    Intravenous Access:  Peripheral IV placed.    Treatment Conditions:  Biological Infusion Checklist:  ~~~ NOTE: If the patient answers yes to any of the questions below, hold the infusion and contact ordering provider or on-call provider.    1. Have you recently had an elevated temperature, fever, chills, productive cough, coughing for 3 weeks or longer or hemoptysis, abnormal vital signs, night sweats,  chest pain or have you noticed a decrease in your appetite, unexplained weight loss or fatigue? No  2. Do you have any open wounds or new incisions? No  3. Do you have any recent or upcoming hospitalizations, surgeries or dental procedures? No  4. Do you currently have or recently have had any signs of illness or infection or are you on any antibiotics? No  5. Have you had any new, sudden or worsening abdominal pain? No  6. Have you or anyone in your household received a live vaccination in the past 4 weeks? Please note:  No live vaccines while on biologic/chemotherapy until 6 months after the last treatment.  Patient can receive the flu vaccine (shot only) and the pneumovax.  It is optimal for the patient to get these vaccines mid cycle, but they can be given at any time as long as it is not on the day of the infusion. No  7. Have you recently been diagnosed with any new nervous system diseases (ie. Multiple sclerosis, Guillain [employer], seizures, neurological changes) or cancer diagnosis? No  8. Are you on any form of radiation or chemotherapy? No  9. Are you pregnant or breast feeding or do you have plans of pregnancy in the future? No  10. Have you been having any signs of worsening  depression or suicidal ideations?  (benlysta only) No  11. Have there been any other new onset medical symptoms? No        Post Infusion Assessment:  Patient tolerated infusion without incident.  Blood return noted pre and post infusion.  Site patent and intact, free from redness, edema or discomfort.  No evidence of extravasations.  Access discontinued per protocol.       Discharge Plan:   AVS to patient via [employer].  Patient will return 3/1 for next appointment.   Patient discharged in stable condition accompanied by: self.  Departure Mode: Ambulatory.      Avery Muse RN

## 2022-03-01 ENCOUNTER — INFUSION THERAPY VISIT (OUTPATIENT)
Dept: INFUSION THERAPY | Facility: CLINIC | Age: 57
End: 2022-03-01
Attending: PSYCHIATRY & NEUROLOGY
Payer: COMMERCIAL

## 2022-03-01 VITALS
WEIGHT: 199.1 LBS | SYSTOLIC BLOOD PRESSURE: 148 MMHG | TEMPERATURE: 97.4 F | BODY MASS INDEX: 27 KG/M2 | OXYGEN SATURATION: 99 % | DIASTOLIC BLOOD PRESSURE: 84 MMHG | HEART RATE: 71 BPM | RESPIRATION RATE: 16 BRPM

## 2022-03-01 DIAGNOSIS — G61.81 CIDP (CHRONIC INFLAMMATORY DEMYELINATING POLYNEUROPATHY) (H): Primary | ICD-10-CM

## 2022-03-01 PROCEDURE — 96415 CHEMO IV INFUSION ADDL HR: CPT

## 2022-03-01 PROCEDURE — 250N000013 HC RX MED GY IP 250 OP 250 PS 637: Performed by: PSYCHIATRY & NEUROLOGY

## 2022-03-01 PROCEDURE — 96366 THER/PROPH/DIAG IV INF ADDON: CPT

## 2022-03-01 PROCEDURE — 96365 THER/PROPH/DIAG IV INF INIT: CPT

## 2022-03-01 PROCEDURE — 96413 CHEMO IV INFUSION 1 HR: CPT

## 2022-03-01 PROCEDURE — 250N000011 HC RX IP 250 OP 636: Performed by: INTERNAL MEDICINE

## 2022-03-01 RX ORDER — DIPHENHYDRAMINE HCL 25 MG
50 CAPSULE ORAL ONCE
Status: CANCELLED | OUTPATIENT
Start: 2022-03-22

## 2022-03-01 RX ORDER — ACETAMINOPHEN 325 MG/1
650 TABLET ORAL ONCE
Status: COMPLETED | OUTPATIENT
Start: 2022-03-01 | End: 2022-03-01

## 2022-03-01 RX ORDER — DIPHENHYDRAMINE HCL 25 MG
50 CAPSULE ORAL ONCE
Status: COMPLETED | OUTPATIENT
Start: 2022-03-01 | End: 2022-03-01

## 2022-03-01 RX ORDER — ACETAMINOPHEN 325 MG/1
650 TABLET ORAL ONCE
Status: CANCELLED
Start: 2022-03-22

## 2022-03-01 RX ADMIN — DIPHENHYDRAMINE HYDROCHLORIDE 50 MG: 25 CAPSULE ORAL at 12:22

## 2022-03-01 RX ADMIN — HUMAN IMMUNOGLOBULIN G 75 G: 40 LIQUID INTRAVENOUS at 12:35

## 2022-03-01 RX ADMIN — ACETAMINOPHEN 650 MG: 325 TABLET ORAL at 12:22

## 2022-03-01 ASSESSMENT — PAIN SCALES - GENERAL: PAINLEVEL: SEVERE PAIN (6)

## 2022-03-01 NOTE — PROGRESS NOTES
"Infusion Nursing Note:  Emiliano Aguiar presents today for IVIG.    Patient seen by provider today: No   present during visit today: Not Applicable.    Note: Patient states he feels he is \"very ready for the infusion today. Feeling really achy all over\".    IVIG started at rate of 0.5ml/hg/hr for 15mins and rate increased by 0.5ml/kg/hr [id number] to a max rate of 4ml/kg/hr.     Intravenous Access:  Peripheral IV placed.    Treatment Conditions:  Biological Infusion Checklist:  ~~~ NOTE: If the patient answers yes to any of the questions below, hold the infusion and contact ordering provider or on-call provider.    1. Have you recently had an elevated temperature, fever, chills, productive cough, coughing for 3 weeks or longer or hemoptysis, abnormal vital signs, night sweats,  chest pain or have you noticed a decrease in your appetite, unexplained weight loss or fatigue? No  2. Do you have any open wounds or new incisions? No  3. Do you have any recent or upcoming hospitalizations, surgeries or dental procedures? No  4. Do you currently have or recently have had any signs of illness or infection or are you on any antibiotics? No  5. Have you had any new, sudden or worsening abdominal pain? No  6. Have you or anyone in your household received a live vaccination in the past 4 weeks? Please note:  No live vaccines while on biologic/chemotherapy until 6 months after the last treatment.  Patient can receive the flu vaccine (shot only) and the pneumovax.  It is optimal for the patient to get these vaccines mid cycle, but they can be given at any time as long as it is not on the day of the infusion. No  7. Have you recently been diagnosed with any new nervous system diseases (ie. Multiple sclerosis, Guillain [place], seizures, neurological changes) or cancer diagnosis? No  8. Are you on any form of radiation or chemotherapy? No  9. Are you pregnant or breast feeding or do you have plans of pregnancy in the future? " No  10. Have you been having any signs of worsening depression or suicidal ideations?  (benlysta only) No  11. Have there been any other new onset medical symptoms? No  Post Infusion Assessment:  Patient tolerated infusion without incident.  Blood return noted pre and post infusion.  Site patent and intact, free from redness, edema or discomfort.  No evidence of extravasations.  Access discontinued per protocol.     Discharge Plan:   Discharge instructions reviewed with: Patient.  Patient and/or family verbalized understanding of discharge instructions and all questions answered.  AVS to patient via [employer].  Patient will return 3/22/22 for next appointment.   Patient discharged in stable condition accompanied by: self.  Departure Mode: Ambulatory.      Lisa Eduardo RN

## 2022-03-22 ENCOUNTER — INFUSION THERAPY VISIT (OUTPATIENT)
Dept: INFUSION THERAPY | Facility: CLINIC | Age: 57
End: 2022-03-22
Attending: PSYCHIATRY & NEUROLOGY
Payer: COMMERCIAL

## 2022-03-22 VITALS
WEIGHT: 199.1 LBS | DIASTOLIC BLOOD PRESSURE: 80 MMHG | SYSTOLIC BLOOD PRESSURE: 130 MMHG | OXYGEN SATURATION: 98 % | BODY MASS INDEX: 27 KG/M2 | RESPIRATION RATE: 16 BRPM | HEART RATE: 60 BPM | TEMPERATURE: 97.9 F

## 2022-03-22 DIAGNOSIS — G61.81 CIDP (CHRONIC INFLAMMATORY DEMYELINATING POLYNEUROPATHY) (H): Primary | ICD-10-CM

## 2022-03-22 PROCEDURE — 250N000013 HC RX MED GY IP 250 OP 250 PS 637: Performed by: PSYCHIATRY & NEUROLOGY

## 2022-03-22 PROCEDURE — 250N000011 HC RX IP 250 OP 636: Performed by: INTERNAL MEDICINE

## 2022-03-22 PROCEDURE — 96366 THER/PROPH/DIAG IV INF ADDON: CPT

## 2022-03-22 PROCEDURE — 96365 THER/PROPH/DIAG IV INF INIT: CPT

## 2022-03-22 RX ORDER — DIPHENHYDRAMINE HCL 25 MG
50 CAPSULE ORAL ONCE
Status: CANCELLED | OUTPATIENT
Start: 2022-04-12

## 2022-03-22 RX ORDER — ACETAMINOPHEN 325 MG/1
650 TABLET ORAL ONCE
Status: COMPLETED | OUTPATIENT
Start: 2022-03-22 | End: 2022-03-22

## 2022-03-22 RX ORDER — ACETAMINOPHEN 325 MG/1
650 TABLET ORAL ONCE
Status: CANCELLED
Start: 2022-04-12

## 2022-03-22 RX ORDER — DIPHENHYDRAMINE HCL 25 MG
50 CAPSULE ORAL ONCE
Status: COMPLETED | OUTPATIENT
Start: 2022-03-22 | End: 2022-03-22

## 2022-03-22 RX ADMIN — DIPHENHYDRAMINE HYDROCHLORIDE 50 MG: 25 CAPSULE ORAL at 08:20

## 2022-03-22 RX ADMIN — HUMAN IMMUNOGLOBULIN G 75 G: 40 LIQUID INTRAVENOUS at 08:25

## 2022-03-22 RX ADMIN — ACETAMINOPHEN 650 MG: 325 TABLET ORAL at 08:20

## 2022-03-22 NOTE — PROGRESS NOTES
Infusion Nursing Note:  Emiliano Aguiar presents today for IVIG.    Patient seen by provider today: No   present during visit today: Not Applicable.    Note: N/A.      Intravenous Access:  Peripheral IV placed.    Treatment Conditions:  Biological Infusion Checklist:  ~~~ NOTE: If the patient answers yes to any of the questions below, hold the infusion and contact ordering provider or on-call provider.    1. Have you recently had an elevated temperature, fever, chills, productive cough, coughing for 3 weeks or longer or hemoptysis, abnormal vital signs, night sweats,  chest pain or have you noticed a decrease in your appetite, unexplained weight loss or fatigue? No  2. Do you have any open wounds or new incisions? No  3. Do you have any recent or upcoming hospitalizations, surgeries or dental procedures? No  4. Do you currently have or recently have had any signs of illness or infection or are you on any antibiotics? No  5. Have you had any new, sudden or worsening abdominal pain? No  6. Have you or anyone in your household received a live vaccination in the past 4 weeks? Please note:  No live vaccines while on biologic/chemotherapy until 6 months after the last treatment.  Patient can receive the flu vaccine (shot only) and the pneumovax.  It is optimal for the patient to get these vaccines mid cycle, but they can be given at any time as long as it is not on the day of the infusion. No  7. Have you recently been diagnosed with any new nervous system diseases (ie. Multiple sclerosis, Guillain [place], seizures, neurological changes) or cancer diagnosis? No  8. Are you on any form of radiation or chemotherapy? No  9. Are you pregnant or breast feeding or do you have plans of pregnancy in the future? No  10. Have you been having any signs of worsening depression or suicidal ideations?  (benlysta only) No  11. Have there been any other new onset medical symptoms? No    Infusion started at 0.5  ml/kg/hr  Increased by 0.5 ml/kg/hr every 15 minutes to max of 4 ml/kg/hr       Post Infusion Assessment:  Patient tolerated infusion without incident.  Blood return noted pre and post infusion.  Site patent and intact, free from redness, edema or discomfort.  No evidence of extravasations.  Access discontinued per protocol.  Biologic Infusion Post Education: Call the triage nurse at your clinic or seek medical attention if you have chills and/or temperature greater than or equal to 100.5, uncontrolled nausea/vomiting, diarrhea, constipation, dizziness, shortness of breath, chest pain, heart palpitations, weakness or any other new or concerning symptoms, questions or concerns.  You cannot have any live virus vaccines prior to or during treatment or up to 6 months post infusion.  If you have an upcoming surgery, medical procedure or dental procedure during treatment, this should be discussed with your ordering physician and your surgeon/dentist.  If you are having any concerning symptom, if you are unsure if you should get your next infusion or wish to speak to a provider before your next infusion, please call your care coordinator or triage nurse at your clinic to notify them so we can adequately serve you.       Discharge Plan:   Discharge instructions reviewed with: Patient.  Patient and/or family verbalized understanding of discharge instructions and all questions answered.  AVS to patient via [employer].  Patient will return 4/12 for next appointment.   Patient discharged in stable condition accompanied by: self.  Departure Mode: Ambulatory.      Whit Riddle RN

## 2022-04-12 ENCOUNTER — INFUSION THERAPY VISIT (OUTPATIENT)
Dept: INFUSION THERAPY | Facility: CLINIC | Age: 57
End: 2022-04-12
Attending: PSYCHIATRY & NEUROLOGY
Payer: COMMERCIAL

## 2022-04-12 VITALS
SYSTOLIC BLOOD PRESSURE: 150 MMHG | TEMPERATURE: 97 F | HEART RATE: 63 BPM | OXYGEN SATURATION: 98 % | BODY MASS INDEX: 26.58 KG/M2 | DIASTOLIC BLOOD PRESSURE: 83 MMHG | WEIGHT: 196 LBS | RESPIRATION RATE: 16 BRPM

## 2022-04-12 DIAGNOSIS — G61.81 CIDP (CHRONIC INFLAMMATORY DEMYELINATING POLYNEUROPATHY) (H): Primary | ICD-10-CM

## 2022-04-12 PROCEDURE — 250N000011 HC RX IP 250 OP 636: Performed by: INTERNAL MEDICINE

## 2022-04-12 PROCEDURE — 250N000013 HC RX MED GY IP 250 OP 250 PS 637: Performed by: PSYCHIATRY & NEUROLOGY

## 2022-04-12 PROCEDURE — 96366 THER/PROPH/DIAG IV INF ADDON: CPT

## 2022-04-12 PROCEDURE — 96365 THER/PROPH/DIAG IV INF INIT: CPT

## 2022-04-12 RX ORDER — ACETAMINOPHEN 325 MG/1
650 TABLET ORAL ONCE
Status: COMPLETED | OUTPATIENT
Start: 2022-04-12 | End: 2022-04-12

## 2022-04-12 RX ORDER — ACETAMINOPHEN 325 MG/1
650 TABLET ORAL ONCE
Status: CANCELLED
Start: 2022-05-03

## 2022-04-12 RX ORDER — DIPHENHYDRAMINE HCL 25 MG
50 CAPSULE ORAL ONCE
Status: CANCELLED | OUTPATIENT
Start: 2022-05-03

## 2022-04-12 RX ORDER — DIPHENHYDRAMINE HCL 25 MG
50 CAPSULE ORAL ONCE
Status: COMPLETED | OUTPATIENT
Start: 2022-04-12 | End: 2022-04-12

## 2022-04-12 RX ADMIN — ACETAMINOPHEN 650 MG: 325 TABLET ORAL at 08:31

## 2022-04-12 RX ADMIN — DIPHENHYDRAMINE HYDROCHLORIDE 50 MG: 25 CAPSULE ORAL at 08:31

## 2022-04-12 RX ADMIN — HUMAN IMMUNOGLOBULIN G 75 G: 40 LIQUID INTRAVENOUS at 08:42

## 2022-04-12 NOTE — PROGRESS NOTES
Infusion Nursing Note:  Emiliano Aguiar presents today for IVIG.    Patient seen by provider today: No   present during visit today: Not Applicable.    Note: N/A.      Intravenous Access:  Peripheral IV placed.    Treatment Conditions:  Biological Infusion Checklist:  ~~~ NOTE: If the patient answers yes to any of the questions below, hold the infusion and contact ordering provider or on-call provider.    1. Have you recently had an elevated temperature, fever, chills, productive cough, coughing for 3 weeks or longer or hemoptysis, abnormal vital signs, night sweats,  chest pain or have you noticed a decrease in your appetite, unexplained weight loss or fatigue? No  2. Do you have any open wounds or new incisions? No  3. Do you have any recent or upcoming hospitalizations, surgeries or dental procedures? No  4. Do you currently have or recently have had any signs of illness or infection or are you on any antibiotics? No  5. Have you had any new, sudden or worsening abdominal pain? No  6. Have you or anyone in your household received a live vaccination in the past 4 weeks? Please note:  No live vaccines while on biologic/chemotherapy until 6 months after the last treatment.  Patient can receive the flu vaccine (shot only) and the pneumovax.  It is optimal for the patient to get these vaccines mid cycle, but they can be given at any time as long as it is not on the day of the infusion. No  7. Have you recently been diagnosed with any new nervous system diseases (ie. Multiple sclerosis, Guillain [place], seizures, neurological changes) or cancer diagnosis? No  8. Are you on any form of radiation or chemotherapy? No  9. Are you pregnant or breast feeding or do you have plans of pregnancy in the future? No  10. Have you been having any signs of worsening depression or suicidal ideations?  (benlysta only) No  11. Have there been any other new onset medical symptoms? No        Post Infusion Assessment:  Patient  tolerated infusion without incident.  Blood return noted pre and post infusion.  Site patent and intact, free from redness, edema or discomfort.  No evidence of extravasations.  Access discontinued per protocol.       Discharge Plan:   AVS to patient via MYCHART.  Patient will return 5/3/22 for next appointment.   Patient discharged in stable condition accompanied by: self.  Departure Mode: Ambulatory.      Brenda Bui RN

## 2022-05-03 ENCOUNTER — INFUSION THERAPY VISIT (OUTPATIENT)
Dept: INFUSION THERAPY | Facility: CLINIC | Age: 57
End: 2022-05-03
Attending: PSYCHIATRY & NEUROLOGY
Payer: COMMERCIAL

## 2022-05-03 VITALS
TEMPERATURE: 97.4 F | SYSTOLIC BLOOD PRESSURE: 133 MMHG | BODY MASS INDEX: 26.42 KG/M2 | RESPIRATION RATE: 16 BRPM | WEIGHT: 194.8 LBS | HEART RATE: 73 BPM | OXYGEN SATURATION: 97 % | DIASTOLIC BLOOD PRESSURE: 83 MMHG

## 2022-05-03 DIAGNOSIS — G61.81 CIDP (CHRONIC INFLAMMATORY DEMYELINATING POLYNEUROPATHY) (H): Primary | ICD-10-CM

## 2022-05-03 PROCEDURE — 250N000013 HC RX MED GY IP 250 OP 250 PS 637: Performed by: PSYCHIATRY & NEUROLOGY

## 2022-05-03 PROCEDURE — 96365 THER/PROPH/DIAG IV INF INIT: CPT

## 2022-05-03 PROCEDURE — 250N000011 HC RX IP 250 OP 636: Performed by: INTERNAL MEDICINE

## 2022-05-03 PROCEDURE — 96366 THER/PROPH/DIAG IV INF ADDON: CPT

## 2022-05-03 RX ORDER — DIPHENHYDRAMINE HCL 25 MG
50 CAPSULE ORAL ONCE
Status: CANCELLED | OUTPATIENT
Start: 2022-05-24

## 2022-05-03 RX ORDER — ACETAMINOPHEN 325 MG/1
650 TABLET ORAL ONCE
Status: CANCELLED
Start: 2022-05-24

## 2022-05-03 RX ORDER — ACETAMINOPHEN 325 MG/1
650 TABLET ORAL ONCE
Status: COMPLETED | OUTPATIENT
Start: 2022-05-03 | End: 2022-05-03

## 2022-05-03 RX ORDER — DIPHENHYDRAMINE HCL 25 MG
50 CAPSULE ORAL ONCE
Status: COMPLETED | OUTPATIENT
Start: 2022-05-03 | End: 2022-05-03

## 2022-05-03 RX ADMIN — ACETAMINOPHEN 650 MG: 325 TABLET ORAL at 08:40

## 2022-05-03 RX ADMIN — HUMAN IMMUNOGLOBULIN G 75 G: 40 LIQUID INTRAVENOUS at 08:46

## 2022-05-03 RX ADMIN — DIPHENHYDRAMINE HYDROCHLORIDE 50 MG: 25 CAPSULE ORAL at 08:40

## 2022-05-03 NOTE — PROGRESS NOTES
Infusion Nursing Note:  Emiliano Aguiar presents today for IVIG.    Patient seen by provider today: No   present during visit today: Not Applicable.    Note: Patient declined steroid premed, tylenol and benadryl given.  Increased infusion rate by 0.5ml/kg/hr every 15 minutes, max rate 4ml/kg/hr.      Intravenous Access:  Peripheral IV placed.    Treatment Conditions:  Biological Infusion Checklist:  ~~~ NOTE: If the patient answers yes to any of the questions below, hold the infusion and contact ordering provider or on-call provider.    1. Have you recently had an elevated temperature, fever, chills, productive cough, coughing for 3 weeks or longer or hemoptysis, abnormal vital signs, night sweats,  chest pain or have you noticed a decrease in your appetite, unexplained weight loss or fatigue? No  2. Do you have any open wounds or new incisions? No  3. Do you have any recent or upcoming hospitalizations, surgeries or dental procedures? No  4. Do you currently have or recently have had any signs of illness or infection or are you on any antibiotics? No  5. Have you had any new, sudden or worsening abdominal pain? No  6. Have you or anyone in your household received a live vaccination in the past 4 weeks? Please note:  No live vaccines while on biologic/chemotherapy until 6 months after the last treatment.  Patient can receive the flu vaccine (shot only) and the pneumovax.  It is optimal for the patient to get these vaccines mid cycle, but they can be given at any time as long as it is not on the day of the infusion. No  7. Have you recently been diagnosed with any new nervous system diseases (ie. Multiple sclerosis, Guillain [place], seizures, neurological changes) or cancer diagnosis? No  8. Are you on any form of radiation or chemotherapy? No  9. Are you pregnant or breast feeding or do you have plans of pregnancy in the future? No  10. Have you been having any signs of worsening depression or suicidal  ideations?  (benlysta only) No  11. Have there been any other new onset medical symptoms? No        Post Infusion Assessment:  Patient tolerated infusion without incident.  Blood return noted pre and post infusion.  Site patent and intact, free from redness, edema or discomfort.  No evidence of extravasations.  Access discontinued per protocol.    Biologic Infusion Post Education: Call the triage nurse at your clinic or seek medical attention if you have chills and/or temperature greater than or equal to 100.5, uncontrolled nausea/vomiting, diarrhea, constipation, dizziness, shortness of breath, chest pain, heart palpitations, weakness or any other new or concerning symptoms, questions or concerns.  You cannot have any live virus vaccines prior to or during treatment or up to 6 months post infusion.  If you have an upcoming surgery, medical procedure or dental procedure during treatment, this should be discussed with your ordering physician and your surgeon/dentist.  If you are having any concerning symptom, if you are unsure if you should get your next infusion or wish to speak to a provider before your next infusion, please call your care coordinator or triage nurse at your clinic to notify them so we can adequately serve you.       Discharge Plan:   AVS to patient via [employer].  Patient will return 5/24/22 for next appointment.   Patient discharged in stable condition accompanied by: self.  Departure Mode: Ambulatory.      Brenda Bui RN

## 2022-05-07 ENCOUNTER — HEALTH MAINTENANCE LETTER (OUTPATIENT)
Age: 57
End: 2022-05-07

## 2022-05-24 ENCOUNTER — INFUSION THERAPY VISIT (OUTPATIENT)
Dept: INFUSION THERAPY | Facility: CLINIC | Age: 57
End: 2022-05-24
Attending: INTERNAL MEDICINE
Payer: COMMERCIAL

## 2022-05-24 VITALS
TEMPERATURE: 97.4 F | WEIGHT: 197.4 LBS | HEART RATE: 61 BPM | OXYGEN SATURATION: 98 % | DIASTOLIC BLOOD PRESSURE: 83 MMHG | SYSTOLIC BLOOD PRESSURE: 136 MMHG | BODY MASS INDEX: 26.77 KG/M2

## 2022-05-24 DIAGNOSIS — G61.81 CIDP (CHRONIC INFLAMMATORY DEMYELINATING POLYNEUROPATHY) (H): Primary | ICD-10-CM

## 2022-05-24 PROCEDURE — 96365 THER/PROPH/DIAG IV INF INIT: CPT

## 2022-05-24 PROCEDURE — 250N000013 HC RX MED GY IP 250 OP 250 PS 637: Performed by: PSYCHIATRY & NEUROLOGY

## 2022-05-24 PROCEDURE — 96366 THER/PROPH/DIAG IV INF ADDON: CPT

## 2022-05-24 PROCEDURE — 250N000011 HC RX IP 250 OP 636: Performed by: INTERNAL MEDICINE

## 2022-05-24 RX ORDER — DIPHENHYDRAMINE HCL 25 MG
50 CAPSULE ORAL ONCE
Status: COMPLETED | OUTPATIENT
Start: 2022-05-24 | End: 2022-05-24

## 2022-05-24 RX ORDER — ACETAMINOPHEN 325 MG/1
650 TABLET ORAL ONCE
Status: CANCELLED
Start: 2022-06-14

## 2022-05-24 RX ORDER — DIPHENHYDRAMINE HCL 25 MG
50 CAPSULE ORAL ONCE
Status: CANCELLED | OUTPATIENT
Start: 2022-06-14

## 2022-05-24 RX ORDER — ACETAMINOPHEN 325 MG/1
650 TABLET ORAL ONCE
Status: COMPLETED | OUTPATIENT
Start: 2022-05-24 | End: 2022-05-24

## 2022-05-24 RX ADMIN — HUMAN IMMUNOGLOBULIN G 75 G: 40 LIQUID INTRAVENOUS at 08:20

## 2022-05-24 RX ADMIN — ACETAMINOPHEN 650 MG: 325 TABLET ORAL at 08:15

## 2022-05-24 RX ADMIN — DIPHENHYDRAMINE HYDROCHLORIDE 50 MG: 25 CAPSULE ORAL at 08:15

## 2022-05-24 NOTE — PROGRESS NOTES
"Infusion Nursing Note:  Emiliano Aguiar presents today for IVIG with premeds.    Patient seen by provider today: No   present during visit today: Not Applicable.    Note: Pt requests Tylenol and Benadryl only.  Does not want steroid.      IVIG initiated at rate of 0.5mg/kg/hr.  Increased by 0.5mg/kg/hr every 15 minutes to max rate of 4.0mg/kg/hr.    Pt states that he generally feels ill for up to 5 days post IVIG, with severe meningitis type headaches, low grade nausea, and just an \"unwell feeling.\"    Pt reports that his neurologist was trying to switch him to subcutaneous IVIG but so far this has been denied by insurance a couple of times.      Intravenous Access:  Peripheral IV placed.    Treatment Conditions:  Biological Infusion Checklist:  ~~~ NOTE: If the patient answers yes to any of the questions below, hold the infusion and contact ordering provider or on-call provider.    1. Have you recently had an elevated temperature, fever, chills, productive cough, coughing for 3 weeks or longer or hemoptysis, abnormal vital signs, night sweats,  chest pain or have you noticed a decrease in your appetite, unexplained weight loss or fatigue? No  2. Do you have any open wounds or new incisions? No  3. Do you have any recent or upcoming hospitalizations, surgeries or dental procedures? No  4. Do you currently have or recently have had any signs of illness or infection or are you on any antibiotics? No  5. Have you had any new, sudden or worsening abdominal pain? No  6. Have you or anyone in your household received a live vaccination in the past 4 weeks? Please note:  No live vaccines while on biologic/chemotherapy until 6 months after the last treatment.  Patient can receive the flu vaccine (shot only) and the pneumovax.  It is optimal for the patient to get these vaccines mid cycle, but they can be given at any time as long as it is not on the day of the infusion. No  7. Have you recently been diagnosed with " any new nervous system diseases (ie. Multiple sclerosis, Guillain [place], seizures, neurological changes) or cancer diagnosis? No  8. Are you on any form of radiation or chemotherapy? No  9. Are you pregnant or breast feeding or do you have plans of pregnancy in the future? No  10. Have you been having any signs of worsening depression or suicidal ideations?  (benlysta only) No  11. Have there been any other new onset medical symptoms? No        Post Infusion Assessment:  Patient tolerated infusion without incident.  Blood return noted pre and post infusion.  Site patent and intact, free from redness, edema or discomfort.  No evidence of extravasations.  Access discontinued per protocol.  Biologic Infusion Post Education: Call the triage nurse at your clinic or seek medical attention if you have chills and/or temperature greater than or equal to 100.5, uncontrolled nausea/vomiting, diarrhea, constipation, dizziness, shortness of breath, chest pain, heart palpitations, weakness or any other new or concerning symptoms, questions or concerns.  You cannot have any live virus vaccines prior to or during treatment or up to 6 months post infusion.  If you have an upcoming surgery, medical procedure or dental procedure during treatment, this should be discussed with your ordering physician and your surgeon/dentist.  If you are having any concerning symptom, if you are unsure if you should get your next infusion or wish to speak to a provider before your next infusion, please call your care coordinator or triage nurse at your clinic to notify them so we can adequately serve you.       Discharge Plan:   AVS to patient via [employer].  Patient will return 6/14/22 for IVIG for next appointment.   Patient discharged in stable condition accompanied by: self.  Departure Mode: Ambulatory.      Carli Lopez RN

## 2022-06-14 ENCOUNTER — INFUSION THERAPY VISIT (OUTPATIENT)
Dept: INFUSION THERAPY | Facility: CLINIC | Age: 57
End: 2022-06-14
Attending: PSYCHIATRY & NEUROLOGY
Payer: COMMERCIAL

## 2022-06-14 VITALS
HEART RATE: 69 BPM | DIASTOLIC BLOOD PRESSURE: 73 MMHG | BODY MASS INDEX: 27.14 KG/M2 | RESPIRATION RATE: 16 BRPM | WEIGHT: 200.1 LBS | OXYGEN SATURATION: 100 % | TEMPERATURE: 98 F | SYSTOLIC BLOOD PRESSURE: 128 MMHG

## 2022-06-14 DIAGNOSIS — G61.81 CIDP (CHRONIC INFLAMMATORY DEMYELINATING POLYNEUROPATHY) (H): Primary | ICD-10-CM

## 2022-06-14 PROCEDURE — 96366 THER/PROPH/DIAG IV INF ADDON: CPT

## 2022-06-14 PROCEDURE — 250N000013 HC RX MED GY IP 250 OP 250 PS 637: Performed by: PSYCHIATRY & NEUROLOGY

## 2022-06-14 PROCEDURE — 250N000011 HC RX IP 250 OP 636: Performed by: INTERNAL MEDICINE

## 2022-06-14 PROCEDURE — 96365 THER/PROPH/DIAG IV INF INIT: CPT

## 2022-06-14 RX ORDER — ACETAMINOPHEN 325 MG/1
650 TABLET ORAL ONCE
Status: COMPLETED | OUTPATIENT
Start: 2022-06-14 | End: 2022-06-14

## 2022-06-14 RX ORDER — DIPHENHYDRAMINE HCL 25 MG
50 CAPSULE ORAL ONCE
Status: COMPLETED | OUTPATIENT
Start: 2022-06-14 | End: 2022-06-14

## 2022-06-14 RX ORDER — ACETAMINOPHEN 325 MG/1
650 TABLET ORAL ONCE
Status: CANCELLED
Start: 2022-07-05

## 2022-06-14 RX ORDER — DIPHENHYDRAMINE HCL 25 MG
50 CAPSULE ORAL ONCE
Status: CANCELLED | OUTPATIENT
Start: 2022-07-05

## 2022-06-14 RX ADMIN — DIPHENHYDRAMINE HYDROCHLORIDE 50 MG: 25 CAPSULE ORAL at 08:09

## 2022-06-14 RX ADMIN — ACETAMINOPHEN 650 MG: 325 TABLET ORAL at 08:09

## 2022-06-14 RX ADMIN — HUMAN IMMUNOGLOBULIN G 75 G: 40 LIQUID INTRAVENOUS at 08:18

## 2022-06-14 NOTE — PROGRESS NOTES
Infusion Nursing Note:  Emiliano Aguiar presents today for IV/IG.    Patient seen by provider today: No   present during visit today: Not Applicable.    Note: Tylenol and Benadryl for pre-meds, per patient preference.  Declined steroid.    IVIG initiated at rate of 0.5 ml/kg/hr and increased by 0.5 ml/hk/hr every 15 minutes to a max of 4 ml/kg/hr.    Emiliano reports that he typically feels ill for 5 days post IVIG (through Sunday) and this is unchanged.    Intravenous Access:  Peripheral IV placed.    Treatment Conditions:  Biological Infusion Checklist:  ~~~ NOTE: If the patient answers yes to any of the questions below, hold the infusion and contact ordering provider or on-call provider.    1. Have you recently had an elevated temperature, fever, chills, productive cough, coughing for 3 weeks or longer or hemoptysis, abnormal vital signs, night sweats,  chest pain or have you noticed a decrease in your appetite, unexplained weight loss or fatigue? No  2. Do you have any open wounds or new incisions? No  3. Do you have any recent or upcoming hospitalizations, surgeries or dental procedures? No  4. Do you currently have or recently have had any signs of illness or infection or are you on any antibiotics? No  5. Have you had any new, sudden or worsening abdominal pain? No  6. Have you or anyone in your household received a live vaccination in the past 4 weeks? Please note:  No live vaccines while on biologic/chemotherapy until 6 months after the last treatment.  Patient can receive the flu vaccine (shot only) and the pneumovax.  It is optimal for the patient to get these vaccines mid cycle, but they can be given at any time as long as it is not on the day of the infusion. No  7. Have you recently been diagnosed with any new nervous system diseases (ie. Multiple sclerosis, Guillain [place], seizures, neurological changes) or cancer diagnosis? No  8. Are you on any form of radiation or chemotherapy? No  9. Are  you pregnant or breast feeding or do you have plans of pregnancy in the future? No  10. Have you been having any signs of worsening depression or suicidal ideations?  (benlysta only) N/A  11. Have there been any other new onset medical symptoms? No  12. Have you had any new blood clots? (IVIG only) No    Post Infusion Assessment:  Patient tolerated infusion without incident.  Blood return noted pre and post infusion.  Site patent and intact, free from redness, edema or discomfort.  No evidence of extravasations.  Access discontinued per protocol.     Discharge Plan:   AVS to patient via MYCHART.  Patient will return 7/5 for next appointment.   Patient discharged in stable condition accompanied by: self.  Departure Mode: Ambulatory.      Ankur Germain RN

## 2022-07-05 ENCOUNTER — INFUSION THERAPY VISIT (OUTPATIENT)
Dept: INFUSION THERAPY | Facility: CLINIC | Age: 57
End: 2022-07-05
Attending: PSYCHIATRY & NEUROLOGY
Payer: COMMERCIAL

## 2022-07-05 VITALS
SYSTOLIC BLOOD PRESSURE: 126 MMHG | BODY MASS INDEX: 26.83 KG/M2 | TEMPERATURE: 97.2 F | DIASTOLIC BLOOD PRESSURE: 74 MMHG | OXYGEN SATURATION: 98 % | WEIGHT: 197.8 LBS | HEART RATE: 54 BPM

## 2022-07-05 DIAGNOSIS — G61.81 CIDP (CHRONIC INFLAMMATORY DEMYELINATING POLYNEUROPATHY) (H): Primary | ICD-10-CM

## 2022-07-05 PROCEDURE — 250N000011 HC RX IP 250 OP 636: Performed by: PSYCHIATRY & NEUROLOGY

## 2022-07-05 PROCEDURE — 96366 THER/PROPH/DIAG IV INF ADDON: CPT

## 2022-07-05 PROCEDURE — 96365 THER/PROPH/DIAG IV INF INIT: CPT

## 2022-07-05 PROCEDURE — 250N000013 HC RX MED GY IP 250 OP 250 PS 637: Performed by: PSYCHIATRY & NEUROLOGY

## 2022-07-05 RX ORDER — ACETAMINOPHEN 325 MG/1
650 TABLET ORAL ONCE
Status: COMPLETED | OUTPATIENT
Start: 2022-07-05 | End: 2022-07-05

## 2022-07-05 RX ORDER — DIPHENHYDRAMINE HCL 25 MG
50 CAPSULE ORAL ONCE
Status: COMPLETED | OUTPATIENT
Start: 2022-07-05 | End: 2022-07-05

## 2022-07-05 RX ORDER — HEPARIN SODIUM (PORCINE) LOCK FLUSH IV SOLN 100 UNIT/ML 100 UNIT/ML
5 SOLUTION INTRAVENOUS
Status: CANCELLED | OUTPATIENT
Start: 2022-07-05

## 2022-07-05 RX ORDER — HEPARIN SODIUM,PORCINE 10 UNIT/ML
5 VIAL (ML) INTRAVENOUS
Status: CANCELLED | OUTPATIENT
Start: 2022-07-05

## 2022-07-05 RX ORDER — EPINEPHRINE 1 MG/ML
0.3 INJECTION, SOLUTION INTRAMUSCULAR; SUBCUTANEOUS EVERY 5 MIN PRN
Status: CANCELLED | OUTPATIENT
Start: 2022-07-05

## 2022-07-05 RX ORDER — ACETAMINOPHEN 325 MG/1
650 TABLET ORAL ONCE
Status: CANCELLED
Start: 2022-07-05

## 2022-07-05 RX ORDER — ALBUTEROL SULFATE 0.83 MG/ML
2.5 SOLUTION RESPIRATORY (INHALATION)
Status: CANCELLED | OUTPATIENT
Start: 2022-07-05

## 2022-07-05 RX ORDER — MEPERIDINE HYDROCHLORIDE 25 MG/ML
25 INJECTION INTRAMUSCULAR; INTRAVENOUS; SUBCUTANEOUS EVERY 30 MIN PRN
Status: CANCELLED | OUTPATIENT
Start: 2022-07-05

## 2022-07-05 RX ORDER — DIPHENHYDRAMINE HYDROCHLORIDE 50 MG/ML
50 INJECTION INTRAMUSCULAR; INTRAVENOUS
Status: CANCELLED
Start: 2022-07-05

## 2022-07-05 RX ORDER — NALOXONE HYDROCHLORIDE 0.4 MG/ML
0.2 INJECTION, SOLUTION INTRAMUSCULAR; INTRAVENOUS; SUBCUTANEOUS
Status: CANCELLED | OUTPATIENT
Start: 2022-07-05

## 2022-07-05 RX ORDER — ALBUTEROL SULFATE 90 UG/1
1-2 AEROSOL, METERED RESPIRATORY (INHALATION)
Status: CANCELLED
Start: 2022-07-05

## 2022-07-05 RX ORDER — METHYLPREDNISOLONE SODIUM SUCCINATE 125 MG/2ML
125 INJECTION, POWDER, LYOPHILIZED, FOR SOLUTION INTRAMUSCULAR; INTRAVENOUS
Status: CANCELLED
Start: 2022-07-05

## 2022-07-05 RX ORDER — DIPHENHYDRAMINE HCL 25 MG
50 CAPSULE ORAL ONCE
Status: CANCELLED
Start: 2022-07-05

## 2022-07-05 RX ADMIN — ACETAMINOPHEN 650 MG: 325 TABLET ORAL at 08:08

## 2022-07-05 RX ADMIN — HUMAN IMMUNOGLOBULIN G 75 G: 40 LIQUID INTRAVENOUS at 08:17

## 2022-07-05 RX ADMIN — DIPHENHYDRAMINE HYDROCHLORIDE 50 MG: 25 CAPSULE ORAL at 08:08

## 2022-07-05 NOTE — PROGRESS NOTES
Infusion Nursing Note:  Emiliano Aguiar presents today for IVIG.    Patient seen by provider today: No   present during visit today: Not Applicable.    Note: IVIG started at 0.5mg/kg/hr and increased by 0.5mg/kg/hr every 15 minutes to a max of 4mg/kg/hr.    Intravenous Access:  Peripheral IV placed.    Treatment Conditions:  Biological Infusion Checklist:  ~~~ NOTE: If the patient answers yes to any of the questions below, hold the infusion and contact ordering provider or on-call provider.    1. Have you recently had an elevated temperature, fever, chills, productive cough, coughing for 3 weeks or longer or hemoptysis, abnormal vital signs, night sweats,  chest pain or have you noticed a decrease in your appetite, unexplained weight loss or fatigue? No  2. Do you have any open wounds or new incisions? No  3. Do you have any recent or upcoming hospitalizations, surgeries or dental procedures? No  4. Do you currently have or recently have had any signs of illness or infection or are you on any antibiotics? No  5. Have you had any new, sudden or worsening abdominal pain? No  6. Have you or anyone in your household received a live vaccination in the past 4 weeks? Please note:  No live vaccines while on biologic/chemotherapy until 6 months after the last treatment.  Patient can receive the flu vaccine (shot only) and the pneumovax.  It is optimal for the patient to get these vaccines mid cycle, but they can be given at any time as long as it is not on the day of the infusion. No  7. Have you recently been diagnosed with any new nervous system diseases (ie. Multiple sclerosis, Guillain [place], seizures, neurological changes) or cancer diagnosis? No  8. Are you on any form of radiation or chemotherapy? No  9. Are you pregnant or breast feeding or do you have plans of pregnancy in the future? No  10. Have you been having any signs of worsening depression or suicidal ideations?  (benlysta only) No  11. Have there  been any other new onset medical symptoms? No      Post Infusion Assessment:  Patient tolerated infusion without incident.  Blood return noted pre and post infusion.  Site patent and intact, free from redness, edema or discomfort.  No evidence of extravasations.  Access discontinued per protocol.  Biologic Infusion Post Education: Call the triage nurse at your clinic or seek medical attention if you have chills and/or temperature greater than or equal to 100.5, uncontrolled nausea/vomiting, diarrhea, constipation, dizziness, shortness of breath, chest pain, heart palpitations, weakness or any other new or concerning symptoms, questions or concerns.  You cannot have any live virus vaccines prior to or during treatment or up to 6 months post infusion.  If you have an upcoming surgery, medical procedure or dental procedure during treatment, this should be discussed with your ordering physician and your surgeon/dentist.  If you are having any concerning symptom, if you are unsure if you should get your next infusion or wish to speak to a provider before your next infusion, please call your care coordinator or triage nurse at your clinic to notify them so we can adequately serve you.     Discharge Plan:   Discharge instructions reviewed with: Patient.  Patient and/or family verbalized understanding of discharge instructions and all questions answered.  AVS to patient via [employer].  Patient will return 7/26 for next appointment.   Patient discharged in stable condition accompanied by: self.  Departure Mode: Ambulatory.      Azalea Carcamo RN

## 2022-07-26 ENCOUNTER — INFUSION THERAPY VISIT (OUTPATIENT)
Dept: INFUSION THERAPY | Facility: CLINIC | Age: 57
End: 2022-07-26
Attending: PSYCHIATRY & NEUROLOGY
Payer: COMMERCIAL

## 2022-07-26 VITALS
HEART RATE: 65 BPM | BODY MASS INDEX: 27.21 KG/M2 | DIASTOLIC BLOOD PRESSURE: 76 MMHG | OXYGEN SATURATION: 97 % | SYSTOLIC BLOOD PRESSURE: 129 MMHG | TEMPERATURE: 97.8 F | WEIGHT: 200.6 LBS

## 2022-07-26 DIAGNOSIS — G61.81 CIDP (CHRONIC INFLAMMATORY DEMYELINATING POLYNEUROPATHY) (H): Primary | ICD-10-CM

## 2022-07-26 PROCEDURE — 250N000011 HC RX IP 250 OP 636: Performed by: PSYCHIATRY & NEUROLOGY

## 2022-07-26 PROCEDURE — 250N000013 HC RX MED GY IP 250 OP 250 PS 637: Performed by: PSYCHIATRY & NEUROLOGY

## 2022-07-26 PROCEDURE — 96365 THER/PROPH/DIAG IV INF INIT: CPT

## 2022-07-26 PROCEDURE — 96366 THER/PROPH/DIAG IV INF ADDON: CPT

## 2022-07-26 RX ORDER — ACETAMINOPHEN 325 MG/1
650 TABLET ORAL ONCE
Status: COMPLETED | OUTPATIENT
Start: 2022-07-26 | End: 2022-07-26

## 2022-07-26 RX ORDER — METHYLPREDNISOLONE SODIUM SUCCINATE 125 MG/2ML
125 INJECTION, POWDER, LYOPHILIZED, FOR SOLUTION INTRAMUSCULAR; INTRAVENOUS
Status: CANCELLED
Start: 2022-07-26

## 2022-07-26 RX ORDER — DIPHENHYDRAMINE HCL 25 MG
50 CAPSULE ORAL ONCE
Status: CANCELLED
Start: 2022-07-26

## 2022-07-26 RX ORDER — ALBUTEROL SULFATE 0.83 MG/ML
2.5 SOLUTION RESPIRATORY (INHALATION)
Status: CANCELLED | OUTPATIENT
Start: 2022-07-26

## 2022-07-26 RX ORDER — HEPARIN SODIUM (PORCINE) LOCK FLUSH IV SOLN 100 UNIT/ML 100 UNIT/ML
5 SOLUTION INTRAVENOUS
Status: CANCELLED | OUTPATIENT
Start: 2022-07-26

## 2022-07-26 RX ORDER — HEPARIN SODIUM,PORCINE 10 UNIT/ML
5 VIAL (ML) INTRAVENOUS
Status: CANCELLED | OUTPATIENT
Start: 2022-07-26

## 2022-07-26 RX ORDER — DIPHENHYDRAMINE HCL 25 MG
50 CAPSULE ORAL ONCE
Status: COMPLETED | OUTPATIENT
Start: 2022-07-26 | End: 2022-07-26

## 2022-07-26 RX ORDER — EPINEPHRINE 1 MG/ML
0.3 INJECTION, SOLUTION INTRAMUSCULAR; SUBCUTANEOUS EVERY 5 MIN PRN
Status: CANCELLED | OUTPATIENT
Start: 2022-07-26

## 2022-07-26 RX ORDER — ALBUTEROL SULFATE 90 UG/1
1-2 AEROSOL, METERED RESPIRATORY (INHALATION)
Status: CANCELLED
Start: 2022-07-26

## 2022-07-26 RX ORDER — ACETAMINOPHEN 325 MG/1
650 TABLET ORAL ONCE
Status: CANCELLED
Start: 2022-07-26

## 2022-07-26 RX ORDER — MEPERIDINE HYDROCHLORIDE 25 MG/ML
25 INJECTION INTRAMUSCULAR; INTRAVENOUS; SUBCUTANEOUS EVERY 30 MIN PRN
Status: CANCELLED | OUTPATIENT
Start: 2022-07-26

## 2022-07-26 RX ORDER — NALOXONE HYDROCHLORIDE 0.4 MG/ML
0.2 INJECTION, SOLUTION INTRAMUSCULAR; INTRAVENOUS; SUBCUTANEOUS
Status: CANCELLED | OUTPATIENT
Start: 2022-07-26

## 2022-07-26 RX ORDER — DIPHENHYDRAMINE HYDROCHLORIDE 50 MG/ML
50 INJECTION INTRAMUSCULAR; INTRAVENOUS
Status: CANCELLED
Start: 2022-07-26

## 2022-07-26 RX ADMIN — ACETAMINOPHEN 650 MG: 325 TABLET ORAL at 08:12

## 2022-07-26 RX ADMIN — DIPHENHYDRAMINE HYDROCHLORIDE 50 MG: 25 CAPSULE ORAL at 08:12

## 2022-07-26 RX ADMIN — HUMAN IMMUNOGLOBULIN G 75 G: 40 LIQUID INTRAVENOUS at 08:15

## 2022-07-26 NOTE — PROGRESS NOTES
Infusion Nursing Note:  Emiliano Aguiar presents today for IVIG.    Patient seen by provider today: No   present during visit today: Not Applicable.    Note: Patient only takes tylenol and benadryl pre meds.    IVIG started at 0.5mg/kg/hr and increased by 0.5mg/kg/hr every 15 minutes to a max of 4mg/kg/hr.    Intravenous Access:  Peripheral IV placed.    Treatment Conditions:  Biological Infusion Checklist:  ~~~ NOTE: If the patient answers yes to any of the questions below, hold the infusion and contact ordering provider or on-call provider.    1. Have you recently had an elevated temperature, fever, chills, productive cough, coughing for 3 weeks or longer or hemoptysis, abnormal vital signs, night sweats,  chest pain or have you noticed a decrease in your appetite, unexplained weight loss or fatigue? No  2. Do you have any open wounds or new incisions? No  3. Do you have any recent or upcoming hospitalizations, surgeries or dental procedures? No  4. Do you currently have or recently have had any signs of illness or infection or are you on any antibiotics? No  5. Have you had any new, sudden or worsening abdominal pain? No  6. Have you or anyone in your household received a live vaccination in the past 4 weeks? Please note:  No live vaccines while on biologic/chemotherapy until 6 months after the last treatment.  Patient can receive the flu vaccine (shot only) and the pneumovax.  It is optimal for the patient to get these vaccines mid cycle, but they can be given at any time as long as it is not on the day of the infusion. No  7. Have you recently been diagnosed with any new nervous system diseases (ie. Multiple sclerosis, Guillain [place], seizures, neurological changes) or cancer diagnosis? No  8. Are you on any form of radiation or chemotherapy? No  9. Are you pregnant or breast feeding or do you have plans of pregnancy in the future? No  10. Have you been having any signs of worsening depression or  suicidal ideations?  (benlysta only) No  11. Have there been any other new onset medical symptoms? No      Post Infusion Assessment:  Patient tolerated infusion without incident.  Blood return noted pre and post infusion.  Site patent and intact, free from redness, edema or discomfort.  No evidence of extravasations.  Access discontinued per protocol.  Biologic Infusion Post Education: Call the triage nurse at your clinic or seek medical attention if you have chills and/or temperature greater than or equal to 100.5, uncontrolled nausea/vomiting, diarrhea, constipation, dizziness, shortness of breath, chest pain, heart palpitations, weakness or any other new or concerning symptoms, questions or concerns.  You cannot have any live virus vaccines prior to or during treatment or up to 6 months post infusion.  If you have an upcoming surgery, medical procedure or dental procedure during treatment, this should be discussed with your ordering physician and your surgeon/dentist.  If you are having any concerning symptom, if you are unsure if you should get your next infusion or wish to speak to a provider before your next infusion, please call your care coordinator or triage nurse at your clinic to notify them so we can adequately serve you.     Discharge Plan:   Discharge instructions reviewed with: Patient.  Patient and/or family verbalized understanding of discharge instructions and all questions answered.  AVS to patient via [facility].  Patient will return 8/16 for next appointment.   Patient discharged in stable condition accompanied by: self.  Departure Mode: Ambulatory.      Azalea Carcamo RN

## 2022-08-01 ENCOUNTER — OFFICE VISIT (OUTPATIENT)
Dept: NEUROLOGY | Facility: CLINIC | Age: 57
End: 2022-08-01
Payer: COMMERCIAL

## 2022-08-01 VITALS
HEART RATE: 65 BPM | WEIGHT: 197 LBS | DIASTOLIC BLOOD PRESSURE: 84 MMHG | OXYGEN SATURATION: 97 % | RESPIRATION RATE: 16 BRPM | BODY MASS INDEX: 26.72 KG/M2 | SYSTOLIC BLOOD PRESSURE: 131 MMHG

## 2022-08-01 DIAGNOSIS — G61.81 CIDP (CHRONIC INFLAMMATORY DEMYELINATING POLYNEUROPATHY) (H): Primary | ICD-10-CM

## 2022-08-01 PROCEDURE — 99214 OFFICE O/P EST MOD 30 MIN: CPT | Performed by: PSYCHIATRY & NEUROLOGY

## 2022-08-01 NOTE — LETTER
8/1/2022       RE: Emiliano Aguiar  [address]  [place] [address]     Dear Colleague,    Thank you for referring your patient, Emiliano Aguiar, to the [facility] NEUROLOGY CLINIC [place] at [facility]. Please see a copy of my visit note below.    [facility], [place]   Neuromuscular Clinic Progress Note  Emiliano Aguiar  [id number]  08/01/2022    Brief Summary:   57 year old man with past medical history including CIDP who presented for follow-up.  Last seen in neuromuscular clinic 6/14/2021.  CIDP was diagnosed in September 2013.  Current treatment is IVIG administered every 3 weeks.  Previous trial of spreading out IVIG to every 4 weeks resulted in worsening tingling and weakness.    Subjective:    Patient presented with his wife who assisted with providing history.  Overall he reports that CIDP has been stable.  He continues to endorse side effect of labs, chills, headache, or back pain for approximately 1 week after each infusion.  Last IVIG dose was 7/26/2022.  At that time, he trialed utilizing benadryl in place of CBD oil to treat side effect of hives.  This resulted in longer duration side effects (1 extra day relative to prior).  He notes several days of weakening muscles prior to each infusion.    He continues to endorse imbalance.  He fell once when carrying a 40 pound object, otherwise no falls.    Neurology Assessments 8/1/2022 6/14/2021 12/9/2020 6/10/2020 2/5/2020 10/2/2019 6/13/2019   RODS CIDP/MGUSP Score 27 26 26 27 27 27 25       Objective   /84   Pulse 65   Resp 16   Wt 89.4 kg (197 lb)   SpO2 97%   BMI 26.72 kg/m    General: pt sitting comfortably in room, breathing easily on room air, not in acute distress   HEENT: no icterus  Chest: no respiratory distress  Ext: no edema  Skin: no rashes on exposed skin  Neuro:   -MS: alert, awake, speech fluent and coherent  -CN: visual fields full, pupils  equal and reactive to light, extraocular movements intact, facial sensation and movement intact b/l, hearing intact to conversation, palate raises symmetrically, shoulder shrug strong, tongue midline  -Motor: tone and bulk normal   Right Left   Neck flexion 5    Neck extension: 5    Shoulder abduction:  5 5   Elbow Flexion: 5 5   Elbow Extension:  5 5   Wrist Extension:  5 5   Finger Extension:  5 5   Index finger abduction 5 5   Little finger abduction 4+ 4+   Thumb abduction 5 5   Finger Flexion 5 5   Wrist Flexion 5 5   Hip Flexion 5 5   Hip Extension 5* 5*   Knee Extension 5 5   Knee Flexion 4+ 4+   Dorsiflexion 5 5   Plantar flexion 5 5   Toe extension 4+ 4+   Lower extremity strength evaluation limited by pain bilaterally with giveaway weakness.  *-able to stand without assistance  -Reflexes: normoactive and symmetric at biceps, triceps, brachioradialis.  Diminished bilateral patellar.  1+ right ankle, absent left ankle.  Toes downgoing b/l.  -Sensory: intact to light touch in all extremities.  Pinprick diminished at level of feet bilaterally.  Vibration 8 seconds at toes, greater than 10 seconds at ankles, intact at fingers. Proprioception intact at toes.  -Coordination: intact to FNF, H2S b/l  -Gait: Wide base gait.  Significant difficulty taking a few steps on toes or heels.  Unable to perform tandem gait.  Romberg positive.    Impression:  #CIDP  Emiliano Aguiar is a 57 year old year old male with past medical history including CIDP who presents for follow-up.  He is responding relatively well to IVIG 1 g/kg administered every 3 weeks.  However, infusions are complicated by side effects including hives, chills, joint pain, headache, lower back pain lasting up to a week after each infusion.  He continues to experience increased weakness several days before each infusion.  RODS score is stable at 27, however lower than several years ago.  Exam with possible mild worsening relative to prior.  Given relative  worsening of exam and lack of EMG since 2014, will plan on repeating EMG to assess for ongoing demyelination.  If EMG is consistent with ongoing active disease, then will consider adding immunosuppressive therapies (eg Rituximab); he previously did not respond well to steroids.     -Continue IVIG infusions every 3 weeks  -Repeat EMG  -If EMG consistent with ongoing demyelination, then will discuss additional immunosuppressive therapy  -Offered PT referral for balance difficulty, patient declined at this time.  We will be happy to refer to physical therapy if desired by patient.    Follow-up in 1 year.    Patient was seen and discussed with attending neurologist, Dr. Godoy, who agrees with above.    Yecenia Zarate MD  Clinical Neurophysiology Fellow    ATTENDING ADDENDUM: Patient seen and examined with Fellow Dr Zarate today. Agree with her assessment and plan. Billing MDM level 4 (moderate) based on 1) Problems assessed: One chronic disorder with progression, exacerbation, or side effects of rx (CIDP, side effects from IVIG described, also not at goal with regards to neurological function) and 2) Risk: prescription drug management.       Nixon Godoy MD, FAAN

## 2022-08-01 NOTE — PATIENT INSTRUCTIONS
Repeat EMG  I will call you after it's done to review results and discuss need for more medications for CIDP depending on the results  Continue IVIG every 3 weeks    Follow up 1 year, sooner if we end up starting a new drug

## 2022-08-01 NOTE — PROGRESS NOTES
[facility], [place]   Neuromuscular Clinic Progress Note  Emiliano Aguiar  [id number]  08/01/2022    Brief Summary:   57 year old man with past medical history including CIDP who presented for follow-up.  Last seen in neuromuscular clinic 6/14/2021.  CIDP was diagnosed in September 2013.  Current treatment is IVIG administered every 3 weeks.  Previous trial of spreading out IVIG to every 4 weeks resulted in worsening tingling and weakness.    Subjective:    Patient presented with his wife who assisted with providing history.  Overall he reports that CIDP has been stable.  He continues to endorse side effect of labs, chills, headache, or back pain for approximately 1 week after each infusion.  Last IVIG dose was 7/26/2022.  At that time, he trialed utilizing benadryl in place of CBD oil to treat side effect of hives.  This resulted in longer duration side effects (1 extra day relative to prior).  He notes several days of weakening muscles prior to each infusion.    He continues to endorse imbalance.  He fell once when carrying a 40 pound object, otherwise no falls.    Neurology Assessments 8/1/2022 6/14/2021 12/9/2020 6/10/2020 2/5/2020 10/2/2019 6/13/2019   RODS CIDP/MGUSP Score 27 26 26 27 27 27 25       Objective   /84   Pulse 65   Resp 16   Wt 89.4 kg (197 lb)   SpO2 97%   BMI 26.72 kg/m    General: pt sitting comfortably in room, breathing easily on room air, not in acute distress   HEENT: no icterus  Chest: no respiratory distress  Ext: no edema  Skin: no rashes on exposed skin  Neuro:   -MS: alert, awake, speech fluent and coherent  -CN: visual fields full, pupils equal and reactive to light, extraocular movements intact, facial sensation and movement intact b/l, hearing intact to conversation, palate raises symmetrically, shoulder shrug strong, tongue midline  -Motor: tone and bulk normal   Right Left   Neck flexion 5    Neck extension: 5    Shoulder abduction:  5 5    Elbow Flexion: 5 5   Elbow Extension:  5 5   Wrist Extension:  5 5   Finger Extension:  5 5   Index finger abduction 5 5   Little finger abduction 4+ 4+   Thumb abduction 5 5   Finger Flexion 5 5   Wrist Flexion 5 5   Hip Flexion 5 5   Hip Extension 5* 5*   Knee Extension 5 5   Knee Flexion 4+ 4+   Dorsiflexion 5 5   Plantar flexion 5 5   Toe extension 4+ 4+   Lower extremity strength evaluation limited by pain bilaterally with giveaway weakness.  *-able to stand without assistance  -Reflexes: normoactive and symmetric at biceps, triceps, brachioradialis.  Diminished bilateral patellar.  1+ right ankle, absent left ankle.  Toes downgoing b/l.  -Sensory: intact to light touch in all extremities.  Pinprick diminished at level of feet bilaterally.  Vibration 8 seconds at toes, greater than 10 seconds at ankles, intact at fingers. Proprioception intact at toes.  -Coordination: intact to FNF, H2S b/l  -Gait: Wide base gait.  Significant difficulty taking a few steps on toes or heels.  Unable to perform tandem gait.  Romberg positive.    Impression:  #CIDP  Emiliano Aguiar is a 57 year old year old male with past medical history including CIDP who presents for follow-up.  He is responding relatively well to IVIG 1 g/kg administered every 3 weeks.  However, infusions are complicated by side effects including hives, chills, joint pain, headache, lower back pain lasting up to a week after each infusion.  He continues to experience increased weakness several days before each infusion.  RODS score is stable at 27, however lower than several years ago.  Exam with possible mild worsening relative to prior.  Given relative worsening of exam and lack of EMG since 2014, will plan on repeating EMG to assess for ongoing demyelination.  If EMG is consistent with ongoing active disease, then will consider adding immunosuppressive therapies (eg Rituximab); he previously did not respond well to steroids.     -Continue IVIG infusions  every 3 weeks  -Repeat EMG  -If EMG consistent with ongoing demyelination, then will discuss additional immunosuppressive therapy  -Offered PT referral for balance difficulty, patient declined at this time.  We will be happy to refer to physical therapy if desired by patient.    Follow-up in 1 year.    Patient was seen and discussed with attending neurologist, Dr. Godoy, who agrees with above.    Yecenia Zarate MD  Clinical Neurophysiology Fellow    ATTENDING ADDENDUM: Patient seen and examined with Fellow Dr Zarate today. Agree with her assessment and plan. Billing MDM level 4 (moderate) based on 1) Problems assessed: One chronic disorder with progression, exacerbation, or side effects of rx (CIDP, side effects from IVIG described, also not at goal with regards to neurological function) and 2) Risk: prescription drug management.  Nixon Godoy MD, FAAN

## 2022-08-09 ENCOUNTER — OFFICE VISIT (OUTPATIENT)
Dept: NEUROLOGY | Facility: CLINIC | Age: 57
End: 2022-08-09
Payer: COMMERCIAL

## 2022-08-09 DIAGNOSIS — G61.81 CIDP (CHRONIC INFLAMMATORY DEMYELINATING POLYNEUROPATHY) (H): ICD-10-CM

## 2022-08-09 PROCEDURE — 99207 PR NO CHARGE LOS: CPT | Performed by: PSYCHIATRY & NEUROLOGY

## 2022-08-09 NOTE — PROGRESS NOTES
[facility]  Electrodiagnostic Laboratory                 Department of Neurology                                                                                                         Test Date:  2022    Patient: Emiliano Aguiar : 1965 Physician: Lynda Castano MD   Sex: Male AGE: 57 year Ref Phys: Dr Godoy   ID#: [id number]   Technician: Kristy Behling     Clinical Information:  Mr Ireland has a history of CIDP treated with intermittent IVIG infusions. He last had an EMG in  which revealed slowed CV throughout. Dr Godoy has reordered EMG to look for continued demyelination.    The patient had significant pain during attempts of the study and study was ended early.     Techniques:  Motor and sensory conduction studies were done with surface recording electrodes. EMG was done with a concentric needle electrode.     Results:  The patient had significant discomfort with nerve conduction studies and we stopped the study early. All information listed below needs to be interpreted carefully as suboptimal stimulation may have occurred due to discomfort of the patient.     No response seen with fibular nerve stimulation recording over TA. Low amplitude, prolonged distal latency with stimlation of fibular nerve recording over the EDB. No cod\nduction velocity calculated due to lack of response at knee but suboptimal stimulation amplitude may have occurred. Tibial study is low ampltidue but stopped due to doscomfort and may have been suboptimal stimulation. No sensory nerve responses with sural or superficial peroneal attempts.       Interpretation:    Limited study that was stopped due to patient discomfort. There is continued evidence of a neuropathic process with lack of responses but further classification can not be obtained due to patient discomfort. I will discuss with Dr Godoy if he wants to try with some sedation/pain pretreatment or consider other  testing.    No charge due to unclear accuracy of testing and intolerability for patient.     ___________________________  Lynda Castano MD        Nerve Conduction Studies  Motor Sites      Latency Amplitude Neg. Amp Diff Segment Distance Velocity Neg. Dur Neg Area Diff Temperature Comment   Site (ms) Norm (mV) Norm %  cm m/s Norm ms %  C    Right Dp Branch Fibular (TA) Motor   Fib Head NR  < 6.0 NR -      NR  28.5    Right Fibular (EDB) Motor   Ankle 8.5  < 6.0 0.27  > 2.5  Ankle-EDB 8   6.3  28.7    Bel Fib Head NR - NR - NR Bel Fib Head-Ankle - NR  > 38 NR NR 28.7    Right Tibial (AHB) Motor   Ankle 6.0  < 6.5 3.5  > 4.4  Ankle-AHB 8   10.1  28.6      Sensory Sites      Onset Lat Peak Lat Amp (O-P) Amp (P-P) Segment Distance Velocity Temperature Comment   Site ms ms  V Norm  V  cm m/s Norm  C    Right Superficial Fibular Sensory   14 cm-Ankle NR NR NR  > 3 NR 14 cm-Ankle 12.5 NR  > 38 26.9    Right Sural Sensory   Calf-Lat Mall NR NR NR  > 5 NR Calf-Lat Mall 14 NR  > 38 29.1            NCS Waveforms:    Motor           Sensory

## 2022-08-09 NOTE — LETTER
2022         RE: Emiliano Aguiar  [address]  [place] [address]        Dear Colleague,    Thank you for referring your patient, Emiliano Aguiar, to the [facility] NEUROLOGY CLINICS [facility]. Please see a copy of my visit note below.                        [facility]  Electrodiagnostic Laboratory                 Department of Neurology                                                                                                         Test Date:  2022    Patient: Emiliano Aguiar : 1965 Physician: Lynda Castano MD   Sex: Male AGE: 57 year Ref Phys: Dr Godoy   ID#: [id number]   Technician: Kristy Behling     Clinical Information:  Mr Beka has a history of CIDP treated with intermittent IVIG infusions. He last had an EMG in  which revealed slowed CV throughout. Dr Godoy has reordered EMG to look for continued demyelination.    The patient had significant pain during attempts of the study and study was ended early.     Techniques:  Motor and sensory conduction studies were done with surface recording electrodes. EMG was done with a concentric needle electrode.     Results:  The patient had significant discomfort with nerve conduction studies and we stopped the study early. All information listed below needs to be interpreted carefully as suboptimal stimulation may have occurred due to discomfort of the patient.     No response seen with fibular nerve stimulation recording over TA. Low amplitude, prolonged distal latency with stimlation of fibular nerve recording over the EDB. No cod\nduction velocity calculated due to lack of response at knee but suboptimal stimulation amplitude may have occurred. Tibial study is low ampltidue but stopped due to doscomfort and may have been suboptimal stimulation. No sensory nerve responses with sural or superficial peroneal attempts.       Interpretation:    Limited study that was stopped due to patient discomfort. There  is continued evidence of a neuropathic process with lack of responses but further classification can not be obtained due to patient discomfort. I will discuss with Dr Godoy if he wants to try with some sedation/pain pretreatment or consider other testing.    No charge due to unclear accuracy of testing and intolerability for patient.     ___________________________  Lynda Castano MD        Nerve Conduction Studies  Motor Sites      Latency Amplitude Neg. Amp Diff Segment Distance Velocity Neg. Dur Neg Area Diff Temperature Comment   Site (ms) Norm (mV) Norm %  cm m/s Norm ms %  C    Right Dp Branch Fibular (TA) Motor   Fib Head NR  < 6.0 NR -      NR  28.5    Right Fibular (EDB) Motor   Ankle 8.5  < 6.0 0.27  > 2.5  Ankle-EDB 8   6.3  28.7    Bel Fib Head NR - NR - NR Bel Fib Head-Ankle - NR  > 38 NR NR 28.7    Right Tibial (AHB) Motor   Ankle 6.0  < 6.5 3.5  > 4.4  Ankle-AHB 8   10.1  28.6      Sensory Sites      Onset Lat Peak Lat Amp (O-P) Amp (P-P) Segment Distance Velocity Temperature Comment   Site ms ms  V Norm  V  cm m/s Norm  C    Right Superficial Fibular Sensory   14 cm-Ankle NR NR NR  > 3 NR 14 cm-Ankle 12.5 NR  > 38 26.9    Right Sural Sensory   Calf-Lat Mall NR NR NR  > 5 NR Calf-Lat Mall 14 NR  > 38 29.1            NCS Waveforms:    Motor           Sensory                     Again, thank you for allowing me to participate in the care of your patient.        Sincerely,        Lynda Castano MD

## 2022-08-09 NOTE — Clinical Note
Here is what we have, really not confident in the integrity of testing, so much so that I am not charging him for emg. I will defer to your judgement.

## 2022-08-16 ENCOUNTER — INFUSION THERAPY VISIT (OUTPATIENT)
Dept: INFUSION THERAPY | Facility: CLINIC | Age: 57
End: 2022-08-16
Attending: PSYCHIATRY & NEUROLOGY
Payer: COMMERCIAL

## 2022-08-16 VITALS
SYSTOLIC BLOOD PRESSURE: 145 MMHG | TEMPERATURE: 97.4 F | BODY MASS INDEX: 27.29 KG/M2 | DIASTOLIC BLOOD PRESSURE: 70 MMHG | WEIGHT: 201.2 LBS | OXYGEN SATURATION: 97 % | HEART RATE: 61 BPM

## 2022-08-16 DIAGNOSIS — G61.81 CIDP (CHRONIC INFLAMMATORY DEMYELINATING POLYNEUROPATHY) (H): Primary | ICD-10-CM

## 2022-08-16 PROCEDURE — 96365 THER/PROPH/DIAG IV INF INIT: CPT

## 2022-08-16 PROCEDURE — 250N000011 HC RX IP 250 OP 636: Performed by: PSYCHIATRY & NEUROLOGY

## 2022-08-16 PROCEDURE — 250N000013 HC RX MED GY IP 250 OP 250 PS 637: Performed by: PSYCHIATRY & NEUROLOGY

## 2022-08-16 PROCEDURE — 96366 THER/PROPH/DIAG IV INF ADDON: CPT

## 2022-08-16 RX ORDER — MEPERIDINE HYDROCHLORIDE 25 MG/ML
25 INJECTION INTRAMUSCULAR; INTRAVENOUS; SUBCUTANEOUS EVERY 30 MIN PRN
Status: CANCELLED | OUTPATIENT
Start: 2022-08-16

## 2022-08-16 RX ORDER — HEPARIN SODIUM,PORCINE 10 UNIT/ML
5 VIAL (ML) INTRAVENOUS
Status: CANCELLED | OUTPATIENT
Start: 2022-08-16

## 2022-08-16 RX ORDER — NALOXONE HYDROCHLORIDE 0.4 MG/ML
0.2 INJECTION, SOLUTION INTRAMUSCULAR; INTRAVENOUS; SUBCUTANEOUS
Status: CANCELLED | OUTPATIENT
Start: 2022-08-16

## 2022-08-16 RX ORDER — HEPARIN SODIUM (PORCINE) LOCK FLUSH IV SOLN 100 UNIT/ML 100 UNIT/ML
5 SOLUTION INTRAVENOUS
Status: CANCELLED | OUTPATIENT
Start: 2022-08-16

## 2022-08-16 RX ORDER — DIPHENHYDRAMINE HYDROCHLORIDE 50 MG/ML
50 INJECTION INTRAMUSCULAR; INTRAVENOUS
Status: CANCELLED
Start: 2022-08-16

## 2022-08-16 RX ORDER — ALBUTEROL SULFATE 90 UG/1
1-2 AEROSOL, METERED RESPIRATORY (INHALATION)
Status: CANCELLED
Start: 2022-08-16

## 2022-08-16 RX ORDER — EPINEPHRINE 1 MG/ML
0.3 INJECTION, SOLUTION INTRAMUSCULAR; SUBCUTANEOUS EVERY 5 MIN PRN
Status: CANCELLED | OUTPATIENT
Start: 2022-08-16

## 2022-08-16 RX ORDER — DIPHENHYDRAMINE HCL 25 MG
50 CAPSULE ORAL ONCE
Status: COMPLETED | OUTPATIENT
Start: 2022-08-16 | End: 2022-08-16

## 2022-08-16 RX ORDER — ACETAMINOPHEN 325 MG/1
650 TABLET ORAL ONCE
Status: COMPLETED | OUTPATIENT
Start: 2022-08-16 | End: 2022-08-16

## 2022-08-16 RX ORDER — METHYLPREDNISOLONE SODIUM SUCCINATE 125 MG/2ML
125 INJECTION, POWDER, LYOPHILIZED, FOR SOLUTION INTRAMUSCULAR; INTRAVENOUS
Status: CANCELLED
Start: 2022-08-16

## 2022-08-16 RX ORDER — DIPHENHYDRAMINE HCL 25 MG
50 CAPSULE ORAL ONCE
Status: CANCELLED
Start: 2022-08-16

## 2022-08-16 RX ORDER — ALBUTEROL SULFATE 0.83 MG/ML
2.5 SOLUTION RESPIRATORY (INHALATION)
Status: CANCELLED | OUTPATIENT
Start: 2022-08-16

## 2022-08-16 RX ORDER — ACETAMINOPHEN 325 MG/1
650 TABLET ORAL ONCE
Status: CANCELLED
Start: 2022-08-16

## 2022-08-16 RX ADMIN — HUMAN IMMUNOGLOBULIN G 75 G: 40 LIQUID INTRAVENOUS at 08:15

## 2022-08-16 RX ADMIN — DIPHENHYDRAMINE HYDROCHLORIDE 50 MG: 25 CAPSULE ORAL at 08:07

## 2022-08-16 RX ADMIN — ACETAMINOPHEN 650 MG: 325 TABLET ORAL at 08:07

## 2022-08-16 NOTE — PROGRESS NOTES
Infusion Nursing Note:  Emiliano Aguiar presents today for IVIG.    Patient seen by provider today: No   present during visit today: Not Applicable.    Note: IVIG started at 0.5mg/kg/hr and increased by 0.5mg/kg/hr every 15 minutes to a max of 4mg/kg/hr.    Patient only takes Tylenol and Benadryl pre meds.     Intravenous Access:  Peripheral IV placed.    Treatment Conditions:  Biological Infusion Checklist:  ~~~ NOTE: If the patient answers yes to any of the questions below, hold the infusion and contact ordering provider or on-call provider.    1. Have you recently had an elevated temperature, fever, chills, productive cough, coughing for 3 weeks or longer or hemoptysis, abnormal vital signs, night sweats,  chest pain or have you noticed a decrease in your appetite, unexplained weight loss or fatigue? No  2. Do you have any open wounds or new incisions? No  3. Do you have any recent or upcoming hospitalizations, surgeries or dental procedures? No  4. Do you currently have or recently have had any signs of illness or infection or are you on any antibiotics? No  5. Have you had any new, sudden or worsening abdominal pain? No  6. Have you or anyone in your household received a live vaccination in the past 4 weeks? Please note:  No live vaccines while on biologic/chemotherapy until 6 months after the last treatment.  Patient can receive the flu vaccine (shot only) and the pneumovax.  It is optimal for the patient to get these vaccines mid cycle, but they can be given at any time as long as it is not on the day of the infusion. No  7. Have you recently been diagnosed with any new nervous system diseases (ie. Multiple sclerosis, Guillain [place], seizures, neurological changes) or cancer diagnosis? No  8. Are you on any form of radiation or chemotherapy? No  9. Are you pregnant or breast feeding or do you have plans of pregnancy in the future? No  10. Have you been having any signs of worsening depression or  suicidal ideations?  (benlysta only) No  11. Have there been any other new onset medical symptoms? No      Post Infusion Assessment:  Patient tolerated infusion without incident.  Blood return noted pre and post infusion.  Site patent and intact, free from redness, edema or discomfort.  No evidence of extravasations.  Access discontinued per protocol.  Biologic Infusion Post Education: Call the triage nurse at your clinic or seek medical attention if you have chills and/or temperature greater than or equal to 100.5, uncontrolled nausea/vomiting, diarrhea, constipation, dizziness, shortness of breath, chest pain, heart palpitations, weakness or any other new or concerning symptoms, questions or concerns.  You cannot have any live virus vaccines prior to or during treatment or up to 6 months post infusion.  If you have an upcoming surgery, medical procedure or dental procedure during treatment, this should be discussed with your ordering physician and your surgeon/dentist.  If you are having any concerning symptom, if you are unsure if you should get your next infusion or wish to speak to a provider before your next infusion, please call your care coordinator or triage nurse at your clinic to notify them so we can adequately serve you.     Discharge Plan:   Discharge instructions reviewed with: Patient.  Patient and/or family verbalized understanding of discharge instructions and all questions answered.  AVS to patient via [employer].  Patient will return 9/6 for next appointment.   Patient discharged in stable condition accompanied by: self.  Departure Mode: Ambulatory.      Azalea Carcamo RN

## 2022-09-06 ENCOUNTER — INFUSION THERAPY VISIT (OUTPATIENT)
Dept: INFUSION THERAPY | Facility: CLINIC | Age: 57
End: 2022-09-06
Attending: PSYCHIATRY & NEUROLOGY
Payer: COMMERCIAL

## 2022-09-06 VITALS
HEART RATE: 59 BPM | SYSTOLIC BLOOD PRESSURE: 139 MMHG | RESPIRATION RATE: 16 BRPM | TEMPERATURE: 98.1 F | DIASTOLIC BLOOD PRESSURE: 89 MMHG | BODY MASS INDEX: 27.26 KG/M2 | WEIGHT: 201 LBS | OXYGEN SATURATION: 99 %

## 2022-09-06 DIAGNOSIS — G61.81 CIDP (CHRONIC INFLAMMATORY DEMYELINATING POLYNEUROPATHY) (H): Primary | ICD-10-CM

## 2022-09-06 PROCEDURE — 96365 THER/PROPH/DIAG IV INF INIT: CPT

## 2022-09-06 PROCEDURE — 96366 THER/PROPH/DIAG IV INF ADDON: CPT

## 2022-09-06 PROCEDURE — 250N000011 HC RX IP 250 OP 636: Performed by: PSYCHIATRY & NEUROLOGY

## 2022-09-06 PROCEDURE — 250N000013 HC RX MED GY IP 250 OP 250 PS 637: Performed by: PSYCHIATRY & NEUROLOGY

## 2022-09-06 RX ORDER — NALOXONE HYDROCHLORIDE 0.4 MG/ML
0.2 INJECTION, SOLUTION INTRAMUSCULAR; INTRAVENOUS; SUBCUTANEOUS
Status: CANCELLED | OUTPATIENT
Start: 2022-09-06

## 2022-09-06 RX ORDER — MEPERIDINE HYDROCHLORIDE 25 MG/ML
25 INJECTION INTRAMUSCULAR; INTRAVENOUS; SUBCUTANEOUS EVERY 30 MIN PRN
Status: CANCELLED | OUTPATIENT
Start: 2022-09-06

## 2022-09-06 RX ORDER — HEPARIN SODIUM (PORCINE) LOCK FLUSH IV SOLN 100 UNIT/ML 100 UNIT/ML
5 SOLUTION INTRAVENOUS
Status: CANCELLED | OUTPATIENT
Start: 2022-09-06

## 2022-09-06 RX ORDER — METHYLPREDNISOLONE SODIUM SUCCINATE 125 MG/2ML
125 INJECTION, POWDER, LYOPHILIZED, FOR SOLUTION INTRAMUSCULAR; INTRAVENOUS
Status: CANCELLED
Start: 2022-09-06

## 2022-09-06 RX ORDER — EPINEPHRINE 1 MG/ML
0.3 INJECTION, SOLUTION INTRAMUSCULAR; SUBCUTANEOUS EVERY 5 MIN PRN
Status: CANCELLED | OUTPATIENT
Start: 2022-09-06

## 2022-09-06 RX ORDER — ALBUTEROL SULFATE 90 UG/1
1-2 AEROSOL, METERED RESPIRATORY (INHALATION)
Status: CANCELLED
Start: 2022-09-06

## 2022-09-06 RX ORDER — DIPHENHYDRAMINE HYDROCHLORIDE 50 MG/ML
50 INJECTION INTRAMUSCULAR; INTRAVENOUS
Status: CANCELLED
Start: 2022-09-06

## 2022-09-06 RX ORDER — DIPHENHYDRAMINE HCL 25 MG
50 CAPSULE ORAL ONCE
Status: COMPLETED | OUTPATIENT
Start: 2022-09-06 | End: 2022-09-06

## 2022-09-06 RX ORDER — HEPARIN SODIUM,PORCINE 10 UNIT/ML
5 VIAL (ML) INTRAVENOUS
Status: CANCELLED | OUTPATIENT
Start: 2022-09-06

## 2022-09-06 RX ORDER — DIPHENHYDRAMINE HCL 25 MG
50 CAPSULE ORAL ONCE
Status: CANCELLED
Start: 2022-09-06

## 2022-09-06 RX ORDER — ALBUTEROL SULFATE 0.83 MG/ML
2.5 SOLUTION RESPIRATORY (INHALATION)
Status: CANCELLED | OUTPATIENT
Start: 2022-09-06

## 2022-09-06 RX ORDER — ACETAMINOPHEN 325 MG/1
650 TABLET ORAL ONCE
Status: COMPLETED | OUTPATIENT
Start: 2022-09-06 | End: 2022-09-06

## 2022-09-06 RX ORDER — ACETAMINOPHEN 325 MG/1
650 TABLET ORAL ONCE
Status: CANCELLED
Start: 2022-09-06

## 2022-09-06 RX ADMIN — HUMAN IMMUNOGLOBULIN G 75 G: 40 LIQUID INTRAVENOUS at 08:30

## 2022-09-06 RX ADMIN — ACETAMINOPHEN 650 MG: 325 TABLET ORAL at 08:20

## 2022-09-06 RX ADMIN — DIPHENHYDRAMINE HYDROCHLORIDE 50 MG: 25 CAPSULE ORAL at 08:19

## 2022-09-06 ASSESSMENT — PAIN SCALES - GENERAL: PAINLEVEL: MILD PAIN (3)

## 2022-09-06 NOTE — PROGRESS NOTES
.Infusion Nursing Note:  Emiliaon Aguiar presents today for IVIG.    Patient seen by provider today: No   present during visit today: Not Applicable.    Note: Emiliano offers no new complaints.  See doc flow sheet for assessment.    Emiliano declines the IV Hydrocortisone as premed and said he has not been getting that.    IVIG rates today:  45 ml/hr x 15 min  90 ml/hr x 15 min  135 ml/hr x 15 min  180 ml/hr x 15 min  225 ml/hr x 15 min  270 ml/hr x 25 min  315 ml/hr x 15 min  360 ml/hr for remainder.    Intravenous Access:  Peripheral IV placed.    Treatment Conditions:  Biological Infusion Checklist:  ~~~ NOTE: If the patient answers yes to any of the questions below, hold the infusion and contact ordering provider or on-call provider.    1. Have you recently had an elevated temperature, fever, chills, productive cough, coughing for 3 weeks or longer or hemoptysis, abnormal vital signs, night sweats,  chest pain or have you noticed a decrease in your appetite, unexplained weight loss or fatigue? No  2. Do you have any open wounds or new incisions? No  3. Do you have any recent or upcoming hospitalizations, surgeries or dental procedures? No  4. Do you currently have or recently have had any signs of illness or infection or are you on any antibiotics? No  5. Have you had any new, sudden or worsening abdominal pain? No  6. Have you or anyone in your household received a live vaccination in the past 4 weeks? Please note:  No live vaccines while on biologic/chemotherapy until 6 months after the last treatment.  Patient can receive the flu vaccine (shot only) and the pneumovax.  It is optimal for the patient to get these vaccines mid cycle, but they can be given at any time as long as it is not on the day of the infusion. No  7. Have you recently been diagnosed with any new nervous system diseases (ie. Multiple sclerosis, Guillain [place], seizures, neurological changes) or cancer diagnosis? No  8. Are you on any  form of radiation or chemotherapy? No  9. Are you pregnant or breast feeding or do you have plans of pregnancy in the future? No  10. Have you been having any signs of worsening depression or suicidal ideations?  (benlysta only) No  11. Have there been any other new onset medical symptoms? No      Post Infusion Assessment:  Patient tolerated infusion without incident.  Blood return noted pre and post infusion.  Site patent and intact, free from redness, edema or discomfort.  No evidence of extravasations.  Access discontinued per protocol.     Discharge Plan:   AVS to patient via MYCHART.  Patient will return 9/27/22 for next appointment.   Patient discharged in stable condition accompanied by: self.  Departure Mode: Ambulatory.  Face to Face time: 0.      Hermelinda Loya RN

## 2022-09-06 NOTE — PATIENT INSTRUCTIONS
Call triage nurse with chills and/or temperature greater than or equal to 100.4, uncontrolled nausea/vomiting, diarrhea, constipation, dizziness, shortness of breath, chest pain, bleeding, unexplained bruising, or any new/concerning symptoms, questions/concerns.   If you are having any concerning symptoms or wish to speak to a provider before your next infusion visit, please call your care coordinator or triage to notify them so we can adequately serve you.   [facility] phone number is [phone]    [phone]

## 2022-09-27 ENCOUNTER — INFUSION THERAPY VISIT (OUTPATIENT)
Dept: INFUSION THERAPY | Facility: CLINIC | Age: 57
End: 2022-09-27
Attending: PSYCHIATRY & NEUROLOGY
Payer: COMMERCIAL

## 2022-09-27 VITALS
SYSTOLIC BLOOD PRESSURE: 136 MMHG | DIASTOLIC BLOOD PRESSURE: 80 MMHG | WEIGHT: 202.7 LBS | BODY MASS INDEX: 27.49 KG/M2 | TEMPERATURE: 97 F | RESPIRATION RATE: 18 BRPM | OXYGEN SATURATION: 99 % | HEART RATE: 59 BPM

## 2022-09-27 DIAGNOSIS — G61.81 CIDP (CHRONIC INFLAMMATORY DEMYELINATING POLYNEUROPATHY) (H): Primary | ICD-10-CM

## 2022-09-27 PROCEDURE — 250N000013 HC RX MED GY IP 250 OP 250 PS 637: Performed by: PSYCHIATRY & NEUROLOGY

## 2022-09-27 PROCEDURE — 250N000011 HC RX IP 250 OP 636: Performed by: PSYCHIATRY & NEUROLOGY

## 2022-09-27 PROCEDURE — 96365 THER/PROPH/DIAG IV INF INIT: CPT

## 2022-09-27 PROCEDURE — 96366 THER/PROPH/DIAG IV INF ADDON: CPT

## 2022-09-27 RX ORDER — METHYLPREDNISOLONE SODIUM SUCCINATE 125 MG/2ML
125 INJECTION, POWDER, LYOPHILIZED, FOR SOLUTION INTRAMUSCULAR; INTRAVENOUS
Status: CANCELLED
Start: 2022-10-18

## 2022-09-27 RX ORDER — HEPARIN SODIUM,PORCINE 10 UNIT/ML
5 VIAL (ML) INTRAVENOUS
Status: CANCELLED | OUTPATIENT
Start: 2022-10-18

## 2022-09-27 RX ORDER — DIPHENHYDRAMINE HYDROCHLORIDE 50 MG/ML
50 INJECTION INTRAMUSCULAR; INTRAVENOUS
Status: CANCELLED
Start: 2022-10-18

## 2022-09-27 RX ORDER — NALOXONE HYDROCHLORIDE 0.4 MG/ML
0.2 INJECTION, SOLUTION INTRAMUSCULAR; INTRAVENOUS; SUBCUTANEOUS
Status: CANCELLED | OUTPATIENT
Start: 2022-10-18

## 2022-09-27 RX ORDER — ACETAMINOPHEN 325 MG/1
650 TABLET ORAL ONCE
Status: CANCELLED
Start: 2022-10-18

## 2022-09-27 RX ORDER — DIPHENHYDRAMINE HCL 25 MG
50 CAPSULE ORAL ONCE
Status: COMPLETED | OUTPATIENT
Start: 2022-09-27 | End: 2022-09-27

## 2022-09-27 RX ORDER — EPINEPHRINE 1 MG/ML
0.3 INJECTION, SOLUTION INTRAMUSCULAR; SUBCUTANEOUS EVERY 5 MIN PRN
Status: CANCELLED | OUTPATIENT
Start: 2022-10-18

## 2022-09-27 RX ORDER — MEPERIDINE HYDROCHLORIDE 25 MG/ML
25 INJECTION INTRAMUSCULAR; INTRAVENOUS; SUBCUTANEOUS EVERY 30 MIN PRN
Status: CANCELLED | OUTPATIENT
Start: 2022-10-18

## 2022-09-27 RX ORDER — ALBUTEROL SULFATE 0.83 MG/ML
2.5 SOLUTION RESPIRATORY (INHALATION)
Status: CANCELLED | OUTPATIENT
Start: 2022-10-18

## 2022-09-27 RX ORDER — ALBUTEROL SULFATE 90 UG/1
1-2 AEROSOL, METERED RESPIRATORY (INHALATION)
Status: CANCELLED
Start: 2022-10-18

## 2022-09-27 RX ORDER — HEPARIN SODIUM (PORCINE) LOCK FLUSH IV SOLN 100 UNIT/ML 100 UNIT/ML
5 SOLUTION INTRAVENOUS
Status: CANCELLED | OUTPATIENT
Start: 2022-10-18

## 2022-09-27 RX ORDER — ACETAMINOPHEN 325 MG/1
650 TABLET ORAL ONCE
Status: COMPLETED | OUTPATIENT
Start: 2022-09-27 | End: 2022-09-27

## 2022-09-27 RX ORDER — DIPHENHYDRAMINE HCL 25 MG
50 CAPSULE ORAL ONCE
Status: CANCELLED
Start: 2022-10-18

## 2022-09-27 RX ADMIN — DIPHENHYDRAMINE HYDROCHLORIDE 50 MG: 25 CAPSULE ORAL at 08:04

## 2022-09-27 RX ADMIN — ACETAMINOPHEN 650 MG: 325 TABLET ORAL at 08:04

## 2022-09-27 RX ADMIN — HUMAN IMMUNOGLOBULIN G 75 G: 40 LIQUID INTRAVENOUS at 08:20

## 2022-09-27 ASSESSMENT — PAIN SCALES - GENERAL: PAINLEVEL: MILD PAIN (2)

## 2022-09-27 NOTE — PROGRESS NOTES
Infusion Nursing Note:  Emiliano Aguiar presents today for IVIG.    Patient seen by provider today: No   present during visit today: Not Applicable.    Note: IVIG started at 0.5mg/kg/hr, and increased by 0.5mg/kg/hr every 15 minutes to a max of 4mg/kg/hr.     Patient only takes Tylenol and Benadryl for pre-medications.     Intravenous Access:  Peripheral IV placed.    Treatment Conditions:  Biological Infusion Checklist:  ~~~ NOTE: If the patient answers yes to any of the questions below, hold the infusion and contact ordering provider or on-call provider.    1. Have you recently had an elevated temperature, fever, chills, productive cough, coughing for 3 weeks or longer or hemoptysis, abnormal vital signs, night sweats,  chest pain or have you noticed a decrease in your appetite, unexplained weight loss or fatigue? No  2. Do you have any open wounds or new incisions? No  3. Do you have any recent or upcoming hospitalizations, surgeries or dental procedures? No  4. Do you currently have or recently have had any signs of illness or infection or are you on any antibiotics? No  5. Have you had any new, sudden or worsening abdominal pain? No  6. Have you or anyone in your household received a live vaccination in the past 4 weeks? Please note:  No live vaccines while on biologic/chemotherapy until 6 months after the last treatment.  Patient can receive the flu vaccine (shot only) and the pneumovax.  It is optimal for the patient to get these vaccines mid cycle, but they can be given at any time as long as it is not on the day of the infusion. No  7. Have you recently been diagnosed with any new nervous system diseases (ie. Multiple sclerosis, Guillain [place], seizures, neurological changes) or cancer diagnosis? No  8. Are you on any form of radiation or chemotherapy? No  9. Are you pregnant or breast feeding or do you have plans of pregnancy in the future? No  10. Have you been having any signs of worsening  depression or suicidal ideations?  (benlysta only) No  11. Have there been any other new onset medical symptoms? No  Post Infusion Assessment:  Patient tolerated infusion without incident.  Blood return noted pre and post infusion.  Site patent and intact, free from redness, edema or discomfort.  No evidence of extravasations.  Access discontinued per protocol.     Discharge Plan:   Discharge instructions reviewed with: Patient.  Patient and/or family verbalized understanding of discharge instructions and all questions answered.  Copy of AVS reviewed with patient and/or family.  Patient will return 10/18/22 for next appointment.  Patient discharged in stable condition accompanied by: self.  Departure Mode: Ambulatory.      Lisa Eduardo RN

## 2022-10-18 ENCOUNTER — INFUSION THERAPY VISIT (OUTPATIENT)
Dept: INFUSION THERAPY | Facility: CLINIC | Age: 57
End: 2022-10-18
Attending: PSYCHIATRY & NEUROLOGY
Payer: COMMERCIAL

## 2022-10-18 VITALS
OXYGEN SATURATION: 98 % | WEIGHT: 201.9 LBS | BODY MASS INDEX: 27.38 KG/M2 | HEART RATE: 65 BPM | TEMPERATURE: 96.9 F | SYSTOLIC BLOOD PRESSURE: 130 MMHG | DIASTOLIC BLOOD PRESSURE: 72 MMHG | RESPIRATION RATE: 16 BRPM

## 2022-10-18 DIAGNOSIS — G61.81 CIDP (CHRONIC INFLAMMATORY DEMYELINATING POLYNEUROPATHY) (H): Primary | ICD-10-CM

## 2022-10-18 PROCEDURE — 96366 THER/PROPH/DIAG IV INF ADDON: CPT

## 2022-10-18 PROCEDURE — 250N000011 HC RX IP 250 OP 636: Performed by: PSYCHIATRY & NEUROLOGY

## 2022-10-18 PROCEDURE — 250N000013 HC RX MED GY IP 250 OP 250 PS 637: Performed by: PSYCHIATRY & NEUROLOGY

## 2022-10-18 PROCEDURE — 96365 THER/PROPH/DIAG IV INF INIT: CPT

## 2022-10-18 RX ORDER — MEPERIDINE HYDROCHLORIDE 25 MG/ML
25 INJECTION INTRAMUSCULAR; INTRAVENOUS; SUBCUTANEOUS EVERY 30 MIN PRN
Status: CANCELLED | OUTPATIENT
Start: 2022-11-08

## 2022-10-18 RX ORDER — METHYLPREDNISOLONE SODIUM SUCCINATE 125 MG/2ML
125 INJECTION, POWDER, LYOPHILIZED, FOR SOLUTION INTRAMUSCULAR; INTRAVENOUS
Status: CANCELLED
Start: 2022-11-08

## 2022-10-18 RX ORDER — DIPHENHYDRAMINE HYDROCHLORIDE 50 MG/ML
50 INJECTION INTRAMUSCULAR; INTRAVENOUS
Status: CANCELLED
Start: 2022-11-08

## 2022-10-18 RX ORDER — HEPARIN SODIUM (PORCINE) LOCK FLUSH IV SOLN 100 UNIT/ML 100 UNIT/ML
5 SOLUTION INTRAVENOUS
Status: CANCELLED | OUTPATIENT
Start: 2022-11-08

## 2022-10-18 RX ORDER — ALBUTEROL SULFATE 0.83 MG/ML
2.5 SOLUTION RESPIRATORY (INHALATION)
Status: CANCELLED | OUTPATIENT
Start: 2022-11-08

## 2022-10-18 RX ORDER — EPINEPHRINE 1 MG/ML
0.3 INJECTION, SOLUTION INTRAMUSCULAR; SUBCUTANEOUS EVERY 5 MIN PRN
Status: CANCELLED | OUTPATIENT
Start: 2022-11-08

## 2022-10-18 RX ORDER — ACETAMINOPHEN 325 MG/1
650 TABLET ORAL ONCE
Status: CANCELLED
Start: 2022-11-08

## 2022-10-18 RX ORDER — ALBUTEROL SULFATE 90 UG/1
1-2 AEROSOL, METERED RESPIRATORY (INHALATION)
Status: CANCELLED
Start: 2022-11-08

## 2022-10-18 RX ORDER — DIPHENHYDRAMINE HCL 25 MG
50 CAPSULE ORAL ONCE
Status: CANCELLED
Start: 2022-11-08

## 2022-10-18 RX ORDER — DIPHENHYDRAMINE HCL 25 MG
50 CAPSULE ORAL ONCE
Status: COMPLETED | OUTPATIENT
Start: 2022-10-18 | End: 2022-10-18

## 2022-10-18 RX ORDER — HEPARIN SODIUM,PORCINE 10 UNIT/ML
5 VIAL (ML) INTRAVENOUS
Status: CANCELLED | OUTPATIENT
Start: 2022-11-08

## 2022-10-18 RX ORDER — NALOXONE HYDROCHLORIDE 0.4 MG/ML
0.2 INJECTION, SOLUTION INTRAMUSCULAR; INTRAVENOUS; SUBCUTANEOUS
Status: CANCELLED | OUTPATIENT
Start: 2022-11-08

## 2022-10-18 RX ORDER — ACETAMINOPHEN 325 MG/1
650 TABLET ORAL ONCE
Status: COMPLETED | OUTPATIENT
Start: 2022-10-18 | End: 2022-10-18

## 2022-10-18 RX ADMIN — DIPHENHYDRAMINE HYDROCHLORIDE 50 MG: 25 CAPSULE ORAL at 08:12

## 2022-10-18 RX ADMIN — ACETAMINOPHEN 650 MG: 325 TABLET ORAL at 08:12

## 2022-10-18 RX ADMIN — HUMAN IMMUNOGLOBULIN G 75 G: 40 LIQUID INTRAVENOUS at 08:27

## 2022-10-18 NOTE — PROGRESS NOTES
Infusion Nursing Note:  Emiliano Aguiar presents today for IVIG.    Patient seen by provider today: No   present during visit today: Not Applicable.    Note: N/A.    Intravenous Access:  Peripheral IV placed.    Treatment Conditions:  Biological Infusion Checklist:  ~~~ NOTE: If the patient answers yes to any of the questions below, hold the infusion and contact ordering provider or on-call provider.    1. Have you recently had an elevated temperature, fever, chills, productive cough, coughing for 3 weeks or longer or hemoptysis, abnormal vital signs, night sweats,  chest pain or have you noticed a decrease in your appetite, unexplained weight loss or fatigue? No  2. Do you have any open wounds or new incisions? No  3. Do you have any recent or upcoming hospitalizations, surgeries or dental procedures? No  4. Do you currently have or recently have had any signs of illness or infection or are you on any antibiotics? No  5. Have you had any new, sudden or worsening abdominal pain? No  6. Have you or anyone in your household received a live vaccination in the past 4 weeks? Please note:  No live vaccines while on biologic/chemotherapy until 6 months after the last treatment.  Patient can receive the flu vaccine (shot only) and the pneumovax.  It is optimal for the patient to get these vaccines mid cycle, but they can be given at any time as long as it is not on the day of the infusion. No  7. Have you recently been diagnosed with any new nervous system diseases (ie. Multiple sclerosis, Guillain [place], seizures, neurological changes) or cancer diagnosis? No  8. Are you on any form of radiation or chemotherapy? No  9. Are you pregnant or breast feeding or do you have plans of pregnancy in the future? No  10. Have you been having any signs of worsening depression or suicidal ideations?  (benlysta only) No  11. Have there been any other new onset medical symptoms? No  Infusion started at 0.5 ml/kg/hr  Increased  by 0.5 ml/kg/hr every 15 minutes to max of 4 ml/kg/hr       Post Infusion Assessment:  Patient tolerated infusion without incident.  Blood return noted pre and post infusion.  Site patent and intact, free from redness, edema or discomfort.  No evidence of extravasations.  Access discontinued per protocol.  Biologic Infusion Post Education: Call the triage nurse at your clinic or seek medical attention if you have chills and/or temperature greater than or equal to 100.5, uncontrolled nausea/vomiting, diarrhea, constipation, dizziness, shortness of breath, chest pain, heart palpitations, weakness or any other new or concerning symptoms, questions or concerns.  You cannot have any live virus vaccines prior to or during treatment or up to 6 months post infusion.  If you have an upcoming surgery, medical procedure or dental procedure during treatment, this should be discussed with your ordering physician and your surgeon/dentist.  If you are having any concerning symptom, if you are unsure if you should get your next infusion or wish to speak to a provider before your next infusion, please call your care coordinator or triage nurse at your clinic to notify them so we can adequately serve you.     Discharge Plan:   Discharge instructions reviewed with: Patient.  Patient and/or family verbalized understanding of discharge instructions and all questions answered.  AVS to patient via Eyef[employer].  Patient will return 11/8 for next appointment.   Patient discharged in stable condition accompanied by: self.  Departure Mode: Ambulatory.      Whit Riddle RN                  '

## 2022-11-08 ENCOUNTER — INFUSION THERAPY VISIT (OUTPATIENT)
Dept: INFUSION THERAPY | Facility: CLINIC | Age: 57
End: 2022-11-08
Attending: PSYCHIATRY & NEUROLOGY
Payer: COMMERCIAL

## 2022-11-08 VITALS
DIASTOLIC BLOOD PRESSURE: 83 MMHG | WEIGHT: 201 LBS | OXYGEN SATURATION: 97 % | BODY MASS INDEX: 27.26 KG/M2 | HEART RATE: 62 BPM | TEMPERATURE: 97 F | RESPIRATION RATE: 16 BRPM | SYSTOLIC BLOOD PRESSURE: 150 MMHG

## 2022-11-08 DIAGNOSIS — G61.81 CIDP (CHRONIC INFLAMMATORY DEMYELINATING POLYNEUROPATHY) (H): Primary | ICD-10-CM

## 2022-11-08 PROCEDURE — 250N000013 HC RX MED GY IP 250 OP 250 PS 637: Performed by: PSYCHIATRY & NEUROLOGY

## 2022-11-08 PROCEDURE — 250N000011 HC RX IP 250 OP 636: Performed by: PSYCHIATRY & NEUROLOGY

## 2022-11-08 PROCEDURE — 96366 THER/PROPH/DIAG IV INF ADDON: CPT

## 2022-11-08 PROCEDURE — 96365 THER/PROPH/DIAG IV INF INIT: CPT

## 2022-11-08 RX ORDER — ALBUTEROL SULFATE 0.83 MG/ML
2.5 SOLUTION RESPIRATORY (INHALATION)
Status: CANCELLED | OUTPATIENT
Start: 2022-11-29

## 2022-11-08 RX ORDER — DIPHENHYDRAMINE HCL 25 MG
50 CAPSULE ORAL ONCE
Status: COMPLETED | OUTPATIENT
Start: 2022-11-08 | End: 2022-11-08

## 2022-11-08 RX ORDER — NALOXONE HYDROCHLORIDE 0.4 MG/ML
0.2 INJECTION, SOLUTION INTRAMUSCULAR; INTRAVENOUS; SUBCUTANEOUS
Status: CANCELLED | OUTPATIENT
Start: 2022-11-29

## 2022-11-08 RX ORDER — MEPERIDINE HYDROCHLORIDE 25 MG/ML
25 INJECTION INTRAMUSCULAR; INTRAVENOUS; SUBCUTANEOUS EVERY 30 MIN PRN
Status: CANCELLED | OUTPATIENT
Start: 2022-11-29

## 2022-11-08 RX ORDER — ALBUTEROL SULFATE 90 UG/1
1-2 AEROSOL, METERED RESPIRATORY (INHALATION)
Status: CANCELLED
Start: 2022-11-29

## 2022-11-08 RX ORDER — DIPHENHYDRAMINE HCL 25 MG
50 CAPSULE ORAL ONCE
Status: CANCELLED
Start: 2022-11-29

## 2022-11-08 RX ORDER — ACETAMINOPHEN 325 MG/1
650 TABLET ORAL ONCE
Status: CANCELLED
Start: 2022-11-29

## 2022-11-08 RX ORDER — HEPARIN SODIUM (PORCINE) LOCK FLUSH IV SOLN 100 UNIT/ML 100 UNIT/ML
5 SOLUTION INTRAVENOUS
Status: CANCELLED | OUTPATIENT
Start: 2022-11-29

## 2022-11-08 RX ORDER — ACETAMINOPHEN 325 MG/1
650 TABLET ORAL ONCE
Status: COMPLETED | OUTPATIENT
Start: 2022-11-08 | End: 2022-11-08

## 2022-11-08 RX ORDER — DIPHENHYDRAMINE HYDROCHLORIDE 50 MG/ML
50 INJECTION INTRAMUSCULAR; INTRAVENOUS
Status: CANCELLED
Start: 2022-11-29

## 2022-11-08 RX ORDER — EPINEPHRINE 1 MG/ML
0.3 INJECTION, SOLUTION INTRAMUSCULAR; SUBCUTANEOUS EVERY 5 MIN PRN
Status: CANCELLED | OUTPATIENT
Start: 2022-11-29

## 2022-11-08 RX ORDER — HEPARIN SODIUM,PORCINE 10 UNIT/ML
5 VIAL (ML) INTRAVENOUS
Status: CANCELLED | OUTPATIENT
Start: 2022-11-29

## 2022-11-08 RX ORDER — METHYLPREDNISOLONE SODIUM SUCCINATE 125 MG/2ML
125 INJECTION, POWDER, LYOPHILIZED, FOR SOLUTION INTRAMUSCULAR; INTRAVENOUS
Status: CANCELLED
Start: 2022-11-29

## 2022-11-08 RX ADMIN — ACETAMINOPHEN 650 MG: 325 TABLET ORAL at 08:31

## 2022-11-08 RX ADMIN — HUMAN IMMUNOGLOBULIN G 75 G: 40 LIQUID INTRAVENOUS at 08:35

## 2022-11-08 RX ADMIN — DIPHENHYDRAMINE HYDROCHLORIDE 50 MG: 25 CAPSULE ORAL at 08:31

## 2022-11-08 NOTE — PROGRESS NOTES
Infusion Nursing Note:  Emiliano Aguiar presents today for IVIG infusion.    Patient seen by provider today: No   present during visit today: Not Applicable.    Note: Pre-medicated with tylenol and benadryl. Patient tolerated infusion at 0.5mg/kg/hr. Increased by 0.5 mg/kg/hr, with a max rate 4mg/kg/hr.    Intravenous Access:  Peripheral IV placed.    Treatment Conditions:  Biological Infusion Checklist:  ~~~ NOTE: If the patient answers yes to any of the questions below, hold the infusion and contact ordering provider or on-call provider.    1. Have you recently had an elevated temperature, fever, chills, productive cough, coughing for 3 weeks or longer or hemoptysis, abnormal vital signs, night sweats,  chest pain or have you noticed a decrease in your appetite, unexplained weight loss or fatigue? No  2. Do you have any open wounds or new incisions? No  3. Do you have any recent or upcoming hospitalizations, surgeries or dental procedures? No  4. Do you currently have or recently have had any signs of illness or infection or are you on any antibiotics? No  5. Have you had any new, sudden or worsening abdominal pain? No  6. Have you or anyone in your household received a live vaccination in the past 4 weeks? Please note:  No live vaccines while on biologic/chemotherapy until 6 months after the last treatment.  Patient can receive the flu vaccine (shot only) and the pneumovax.  It is optimal for the patient to get these vaccines mid cycle, but they can be given at any time as long as it is not on the day of the infusion. No  7. Have you recently been diagnosed with any new nervous system diseases (ie. Multiple sclerosis, Guillain [place], seizures, neurological changes) or cancer diagnosis? No  8. Are you on any form of radiation or chemotherapy? No  9. Are you pregnant or breast feeding or do you have plans of pregnancy in the future? No  10. Have you been having any signs of worsening depression or  suicidal ideations?  (benlysta only) N/A  11. Have there been any other new onset medical symptoms? No    Post Infusion Assessment:  Patient tolerated infusion without incident.  Blood return noted pre and post infusion.  Site patent and intact, free from redness, edema or discomfort.  No evidence of extravasations.  Access discontinued per protocol.  Biologic Infusion Post Education: Call the triage nurse at your clinic or seek medical attention if you have chills and/or temperature greater than or equal to 100.5, uncontrolled nausea/vomiting, diarrhea, constipation, dizziness, shortness of breath, chest pain, heart palpitations, weakness or any other new or concerning symptoms, questions or concerns.  You cannot have any live virus vaccines prior to or during treatment or up to 6 months post infusion.  If you have an upcoming surgery, medical procedure or dental procedure during treatment, this should be discussed with your ordering physician and your surgeon/dentist.  If you are having any concerning symptom, if you are unsure if you should get your next infusion or wish to speak to a provider before your next infusion, please call your care coordinator or triage nurse at your clinic to notify them so we can adequately serve you.     Discharge Plan:   Discharge instructions reviewed with: Patient.  Patient and/or family verbalized understanding of discharge instructions and all questions answered.  AVS to patient via waygumT.  Patient will return 11/29/22 for next appointment.   Patient discharged in stable condition accompanied by: self.  Departure Mode: Ambulatory.      Lynda Mccain RN

## 2022-11-29 ENCOUNTER — INFUSION THERAPY VISIT (OUTPATIENT)
Dept: INFUSION THERAPY | Facility: CLINIC | Age: 57
End: 2022-11-29
Attending: PSYCHIATRY & NEUROLOGY
Payer: COMMERCIAL

## 2022-11-29 VITALS
HEART RATE: 58 BPM | OXYGEN SATURATION: 97 % | SYSTOLIC BLOOD PRESSURE: 112 MMHG | WEIGHT: 196.1 LBS | BODY MASS INDEX: 26.6 KG/M2 | TEMPERATURE: 97.4 F | DIASTOLIC BLOOD PRESSURE: 75 MMHG | RESPIRATION RATE: 16 BRPM

## 2022-11-29 DIAGNOSIS — G61.81 CIDP (CHRONIC INFLAMMATORY DEMYELINATING POLYNEUROPATHY) (H): Primary | ICD-10-CM

## 2022-11-29 PROCEDURE — 250N000011 HC RX IP 250 OP 636: Performed by: PSYCHIATRY & NEUROLOGY

## 2022-11-29 PROCEDURE — 96366 THER/PROPH/DIAG IV INF ADDON: CPT

## 2022-11-29 PROCEDURE — 96365 THER/PROPH/DIAG IV INF INIT: CPT

## 2022-11-29 PROCEDURE — 250N000013 HC RX MED GY IP 250 OP 250 PS 637: Performed by: PSYCHIATRY & NEUROLOGY

## 2022-11-29 RX ORDER — METHYLPREDNISOLONE SODIUM SUCCINATE 125 MG/2ML
125 INJECTION, POWDER, LYOPHILIZED, FOR SOLUTION INTRAMUSCULAR; INTRAVENOUS
Status: CANCELLED
Start: 2022-12-20

## 2022-11-29 RX ORDER — ALBUTEROL SULFATE 90 UG/1
1-2 AEROSOL, METERED RESPIRATORY (INHALATION)
Status: CANCELLED
Start: 2022-12-20

## 2022-11-29 RX ORDER — DIPHENHYDRAMINE HCL 25 MG
50 CAPSULE ORAL ONCE
Status: CANCELLED
Start: 2022-12-20

## 2022-11-29 RX ORDER — ACETAMINOPHEN 325 MG/1
650 TABLET ORAL ONCE
Status: COMPLETED | OUTPATIENT
Start: 2022-11-29 | End: 2022-11-29

## 2022-11-29 RX ORDER — DIPHENHYDRAMINE HCL 25 MG
50 CAPSULE ORAL ONCE
Status: COMPLETED | OUTPATIENT
Start: 2022-11-29 | End: 2022-11-29

## 2022-11-29 RX ORDER — EPINEPHRINE 1 MG/ML
0.3 INJECTION, SOLUTION INTRAMUSCULAR; SUBCUTANEOUS EVERY 5 MIN PRN
Status: CANCELLED | OUTPATIENT
Start: 2022-12-20

## 2022-11-29 RX ORDER — DIPHENHYDRAMINE HYDROCHLORIDE 50 MG/ML
50 INJECTION INTRAMUSCULAR; INTRAVENOUS
Status: CANCELLED
Start: 2022-12-20

## 2022-11-29 RX ORDER — MEPERIDINE HYDROCHLORIDE 25 MG/ML
25 INJECTION INTRAMUSCULAR; INTRAVENOUS; SUBCUTANEOUS EVERY 30 MIN PRN
Status: CANCELLED | OUTPATIENT
Start: 2022-12-20

## 2022-11-29 RX ORDER — HEPARIN SODIUM (PORCINE) LOCK FLUSH IV SOLN 100 UNIT/ML 100 UNIT/ML
5 SOLUTION INTRAVENOUS
Status: CANCELLED | OUTPATIENT
Start: 2022-12-20

## 2022-11-29 RX ORDER — NALOXONE HYDROCHLORIDE 0.4 MG/ML
0.2 INJECTION, SOLUTION INTRAMUSCULAR; INTRAVENOUS; SUBCUTANEOUS
Status: CANCELLED | OUTPATIENT
Start: 2022-12-20

## 2022-11-29 RX ORDER — ALBUTEROL SULFATE 0.83 MG/ML
2.5 SOLUTION RESPIRATORY (INHALATION)
Status: CANCELLED | OUTPATIENT
Start: 2022-12-20

## 2022-11-29 RX ORDER — ACETAMINOPHEN 325 MG/1
650 TABLET ORAL ONCE
Status: CANCELLED
Start: 2022-12-20

## 2022-11-29 RX ORDER — HEPARIN SODIUM,PORCINE 10 UNIT/ML
5 VIAL (ML) INTRAVENOUS
Status: CANCELLED | OUTPATIENT
Start: 2022-12-20

## 2022-11-29 RX ADMIN — ACETAMINOPHEN 650 MG: 325 TABLET ORAL at 08:18

## 2022-11-29 RX ADMIN — HUMAN IMMUNOGLOBULIN G 75 G: 40 LIQUID INTRAVENOUS at 08:25

## 2022-11-29 RX ADMIN — DIPHENHYDRAMINE HYDROCHLORIDE 50 MG: 25 CAPSULE ORAL at 08:18

## 2022-11-29 NOTE — PROGRESS NOTES
Infusion Nursing Note:  Emiliano Aguiar presents today for IVIG.    Patient seen by provider today: No   present during visit today: Not Applicable.    Note: Pre-medicated with tylenol and benadryl. Patient tolerated infusion begun at 0.5mg/kg/hr, increased by 0.5mg/kg/hr, with a max rate of 4mg/kg/hr.    Intravenous Access:  Peripheral IV placed.    Treatment Conditions:  Biological Infusion Checklist:  ~~~ NOTE: If the patient answers yes to any of the questions below, hold the infusion and contact ordering provider or on-call provider.    1. Have you recently had an elevated temperature, fever, chills, productive cough, coughing for 3 weeks or longer or hemoptysis, abnormal vital signs, night sweats,  chest pain or have you noticed a decrease in your appetite, unexplained weight loss or fatigue? No  2. Do you have any open wounds or new incisions? No  3. Do you have any recent or upcoming hospitalizations, surgeries or dental procedures? No  4. Do you currently have or recently have had any signs of illness or infection or are you on any antibiotics? No  5. Have you had any new, sudden or worsening abdominal pain? No  6. Have you or anyone in your household received a live vaccination in the past 4 weeks? Please note:  No live vaccines while on biologic/chemotherapy until 6 months after the last treatment.  Patient can receive the flu vaccine (shot only) and the pneumovax.  It is optimal for the patient to get these vaccines mid cycle, but they can be given at any time as long as it is not on the day of the infusion. No  7. Have you recently been diagnosed with any new nervous system diseases (ie. Multiple sclerosis, Guillain [place], seizures, neurological changes) or cancer diagnosis? No  8. Are you on any form of radiation or chemotherapy? No  9. Are you pregnant or breast feeding or do you have plans of pregnancy in the future? No  10. Have you been having any signs of worsening depression or  suicidal ideations?  (benlysta only) No  11. Have there been any other new onset medical symptoms? No      Post Infusion Assessment:  Patient tolerated infusion without incident.  Blood return noted pre and post infusion.  Site patent and intact, free from redness, edema or discomfort.  No evidence of extravasations.  Access discontinued per protocol.    Biologic Infusion Post Education: Call the triage nurse at your clinic or seek medical attention if you have chills and/or temperature greater than or equal to 100.5, uncontrolled nausea/vomiting, diarrhea, constipation, dizziness, shortness of breath, chest pain, heart palpitations, weakness or any other new or concerning symptoms, questions or concerns.  You cannot have any live virus vaccines prior to or during treatment or up to 6 months post infusion.  If you have an upcoming surgery, medical procedure or dental procedure during treatment, this should be discussed with your ordering physician and your surgeon/dentist.  If you are having any concerning symptom, if you are unsure if you should get your next infusion or wish to speak to a provider before your next infusion, please call your care coordinator or triage nurse at your clinic to notify them so we can adequately serve you.     Discharge Plan:   Discharge instructions reviewed with: Patient.  Patient and/or family verbalized understanding of discharge instructions and all questions answered.  AVS to patient via QuitbitT.  Patient will return 12/20/22 for next appointment.   Patient discharged in stable condition accompanied by: self.  Departure Mode: Ambulatory.      Lynda Mccain RN

## 2022-12-20 ENCOUNTER — INFUSION THERAPY VISIT (OUTPATIENT)
Dept: INFUSION THERAPY | Facility: CLINIC | Age: 57
End: 2022-12-20
Attending: PSYCHIATRY & NEUROLOGY
Payer: COMMERCIAL

## 2022-12-20 VITALS
SYSTOLIC BLOOD PRESSURE: 149 MMHG | HEART RATE: 66 BPM | WEIGHT: 196.8 LBS | BODY MASS INDEX: 26.69 KG/M2 | DIASTOLIC BLOOD PRESSURE: 73 MMHG | TEMPERATURE: 97.4 F | OXYGEN SATURATION: 97 %

## 2022-12-20 DIAGNOSIS — G61.81 CIDP (CHRONIC INFLAMMATORY DEMYELINATING POLYNEUROPATHY) (H): Primary | ICD-10-CM

## 2022-12-20 PROCEDURE — 250N000013 HC RX MED GY IP 250 OP 250 PS 637: Performed by: PSYCHIATRY & NEUROLOGY

## 2022-12-20 PROCEDURE — 96365 THER/PROPH/DIAG IV INF INIT: CPT

## 2022-12-20 PROCEDURE — 96366 THER/PROPH/DIAG IV INF ADDON: CPT

## 2022-12-20 PROCEDURE — 250N000011 HC RX IP 250 OP 636: Performed by: PSYCHIATRY & NEUROLOGY

## 2022-12-20 RX ORDER — METHYLPREDNISOLONE SODIUM SUCCINATE 125 MG/2ML
125 INJECTION, POWDER, LYOPHILIZED, FOR SOLUTION INTRAMUSCULAR; INTRAVENOUS
Status: CANCELLED
Start: 2023-01-10

## 2022-12-20 RX ORDER — NALOXONE HYDROCHLORIDE 0.4 MG/ML
0.2 INJECTION, SOLUTION INTRAMUSCULAR; INTRAVENOUS; SUBCUTANEOUS
Status: CANCELLED | OUTPATIENT
Start: 2023-01-10

## 2022-12-20 RX ORDER — EPINEPHRINE 1 MG/ML
0.3 INJECTION, SOLUTION INTRAMUSCULAR; SUBCUTANEOUS EVERY 5 MIN PRN
Status: CANCELLED | OUTPATIENT
Start: 2023-01-10

## 2022-12-20 RX ORDER — ALBUTEROL SULFATE 90 UG/1
1-2 AEROSOL, METERED RESPIRATORY (INHALATION)
Status: CANCELLED
Start: 2023-01-10

## 2022-12-20 RX ORDER — ACETAMINOPHEN 325 MG/1
650 TABLET ORAL ONCE
Status: CANCELLED
Start: 2023-01-10

## 2022-12-20 RX ORDER — DIPHENHYDRAMINE HCL 25 MG
50 CAPSULE ORAL ONCE
Status: CANCELLED
Start: 2023-01-10

## 2022-12-20 RX ORDER — ALBUTEROL SULFATE 0.83 MG/ML
2.5 SOLUTION RESPIRATORY (INHALATION)
Status: CANCELLED | OUTPATIENT
Start: 2023-01-10

## 2022-12-20 RX ORDER — ACETAMINOPHEN 325 MG/1
650 TABLET ORAL ONCE
Status: COMPLETED | OUTPATIENT
Start: 2022-12-20 | End: 2022-12-20

## 2022-12-20 RX ORDER — HEPARIN SODIUM,PORCINE 10 UNIT/ML
5 VIAL (ML) INTRAVENOUS
Status: CANCELLED | OUTPATIENT
Start: 2023-01-10

## 2022-12-20 RX ORDER — HEPARIN SODIUM (PORCINE) LOCK FLUSH IV SOLN 100 UNIT/ML 100 UNIT/ML
5 SOLUTION INTRAVENOUS
Status: CANCELLED | OUTPATIENT
Start: 2023-01-10

## 2022-12-20 RX ORDER — DIPHENHYDRAMINE HYDROCHLORIDE 50 MG/ML
50 INJECTION INTRAMUSCULAR; INTRAVENOUS
Status: CANCELLED
Start: 2023-01-10

## 2022-12-20 RX ORDER — MEPERIDINE HYDROCHLORIDE 25 MG/ML
25 INJECTION INTRAMUSCULAR; INTRAVENOUS; SUBCUTANEOUS EVERY 30 MIN PRN
Status: CANCELLED | OUTPATIENT
Start: 2023-01-10

## 2022-12-20 RX ORDER — DIPHENHYDRAMINE HCL 25 MG
50 CAPSULE ORAL ONCE
Status: COMPLETED | OUTPATIENT
Start: 2022-12-20 | End: 2022-12-20

## 2022-12-20 RX ADMIN — ACETAMINOPHEN 650 MG: 325 TABLET ORAL at 08:18

## 2022-12-20 RX ADMIN — HUMAN IMMUNOGLOBULIN G 75 G: 40 LIQUID INTRAVENOUS at 08:22

## 2022-12-20 RX ADMIN — DIPHENHYDRAMINE HYDROCHLORIDE 50 MG: 25 CAPSULE ORAL at 08:18

## 2022-12-20 NOTE — PROGRESS NOTES
Infusion Nursing Note:  Emiliano Aguiar presents today for IVIG.    Patient seen by provider today: No   present during visit today: Not Applicable.    Note:  Patient only takes tylenol and bendaryl pre meds.      IVIG started at 0.5mg/kg/hr and increased by 0.5mg/kg/hr every 15 minutes to a max of 4mg/kg/hr.  .    Intravenous Access:  Peripheral IV placed.    Treatment Conditions:  Biological Infusion Checklist:  ~~~ NOTE: If the patient answers yes to any of the questions below, hold the infusion and contact ordering provider or on-call provider.    1. Have you recently had an elevated temperature, fever, chills, productive cough, coughing for 3 weeks or longer or hemoptysis, abnormal vital signs, night sweats,  chest pain or have you noticed a decrease in your appetite, unexplained weight loss or fatigue? No  2. Do you have any open wounds or new incisions? No  3. Do you have any recent or upcoming hospitalizations, surgeries or dental procedures? No  4. Do you currently have or recently have had any signs of illness or infection or are you on any antibiotics? No  5. Have you had any new, sudden or worsening abdominal pain? No  6. Have you or anyone in your household received a live vaccination in the past 4 weeks? Please note:  No live vaccines while on biologic/chemotherapy until 6 months after the last treatment.  Patient can receive the flu vaccine (shot only) and the pneumovax.  It is optimal for the patient to get these vaccines mid cycle, but they can be given at any time as long as it is not on the day of the infusion. No  7. Have you recently been diagnosed with any new nervous system diseases (ie. Multiple sclerosis, Guillain [place], seizures, neurological changes) or cancer diagnosis? No  8. Are you on any form of radiation or chemotherapy? No  9. Are you pregnant or breast feeding or do you have plans of pregnancy in the future? No  10. Have you been having any signs of worsening depression  or suicidal ideations?  (benlysta only) No  11. Have there been any other new onset medical symptoms? No      Post Infusion Assessment:  Patient tolerated infusion without incident.  Blood return noted pre and post infusion.  Site patent and intact, free from redness, edema or discomfort.  No evidence of extravasations.  Access discontinued per protocol.  Biologic Infusion Post Education: Call the triage nurse at your clinic or seek medical attention if you have chills and/or temperature greater than or equal to 100.5, uncontrolled nausea/vomiting, diarrhea, constipation, dizziness, shortness of breath, chest pain, heart palpitations, weakness or any other new or concerning symptoms, questions or concerns.  You cannot have any live virus vaccines prior to or during treatment or up to 6 months post infusion.  If you have an upcoming surgery, medical procedure or dental procedure during treatment, this should be discussed with your ordering physician and your surgeon/dentist.  If you are having any concerning symptom, if you are unsure if you should get your next infusion or wish to speak to a provider before your next infusion, please call your care coordinator or triage nurse at your clinic to notify them so we can adequately serve you.     Discharge Plan:   Discharge instructions reviewed with: Patient.  Patient and/or family verbalized understanding of discharge instructions and all questions answered.  AVS to patient via [employer].  Patient will return 1/10 for next appointment.   Patient discharged in stable condition accompanied by: self.  Departure Mode: Ambulatory.      Azalea Carcamo RN                  '

## 2023-01-10 ENCOUNTER — INFUSION THERAPY VISIT (OUTPATIENT)
Dept: INFUSION THERAPY | Facility: CLINIC | Age: 58
End: 2023-01-10
Attending: PSYCHIATRY & NEUROLOGY
Payer: COMMERCIAL

## 2023-01-10 VITALS
TEMPERATURE: 98 F | WEIGHT: 190.1 LBS | DIASTOLIC BLOOD PRESSURE: 80 MMHG | BODY MASS INDEX: 25.78 KG/M2 | SYSTOLIC BLOOD PRESSURE: 126 MMHG | HEART RATE: 63 BPM | RESPIRATION RATE: 16 BRPM | OXYGEN SATURATION: 98 %

## 2023-01-10 DIAGNOSIS — G61.81 CIDP (CHRONIC INFLAMMATORY DEMYELINATING POLYNEUROPATHY) (H): Primary | ICD-10-CM

## 2023-01-10 PROCEDURE — 250N000013 HC RX MED GY IP 250 OP 250 PS 637: Performed by: PSYCHIATRY & NEUROLOGY

## 2023-01-10 PROCEDURE — 96366 THER/PROPH/DIAG IV INF ADDON: CPT

## 2023-01-10 PROCEDURE — 250N000011 HC RX IP 250 OP 636: Performed by: PSYCHIATRY & NEUROLOGY

## 2023-01-10 PROCEDURE — 96365 THER/PROPH/DIAG IV INF INIT: CPT

## 2023-01-10 RX ORDER — ALBUTEROL SULFATE 90 UG/1
1-2 AEROSOL, METERED RESPIRATORY (INHALATION)
Status: CANCELLED
Start: 2023-01-31

## 2023-01-10 RX ORDER — HEPARIN SODIUM (PORCINE) LOCK FLUSH IV SOLN 100 UNIT/ML 100 UNIT/ML
5 SOLUTION INTRAVENOUS
Status: CANCELLED | OUTPATIENT
Start: 2023-01-31

## 2023-01-10 RX ORDER — ALBUTEROL SULFATE 0.83 MG/ML
2.5 SOLUTION RESPIRATORY (INHALATION)
Status: CANCELLED | OUTPATIENT
Start: 2023-01-31

## 2023-01-10 RX ORDER — ACETAMINOPHEN 325 MG/1
650 TABLET ORAL ONCE
Status: CANCELLED
Start: 2023-01-31

## 2023-01-10 RX ORDER — HEPARIN SODIUM,PORCINE 10 UNIT/ML
5 VIAL (ML) INTRAVENOUS
Status: CANCELLED | OUTPATIENT
Start: 2023-01-31

## 2023-01-10 RX ORDER — NALOXONE HYDROCHLORIDE 0.4 MG/ML
0.2 INJECTION, SOLUTION INTRAMUSCULAR; INTRAVENOUS; SUBCUTANEOUS
Status: CANCELLED | OUTPATIENT
Start: 2023-01-31

## 2023-01-10 RX ORDER — ACETAMINOPHEN 325 MG/1
650 TABLET ORAL ONCE
Status: COMPLETED | OUTPATIENT
Start: 2023-01-10 | End: 2023-01-10

## 2023-01-10 RX ORDER — METHYLPREDNISOLONE SODIUM SUCCINATE 125 MG/2ML
125 INJECTION, POWDER, LYOPHILIZED, FOR SOLUTION INTRAMUSCULAR; INTRAVENOUS
Status: CANCELLED
Start: 2023-01-31

## 2023-01-10 RX ORDER — DIPHENHYDRAMINE HCL 25 MG
50 CAPSULE ORAL ONCE
Status: CANCELLED
Start: 2023-01-31

## 2023-01-10 RX ORDER — DIPHENHYDRAMINE HCL 25 MG
50 CAPSULE ORAL ONCE
Status: COMPLETED | OUTPATIENT
Start: 2023-01-10 | End: 2023-01-10

## 2023-01-10 RX ORDER — EPINEPHRINE 1 MG/ML
0.3 INJECTION, SOLUTION INTRAMUSCULAR; SUBCUTANEOUS EVERY 5 MIN PRN
Status: CANCELLED | OUTPATIENT
Start: 2023-01-31

## 2023-01-10 RX ORDER — MEPERIDINE HYDROCHLORIDE 25 MG/ML
25 INJECTION INTRAMUSCULAR; INTRAVENOUS; SUBCUTANEOUS EVERY 30 MIN PRN
Status: CANCELLED | OUTPATIENT
Start: 2023-01-31

## 2023-01-10 RX ORDER — DIPHENHYDRAMINE HYDROCHLORIDE 50 MG/ML
50 INJECTION INTRAMUSCULAR; INTRAVENOUS
Status: CANCELLED
Start: 2023-01-31

## 2023-01-10 RX ADMIN — HUMAN IMMUNOGLOBULIN G 75 G: 40 LIQUID INTRAVENOUS at 08:31

## 2023-01-10 RX ADMIN — DIPHENHYDRAMINE HYDROCHLORIDE 50 MG: 25 CAPSULE ORAL at 08:24

## 2023-01-10 RX ADMIN — ACETAMINOPHEN 650 MG: 325 TABLET ORAL at 08:23

## 2023-01-10 NOTE — PROGRESS NOTES
Infusion Nursing Note:  Emiliano Aguiar presents today for IVIG.    Patient seen by provider today: No   present during visit today: Not Applicable.    Note: Pt has noticed prior to the last 3 infusions he has had that he has developed more painful joints.  He will be contacting his MD to look into this.    Intravenous Access:  Peripheral IV placed.    Treatment Conditions:  Biological Infusion Checklist:  ~~~ NOTE: If the patient answers yes to any of the questions below, hold the infusion and contact ordering provider or on-call provider.    1. Have you recently had an elevated temperature, fever, chills, productive cough, coughing for 3 weeks or longer or hemoptysis, abnormal vital signs, night sweats,  chest pain or have you noticed a decrease in your appetite, unexplained weight loss or fatigue? No  2. Do you have any open wounds or new incisions? No  3. Do you have any recent or upcoming hospitalizations, surgeries or dental procedures? No  4. Do you currently have or recently have had any signs of illness or infection or are you on any antibiotics? No  5. Have you had any new, sudden or worsening abdominal pain? No  6. Have you or anyone in your household received a live vaccination in the past 4 weeks? Please note:  No live vaccines while on biologic/chemotherapy until 6 months after the last treatment.  Patient can receive the flu vaccine (shot only) and the pneumovax.  It is optimal for the patient to get these vaccines mid cycle, but they can be given at any time as long as it is not on the day of the infusion. No  7. Have you recently been diagnosed with any new nervous system diseases (ie. Multiple sclerosis, Guillain Sanders, seizures, neurological changes) or cancer diagnosis? No  8. Are you on any form of radiation or chemotherapy? No  9. Are you pregnant or breast feeding or do you have plans of pregnancy in the future? No  10. Have you been having any signs of worsening depression or  suicidal ideations?  (benlysta only) No  11. Have there been any other new onset medical symptoms? YES  See above      Post Infusion Assessment:  Patient tolerated infusion without incident.  Blood return noted pre and post infusion.  Site patent and intact, free from redness, edema or discomfort.  No evidence of extravasations.  Access discontinued per protocol.  Biologic Infusion Post Education: Call the triage nurse at your clinic or seek medical attention if you have chills and/or temperature greater than or equal to 100.5, uncontrolled nausea/vomiting, diarrhea, constipation, dizziness, shortness of breath, chest pain, heart palpitations, weakness or any other new or concerning symptoms, questions or concerns.  You cannot have any live virus vaccines prior to or during treatment or up to 6 months post infusion.  If you have an upcoming surgery, medical procedure or dental procedure during treatment, this should be discussed with your ordering physician and your surgeon/dentist.  If you are having any concerning symptom, if you are unsure if you should get your next infusion or wish to speak to a provider before your next infusion, please call your care coordinator or triage nurse at your clinic to notify them so we can adequately serve you.     Discharge Plan:   Discharge instructions reviewed with: Patient.  Patient and/or family verbalized understanding of discharge instructions and all questions answered.  Patient discharged in stable condition accompanied by: self.  Departure Mode: Ambulatory.      Whit Riddle RN

## 2023-01-19 NOTE — MR AVS SNAPSHOT
After Visit Summary   7/5/2017    Emiliano Aguiar    MRN: [id number]           Patient Information     Date Of Birth          1965        Visit Information        Provider Department      7/5/2017 9:50 AM Nixon Godoy MD [facility]        Care Instructions    If you have questions or concerns following today's appointment, please contact   [facility] at [phone].    For more urgent concerns, contact the neurology department triage line at [phone] option 3.    Follow up with Dr. Godoy in 4 months.    Medication:   IVIG 1gram/kg every 3 weeks (broken into 0.5gm/kg daily over 2 days). We will update orders with your infusion center.     We now have a  available to help patients with psychosocial needs, supportive counseling, advanced care planning, and insurance and/or disability questions. You can reach JEFFREY aZpata, [facility] at [phone].              Follow-ups after your visit        Your next 10 appointments already scheduled     Oct 18, 2017 10:20 AM CDT   (Arrive by 10:05 AM)   Return Neuropathy Visit with Nixon Godoy MD   [facility] ([facility])    [address] [place] [id number] [phone]              Who to contact     Please call your clinic at [phone] to:    Ask questions about your health    Make or cancel appointments    Discuss your medicines    Learn about your test results    Speak to your doctor   If you have compliments or concerns about an experience at your clinic, or if you wish to file a complaint, please contact [facility] Patient Relations at [phone] or email us at [email or web address]         Additional Information About Your Visit        MyChart Information     [employer] gives you secure access to your electronic health record. If you see a primary care provider, you can also send  Patient reports good fm, no n/v, bleeding, loss of fluid, edema, dom violence, or smoking  rhianna pnv urine neg/neg given slip for glucola cbc rpr return in 4 weeks or sooner as needed  messages to your care team and make appointments. If you have questions, please call your primary care clinic.  If you do not have a primary care provider, please call [phone] and they will assist you.      [employer] is an electronic gateway that provides easy, online access to your medical records. With [employer], you can request a clinic appointment, read your test results, renew a prescription or communicate with your care team.     To access your existing account, please contact your [facility] or call [phone] for assistance.        Care EveryWhere ID     This is your Care EveryWhere ID. This could be used by other organizations to access your [facility] medical records  [id number]        Your Vitals Were     Pulse Height BMI (Body Mass Index)             76 1.829 m (6') 28.48 kg/m2          Blood Pressure from Last 3 Encounters:   07/05/17 124/83   07/06/16 137/76   01/06/16 131/69    Weight from Last 3 Encounters:   07/05/17 95.3 kg (210 lb)   12/28/16 95.3 kg (210 lb)   01/06/16 95.3 kg (210 lb)              Today, you had the following     No orders found for display       Primary Care Provider Office Phone # Fax #    Son Spenser White [phone]       [facility] [address] [place] [address]        Equal Access to Services     MOISES SUBRAMANIAN : Hadii aad ku hadasho Soomaali, waaxda luqadaha, qaybta kaalmada adeegyada, concepción haddad. So [facility] [phone].    ATENCIÓN: Si habla español, tiene a krishnamurthy disposición servicios gratuitos de asistencia lingüística. Llyo al [phone].    We comply with applicable federal civil rights laws and [place] laws. We do not discriminate on the basis of race, color, national origin, age, disability sex, sexual orientation or gender identity.            Thank you!     Thank you for choosing [facility]  for your care. Our goal is always to provide you with excellent  care. Hearing back from our patients is one way we can continue to improve our services. Please take a few minutes to complete the written survey that you may receive in the mail after your visit with us. Thank you!             Your Updated Medication List - Protect others around you: Learn how to safely use, store and throw away your medicines at www.disposemymeds.org.          This list is accurate as of: 7/5/17 10:29 AM.  Always use your most recent med list.                   Brand Name Dispense Instructions for use Diagnosis    acetaminophen 500 MG tablet    TYLENOL     Take 1,000 mg by mouth        CALCI-CHEW 1250 (500 CA) MG Chew   Generic drug:  calcium carbonate           diphenhydrAMINE 25 MG capsule    BENADRYL     Take 25 mg by mouth        TUMS PO      Take by mouth as needed

## 2023-01-30 ENCOUNTER — OFFICE VISIT (OUTPATIENT)
Dept: NEUROLOGY | Facility: CLINIC | Age: 58
End: 2023-01-30
Payer: COMMERCIAL

## 2023-01-30 ENCOUNTER — LAB (OUTPATIENT)
Dept: LAB | Facility: CLINIC | Age: 58
End: 2023-01-30
Payer: COMMERCIAL

## 2023-01-30 VITALS
RESPIRATION RATE: 16 BRPM | SYSTOLIC BLOOD PRESSURE: 125 MMHG | WEIGHT: 191 LBS | HEART RATE: 60 BPM | DIASTOLIC BLOOD PRESSURE: 78 MMHG | OXYGEN SATURATION: 99 % | BODY MASS INDEX: 25.9 KG/M2

## 2023-01-30 DIAGNOSIS — Z11.59 NEED FOR HEPATITIS B SCREENING TEST: Primary | ICD-10-CM

## 2023-01-30 DIAGNOSIS — Z11.1 SCREENING EXAMINATION FOR PULMONARY TUBERCULOSIS: ICD-10-CM

## 2023-01-30 DIAGNOSIS — G61.81 CIDP (CHRONIC INFLAMMATORY DEMYELINATING POLYNEUROPATHY) (H): ICD-10-CM

## 2023-01-30 DIAGNOSIS — Z11.59 NEED FOR HEPATITIS C SCREENING TEST: ICD-10-CM

## 2023-01-30 DIAGNOSIS — Z11.59 NEED FOR HEPATITIS B SCREENING TEST: ICD-10-CM

## 2023-01-30 LAB
HBV SURFACE AB SERPL IA-ACNC: 397.99 M[IU]/ML
HBV SURFACE AB SERPL IA-ACNC: REACTIVE M[IU]/ML
HBV SURFACE AG SERPL QL IA: NONREACTIVE
HCV AB SERPL QL IA: NONREACTIVE
Lab: NORMAL
PERFORMING LABORATORY: NORMAL
SPECIMEN STATUS: NORMAL
TEST NAME: NORMAL

## 2023-01-30 PROCEDURE — 86706 HEP B SURFACE ANTIBODY: CPT | Performed by: PSYCHIATRY & NEUROLOGY

## 2023-01-30 PROCEDURE — 99215 OFFICE O/P EST HI 40 MIN: CPT | Performed by: PSYCHIATRY & NEUROLOGY

## 2023-01-30 PROCEDURE — 86255 FLUORESCENT ANTIBODY SCREEN: CPT | Performed by: PATHOLOGY

## 2023-01-30 PROCEDURE — 86704 HEP B CORE ANTIBODY TOTAL: CPT | Performed by: PSYCHIATRY & NEUROLOGY

## 2023-01-30 PROCEDURE — 86481 TB AG RESPONSE T-CELL SUSP: CPT | Performed by: PSYCHIATRY & NEUROLOGY

## 2023-01-30 PROCEDURE — 86803 HEPATITIS C AB TEST: CPT | Performed by: PSYCHIATRY & NEUROLOGY

## 2023-01-30 PROCEDURE — 87340 HEPATITIS B SURFACE AG IA: CPT | Performed by: PSYCHIATRY & NEUROLOGY

## 2023-01-30 PROCEDURE — 36415 COLL VENOUS BLD VENIPUNCTURE: CPT | Performed by: PATHOLOGY

## 2023-01-30 ASSESSMENT — PAIN SCALES - GENERAL: PAINLEVEL: MILD PAIN (3)

## 2023-01-30 NOTE — LETTER
2023       RE: Emiliano Aguiar  [address]  [facility] [address]     Dear Colleague,    Thank you for referring your patient, Emiliano Aguiar, to the [facility] [place] [facility]. Please see a copy of my visit note below.    Service Date: 2023    Son DEBBIE White MD   [facility]  [address] [place] [address]    RE:      Emiliano Aguiar   MRN:  [id number]  :   1965    Dear Dr. White:    I had the pleasure to see Emiliano in followup at the [facility].  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  I have been following him since .  He presented with a sensory ataxic neuropathy with distal numbness, Romberg sign, gait ataxia and clumsiness of the hands, and very little distal weakness.  He had an EMG in , clearly showing demyelination, that I reviewed again today.  CSF protein was not very elevated; it was 55 mg/dL.  In the past, he had a good response to plasmapheresis, but to avoid complications related with long-term line placement, I had put him on IVIG.  A trial of Solu-Medrol weekly for 10 weeks at large doses back in  was unsuccessful.  IVIG has worked, but he tends to have a lot of side effects, including back pain, fever, chills, headache in the occiput resembling aseptic meningitis, and malaise for a good week after each infusion.  What makes Emiliano more concerned, however, is that in the last couple of months, he is not getting the benefit that he used to get with the IVIG infusions a week to 10 days later.  He has more of his balance problems and clumsiness and more weakness of his hands and pain in the shoulder and the back, and that is why he is here today.      His RODS CIDP score is 25, which is 2 points lower than his last assessment in 2022, but I should point out that further probing into individual questions indicates more difficulties  doing his shower, fine hand movements, etc.  He denies dysphagia, dysarthria, sialorrhea, ptosis or any other bulbar symptoms.  He has not fallen, but he had several near misses.      It is interesting that he gets premedication with a moderate dose of methylprednisolone before each IVIG infusion, but this does not help prevent the pain in the joints and headaches that he gets. We have tried to get subcutaneous immunoglobulin approved by his insurance twice in 2019 and in 2022, and it was declined on both occasions.  Dr Castano tried to repeat an EMG on him on 08/09/2022, but he could not tolerate the nerve conduction studies well at all, and the study was incomplete.  It still showed absent sural and superficial peroneal sensory responses, which are consistent with a neuropathy, but this does not define it as axonal or demyelinating. Tibial motor response was reduced in amplitude, and peroneal motor responses for the TA were absent, but I suspect this has to do with incomplete stimulation.  The study was not informative. In the past, he had contactin-1 and neurofascin antibodies, MAG, tubulin, serum protein immunofixation, vitamin B12, copper and vitamin E levels, all negative or normal, so an alternative diagnosis appears unlikely.      /78   Pulse 60   Resp 16   Wt 86.6 kg (191 lb)   SpO2 99%   BMI 25.90 kg/m      PHYSICAL EXAMINATION:  He is awake, alert and oriented x3.  He is able to provide a coherent history.  Frankly, his strength has not changed from prior assessments, and it remains 5/5 except perhaps for some issues with the hip flexors, which show 4+ strength, which is likely due to deafferentation or variable effort or pain.  Otherwise, he is pretty much 5.  Tone is normal.  I did not repeat his reflex exam.  His vibration is still a good 8-9 seconds at the toes on the right and 7 on the left and 10 at the medial malleoli.  It is normal at the index fingers, more close to 20 seconds.   Position sensation is normal, definitely present at the toes and the hands.  There is no major tremor of outstretched hands.  His balance is poor.  He can get easily up from a chair, but he has a positive Romberg sign.  He can hardly do 2 steps of tandem and cannot walk on heels and toes, although he can stand on his heels and toes, indicating no weakness of the TA and gastrocnemius, respectively.    In summary, Luther has a functional decline in the last few months.  He does not tolerate nerve conduction studies to see if his CIDP has objectively progressed compared to previous test. He is on a stable dose of IVIG.  An alternative explanation for his neuropathy besides CIDP has not been found.     I offered him the option of rituximab. Dr. Sultana, who is a national CIDP expert, also reached him today, and the patient may be interested to be enrolled in a C2 complement inhibitor trial at the [facility] before trying rituximab. Either way, I would like him to get a QuantiFERON TB testing today and hepatitis B-C serologies before we do anything.  If he wishes to be enrolled in the trial, he can see how that goes, and if it goes well, he can continue on the study.  If it does not go well, we could consider the rituximab.  I told him that rituximab has numerous side effects, including increased risk of infection, and it is mandatory that he gets influenza and booster bivalent COVID-19 vaccines at least 2 weeks before the first rituximab infusion.      He will weigh his options and return to clinic for followup in 3 months.  In the meantime, I am continuing his IVIG. Thank you for allowing me to participate in his care. Total time spent on this encounter today 45 minutes, including 25 face to face, 10 on post visit dictation, editing and orders and 10 on pre-visit chart review.    Sincerely,    Nixon Godoy MD        D: 01/30/2023   T: 01/30/2023   MT: lonnie    Name:     LUTHER HUGHES  MRN:      [id number]         Account:      [id number]   :      1965           Service Date: 2023       Document: [id number]

## 2023-01-30 NOTE — PATIENT INSTRUCTIONS
I would consider adding Rituximab to your regimen    Before starting Rituximab, you will need:    A) Some blood tests today  B) Vaccination for flu and bivalent booster COVID19 (your primary care doctor should do this) at least 2 weeks before the first infusion    Follow up 3 months

## 2023-01-30 NOTE — PROGRESS NOTES
Service Date: 2023    Son DEBBIE MD Cindy   [facility]  [address] [id number]    RE:      Emiliano Aguiar   MRN:  [id number]  :   1965    Dear Dr. White:    I had the pleasure to see Emiliano in followup at the [facility].  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  I have been following him since .  He presented with a sensory ataxic neuropathy with distal numbness, Romberg sign, gait ataxia and clumsiness of the hands, and very little distal weakness.  He had an EMG in , clearly showing demyelination, that I reviewed again today.  CSF protein was not very elevated; it was 55 mg/dL.  In the past, he had a good response to plasmapheresis, but to avoid complications related with long-term line placement, I had put him on IVIG.  A trial of Solu-Medrol weekly for 10 weeks at large doses back in  was unsuccessful.  IVIG has worked, but he tends to have a lot of side effects, including back pain, fever, chills, headache in the occiput resembling aseptic meningitis, and malaise for a good week after each infusion.  What makes Emiliano more concerned, however, is that in the last couple of months, he is not getting the benefit that he used to get with the IVIG infusions a week to 10 days later.  He has more of his balance problems and clumsiness and more weakness of his hands and pain in the shoulder and the back, and that is why he is here today.      His RODS CIDP score is 25, which is 2 points lower than his last assessment in 2022, but I should point out that further probing into individual questions indicates more difficulties doing his shower, fine hand movements, etc.  He denies dysphagia, dysarthria, sialorrhea, ptosis or any other bulbar symptoms.  He has not fallen, but he had several near misses.      It is interesting that he gets premedication with a moderate dose of methylprednisolone before each IVIG infusion, but this does  not help prevent the pain in the joints and headaches that he gets. We have tried to get subcutaneous immunoglobulin approved by his insurance twice in 2019 and in 2022, and it was declined on both occasions.  Dr Castano tried to repeat an EMG on him on 08/09/2022, but he could not tolerate the nerve conduction studies well at all, and the study was incomplete.  It still showed absent sural and superficial peroneal sensory responses, which are consistent with a neuropathy, but this does not define it as axonal or demyelinating. Tibial motor response was reduced in amplitude, and peroneal motor responses for the TA were absent, but I suspect this has to do with incomplete stimulation.  The study was not informative. In the past, he had contactin-1 and neurofascin antibodies, MAG, tubulin, serum protein immunofixation, vitamin B12, copper and vitamin E levels, all negative or normal, so an alternative diagnosis appears unlikely.      /78   Pulse 60   Resp 16   Wt 86.6 kg (191 lb)   SpO2 99%   BMI 25.90 kg/m      PHYSICAL EXAMINATION:  He is awake, alert and oriented x3.  He is able to provide a coherent history.  Frankly, his strength has not changed from prior assessments, and it remains 5/5 except perhaps for some issues with the hip flexors, which show 4+ strength, which is likely due to deafferentation or variable effort or pain.  Otherwise, he is pretty much 5.  Tone is normal.  I did not repeat his reflex exam.  His vibration is still a good 8-9 seconds at the toes on the right and 7 on the left and 10 at the medial malleoli.  It is normal at the index fingers, more close to 20 seconds.  Position sensation is normal, definitely present at the toes and the hands.  There is no major tremor of outstretched hands.  His balance is poor.  He can get easily up from a chair, but he has a positive Romberg sign.  He can hardly do 2 steps of tandem and cannot walk on heels and toes, although he can stand on his  heels and toes, indicating no weakness of the TA and gastrocnemius, respectively.    In summary, Luther has a functional decline in the last few months.  He does not tolerate nerve conduction studies to see if his CIDP has objectively progressed compared to previous test. He is on a stable dose of IVIG.  An alternative explanation for his neuropathy besides CIDP has not been found.     I offered him the option of rituximab. Dr. Sultana, who is a national CIDP expert, also reached him today, and the patient may be interested to be enrolled in a C2 complement inhibitor trial at the [facility] before trying rituximab. Either way, I would like him to get a QuantiFERON TB testing today and hepatitis B-C serologies before we do anything.  If he wishes to be enrolled in the trial, he can see how that goes, and if it goes well, he can continue on the study.  If it does not go well, we could consider the rituximab.  I told him that rituximab has numerous side effects, including increased risk of infection, and it is mandatory that he gets influenza and booster bivalent COVID-19 vaccines at least 2 weeks before the first rituximab infusion.      He will weigh his options and return to clinic for followup in 3 months.  In the meantime, I am continuing his IVIG. Thank you for allowing me to participate in his care. Total time spent on this encounter today 45 minutes, including 25 face to face, 10 on post visit dictation, editing and orders and 10 on pre-visit chart review.    Sincerely,    Nixon Godoy MD        D: 2023   T: 2023   MT: lonnie    Name:     LUTHER HUGHES  MRN:      [id number]        Account:      [id number]   :      1965           Service Date: 2023       Document: [id number]

## 2023-01-31 ENCOUNTER — INFUSION THERAPY VISIT (OUTPATIENT)
Dept: INFUSION THERAPY | Facility: CLINIC | Age: 58
End: 2023-01-31
Attending: PSYCHIATRY & NEUROLOGY
Payer: COMMERCIAL

## 2023-01-31 VITALS
SYSTOLIC BLOOD PRESSURE: 120 MMHG | TEMPERATURE: 97.2 F | WEIGHT: 192.9 LBS | OXYGEN SATURATION: 99 % | DIASTOLIC BLOOD PRESSURE: 68 MMHG | BODY MASS INDEX: 26.16 KG/M2 | HEART RATE: 58 BPM

## 2023-01-31 DIAGNOSIS — G61.81 CIDP (CHRONIC INFLAMMATORY DEMYELINATING POLYNEUROPATHY) (H): Primary | ICD-10-CM

## 2023-01-31 LAB
GAMMA INTERFERON BACKGROUND BLD IA-ACNC: 0 IU/ML
HBV CORE AB SERPL QL IA: REACTIVE
M TB IFN-G BLD-IMP: NEGATIVE
M TB IFN-G CD4+ BCKGRND COR BLD-ACNC: 10 IU/ML
MITOGEN IGNF BCKGRD COR BLD-ACNC: 0.02 IU/ML
MITOGEN IGNF BCKGRD COR BLD-ACNC: 0.03 IU/ML
QUANTIFERON MITOGEN: 10 IU/ML
QUANTIFERON NIL TUBE: 0 IU/ML
QUANTIFERON TB1 TUBE: 0.02 IU/ML
QUANTIFERON TB2 TUBE: 0.03

## 2023-01-31 PROCEDURE — 250N000013 HC RX MED GY IP 250 OP 250 PS 637: Performed by: PSYCHIATRY & NEUROLOGY

## 2023-01-31 PROCEDURE — 96366 THER/PROPH/DIAG IV INF ADDON: CPT

## 2023-01-31 PROCEDURE — 96365 THER/PROPH/DIAG IV INF INIT: CPT

## 2023-01-31 PROCEDURE — 250N000011 HC RX IP 250 OP 636: Performed by: PSYCHIATRY & NEUROLOGY

## 2023-01-31 RX ORDER — MEPERIDINE HYDROCHLORIDE 25 MG/ML
25 INJECTION INTRAMUSCULAR; INTRAVENOUS; SUBCUTANEOUS EVERY 30 MIN PRN
Status: CANCELLED | OUTPATIENT
Start: 2023-02-21

## 2023-01-31 RX ORDER — DIPHENHYDRAMINE HCL 25 MG
50 CAPSULE ORAL ONCE
Status: CANCELLED
Start: 2023-02-21

## 2023-01-31 RX ORDER — METHYLPREDNISOLONE SODIUM SUCCINATE 125 MG/2ML
125 INJECTION, POWDER, LYOPHILIZED, FOR SOLUTION INTRAMUSCULAR; INTRAVENOUS
Status: CANCELLED
Start: 2023-02-21

## 2023-01-31 RX ORDER — DIPHENHYDRAMINE HCL 25 MG
50 CAPSULE ORAL ONCE
Status: COMPLETED | OUTPATIENT
Start: 2023-01-31 | End: 2023-01-31

## 2023-01-31 RX ORDER — EPINEPHRINE 1 MG/ML
0.3 INJECTION, SOLUTION INTRAMUSCULAR; SUBCUTANEOUS EVERY 5 MIN PRN
Status: CANCELLED | OUTPATIENT
Start: 2023-02-21

## 2023-01-31 RX ORDER — HEPARIN SODIUM (PORCINE) LOCK FLUSH IV SOLN 100 UNIT/ML 100 UNIT/ML
5 SOLUTION INTRAVENOUS
Status: CANCELLED | OUTPATIENT
Start: 2023-02-21

## 2023-01-31 RX ORDER — ALBUTEROL SULFATE 90 UG/1
1-2 AEROSOL, METERED RESPIRATORY (INHALATION)
Status: CANCELLED
Start: 2023-02-21

## 2023-01-31 RX ORDER — ACETAMINOPHEN 325 MG/1
650 TABLET ORAL ONCE
Status: CANCELLED
Start: 2023-02-21

## 2023-01-31 RX ORDER — NALOXONE HYDROCHLORIDE 0.4 MG/ML
0.2 INJECTION, SOLUTION INTRAMUSCULAR; INTRAVENOUS; SUBCUTANEOUS
Status: CANCELLED | OUTPATIENT
Start: 2023-02-21

## 2023-01-31 RX ORDER — ACETAMINOPHEN 325 MG/1
650 TABLET ORAL ONCE
Status: COMPLETED | OUTPATIENT
Start: 2023-01-31 | End: 2023-01-31

## 2023-01-31 RX ORDER — DIPHENHYDRAMINE HYDROCHLORIDE 50 MG/ML
50 INJECTION INTRAMUSCULAR; INTRAVENOUS
Status: CANCELLED
Start: 2023-02-21

## 2023-01-31 RX ORDER — ALBUTEROL SULFATE 0.83 MG/ML
2.5 SOLUTION RESPIRATORY (INHALATION)
Status: CANCELLED | OUTPATIENT
Start: 2023-02-21

## 2023-01-31 RX ORDER — HEPARIN SODIUM,PORCINE 10 UNIT/ML
5 VIAL (ML) INTRAVENOUS
Status: CANCELLED | OUTPATIENT
Start: 2023-02-21

## 2023-01-31 RX ADMIN — HUMAN IMMUNOGLOBULIN G 75 G: 40 LIQUID INTRAVENOUS at 08:17

## 2023-01-31 RX ADMIN — ACETAMINOPHEN 650 MG: 325 TABLET ORAL at 08:13

## 2023-01-31 RX ADMIN — DIPHENHYDRAMINE HYDROCHLORIDE 50 MG: 25 CAPSULE ORAL at 08:13

## 2023-01-31 NOTE — PROGRESS NOTES
Infusion Nursing Note:  Emiliano Aguiar presents today for IVIG.    Patient seen by provider today: No   present during visit today: Not Applicable.    Note: Declined solu-cortef for premedication. IVIG initiated at 0.5ml/kg/hr and increased every 15 minutes by 0.5ml/kg/hr to a max dose of 4 ml/kg/hr.    Intravenous Access:  Peripheral IV placed.    Treatment Conditions:  Biological Infusion Checklist:  ~~~ NOTE: If the patient answers yes to any of the questions below, hold the infusion and contact ordering provider or on-call provider.    1. Have you recently had an elevated temperature, fever, chills, productive cough, coughing for 3 weeks or longer or hemoptysis, abnormal vital signs, night sweats,  chest pain or have you noticed a decrease in your appetite, unexplained weight loss or fatigue? No  2. Do you have any open wounds or new incisions? No  3. Do you have any recent or upcoming hospitalizations, surgeries or dental procedures? No  4. Do you currently have or recently have had any signs of illness or infection or are you on any antibiotics? No  5. Have you had any new, sudden or worsening abdominal pain? No  6. Have you or anyone in your household received a live vaccination in the past 4 weeks? Please note:  No live vaccines while on biologic/chemotherapy until 6 months after the last treatment.  Patient can receive the flu vaccine (shot only) and the pneumovax.  It is optimal for the patient to get these vaccines mid cycle, but they can be given at any time as long as it is not on the day of the infusion. No  7. Have you recently been diagnosed with any new nervous system diseases (ie. Multiple sclerosis, Guillain Benson, seizures, neurological changes) or cancer diagnosis? No  8. Are you on any form of radiation or chemotherapy? No  9. Are you pregnant or breast feeding or do you have plans of pregnancy in the future? No  10. Have you been having any signs of worsening depression or suicidal  ideations?  (benlysta only) No  11. Have there been any other new onset medical symptoms? No    Post Infusion Assessment:  Patient tolerated infusion without incident.  Blood return noted pre and post infusion.  Site patent and intact, free from redness, edema or discomfort.  No evidence of extravasations.  Access discontinued per protocol.     Discharge Plan:   Discharge instructions reviewed with: Patient.  Patient and/or family verbalized understanding of discharge instructions and all questions answered.  [facility]  Patient will return 2/21/23 for next appointment.   Patient discharged in stable condition accompanied by: self.  Departure Mode: Ambulatory.      Nick Omalley RN

## 2023-02-01 ENCOUNTER — TELEPHONE (OUTPATIENT)
Dept: NEUROLOGY | Facility: CLINIC | Age: 58
End: 2023-02-01
Payer: COMMERCIAL

## 2023-02-01 NOTE — TELEPHONE ENCOUNTER
[facility] Call Center    Phone Message    May a detailed message be left on voicemail: yes     Reason for Call: Other: patient just completed labs yesterday and was questioning his Hepatitis B level. Please contact patient to advise.     Action Taken: Message routed to:  [facility] ([facility] neurology    Travel Screening: Not Applicable

## 2023-02-07 LAB — MAYO MISC RESULT: NORMAL

## 2023-02-21 ENCOUNTER — INFUSION THERAPY VISIT (OUTPATIENT)
Dept: INFUSION THERAPY | Facility: CLINIC | Age: 58
End: 2023-02-21
Attending: PSYCHIATRY & NEUROLOGY
Payer: COMMERCIAL

## 2023-02-21 VITALS
SYSTOLIC BLOOD PRESSURE: 130 MMHG | HEART RATE: 61 BPM | BODY MASS INDEX: 25.63 KG/M2 | DIASTOLIC BLOOD PRESSURE: 77 MMHG | TEMPERATURE: 97.4 F | WEIGHT: 189 LBS | OXYGEN SATURATION: 99 %

## 2023-02-21 DIAGNOSIS — G61.81 CIDP (CHRONIC INFLAMMATORY DEMYELINATING POLYNEUROPATHY) (H): Primary | ICD-10-CM

## 2023-02-21 PROCEDURE — 96366 THER/PROPH/DIAG IV INF ADDON: CPT

## 2023-02-21 PROCEDURE — 250N000013 HC RX MED GY IP 250 OP 250 PS 637: Performed by: PSYCHIATRY & NEUROLOGY

## 2023-02-21 PROCEDURE — 250N000011 HC RX IP 250 OP 636: Performed by: PSYCHIATRY & NEUROLOGY

## 2023-02-21 PROCEDURE — 96365 THER/PROPH/DIAG IV INF INIT: CPT

## 2023-02-21 RX ORDER — DIPHENHYDRAMINE HCL 25 MG
50 CAPSULE ORAL ONCE
Status: COMPLETED | OUTPATIENT
Start: 2023-02-21 | End: 2023-02-21

## 2023-02-21 RX ORDER — ALBUTEROL SULFATE 90 UG/1
1-2 AEROSOL, METERED RESPIRATORY (INHALATION)
Status: CANCELLED
Start: 2023-03-14

## 2023-02-21 RX ORDER — HEPARIN SODIUM,PORCINE 10 UNIT/ML
5 VIAL (ML) INTRAVENOUS
Status: CANCELLED | OUTPATIENT
Start: 2023-03-14

## 2023-02-21 RX ORDER — HEPARIN SODIUM (PORCINE) LOCK FLUSH IV SOLN 100 UNIT/ML 100 UNIT/ML
5 SOLUTION INTRAVENOUS
Status: CANCELLED | OUTPATIENT
Start: 2023-03-14

## 2023-02-21 RX ORDER — EPINEPHRINE 1 MG/ML
0.3 INJECTION, SOLUTION INTRAMUSCULAR; SUBCUTANEOUS EVERY 5 MIN PRN
Status: CANCELLED | OUTPATIENT
Start: 2023-03-14

## 2023-02-21 RX ORDER — DIPHENHYDRAMINE HCL 25 MG
50 CAPSULE ORAL ONCE
Status: CANCELLED
Start: 2023-03-14

## 2023-02-21 RX ORDER — ALBUTEROL SULFATE 0.83 MG/ML
2.5 SOLUTION RESPIRATORY (INHALATION)
Status: CANCELLED | OUTPATIENT
Start: 2023-03-14

## 2023-02-21 RX ORDER — ACETAMINOPHEN 325 MG/1
650 TABLET ORAL ONCE
Status: COMPLETED | OUTPATIENT
Start: 2023-02-21 | End: 2023-02-21

## 2023-02-21 RX ORDER — MEPERIDINE HYDROCHLORIDE 25 MG/ML
25 INJECTION INTRAMUSCULAR; INTRAVENOUS; SUBCUTANEOUS EVERY 30 MIN PRN
Status: CANCELLED | OUTPATIENT
Start: 2023-03-14

## 2023-02-21 RX ORDER — NALOXONE HYDROCHLORIDE 0.4 MG/ML
0.2 INJECTION, SOLUTION INTRAMUSCULAR; INTRAVENOUS; SUBCUTANEOUS
Status: CANCELLED | OUTPATIENT
Start: 2023-03-14

## 2023-02-21 RX ORDER — ACETAMINOPHEN 325 MG/1
650 TABLET ORAL ONCE
Status: CANCELLED
Start: 2023-03-14

## 2023-02-21 RX ORDER — DIPHENHYDRAMINE HYDROCHLORIDE 50 MG/ML
50 INJECTION INTRAMUSCULAR; INTRAVENOUS
Status: CANCELLED
Start: 2023-03-14

## 2023-02-21 RX ORDER — METHYLPREDNISOLONE SODIUM SUCCINATE 125 MG/2ML
125 INJECTION, POWDER, LYOPHILIZED, FOR SOLUTION INTRAMUSCULAR; INTRAVENOUS
Status: CANCELLED
Start: 2023-03-14

## 2023-02-21 RX ADMIN — ACETAMINOPHEN 650 MG: 325 TABLET ORAL at 08:13

## 2023-02-21 RX ADMIN — DIPHENHYDRAMINE HYDROCHLORIDE 50 MG: 25 CAPSULE ORAL at 08:13

## 2023-02-21 RX ADMIN — HUMAN IMMUNOGLOBULIN G 75 G: 40 LIQUID INTRAVENOUS at 08:20

## 2023-02-21 NOTE — PROGRESS NOTES
"Infusion Nursing Note:  Emiliano Aguiar presents today for IVIG with premeds.    Patient seen by provider today: No   present during visit today: Not Applicable.    Note: Pt reports that he has been in discussion with his neurologist about potentially changing treatments.  He hasn't felt as much relief with IVIG treatments over the past few months.  Feels his symptoms of imbalance, clumsiness, weakness have been returning a lot sooner; \"about a week before I come back for my next treatment.\"  However, he states that was not the case with this past treatment.  States \"that must have been a good batch!\"     Declines steroid as premed.  Still reports occipital headaches/nausea/chills/arthralgia for a few days post IVIG treatments    IVIG initiated at 0.5mg/kg/hr.  Increased by 0.5mg/kg/hr every 15 minutes to max rate of 4.0mg/kg/hr.    Intravenous Access:  Peripheral IV placed.    Treatment Conditions:  Biological Infusion Checklist:  ~~~ NOTE: If the patient answers yes to any of the questions below, hold the infusion and contact ordering provider or on-call provider.    1. Have you recently had an elevated temperature, fever, chills, productive cough, coughing for 3 weeks or longer or hemoptysis, abnormal vital signs, night sweats,  chest pain or have you noticed a decrease in your appetite, unexplained weight loss or fatigue? No  2. Do you have any open wounds or new incisions? No  3. Do you have any recent or upcoming hospitalizations, surgeries or dental procedures? No  4. Do you currently have or recently have had any signs of illness or infection or are you on any antibiotics? No  5. Have you had any new, sudden or worsening abdominal pain? No  6. Have you or anyone in your household received a live vaccination in the past 4 weeks? Please note:  No live vaccines while on biologic/chemotherapy until 6 months after the last treatment.  Patient can receive the flu vaccine (shot only) and the pneumovax.  " It is optimal for the patient to get these vaccines mid cycle, but they can be given at any time as long as it is not on the day of the infusion. No  7. Have you recently been diagnosed with any new nervous system diseases (ie. Multiple sclerosis, Guillain [place], seizures, neurological changes) or cancer diagnosis? No  8. Are you on any form of radiation or chemotherapy? No  9. Are you pregnant or breast feeding or do you have plans of pregnancy in the future? No  10. Have you been having any signs of worsening depression or suicidal ideations?  (benlysta only) No  11. Have there been any other new onset medical symptoms? No      Post Infusion Assessment:  Patient tolerated infusion without incident.  Blood return noted pre and post infusion.  Site patent and intact, free from redness, edema or discomfort.  No evidence of extravasations.  Access discontinued per protocol.     Discharge Plan:   AVS to patient via MYCHART.  Patient will return 3/14/23 for next dose of IVIG for next appointment.   Patient discharged in stable condition accompanied by: self.  Departure Mode: Ambulatory.      Carli Lopez RN

## 2023-03-01 ENCOUNTER — TELEPHONE (OUTPATIENT)
Dept: PHARMACY | Facility: CLINIC | Age: 58
End: 2023-03-01
Payer: COMMERCIAL

## 2023-03-14 ENCOUNTER — INFUSION THERAPY VISIT (OUTPATIENT)
Dept: INFUSION THERAPY | Facility: CLINIC | Age: 58
End: 2023-03-14
Attending: PSYCHIATRY & NEUROLOGY
Payer: COMMERCIAL

## 2023-03-14 VITALS
BODY MASS INDEX: 25.25 KG/M2 | DIASTOLIC BLOOD PRESSURE: 76 MMHG | HEART RATE: 57 BPM | OXYGEN SATURATION: 98 % | SYSTOLIC BLOOD PRESSURE: 122 MMHG | WEIGHT: 186.2 LBS | TEMPERATURE: 97 F

## 2023-03-14 DIAGNOSIS — G61.81 CIDP (CHRONIC INFLAMMATORY DEMYELINATING POLYNEUROPATHY) (H): Primary | ICD-10-CM

## 2023-03-14 PROCEDURE — 250N000013 HC RX MED GY IP 250 OP 250 PS 637: Performed by: PSYCHIATRY & NEUROLOGY

## 2023-03-14 PROCEDURE — 96366 THER/PROPH/DIAG IV INF ADDON: CPT

## 2023-03-14 PROCEDURE — 96365 THER/PROPH/DIAG IV INF INIT: CPT

## 2023-03-14 PROCEDURE — 250N000011 HC RX IP 250 OP 636: Performed by: PSYCHIATRY & NEUROLOGY

## 2023-03-14 RX ORDER — ACETAMINOPHEN 325 MG/1
650 TABLET ORAL ONCE
Status: COMPLETED | OUTPATIENT
Start: 2023-03-14 | End: 2023-03-14

## 2023-03-14 RX ORDER — ACETAMINOPHEN 325 MG/1
650 TABLET ORAL ONCE
Status: CANCELLED
Start: 2023-04-04

## 2023-03-14 RX ORDER — HEPARIN SODIUM,PORCINE 10 UNIT/ML
5 VIAL (ML) INTRAVENOUS
Status: CANCELLED | OUTPATIENT
Start: 2023-04-04

## 2023-03-14 RX ORDER — DIPHENHYDRAMINE HYDROCHLORIDE 50 MG/ML
50 INJECTION INTRAMUSCULAR; INTRAVENOUS
Status: CANCELLED
Start: 2023-04-04

## 2023-03-14 RX ORDER — ALBUTEROL SULFATE 0.83 MG/ML
2.5 SOLUTION RESPIRATORY (INHALATION)
Status: CANCELLED | OUTPATIENT
Start: 2023-04-04

## 2023-03-14 RX ORDER — EPINEPHRINE 1 MG/ML
0.3 INJECTION, SOLUTION INTRAMUSCULAR; SUBCUTANEOUS EVERY 5 MIN PRN
Status: CANCELLED | OUTPATIENT
Start: 2023-04-04

## 2023-03-14 RX ORDER — NALOXONE HYDROCHLORIDE 0.4 MG/ML
0.2 INJECTION, SOLUTION INTRAMUSCULAR; INTRAVENOUS; SUBCUTANEOUS
Status: CANCELLED | OUTPATIENT
Start: 2023-04-04

## 2023-03-14 RX ORDER — DIPHENHYDRAMINE HCL 25 MG
50 CAPSULE ORAL ONCE
Status: CANCELLED
Start: 2023-04-04

## 2023-03-14 RX ORDER — ALBUTEROL SULFATE 90 UG/1
1-2 AEROSOL, METERED RESPIRATORY (INHALATION)
Status: CANCELLED
Start: 2023-04-04

## 2023-03-14 RX ORDER — MEPERIDINE HYDROCHLORIDE 25 MG/ML
25 INJECTION INTRAMUSCULAR; INTRAVENOUS; SUBCUTANEOUS EVERY 30 MIN PRN
Status: CANCELLED | OUTPATIENT
Start: 2023-04-04

## 2023-03-14 RX ORDER — METHYLPREDNISOLONE SODIUM SUCCINATE 125 MG/2ML
125 INJECTION, POWDER, LYOPHILIZED, FOR SOLUTION INTRAMUSCULAR; INTRAVENOUS
Status: CANCELLED
Start: 2023-04-04

## 2023-03-14 RX ORDER — HEPARIN SODIUM (PORCINE) LOCK FLUSH IV SOLN 100 UNIT/ML 100 UNIT/ML
5 SOLUTION INTRAVENOUS
Status: CANCELLED | OUTPATIENT
Start: 2023-04-04

## 2023-03-14 RX ORDER — DIPHENHYDRAMINE HCL 25 MG
50 CAPSULE ORAL ONCE
Status: COMPLETED | OUTPATIENT
Start: 2023-03-14 | End: 2023-03-14

## 2023-03-14 RX ADMIN — DIPHENHYDRAMINE HYDROCHLORIDE 50 MG: 25 CAPSULE ORAL at 08:09

## 2023-03-14 RX ADMIN — HUMAN IMMUNOGLOBULIN G 75 G: 40 LIQUID INTRAVENOUS at 08:18

## 2023-03-14 RX ADMIN — ACETAMINOPHEN 650 MG: 325 TABLET ORAL at 08:09

## 2023-03-14 NOTE — PROGRESS NOTES
Infusion Nursing Note:  Emiliano Aguiar presents today for IVIG w/ premeds.    Patient seen by provider today: No   present during visit today: Not Applicable.    Note:   IVIG started at 0.5mg/kg/hr and increased by 0.5mg/kg/hr every 15 minutes to a max of 4mg/kg/hr.    Only gets tylenol and benadryl for pre meds.     Intravenous Access:  Peripheral IV placed.    Treatment Conditions:  Biological Infusion Checklist:  ~~~ NOTE: If the patient answers yes to any of the questions below, hold the infusion and contact ordering provider or on-call provider.    1. Have you recently had an elevated temperature, fever, chills, productive cough, coughing for 3 weeks or longer or hemoptysis, abnormal vital signs, night sweats,  chest pain or have you noticed a decrease in your appetite, unexplained weight loss or fatigue? No  2. Do you have any open wounds or new incisions? No  3. Do you have any recent or upcoming hospitalizations, surgeries or dental procedures? No  4. Do you currently have or recently have had any signs of illness or infection or are you on any antibiotics? No  5. Have you had any new, sudden or worsening abdominal pain? No  6. Have you or anyone in your household received a live vaccination in the past 4 weeks? Please note:  No live vaccines while on biologic/chemotherapy until 6 months after the last treatment.  Patient can receive the flu vaccine (shot only) and the pneumovax.  It is optimal for the patient to get these vaccines mid cycle, but they can be given at any time as long as it is not on the day of the infusion. No  7. Have you recently been diagnosed with any new nervous system diseases (ie. Multiple sclerosis, Guillain [place], seizures, neurological changes) or cancer diagnosis? No  8. Are you on any form of radiation or chemotherapy? No  9. Are you pregnant or breast feeding or do you have plans of pregnancy in the future? No  10. Have you been having any signs of worsening  depression or suicidal ideations?  (benlysta only) No  11. Have there been any other new onset medical symptoms? No      Post Infusion Assessment:  Patient tolerated infusion without incident.  Blood return noted pre and post infusion.  Site patent and intact, free from redness, edema or discomfort.  No evidence of extravasations.  Access discontinued per protocol.  Biologic Infusion Post Education: Call the triage nurse at your clinic or seek medical attention if you have chills and/or temperature greater than or equal to 100.5, uncontrolled nausea/vomiting, diarrhea, constipation, dizziness, shortness of breath, chest pain, heart palpitations, weakness or any other new or concerning symptoms, questions or concerns.  You cannot have any live virus vaccines prior to or during treatment or up to 6 months post infusion.  If you have an upcoming surgery, medical procedure or dental procedure during treatment, this should be discussed with your ordering physician and your surgeon/dentist.  If you are having any concerning symptom, if you are unsure if you should get your next infusion or wish to speak to a provider before your next infusion, please call your care coordinator or triage nurse at your clinic to notify them so we can adequately serve you.     Discharge Plan:   Discharge instructions reviewed with: Patient.  Patient and/or family verbalized understanding of discharge instructions and all questions answered.  AVS to patient via [employer].  Patient will return 4/4 for next appointment.   Patient discharged in stable condition accompanied by: self.  Departure Mode: Ambulatory.      Azalea Carcamo RN

## 2023-04-04 ENCOUNTER — INFUSION THERAPY VISIT (OUTPATIENT)
Dept: INFUSION THERAPY | Facility: CLINIC | Age: 58
End: 2023-04-04
Attending: PSYCHIATRY & NEUROLOGY
Payer: COMMERCIAL

## 2023-04-04 VITALS
OXYGEN SATURATION: 98 % | SYSTOLIC BLOOD PRESSURE: 152 MMHG | WEIGHT: 187.6 LBS | TEMPERATURE: 96.7 F | BODY MASS INDEX: 25.44 KG/M2 | HEART RATE: 69 BPM | DIASTOLIC BLOOD PRESSURE: 82 MMHG

## 2023-04-04 DIAGNOSIS — G61.81 CIDP (CHRONIC INFLAMMATORY DEMYELINATING POLYNEUROPATHY) (H): Primary | ICD-10-CM

## 2023-04-04 PROCEDURE — 250N000013 HC RX MED GY IP 250 OP 250 PS 637: Performed by: PSYCHIATRY & NEUROLOGY

## 2023-04-04 PROCEDURE — 96366 THER/PROPH/DIAG IV INF ADDON: CPT

## 2023-04-04 PROCEDURE — 250N000011 HC RX IP 250 OP 636: Performed by: PSYCHIATRY & NEUROLOGY

## 2023-04-04 PROCEDURE — 96365 THER/PROPH/DIAG IV INF INIT: CPT

## 2023-04-04 RX ORDER — DIPHENHYDRAMINE HCL 25 MG
50 CAPSULE ORAL ONCE
Status: CANCELLED
Start: 2023-04-25

## 2023-04-04 RX ORDER — HEPARIN SODIUM,PORCINE 10 UNIT/ML
5 VIAL (ML) INTRAVENOUS
Status: CANCELLED | OUTPATIENT
Start: 2023-04-25

## 2023-04-04 RX ORDER — HEPARIN SODIUM (PORCINE) LOCK FLUSH IV SOLN 100 UNIT/ML 100 UNIT/ML
5 SOLUTION INTRAVENOUS
Status: CANCELLED | OUTPATIENT
Start: 2023-04-25

## 2023-04-04 RX ORDER — ACETAMINOPHEN 325 MG/1
650 TABLET ORAL ONCE
Status: CANCELLED
Start: 2023-04-25

## 2023-04-04 RX ORDER — DIPHENHYDRAMINE HYDROCHLORIDE 50 MG/ML
50 INJECTION INTRAMUSCULAR; INTRAVENOUS
Status: CANCELLED
Start: 2023-04-25

## 2023-04-04 RX ORDER — MEPERIDINE HYDROCHLORIDE 25 MG/ML
25 INJECTION INTRAMUSCULAR; INTRAVENOUS; SUBCUTANEOUS EVERY 30 MIN PRN
Status: CANCELLED | OUTPATIENT
Start: 2023-04-25

## 2023-04-04 RX ORDER — DIPHENHYDRAMINE HCL 25 MG
50 CAPSULE ORAL ONCE
Status: COMPLETED | OUTPATIENT
Start: 2023-04-04 | End: 2023-04-04

## 2023-04-04 RX ORDER — EPINEPHRINE 1 MG/ML
0.3 INJECTION, SOLUTION INTRAMUSCULAR; SUBCUTANEOUS EVERY 5 MIN PRN
Status: CANCELLED | OUTPATIENT
Start: 2023-04-25

## 2023-04-04 RX ORDER — ACETAMINOPHEN 325 MG/1
650 TABLET ORAL ONCE
Status: COMPLETED | OUTPATIENT
Start: 2023-04-04 | End: 2023-04-04

## 2023-04-04 RX ORDER — ALBUTEROL SULFATE 0.83 MG/ML
2.5 SOLUTION RESPIRATORY (INHALATION)
Status: CANCELLED | OUTPATIENT
Start: 2023-04-25

## 2023-04-04 RX ORDER — ALBUTEROL SULFATE 90 UG/1
1-2 AEROSOL, METERED RESPIRATORY (INHALATION)
Status: CANCELLED
Start: 2023-04-25

## 2023-04-04 RX ORDER — NALOXONE HYDROCHLORIDE 0.4 MG/ML
0.2 INJECTION, SOLUTION INTRAMUSCULAR; INTRAVENOUS; SUBCUTANEOUS
Status: CANCELLED | OUTPATIENT
Start: 2023-04-25

## 2023-04-04 RX ORDER — METHYLPREDNISOLONE SODIUM SUCCINATE 125 MG/2ML
125 INJECTION, POWDER, LYOPHILIZED, FOR SOLUTION INTRAMUSCULAR; INTRAVENOUS
Status: CANCELLED
Start: 2023-04-25

## 2023-04-04 RX ADMIN — HUMAN IMMUNOGLOBULIN G 75 G: 40 LIQUID INTRAVENOUS at 08:16

## 2023-04-04 RX ADMIN — DIPHENHYDRAMINE HYDROCHLORIDE 50 MG: 25 CAPSULE ORAL at 08:15

## 2023-04-04 RX ADMIN — ACETAMINOPHEN 650 MG: 325 TABLET ORAL at 08:15

## 2023-04-04 NOTE — PROGRESS NOTES
Infusion Nursing Note:  Emiliano Aguiar presents today for IVIG.    Patient seen by provider today: No   present during visit today: Not Applicable.    Note: Has made dietary changes which have helped slightly with symptoms.    Declines solucortef.    IVIG initiated at 0.5ml/kg/hr and increased every 15 minutes by 0.5ml/kg/hr to a max dose of 4 ml/kg/hr.    Intravenous Access:  Peripheral IV placed.    Treatment Conditions:  Biological Infusion Checklist:  ~~~ NOTE: If the patient answers yes to any of the questions below, hold the infusion and contact ordering provider or on-call provider.    1. Have you recently had an elevated temperature, fever, chills, productive cough, coughing for 3 weeks or longer or hemoptysis, abnormal vital signs, night sweats,  chest pain or have you noticed a decrease in your appetite, unexplained weight loss or fatigue? No  2. Do you have any open wounds or new incisions? No  3. Do you have any recent or upcoming hospitalizations, surgeries or dental procedures? No  4. Do you currently have or recently have had any signs of illness or infection or are you on any antibiotics? No  5. Have you had any new, sudden or worsening abdominal pain? No  6. Have you or anyone in your household received a live vaccination in the past 4 weeks? Please note:  No live vaccines while on biologic/chemotherapy until 6 months after the last treatment.  Patient can receive the flu vaccine (shot only) and the pneumovax.  It is optimal for the patient to get these vaccines mid cycle, but they can be given at any time as long as it is not on the day of the infusion. No  7. Have you recently been diagnosed with any new nervous system diseases (ie. Multiple sclerosis, Guillain [place], seizures, neurological changes) or cancer diagnosis? No  8. Are you on any form of radiation or chemotherapy? No  9. Are you pregnant or breast feeding or do you have plans of pregnancy in the future? No  10. Have you  been having any signs of worsening depression or suicidal ideations?  (benlysta only) No  11. Have there been any other new onset medical symptoms? No      Post Infusion Assessment:  Patient tolerated infusion without incident.  Blood return noted pre and post infusion.  Site patent and intact, free from redness, edema or discomfort.  No evidence of extravasations.  Access discontinued per protocol.    Biologic Infusion Post Education: Call the triage nurse at your clinic or seek medical attention if you have chills and/or temperature greater than or equal to 100.5, uncontrolled nausea/vomiting, diarrhea, constipation, dizziness, shortness of breath, chest pain, heart palpitations, weakness or any other new or concerning symptoms, questions or concerns.  You cannot have any live virus vaccines prior to or during treatment or up to 6 months post infusion.  If you have an upcoming surgery, medical procedure or dental procedure during treatment, this should be discussed with your ordering physician and your surgeon/dentist.  If you are having any concerning symptom, if you are unsure if you should get your next infusion or wish to speak to a provider before your next infusion, please call your care coordinator or triage nurse at your clinic to notify them so we can adequately serve you.     Discharge Plan:   Discharge instructions reviewed with: Patient.  Patient and/or family verbalized understanding of discharge instructions and all questions answered.  AVS to patient via [employer].  Patient will return 4/25/23 for next appointment.   Patient discharged in stable condition accompanied by: self.  Departure Mode: Ambulatory.      Nick Omalley RN

## 2023-04-22 ENCOUNTER — HEALTH MAINTENANCE LETTER (OUTPATIENT)
Age: 58
End: 2023-04-22

## 2023-04-25 ENCOUNTER — INFUSION THERAPY VISIT (OUTPATIENT)
Dept: INFUSION THERAPY | Facility: CLINIC | Age: 58
End: 2023-04-25
Attending: COUNSELOR
Payer: COMMERCIAL

## 2023-04-25 VITALS
SYSTOLIC BLOOD PRESSURE: 136 MMHG | BODY MASS INDEX: 25.88 KG/M2 | OXYGEN SATURATION: 97 % | DIASTOLIC BLOOD PRESSURE: 78 MMHG | WEIGHT: 190.8 LBS | TEMPERATURE: 96.4 F | HEART RATE: 64 BPM

## 2023-04-25 DIAGNOSIS — G61.81 CIDP (CHRONIC INFLAMMATORY DEMYELINATING POLYNEUROPATHY) (H): Primary | ICD-10-CM

## 2023-04-25 PROCEDURE — 96365 THER/PROPH/DIAG IV INF INIT: CPT

## 2023-04-25 PROCEDURE — 250N000011 HC RX IP 250 OP 636: Performed by: PSYCHIATRY & NEUROLOGY

## 2023-04-25 PROCEDURE — 250N000013 HC RX MED GY IP 250 OP 250 PS 637: Performed by: PSYCHIATRY & NEUROLOGY

## 2023-04-25 PROCEDURE — 96366 THER/PROPH/DIAG IV INF ADDON: CPT

## 2023-04-25 RX ORDER — METHYLPREDNISOLONE SODIUM SUCCINATE 125 MG/2ML
125 INJECTION, POWDER, LYOPHILIZED, FOR SOLUTION INTRAMUSCULAR; INTRAVENOUS
Status: CANCELLED
Start: 2023-05-16

## 2023-04-25 RX ORDER — MEPERIDINE HYDROCHLORIDE 25 MG/ML
25 INJECTION INTRAMUSCULAR; INTRAVENOUS; SUBCUTANEOUS EVERY 30 MIN PRN
Status: CANCELLED | OUTPATIENT
Start: 2023-05-16

## 2023-04-25 RX ORDER — HEPARIN SODIUM,PORCINE 10 UNIT/ML
5 VIAL (ML) INTRAVENOUS
Status: CANCELLED | OUTPATIENT
Start: 2023-05-16

## 2023-04-25 RX ORDER — NALOXONE HYDROCHLORIDE 0.4 MG/ML
0.2 INJECTION, SOLUTION INTRAMUSCULAR; INTRAVENOUS; SUBCUTANEOUS
Status: CANCELLED | OUTPATIENT
Start: 2023-05-16

## 2023-04-25 RX ORDER — DIPHENHYDRAMINE HCL 25 MG
50 CAPSULE ORAL ONCE
Status: COMPLETED | OUTPATIENT
Start: 2023-04-25 | End: 2023-04-25

## 2023-04-25 RX ORDER — ACETAMINOPHEN 325 MG/1
650 TABLET ORAL ONCE
Status: CANCELLED
Start: 2023-05-16

## 2023-04-25 RX ORDER — DIPHENHYDRAMINE HCL 25 MG
50 CAPSULE ORAL ONCE
Status: CANCELLED
Start: 2023-05-16

## 2023-04-25 RX ORDER — DIPHENHYDRAMINE HYDROCHLORIDE 50 MG/ML
50 INJECTION INTRAMUSCULAR; INTRAVENOUS
Status: CANCELLED
Start: 2023-05-16

## 2023-04-25 RX ORDER — ALBUTEROL SULFATE 90 UG/1
1-2 AEROSOL, METERED RESPIRATORY (INHALATION)
Status: CANCELLED
Start: 2023-05-16

## 2023-04-25 RX ORDER — EPINEPHRINE 1 MG/ML
0.3 INJECTION, SOLUTION INTRAMUSCULAR; SUBCUTANEOUS EVERY 5 MIN PRN
Status: CANCELLED | OUTPATIENT
Start: 2023-05-16

## 2023-04-25 RX ORDER — ALBUTEROL SULFATE 0.83 MG/ML
2.5 SOLUTION RESPIRATORY (INHALATION)
Status: CANCELLED | OUTPATIENT
Start: 2023-05-16

## 2023-04-25 RX ORDER — ACETAMINOPHEN 325 MG/1
650 TABLET ORAL ONCE
Status: COMPLETED | OUTPATIENT
Start: 2023-04-25 | End: 2023-04-25

## 2023-04-25 RX ORDER — HEPARIN SODIUM (PORCINE) LOCK FLUSH IV SOLN 100 UNIT/ML 100 UNIT/ML
5 SOLUTION INTRAVENOUS
Status: CANCELLED | OUTPATIENT
Start: 2023-05-16

## 2023-04-25 RX ADMIN — DIPHENHYDRAMINE HYDROCHLORIDE 50 MG: 25 CAPSULE ORAL at 08:11

## 2023-04-25 RX ADMIN — HUMAN IMMUNOGLOBULIN G 75 G: 40 LIQUID INTRAVENOUS at 08:13

## 2023-04-25 RX ADMIN — ACETAMINOPHEN 650 MG: 325 TABLET ORAL at 08:11

## 2023-05-16 ENCOUNTER — INFUSION THERAPY VISIT (OUTPATIENT)
Dept: INFUSION THERAPY | Facility: CLINIC | Age: 58
End: 2023-05-16
Attending: PSYCHIATRY & NEUROLOGY
Payer: COMMERCIAL

## 2023-05-16 VITALS
WEIGHT: 191 LBS | TEMPERATURE: 96.8 F | DIASTOLIC BLOOD PRESSURE: 69 MMHG | RESPIRATION RATE: 16 BRPM | HEART RATE: 59 BPM | SYSTOLIC BLOOD PRESSURE: 109 MMHG | OXYGEN SATURATION: 98 % | BODY MASS INDEX: 25.9 KG/M2

## 2023-05-16 DIAGNOSIS — G61.81 CIDP (CHRONIC INFLAMMATORY DEMYELINATING POLYNEUROPATHY) (H): Primary | ICD-10-CM

## 2023-05-16 PROCEDURE — 250N000013 HC RX MED GY IP 250 OP 250 PS 637: Performed by: PSYCHIATRY & NEUROLOGY

## 2023-05-16 PROCEDURE — 96366 THER/PROPH/DIAG IV INF ADDON: CPT

## 2023-05-16 PROCEDURE — 250N000011 HC RX IP 250 OP 636: Performed by: PSYCHIATRY & NEUROLOGY

## 2023-05-16 PROCEDURE — 96365 THER/PROPH/DIAG IV INF INIT: CPT

## 2023-05-16 RX ORDER — HEPARIN SODIUM,PORCINE 10 UNIT/ML
5 VIAL (ML) INTRAVENOUS
Status: CANCELLED | OUTPATIENT
Start: 2023-06-06

## 2023-05-16 RX ORDER — NALOXONE HYDROCHLORIDE 0.4 MG/ML
0.2 INJECTION, SOLUTION INTRAMUSCULAR; INTRAVENOUS; SUBCUTANEOUS
Status: CANCELLED | OUTPATIENT
Start: 2023-06-06

## 2023-05-16 RX ORDER — METHYLPREDNISOLONE SODIUM SUCCINATE 125 MG/2ML
125 INJECTION, POWDER, LYOPHILIZED, FOR SOLUTION INTRAMUSCULAR; INTRAVENOUS
Status: CANCELLED
Start: 2023-06-06

## 2023-05-16 RX ORDER — DIPHENHYDRAMINE HCL 25 MG
50 CAPSULE ORAL ONCE
Status: COMPLETED | OUTPATIENT
Start: 2023-05-16 | End: 2023-05-16

## 2023-05-16 RX ORDER — EPINEPHRINE 1 MG/ML
0.3 INJECTION, SOLUTION INTRAMUSCULAR; SUBCUTANEOUS EVERY 5 MIN PRN
Status: CANCELLED | OUTPATIENT
Start: 2023-06-06

## 2023-05-16 RX ORDER — ALBUTEROL SULFATE 0.83 MG/ML
2.5 SOLUTION RESPIRATORY (INHALATION)
Status: CANCELLED | OUTPATIENT
Start: 2023-06-06

## 2023-05-16 RX ORDER — DIPHENHYDRAMINE HCL 25 MG
50 CAPSULE ORAL ONCE
Status: CANCELLED
Start: 2023-06-06

## 2023-05-16 RX ORDER — HEPARIN SODIUM (PORCINE) LOCK FLUSH IV SOLN 100 UNIT/ML 100 UNIT/ML
5 SOLUTION INTRAVENOUS
Status: CANCELLED | OUTPATIENT
Start: 2023-06-06

## 2023-05-16 RX ORDER — ALBUTEROL SULFATE 90 UG/1
1-2 AEROSOL, METERED RESPIRATORY (INHALATION)
Status: CANCELLED
Start: 2023-06-06

## 2023-05-16 RX ORDER — MEPERIDINE HYDROCHLORIDE 25 MG/ML
25 INJECTION INTRAMUSCULAR; INTRAVENOUS; SUBCUTANEOUS EVERY 30 MIN PRN
Status: CANCELLED | OUTPATIENT
Start: 2023-06-06

## 2023-05-16 RX ORDER — ACETAMINOPHEN 325 MG/1
650 TABLET ORAL ONCE
Status: COMPLETED | OUTPATIENT
Start: 2023-05-16 | End: 2023-05-16

## 2023-05-16 RX ORDER — DIPHENHYDRAMINE HYDROCHLORIDE 50 MG/ML
50 INJECTION INTRAMUSCULAR; INTRAVENOUS
Status: CANCELLED
Start: 2023-06-06

## 2023-05-16 RX ORDER — ACETAMINOPHEN 325 MG/1
650 TABLET ORAL ONCE
Status: CANCELLED
Start: 2023-06-06

## 2023-05-16 RX ADMIN — HUMAN IMMUNOGLOBULIN G 75 G: 40 LIQUID INTRAVENOUS at 08:16

## 2023-05-16 RX ADMIN — ACETAMINOPHEN 650 MG: 325 TABLET ORAL at 08:15

## 2023-05-16 RX ADMIN — DIPHENHYDRAMINE HYDROCHLORIDE 50 MG: 25 CAPSULE ORAL at 08:15

## 2023-05-16 NOTE — PROGRESS NOTES
Infusion Nursing Note:  Emiliano Aguiar presents today for IVIG.    Patient seen by provider today: No   present during visit today: Not Applicable.    Note: Patient tolerated infusion initiated at 0.5ml/kg/hr, increased by 0.5ml/kg/hr every 15 minutes, with a max rate of 4ml/kg/hr.      Intravenous Access:  Peripheral IV placed.    Treatment Conditions:  Biological Infusion Checklist:  ~~~ NOTE: If the patient answers yes to any of the questions below, hold the infusion and contact ordering provider or on-call provider.    1. Have you recently had an elevated temperature, fever, chills, productive cough, coughing for 3 weeks or longer or hemoptysis, abnormal vital signs, night sweats,  chest pain or have you noticed a decrease in your appetite, unexplained weight loss or fatigue? No  2. Do you have any open wounds or new incisions? No  3. Do you have any recent or upcoming hospitalizations, surgeries or dental procedures? No  4. Do you currently have or recently have had any signs of illness or infection or are you on any antibiotics? No  5. Have you had any new, sudden or worsening abdominal pain? No  6. Have you or anyone in your household received a live vaccination in the past 4 weeks? Please note:  No live vaccines while on biologic/chemotherapy until 6 months after the last treatment.  Patient can receive the flu vaccine (shot only) and the pneumovax.  It is optimal for the patient to get these vaccines mid cycle, but they can be given at any time as long as it is not on the day of the infusion. No  7. Have you recently been diagnosed with any new nervous system diseases (ie. Multiple sclerosis, Guillain [place], seizures, neurological changes) or cancer diagnosis? No  8. Are you on any form of radiation or chemotherapy? No  9. Are you pregnant or breast feeding or do you have plans of pregnancy in the future? No  10. Have you been having any signs of worsening depression or suicidal ideations?   (benlysta only) No  11. Have there been any other new onset medical symptoms? No        Post Infusion Assessment:  Patient tolerated infusion without incident.  Blood return noted pre and post infusion.  Site patent and intact, free from redness, edema or discomfort.  No evidence of extravasations.  Access discontinued per protocol.      Discharge Plan:   Discharge instructions reviewed with: Patient.  Patient and/or family verbalized understanding of discharge instructions and all questions answered.  AVS to patient via [employer].  Patient will return 6/6 for next appointment.   Patient discharged in stable condition accompanied by: self.  Departure Mode: Ambulatory.      Lynda Mccain RN

## 2023-06-02 ENCOUNTER — HEALTH MAINTENANCE LETTER (OUTPATIENT)
Age: 58
End: 2023-06-02

## 2023-06-06 ENCOUNTER — INFUSION THERAPY VISIT (OUTPATIENT)
Dept: INFUSION THERAPY | Facility: CLINIC | Age: 58
End: 2023-06-06
Attending: PSYCHIATRY & NEUROLOGY
Payer: COMMERCIAL

## 2023-06-06 VITALS
WEIGHT: 187 LBS | BODY MASS INDEX: 25.36 KG/M2 | OXYGEN SATURATION: 99 % | DIASTOLIC BLOOD PRESSURE: 76 MMHG | TEMPERATURE: 97.1 F | SYSTOLIC BLOOD PRESSURE: 147 MMHG | HEART RATE: 56 BPM | RESPIRATION RATE: 16 BRPM

## 2023-06-06 DIAGNOSIS — G61.81 CIDP (CHRONIC INFLAMMATORY DEMYELINATING POLYNEUROPATHY) (H): Primary | ICD-10-CM

## 2023-06-06 PROCEDURE — 96366 THER/PROPH/DIAG IV INF ADDON: CPT

## 2023-06-06 PROCEDURE — 250N000013 HC RX MED GY IP 250 OP 250 PS 637: Performed by: PSYCHIATRY & NEUROLOGY

## 2023-06-06 PROCEDURE — 96365 THER/PROPH/DIAG IV INF INIT: CPT

## 2023-06-06 PROCEDURE — 250N000011 HC RX IP 250 OP 636: Performed by: PSYCHIATRY & NEUROLOGY

## 2023-06-06 RX ORDER — DIPHENHYDRAMINE HCL 25 MG
50 CAPSULE ORAL ONCE
Status: CANCELLED
Start: 2023-06-27

## 2023-06-06 RX ORDER — ALBUTEROL SULFATE 0.83 MG/ML
2.5 SOLUTION RESPIRATORY (INHALATION)
Status: CANCELLED | OUTPATIENT
Start: 2023-06-27

## 2023-06-06 RX ORDER — EPINEPHRINE 1 MG/ML
0.3 INJECTION, SOLUTION INTRAMUSCULAR; SUBCUTANEOUS EVERY 5 MIN PRN
Status: CANCELLED | OUTPATIENT
Start: 2023-06-27

## 2023-06-06 RX ORDER — NALOXONE HYDROCHLORIDE 0.4 MG/ML
0.2 INJECTION, SOLUTION INTRAMUSCULAR; INTRAVENOUS; SUBCUTANEOUS
Status: CANCELLED | OUTPATIENT
Start: 2023-06-27

## 2023-06-06 RX ORDER — ACETAMINOPHEN 325 MG/1
650 TABLET ORAL ONCE
Status: COMPLETED | OUTPATIENT
Start: 2023-06-06 | End: 2023-06-06

## 2023-06-06 RX ORDER — HEPARIN SODIUM,PORCINE 10 UNIT/ML
5 VIAL (ML) INTRAVENOUS
Status: CANCELLED | OUTPATIENT
Start: 2023-06-27

## 2023-06-06 RX ORDER — HEPARIN SODIUM (PORCINE) LOCK FLUSH IV SOLN 100 UNIT/ML 100 UNIT/ML
5 SOLUTION INTRAVENOUS
Status: CANCELLED | OUTPATIENT
Start: 2023-06-27

## 2023-06-06 RX ORDER — DIPHENHYDRAMINE HCL 25 MG
50 CAPSULE ORAL ONCE
Status: COMPLETED | OUTPATIENT
Start: 2023-06-06 | End: 2023-06-06

## 2023-06-06 RX ORDER — DIPHENHYDRAMINE HYDROCHLORIDE 50 MG/ML
50 INJECTION INTRAMUSCULAR; INTRAVENOUS
Status: CANCELLED
Start: 2023-06-27

## 2023-06-06 RX ORDER — MEPERIDINE HYDROCHLORIDE 25 MG/ML
25 INJECTION INTRAMUSCULAR; INTRAVENOUS; SUBCUTANEOUS EVERY 30 MIN PRN
Status: CANCELLED | OUTPATIENT
Start: 2023-06-27

## 2023-06-06 RX ORDER — METHYLPREDNISOLONE SODIUM SUCCINATE 125 MG/2ML
125 INJECTION, POWDER, LYOPHILIZED, FOR SOLUTION INTRAMUSCULAR; INTRAVENOUS
Status: CANCELLED
Start: 2023-06-27

## 2023-06-06 RX ORDER — ALBUTEROL SULFATE 90 UG/1
1-2 AEROSOL, METERED RESPIRATORY (INHALATION)
Status: CANCELLED
Start: 2023-06-27

## 2023-06-06 RX ORDER — ACETAMINOPHEN 325 MG/1
650 TABLET ORAL ONCE
Status: CANCELLED
Start: 2023-06-27

## 2023-06-06 RX ADMIN — HUMAN IMMUNOGLOBULIN G 75 G: 40 LIQUID INTRAVENOUS at 08:33

## 2023-06-06 RX ADMIN — DIPHENHYDRAMINE HYDROCHLORIDE 50 MG: 25 CAPSULE ORAL at 08:25

## 2023-06-06 RX ADMIN — ACETAMINOPHEN 650 MG: 325 TABLET ORAL at 08:25

## 2023-06-06 NOTE — PROGRESS NOTES
Infusion Nursing Note:  Emiliano Aguiar presents today for IVIG.    Patient seen by provider today: No   present during visit today: Not Applicable.    Note: Patient made dietary changes in attempts to lose weight to improve overall health and decrease general inflammation. He reports improvements.    Intravenous Access:  Peripheral IV placed.    Treatment Conditions:  Biological Infusion Checklist:  ~~~ NOTE: If the patient answers yes to any of the questions below, hold the infusion and contact ordering provider or on-call provider.    1. Have you recently had an elevated temperature, fever, chills, productive cough, coughing for 3 weeks or longer or hemoptysis,  abnormal vital signs, night sweats,  chest pain or have you noticed a decrease in your appetite, unexplained weight loss or fatigue? No  2. Do you have any open wounds or new incisions? No  3. Do you have any upcoming hospitalizations or surgeries? Does not include esophagogastroduodenoscopy, colonoscopy, endoscopic retrograde cholangiopancreatography (ERCP), endoscopic ultrasound (EUS), dental procedures or joint aspiration/steroid injections No  4. Do you currently have any signs of illness or infection or are you on any antibiotics? No  5. Have you had any new, sudden or worsening abdominal pain? No  6. Have you or anyone in your household received a live vaccination in the past 4 weeks? Please note: No live vaccines while on biologic/chemotherapy until 6 months after the last treatment. Patient can receive the flu vaccine (shot only), pneumovax and the Covid vaccine. It is optimal for the patient to get these vaccines mid cycle, but they can be given at any time as long as it is not on the day of the infusion. No  7. Have you recently been diagnosed with any new nervous system diseases (ie. Multiple sclerosis, Guillain [place], seizures, neurological changes) or cancer diagnosis? Are you on any form of radiation or chemotherapy? No  8. Are  you pregnant or breast feeding or do you have plans of pregnancy in the future? No  9. Have you been having any signs of worsening depression or suicidal ideations?  (benlysta only) No  10. Have there been any other new onset medical symptoms? No  11. Have you had any new blood clots? (IVIG only) No    Post Infusion Assessment:  Patient tolerated infusion without incident.       Discharge Plan:   [facility]  Patient will return 6/27/2023 for next appointment.   Patient discharged in stable condition accompanied by: self.  Departure Mode: Ambulatory.      Portillo Rice RN

## 2023-06-19 ENCOUNTER — OFFICE VISIT (OUTPATIENT)
Dept: NEUROLOGY | Facility: CLINIC | Age: 58
End: 2023-06-19
Payer: COMMERCIAL

## 2023-06-19 VITALS
WEIGHT: 189 LBS | BODY MASS INDEX: 25.63 KG/M2 | SYSTOLIC BLOOD PRESSURE: 122 MMHG | OXYGEN SATURATION: 99 % | HEART RATE: 73 BPM | RESPIRATION RATE: 16 BRPM | DIASTOLIC BLOOD PRESSURE: 74 MMHG

## 2023-06-19 DIAGNOSIS — G61.81 CIDP (CHRONIC INFLAMMATORY DEMYELINATING POLYNEUROPATHY) (H): Primary | ICD-10-CM

## 2023-06-19 PROCEDURE — 99214 OFFICE O/P EST MOD 30 MIN: CPT | Performed by: PSYCHIATRY & NEUROLOGY

## 2023-06-19 ASSESSMENT — PAIN SCALES - GENERAL: PAINLEVEL: MILD PAIN (2)

## 2023-06-19 NOTE — PROGRESS NOTES
Son Spenser White MD    [place], June 19, 2023      Dear Dr. White,    I had the pleasure to see Emiliano in follow-up at the [facility]/neuromuscular clinic today for his CIDP, manifesting predominantly with sensory ataxia, minimal to no weakness.  He is on IVIG 75 g every 3 weeks.  Previously, he tried pulse IV weekly Solu-Medrol for 10 weeks, which was not effective. He is having chronic issues with back pain, and arthralgias that last for about 7 days after each IVIG infusion, and those symptoms could not be mitigated no matter what we premedicated him with, including Tylenol, corticosteroids, NSAIDs, etc.  He has found that CBD Gummies are the most helpful, and they tend to prevent the hives and itching associated with the IVIG infusions, but they do not fully prevent the arthralgias.  At the last visit with me 6 months ago, he had a functional decline of his balance and gait, and I was concerned.  I offered him the option of adjunct rituximab treatment.  He also discussed with my colleague Dr. Sultana about participating in a complement C2 inhibitor clinical trial for CIDP, at the [facility].  He felt that the risk (of infection) with those two options was excessive, and decided not to change anything medically.  He lost some weight, and he went on an anti-inflammatory diet, with less processed foods.  He has found this helpful overall, and his stamina and balance are probably better than December 2022.    RODS CIDP score today was 26, which is overall stable (was 25 on December 2022, and 27 on August 2022).    /74   Pulse 73   Resp 16   Wt 85.7 kg (189 lb)   SpO2 99%   BMI 25.63 kg/m      EXAM: Strength is 5/5 in upper and lower extremities proximally and distally, except for the right hip flexion that is 4+ with a little giveaway/pain component.  His vibration is detected for good 8 seconds at the right great toe and 7 on the left,  And 10 at the right medial malleolus.   Vibration of the index fingers is normal, close to 20 seconds.  Position sensation is preserved at the toes.  He can get up from a chair with arms crossed on the chest, independently, but slowly.  He has a strikingly positive Romberg sign and he is unable to do tandem gait.    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  He feels overall little improved from the last visit, after diet interventions and weight loss.  His treatment has not changed, and we will continue IVIG at the same dose and frequency every 3 weeks.  I will see him in follow-up in 6 months.  We discussed the pros and cons of offering him additional treatments with medications like rituximab, or complement C2 inhibitors, and we both agreed that he can stay off those for now, given his subjective perception of improvement compared to December 2022.     Total time spent on this encounter today 30 minutes, of which 15 face-to-face, 10 in postvisit note dictation, editing, and orders, and 5 in previsit chart review.    Sincerely,    Nixon Godoy MD, FAAN

## 2023-06-19 NOTE — LETTER
6/19/2023       RE: Emiliano Aguiar  [address]  [facility] [place] [facility] [address]     Dear Colleague,    Thank you for referring your patient, Emiliano Aguiar, to the [facility] [place] at [facility]. Please see a copy of my visit note below.    Son Spenser White MD    [place], June 19, 2023      Dear Dr. White,    I had the pleasure to see Emiliano in follow-up at the [facility]/neuromuscular clinic today for his CIDP, manifesting predominantly with sensory ataxia, minimal to no weakness.  He is on IVIG 75 g every 3 weeks.  Previously, he tried pulse IV weekly Solu-Medrol for 10 weeks, which was not effective. He is having chronic issues with back pain, and arthralgias that last for about 7 days after each IVIG infusion, and those symptoms could not be mitigated no matter what we premedicated him with, including Tylenol, corticosteroids, NSAIDs, etc.  He has found that CBD Gummies are the most helpful, and they tend to prevent the hives and itching associated with the IVIG infusions, but they do not fully prevent the arthralgias.  At the last visit with me 6 months ago, he had a functional decline of his balance and gait, and I was concerned.  I offered him the option of adjunct rituximab treatment.  He also discussed with my colleague Dr. Sultana about participating in a complement C2 inhibitor clinical trial for CIDP, at the [facility].  He felt that the risk (of infection) with those two options was excessive, and decided not to change anything medically.  He lost some weight, and he went on an anti-inflammatory diet, with less processed foods.  He has found this helpful overall, and his stamina and balance are probably better than December 2022.    RODS CIDP score today was 26, which is overall stable (was 25 on December 2022, and 27 on August 2022).    /74   Pulse 73   Resp 16   Wt 85.7 kg (189 lb)   SpO2  99%   BMI 25.63 kg/m      EXAM: Strength is 5/5 in upper and lower extremities proximally and distally, except for the right hip flexion that is 4+ with a little giveaway/pain component.  His vibration is detected for good 8 seconds at the right great toe and 7 on the left,  And 10 at the right medial malleolus.  Vibration of the index fingers is normal, close to 20 seconds.  Position sensation is preserved at the toes.  He can get up from a chair with arms crossed on the chest, independently, but slowly.  He has a strikingly positive Romberg sign and he is unable to do tandem gait.    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  He feels overall little improved from the last visit, after diet interventions and weight loss.  His treatment has not changed, and we will continue IVIG at the same dose and frequency every 3 weeks.  I will see him in follow-up in 6 months.  We discussed the pros and cons of offering him additional treatments with medications like rituximab, or complement C2 inhibitors, and we both agreed that he can stay off those for now, given his subjective perception of improvement compared to December 2022.     Total time spent on this encounter today 30 minutes, of which 15 face-to-face, 10 in postvisit note dictation, editing, and orders, and 5 in previsit chart review.    Sincerely,    Nixon Godoy MD, FAAN

## 2023-06-27 ENCOUNTER — INFUSION THERAPY VISIT (OUTPATIENT)
Dept: INFUSION THERAPY | Facility: CLINIC | Age: 58
End: 2023-06-27
Attending: PSYCHIATRY & NEUROLOGY
Payer: COMMERCIAL

## 2023-06-27 VITALS
OXYGEN SATURATION: 98 % | HEART RATE: 58 BPM | TEMPERATURE: 96.6 F | WEIGHT: 185 LBS | BODY MASS INDEX: 25.09 KG/M2 | RESPIRATION RATE: 16 BRPM | SYSTOLIC BLOOD PRESSURE: 120 MMHG | DIASTOLIC BLOOD PRESSURE: 74 MMHG

## 2023-06-27 DIAGNOSIS — G61.81 CIDP (CHRONIC INFLAMMATORY DEMYELINATING POLYNEUROPATHY) (H): Primary | ICD-10-CM

## 2023-06-27 PROCEDURE — 96366 THER/PROPH/DIAG IV INF ADDON: CPT

## 2023-06-27 PROCEDURE — 96365 THER/PROPH/DIAG IV INF INIT: CPT

## 2023-06-27 PROCEDURE — 250N000013 HC RX MED GY IP 250 OP 250 PS 637: Performed by: PSYCHIATRY & NEUROLOGY

## 2023-06-27 PROCEDURE — 250N000011 HC RX IP 250 OP 636: Mod: JZ | Performed by: PSYCHIATRY & NEUROLOGY

## 2023-06-27 RX ORDER — DIPHENHYDRAMINE HCL 25 MG
50 CAPSULE ORAL ONCE
Status: CANCELLED
Start: 2023-07-18

## 2023-06-27 RX ORDER — METHYLPREDNISOLONE SODIUM SUCCINATE 125 MG/2ML
125 INJECTION, POWDER, LYOPHILIZED, FOR SOLUTION INTRAMUSCULAR; INTRAVENOUS
Status: CANCELLED
Start: 2023-07-18

## 2023-06-27 RX ORDER — ALBUTEROL SULFATE 90 UG/1
1-2 AEROSOL, METERED RESPIRATORY (INHALATION)
Status: CANCELLED
Start: 2023-07-18

## 2023-06-27 RX ORDER — DIPHENHYDRAMINE HYDROCHLORIDE 50 MG/ML
50 INJECTION INTRAMUSCULAR; INTRAVENOUS
Status: CANCELLED
Start: 2023-07-18

## 2023-06-27 RX ORDER — HEPARIN SODIUM,PORCINE 10 UNIT/ML
5 VIAL (ML) INTRAVENOUS
Status: CANCELLED | OUTPATIENT
Start: 2023-07-18

## 2023-06-27 RX ORDER — EPINEPHRINE 1 MG/ML
0.3 INJECTION, SOLUTION INTRAMUSCULAR; SUBCUTANEOUS EVERY 5 MIN PRN
Status: CANCELLED | OUTPATIENT
Start: 2023-07-18

## 2023-06-27 RX ORDER — MEPERIDINE HYDROCHLORIDE 25 MG/ML
25 INJECTION INTRAMUSCULAR; INTRAVENOUS; SUBCUTANEOUS EVERY 30 MIN PRN
Status: CANCELLED | OUTPATIENT
Start: 2023-07-18

## 2023-06-27 RX ORDER — ALBUTEROL SULFATE 0.83 MG/ML
2.5 SOLUTION RESPIRATORY (INHALATION)
Status: CANCELLED | OUTPATIENT
Start: 2023-07-18

## 2023-06-27 RX ORDER — ACETAMINOPHEN 325 MG/1
650 TABLET ORAL ONCE
Status: CANCELLED
Start: 2023-07-18

## 2023-06-27 RX ORDER — NALOXONE HYDROCHLORIDE 0.4 MG/ML
0.2 INJECTION, SOLUTION INTRAMUSCULAR; INTRAVENOUS; SUBCUTANEOUS
Status: CANCELLED | OUTPATIENT
Start: 2023-07-18

## 2023-06-27 RX ORDER — DIPHENHYDRAMINE HCL 25 MG
50 CAPSULE ORAL ONCE
Status: COMPLETED | OUTPATIENT
Start: 2023-06-27 | End: 2023-06-27

## 2023-06-27 RX ORDER — HEPARIN SODIUM (PORCINE) LOCK FLUSH IV SOLN 100 UNIT/ML 100 UNIT/ML
5 SOLUTION INTRAVENOUS
Status: CANCELLED | OUTPATIENT
Start: 2023-07-18

## 2023-06-27 RX ORDER — ACETAMINOPHEN 325 MG/1
650 TABLET ORAL ONCE
Status: COMPLETED | OUTPATIENT
Start: 2023-06-27 | End: 2023-06-27

## 2023-06-27 RX ADMIN — HUMAN IMMUNOGLOBULIN G 75 G: 40 LIQUID INTRAVENOUS at 08:14

## 2023-06-27 RX ADMIN — DIPHENHYDRAMINE HYDROCHLORIDE 50 MG: 25 CAPSULE ORAL at 08:13

## 2023-06-27 RX ADMIN — ACETAMINOPHEN 650 MG: 325 TABLET ORAL at 08:13

## 2023-06-27 ASSESSMENT — PAIN SCALES - GENERAL: PAINLEVEL: MODERATE PAIN (4)

## 2023-06-27 NOTE — PROGRESS NOTES
Infusion Nursing Note:  Emiliano Aguiar presents today for IVIG.    Patient seen by provider today: No   present during visit today: Not Applicable.    Note: Patient reports no fevers, signs of infection, cough, or recent surgery.      Intravenous Access:  Peripheral IV placed.    Treatment Conditions:  Not Applicable.      Post Infusion Assessment:  Patient tolerated infusion without incident.  Blood return noted pre and post infusion.  Site patent and intact, free from redness, edema or discomfort.  No evidence of extravasations.  Access discontinued per protocol.       Discharge Plan:   Discharge instructions reviewed with: Patient.  Patient and/or family verbalized understanding of discharge instructions and all questions answered.  AVS to patient via [employer].  Patient will return 7/18 for next appointment.   Patient discharged in stable condition accompanied by: self.  Departure Mode: Ambulatory.      Lynda Mccain RN

## 2023-07-18 ENCOUNTER — INFUSION THERAPY VISIT (OUTPATIENT)
Dept: INFUSION THERAPY | Facility: CLINIC | Age: 58
End: 2023-07-18
Attending: PSYCHIATRY & NEUROLOGY
Payer: COMMERCIAL

## 2023-07-18 VITALS
BODY MASS INDEX: 25.81 KG/M2 | DIASTOLIC BLOOD PRESSURE: 75 MMHG | OXYGEN SATURATION: 97 % | RESPIRATION RATE: 16 BRPM | HEART RATE: 56 BPM | WEIGHT: 190.3 LBS | TEMPERATURE: 97.6 F | SYSTOLIC BLOOD PRESSURE: 122 MMHG

## 2023-07-18 DIAGNOSIS — G61.81 CIDP (CHRONIC INFLAMMATORY DEMYELINATING POLYNEUROPATHY) (H): Primary | ICD-10-CM

## 2023-07-18 PROCEDURE — 250N000011 HC RX IP 250 OP 636: Mod: JZ | Performed by: PSYCHIATRY & NEUROLOGY

## 2023-07-18 PROCEDURE — 96365 THER/PROPH/DIAG IV INF INIT: CPT

## 2023-07-18 PROCEDURE — 250N000013 HC RX MED GY IP 250 OP 250 PS 637: Performed by: PSYCHIATRY & NEUROLOGY

## 2023-07-18 PROCEDURE — 96366 THER/PROPH/DIAG IV INF ADDON: CPT

## 2023-07-18 RX ORDER — ACETAMINOPHEN 325 MG/1
650 TABLET ORAL ONCE
Status: COMPLETED | OUTPATIENT
Start: 2023-07-18 | End: 2023-07-18

## 2023-07-18 RX ORDER — DIPHENHYDRAMINE HCL 25 MG
50 CAPSULE ORAL ONCE
Status: COMPLETED | OUTPATIENT
Start: 2023-07-18 | End: 2023-07-18

## 2023-07-18 RX ORDER — DIPHENHYDRAMINE HCL 25 MG
50 CAPSULE ORAL ONCE
Status: CANCELLED
Start: 2023-08-08

## 2023-07-18 RX ORDER — ACETAMINOPHEN 325 MG/1
650 TABLET ORAL ONCE
Status: CANCELLED
Start: 2023-08-08

## 2023-07-18 RX ADMIN — DIPHENHYDRAMINE HYDROCHLORIDE 50 MG: 25 CAPSULE ORAL at 08:20

## 2023-07-18 RX ADMIN — HUMAN IMMUNOGLOBULIN G 75 G: 40 LIQUID INTRAVENOUS at 08:21

## 2023-07-18 RX ADMIN — ACETAMINOPHEN 650 MG: 325 TABLET ORAL at 08:20

## 2023-07-18 ASSESSMENT — PAIN SCALES - GENERAL: PAINLEVEL: MILD PAIN (3)

## 2023-07-18 NOTE — PATIENT INSTRUCTIONS
EDUCATION POST BIOLOGIC INFUSION  Call the triage nurse at your clinic or seek medical attention if you have chills and/or temperature greater than or equal to 100.5, uncontrolled nausea/vomiting, diarrhea, constipation, dizziness, shortness of breath, chest pain, heart palpitations, weakness or any other new or concerning symptoms, questions or concerns.  You can not have any live virus vaccines prior to or during treatment or up to 6 months post infusion.  If you have an upcoming surgery, medical procedure or dental procedure during treatment, this should be discussed with your ordering physician and your surgeon/dentist.  If you are having any concerning symptom, if you are unsure if you should get your next infusion or wish to speak to a provider before your next infusion, please call your care coordinator or triage nurse at your clinic to notify them so we can adequately serve you.

## 2023-07-18 NOTE — PROGRESS NOTES
Infusion Nursing Note:  Emiliano Aguiar presents today for IVIG.    Patient seen by provider today: No   present during visit today: Not Applicable.    Note: Denies new symptoms, fever, illness, recent major or local infection, antibiotics, productive cough, ore recent surgery, or elevated temperature. Does have history of reaction to Gammagard but has been tolerating Privigen decently well. Does still experience headache and fatigue/malise for about 5 days after infusion; also noted in recent MD note. Emiliano states he notices increased symptoms when he is due for infusion-- weakness, unsteady gait, increased inflammation of BLE.    Intravenous Access:  Peripheral IV placed.    Treatment Conditions:  Not Applicable.    Post Infusion Assessment:  Patient tolerated infusion without incident.  Blood return noted pre and post infusion.  Site patent and intact, free from redness, edema or discomfort.  No evidence of extravasations.  Access discontinued per protocol.  Biologic Infusion Post Education: Call the triage nurse at your clinic or seek medical attention if you have chills and/or temperature greater than or equal to 100.5, uncontrolled nausea/vomiting, diarrhea, constipation, dizziness, shortness of breath, chest pain, heart palpitations, weakness or any other new or concerning symptoms, questions or concerns.  You cannot have any live virus vaccines prior to or during treatment or up to 6 months post infusion.  If you have an upcoming surgery, medical procedure or dental procedure during treatment, this should be discussed with your ordering physician and your surgeon/dentist.  If you are having any concerning symptom, if you are unsure if you should get your next infusion or wish to speak to a provider before your next infusion, please call your care coordinator or triage nurse at your clinic to notify them so we can adequately serve you.     Discharge Plan:   AVS to patient via [employer].  Patient will  return 8/8 for next appointment.   Patient discharged in stable condition accompanied by: self.  Departure Mode: Ambulatory.    Josselin Chavira RN

## 2023-08-08 ENCOUNTER — INFUSION THERAPY VISIT (OUTPATIENT)
Dept: INFUSION THERAPY | Facility: CLINIC | Age: 58
End: 2023-08-08
Attending: PSYCHIATRY & NEUROLOGY
Payer: COMMERCIAL

## 2023-08-08 VITALS
RESPIRATION RATE: 16 BRPM | HEART RATE: 54 BPM | SYSTOLIC BLOOD PRESSURE: 126 MMHG | WEIGHT: 187.8 LBS | BODY MASS INDEX: 25.47 KG/M2 | TEMPERATURE: 96.7 F | OXYGEN SATURATION: 98 % | DIASTOLIC BLOOD PRESSURE: 79 MMHG

## 2023-08-08 DIAGNOSIS — G61.81 CIDP (CHRONIC INFLAMMATORY DEMYELINATING POLYNEUROPATHY) (H): Primary | ICD-10-CM

## 2023-08-08 PROCEDURE — 250N000011 HC RX IP 250 OP 636: Mod: JZ | Performed by: PSYCHIATRY & NEUROLOGY

## 2023-08-08 PROCEDURE — 96365 THER/PROPH/DIAG IV INF INIT: CPT

## 2023-08-08 PROCEDURE — 250N000013 HC RX MED GY IP 250 OP 250 PS 637: Performed by: PSYCHIATRY & NEUROLOGY

## 2023-08-08 PROCEDURE — 96366 THER/PROPH/DIAG IV INF ADDON: CPT

## 2023-08-08 RX ORDER — EPINEPHRINE 1 MG/ML
0.3 INJECTION, SOLUTION INTRAMUSCULAR; SUBCUTANEOUS EVERY 5 MIN PRN
Status: CANCELLED | OUTPATIENT
Start: 2023-08-08

## 2023-08-08 RX ORDER — ACETAMINOPHEN 325 MG/1
650 TABLET ORAL ONCE
Status: CANCELLED
Start: 2023-08-29

## 2023-08-08 RX ORDER — DIPHENHYDRAMINE HYDROCHLORIDE 50 MG/ML
50 INJECTION INTRAMUSCULAR; INTRAVENOUS
Status: CANCELLED
Start: 2023-08-08

## 2023-08-08 RX ORDER — DIPHENHYDRAMINE HYDROCHLORIDE 50 MG/ML
50 INJECTION INTRAMUSCULAR; INTRAVENOUS
Status: CANCELLED
Start: 2023-08-29

## 2023-08-08 RX ORDER — EPINEPHRINE 1 MG/ML
0.3 INJECTION, SOLUTION INTRAMUSCULAR; SUBCUTANEOUS EVERY 5 MIN PRN
Status: CANCELLED | OUTPATIENT
Start: 2023-08-29

## 2023-08-08 RX ORDER — HEPARIN SODIUM (PORCINE) LOCK FLUSH IV SOLN 100 UNIT/ML 100 UNIT/ML
5 SOLUTION INTRAVENOUS
Status: CANCELLED | OUTPATIENT
Start: 2023-08-29

## 2023-08-08 RX ORDER — METHYLPREDNISOLONE SODIUM SUCCINATE 125 MG/2ML
125 INJECTION, POWDER, LYOPHILIZED, FOR SOLUTION INTRAMUSCULAR; INTRAVENOUS
Status: CANCELLED
Start: 2023-08-08

## 2023-08-08 RX ORDER — MEPERIDINE HYDROCHLORIDE 25 MG/ML
25 INJECTION INTRAMUSCULAR; INTRAVENOUS; SUBCUTANEOUS EVERY 30 MIN PRN
Status: CANCELLED | OUTPATIENT
Start: 2023-08-08

## 2023-08-08 RX ORDER — HEPARIN SODIUM (PORCINE) LOCK FLUSH IV SOLN 100 UNIT/ML 100 UNIT/ML
5 SOLUTION INTRAVENOUS
Status: CANCELLED | OUTPATIENT
Start: 2023-08-08

## 2023-08-08 RX ORDER — NALOXONE HYDROCHLORIDE 0.4 MG/ML
0.2 INJECTION, SOLUTION INTRAMUSCULAR; INTRAVENOUS; SUBCUTANEOUS
Status: CANCELLED | OUTPATIENT
Start: 2023-08-29

## 2023-08-08 RX ORDER — DIPHENHYDRAMINE HCL 25 MG
50 CAPSULE ORAL ONCE
Status: CANCELLED
Start: 2023-08-29

## 2023-08-08 RX ORDER — METHYLPREDNISOLONE SODIUM SUCCINATE 125 MG/2ML
125 INJECTION, POWDER, LYOPHILIZED, FOR SOLUTION INTRAMUSCULAR; INTRAVENOUS
Status: CANCELLED
Start: 2023-08-29

## 2023-08-08 RX ORDER — HEPARIN SODIUM,PORCINE 10 UNIT/ML
5 VIAL (ML) INTRAVENOUS
Status: CANCELLED | OUTPATIENT
Start: 2023-08-08

## 2023-08-08 RX ORDER — ACETAMINOPHEN 325 MG/1
650 TABLET ORAL ONCE
Status: COMPLETED | OUTPATIENT
Start: 2023-08-08 | End: 2023-08-08

## 2023-08-08 RX ORDER — ALBUTEROL SULFATE 0.83 MG/ML
2.5 SOLUTION RESPIRATORY (INHALATION)
Status: CANCELLED | OUTPATIENT
Start: 2023-08-08

## 2023-08-08 RX ORDER — DIPHENHYDRAMINE HCL 25 MG
50 CAPSULE ORAL ONCE
Status: COMPLETED | OUTPATIENT
Start: 2023-08-08 | End: 2023-08-08

## 2023-08-08 RX ORDER — NALOXONE HYDROCHLORIDE 0.4 MG/ML
0.2 INJECTION, SOLUTION INTRAMUSCULAR; INTRAVENOUS; SUBCUTANEOUS
Status: CANCELLED | OUTPATIENT
Start: 2023-08-08

## 2023-08-08 RX ORDER — MEPERIDINE HYDROCHLORIDE 25 MG/ML
25 INJECTION INTRAMUSCULAR; INTRAVENOUS; SUBCUTANEOUS EVERY 30 MIN PRN
Status: CANCELLED | OUTPATIENT
Start: 2023-08-29

## 2023-08-08 RX ORDER — ALBUTEROL SULFATE 90 UG/1
1-2 AEROSOL, METERED RESPIRATORY (INHALATION)
Status: CANCELLED
Start: 2023-08-08

## 2023-08-08 RX ORDER — ALBUTEROL SULFATE 90 UG/1
1-2 AEROSOL, METERED RESPIRATORY (INHALATION)
Status: CANCELLED
Start: 2023-08-29

## 2023-08-08 RX ORDER — ALBUTEROL SULFATE 0.83 MG/ML
2.5 SOLUTION RESPIRATORY (INHALATION)
Status: CANCELLED | OUTPATIENT
Start: 2023-08-29

## 2023-08-08 RX ORDER — HEPARIN SODIUM,PORCINE 10 UNIT/ML
5 VIAL (ML) INTRAVENOUS
Status: CANCELLED | OUTPATIENT
Start: 2023-08-29

## 2023-08-08 RX ADMIN — HUMAN IMMUNOGLOBULIN G 75 G: 40 LIQUID INTRAVENOUS at 08:42

## 2023-08-08 RX ADMIN — ACETAMINOPHEN 650 MG: 325 TABLET ORAL at 08:29

## 2023-08-08 RX ADMIN — DIPHENHYDRAMINE HYDROCHLORIDE 50 MG: 25 CAPSULE ORAL at 08:29

## 2023-08-08 NOTE — PROGRESS NOTES
Infusion Nursing Note:  Emiliano Aguiar presents today for IVIG.    Patient seen by provider today: No   present during visit today: Not Applicable.    Note: Patient denies elevated temp or fever, cough, recent surgery, feeling ill, recent infection or taking antibiotics. Patient has a history of Gammagard reaction, but tolerating Privigen. Patient states that he starts having increased symptoms when he gets closer to being due for IVIG. Refused IV solu-Cortef before infusion.  Started IVIG @ 0.5 mg/kg/hr and increased dose by 0.5 mg/kg/hr every 15 min up to 4.0 mg/kg/hr max.    Intravenous Access:  Peripheral IV placed.    Treatment Conditions:  Not Applicable.      Post Infusion Assessment:  Patient tolerated infusion without incident.  Blood return noted pre and post infusion.  Site patent and intact, free from redness, edema or discomfort.  No evidence of extravasations.  Access discontinued per protocol.       Discharge Plan:    AVS to patient via MYCHART.  Patient will return 8/29/23 for next appointment.   Patient discharged in stable condition accompanied by: self.  Departure Mode: Ambulatory.      Lynda Beltrán RN

## 2023-08-29 ENCOUNTER — INFUSION THERAPY VISIT (OUTPATIENT)
Dept: INFUSION THERAPY | Facility: CLINIC | Age: 58
End: 2023-08-29
Attending: PSYCHIATRY & NEUROLOGY
Payer: COMMERCIAL

## 2023-08-29 VITALS
WEIGHT: 186.8 LBS | TEMPERATURE: 98.3 F | BODY MASS INDEX: 25.33 KG/M2 | DIASTOLIC BLOOD PRESSURE: 75 MMHG | SYSTOLIC BLOOD PRESSURE: 127 MMHG | OXYGEN SATURATION: 99 % | HEART RATE: 62 BPM

## 2023-08-29 DIAGNOSIS — G61.81 CIDP (CHRONIC INFLAMMATORY DEMYELINATING POLYNEUROPATHY) (H): Primary | ICD-10-CM

## 2023-08-29 PROCEDURE — 250N000011 HC RX IP 250 OP 636: Mod: JZ | Performed by: PSYCHIATRY & NEUROLOGY

## 2023-08-29 PROCEDURE — 96365 THER/PROPH/DIAG IV INF INIT: CPT

## 2023-08-29 PROCEDURE — 250N000013 HC RX MED GY IP 250 OP 250 PS 637: Performed by: PSYCHIATRY & NEUROLOGY

## 2023-08-29 PROCEDURE — 96366 THER/PROPH/DIAG IV INF ADDON: CPT

## 2023-08-29 RX ORDER — EPINEPHRINE 1 MG/ML
0.3 INJECTION, SOLUTION INTRAMUSCULAR; SUBCUTANEOUS EVERY 5 MIN PRN
Status: CANCELLED | OUTPATIENT
Start: 2023-08-29

## 2023-08-29 RX ORDER — DIPHENHYDRAMINE HYDROCHLORIDE 50 MG/ML
50 INJECTION INTRAMUSCULAR; INTRAVENOUS
Status: CANCELLED
Start: 2023-08-29

## 2023-08-29 RX ORDER — ACETAMINOPHEN 325 MG/1
650 TABLET ORAL ONCE
Status: COMPLETED | OUTPATIENT
Start: 2023-08-29 | End: 2023-08-29

## 2023-08-29 RX ORDER — ALBUTEROL SULFATE 90 UG/1
1-2 AEROSOL, METERED RESPIRATORY (INHALATION)
Status: CANCELLED
Start: 2023-08-29

## 2023-08-29 RX ORDER — ACETAMINOPHEN 325 MG/1
650 TABLET ORAL ONCE
Status: CANCELLED
Start: 2023-08-29

## 2023-08-29 RX ORDER — METHYLPREDNISOLONE SODIUM SUCCINATE 125 MG/2ML
125 INJECTION, POWDER, LYOPHILIZED, FOR SOLUTION INTRAMUSCULAR; INTRAVENOUS
Status: CANCELLED
Start: 2023-08-29

## 2023-08-29 RX ORDER — MEPERIDINE HYDROCHLORIDE 25 MG/ML
25 INJECTION INTRAMUSCULAR; INTRAVENOUS; SUBCUTANEOUS EVERY 30 MIN PRN
Status: CANCELLED | OUTPATIENT
Start: 2023-08-29

## 2023-08-29 RX ORDER — HEPARIN SODIUM (PORCINE) LOCK FLUSH IV SOLN 100 UNIT/ML 100 UNIT/ML
5 SOLUTION INTRAVENOUS
Status: CANCELLED | OUTPATIENT
Start: 2023-08-29

## 2023-08-29 RX ORDER — ALBUTEROL SULFATE 0.83 MG/ML
2.5 SOLUTION RESPIRATORY (INHALATION)
Status: CANCELLED | OUTPATIENT
Start: 2023-08-29

## 2023-08-29 RX ORDER — HEPARIN SODIUM,PORCINE 10 UNIT/ML
5-20 VIAL (ML) INTRAVENOUS DAILY PRN
Status: CANCELLED | OUTPATIENT
Start: 2023-08-29

## 2023-08-29 RX ORDER — DIPHENHYDRAMINE HCL 25 MG
50 CAPSULE ORAL ONCE
Status: COMPLETED | OUTPATIENT
Start: 2023-08-29 | End: 2023-08-29

## 2023-08-29 RX ORDER — DIPHENHYDRAMINE HCL 25 MG
50 CAPSULE ORAL ONCE
Status: CANCELLED | OUTPATIENT
Start: 2023-08-29

## 2023-08-29 RX ADMIN — HUMAN IMMUNOGLOBULIN G 75 G: 40 LIQUID INTRAVENOUS at 08:19

## 2023-08-29 RX ADMIN — ACETAMINOPHEN 650 MG: 325 TABLET ORAL at 08:15

## 2023-08-29 RX ADMIN — DIPHENHYDRAMINE HYDROCHLORIDE 50 MG: 25 CAPSULE ORAL at 08:15

## 2023-08-29 NOTE — PROGRESS NOTES
Infusion Nursing Note:  Emiliano Aguiar presents today for IVIG.    Patient seen by provider today: No   present during visit today: Not Applicable.    Note: Patient denies any fevers, illness, infections, recent surgery, productive cough or antibiotic use.    IVIG started at 0.5mg/kg/hr and increased by 0.5mg/kg/hr every 15 minutes to a max of 4mg/kg/hr.      Intravenous Access:  Peripheral IV placed.    Treatment Conditions:  Not Applicable.      Post Infusion Assessment:  Patient tolerated infusion without incident.  Blood return noted pre and post infusion.  Site patent and intact, free from redness, edema or discomfort.  No evidence of extravasations.  Access discontinued per protocol.       Discharge Plan:   Discharge instructions reviewed with: Patient.  Patient and/or family verbalized understanding of discharge instructions and all questions answered.  AVS to patient via [email or web address].  Patient will return 9/19 for next appointment.   Patient discharged in stable condition accompanied by: self.  Departure Mode: Ambulatory.      Azalea Carcamo RN

## 2023-09-19 ENCOUNTER — INFUSION THERAPY VISIT (OUTPATIENT)
Dept: INFUSION THERAPY | Facility: CLINIC | Age: 58
End: 2023-09-19
Attending: PSYCHIATRY & NEUROLOGY
Payer: COMMERCIAL

## 2023-09-19 VITALS
SYSTOLIC BLOOD PRESSURE: 127 MMHG | DIASTOLIC BLOOD PRESSURE: 75 MMHG | WEIGHT: 188.8 LBS | TEMPERATURE: 97.9 F | RESPIRATION RATE: 16 BRPM | OXYGEN SATURATION: 98 % | BODY MASS INDEX: 25.61 KG/M2 | HEART RATE: 59 BPM

## 2023-09-19 DIAGNOSIS — G61.81 CIDP (CHRONIC INFLAMMATORY DEMYELINATING POLYNEUROPATHY) (H): Primary | ICD-10-CM

## 2023-09-19 PROCEDURE — 250N000011 HC RX IP 250 OP 636: Mod: JZ | Performed by: PSYCHIATRY & NEUROLOGY

## 2023-09-19 PROCEDURE — 250N000013 HC RX MED GY IP 250 OP 250 PS 637: Performed by: PSYCHIATRY & NEUROLOGY

## 2023-09-19 PROCEDURE — 96366 THER/PROPH/DIAG IV INF ADDON: CPT

## 2023-09-19 PROCEDURE — 96365 THER/PROPH/DIAG IV INF INIT: CPT

## 2023-09-19 RX ORDER — DIPHENHYDRAMINE HYDROCHLORIDE 50 MG/ML
50 INJECTION INTRAMUSCULAR; INTRAVENOUS
Status: CANCELLED
Start: 2023-10-10

## 2023-09-19 RX ORDER — HEPARIN SODIUM,PORCINE 10 UNIT/ML
5-20 VIAL (ML) INTRAVENOUS DAILY PRN
Status: CANCELLED | OUTPATIENT
Start: 2023-10-10

## 2023-09-19 RX ORDER — METHYLPREDNISOLONE SODIUM SUCCINATE 125 MG/2ML
125 INJECTION, POWDER, LYOPHILIZED, FOR SOLUTION INTRAMUSCULAR; INTRAVENOUS
Status: CANCELLED
Start: 2023-10-10

## 2023-09-19 RX ORDER — ACETAMINOPHEN 325 MG/1
650 TABLET ORAL ONCE
Status: CANCELLED
Start: 2023-10-10

## 2023-09-19 RX ORDER — ACETAMINOPHEN 325 MG/1
650 TABLET ORAL ONCE
Status: COMPLETED | OUTPATIENT
Start: 2023-09-19 | End: 2023-09-19

## 2023-09-19 RX ORDER — MEPERIDINE HYDROCHLORIDE 25 MG/ML
25 INJECTION INTRAMUSCULAR; INTRAVENOUS; SUBCUTANEOUS EVERY 30 MIN PRN
Status: CANCELLED | OUTPATIENT
Start: 2023-10-10

## 2023-09-19 RX ORDER — ALBUTEROL SULFATE 90 UG/1
1-2 AEROSOL, METERED RESPIRATORY (INHALATION)
Status: CANCELLED
Start: 2023-10-10

## 2023-09-19 RX ORDER — EPINEPHRINE 1 MG/ML
0.3 INJECTION, SOLUTION INTRAMUSCULAR; SUBCUTANEOUS EVERY 5 MIN PRN
Status: CANCELLED | OUTPATIENT
Start: 2023-10-10

## 2023-09-19 RX ORDER — DIPHENHYDRAMINE HCL 25 MG
50 CAPSULE ORAL ONCE
Status: COMPLETED | OUTPATIENT
Start: 2023-09-19 | End: 2023-09-19

## 2023-09-19 RX ORDER — DIPHENHYDRAMINE HCL 25 MG
50 CAPSULE ORAL ONCE
Status: CANCELLED | OUTPATIENT
Start: 2023-10-10

## 2023-09-19 RX ORDER — ALBUTEROL SULFATE 0.83 MG/ML
2.5 SOLUTION RESPIRATORY (INHALATION)
Status: CANCELLED | OUTPATIENT
Start: 2023-10-10

## 2023-09-19 RX ORDER — HEPARIN SODIUM (PORCINE) LOCK FLUSH IV SOLN 100 UNIT/ML 100 UNIT/ML
5 SOLUTION INTRAVENOUS
Status: CANCELLED | OUTPATIENT
Start: 2023-10-10

## 2023-09-19 RX ADMIN — ACETAMINOPHEN 650 MG: 325 TABLET ORAL at 08:25

## 2023-09-19 RX ADMIN — DIPHENHYDRAMINE HYDROCHLORIDE 50 MG: 25 CAPSULE ORAL at 08:25

## 2023-09-19 RX ADMIN — HUMAN IMMUNOGLOBULIN G 75 G: 40 LIQUID INTRAVENOUS at 08:34

## 2023-09-19 NOTE — PROGRESS NOTES
Infusion Nursing Note:  Emiliano Aguiar presents today for IVIG.    Patient seen by provider today: No   present during visit today: Not Applicable.    Note: Emiliano reports feeling like the last dose of IVIG didn't give him the full benefits as it has in the past. He reports feeling like his balance has been more off and he is having more pain in his legs, thighs, arms & low back. He denies any new reactions, fever/elevated temperature, illness, recent major or local infection, being on antibiotics, productive cough, or recent surgery. Pre-medicated with Tylenol & Benadryl, he refused the Solu-cortef.   IVIG infusion initiated at 0.5 ml/kg/hr and increased by 0.5 ml/kg/hr every 15 minutes to max rate of 4 ml/kg/hr. .    Intravenous Access:  Peripheral IV placed.    Treatment Conditions:  Not Applicable.      Post Infusion Assessment:  Patient tolerated infusion without incident.  Blood return noted pre and post infusion.  Site patent and intact, free from redness, edema or discomfort.  No evidence of extravasations.  Access discontinued per protocol.       Discharge Plan:   Discharge instructions reviewed with: Patient.  AVS to patient via [email or web address].  Patient will return 10/10/23 for next appointment.   Patient discharged in stable condition accompanied by: self.  Departure Mode: Ambulatory.      Claudine Healy RN

## 2023-10-10 ENCOUNTER — INFUSION THERAPY VISIT (OUTPATIENT)
Dept: INFUSION THERAPY | Facility: CLINIC | Age: 58
End: 2023-10-10
Attending: PSYCHIATRY & NEUROLOGY
Payer: COMMERCIAL

## 2023-10-10 VITALS
OXYGEN SATURATION: 99 % | RESPIRATION RATE: 16 BRPM | TEMPERATURE: 97.4 F | HEART RATE: 65 BPM | DIASTOLIC BLOOD PRESSURE: 81 MMHG | SYSTOLIC BLOOD PRESSURE: 136 MMHG | WEIGHT: 191.6 LBS | BODY MASS INDEX: 25.99 KG/M2

## 2023-10-10 DIAGNOSIS — G61.81 CIDP (CHRONIC INFLAMMATORY DEMYELINATING POLYNEUROPATHY) (H): Primary | ICD-10-CM

## 2023-10-10 PROCEDURE — 250N000011 HC RX IP 250 OP 636: Mod: JZ | Performed by: PSYCHIATRY & NEUROLOGY

## 2023-10-10 PROCEDURE — 250N000013 HC RX MED GY IP 250 OP 250 PS 637: Performed by: PSYCHIATRY & NEUROLOGY

## 2023-10-10 PROCEDURE — 96365 THER/PROPH/DIAG IV INF INIT: CPT

## 2023-10-10 PROCEDURE — 96366 THER/PROPH/DIAG IV INF ADDON: CPT

## 2023-10-10 RX ORDER — HEPARIN SODIUM,PORCINE 10 UNIT/ML
5-20 VIAL (ML) INTRAVENOUS DAILY PRN
Status: CANCELLED | OUTPATIENT
Start: 2023-10-31

## 2023-10-10 RX ORDER — HEPARIN SODIUM (PORCINE) LOCK FLUSH IV SOLN 100 UNIT/ML 100 UNIT/ML
5 SOLUTION INTRAVENOUS
Status: CANCELLED | OUTPATIENT
Start: 2023-10-31

## 2023-10-10 RX ORDER — ALBUTEROL SULFATE 0.83 MG/ML
2.5 SOLUTION RESPIRATORY (INHALATION)
Status: CANCELLED | OUTPATIENT
Start: 2023-10-31

## 2023-10-10 RX ORDER — ALBUTEROL SULFATE 90 UG/1
1-2 AEROSOL, METERED RESPIRATORY (INHALATION)
Status: CANCELLED
Start: 2023-10-31

## 2023-10-10 RX ORDER — DIPHENHYDRAMINE HCL 25 MG
50 CAPSULE ORAL ONCE
Status: CANCELLED | OUTPATIENT
Start: 2023-10-31

## 2023-10-10 RX ORDER — ACETAMINOPHEN 325 MG/1
650 TABLET ORAL ONCE
Status: COMPLETED | OUTPATIENT
Start: 2023-10-10 | End: 2023-10-10

## 2023-10-10 RX ORDER — DIPHENHYDRAMINE HYDROCHLORIDE 50 MG/ML
50 INJECTION INTRAMUSCULAR; INTRAVENOUS
Status: CANCELLED
Start: 2023-10-31

## 2023-10-10 RX ORDER — ACETAMINOPHEN 325 MG/1
650 TABLET ORAL ONCE
Status: CANCELLED
Start: 2023-10-31

## 2023-10-10 RX ORDER — METHYLPREDNISOLONE SODIUM SUCCINATE 125 MG/2ML
125 INJECTION, POWDER, LYOPHILIZED, FOR SOLUTION INTRAMUSCULAR; INTRAVENOUS
Status: CANCELLED
Start: 2023-10-31

## 2023-10-10 RX ORDER — EPINEPHRINE 1 MG/ML
0.3 INJECTION, SOLUTION INTRAMUSCULAR; SUBCUTANEOUS EVERY 5 MIN PRN
Status: CANCELLED | OUTPATIENT
Start: 2023-10-31

## 2023-10-10 RX ORDER — DIPHENHYDRAMINE HCL 25 MG
50 CAPSULE ORAL ONCE
Status: COMPLETED | OUTPATIENT
Start: 2023-10-10 | End: 2023-10-10

## 2023-10-10 RX ORDER — MEPERIDINE HYDROCHLORIDE 25 MG/ML
25 INJECTION INTRAMUSCULAR; INTRAVENOUS; SUBCUTANEOUS EVERY 30 MIN PRN
Status: CANCELLED | OUTPATIENT
Start: 2023-10-31

## 2023-10-10 RX ADMIN — DIPHENHYDRAMINE HYDROCHLORIDE 50 MG: 25 CAPSULE ORAL at 08:11

## 2023-10-10 RX ADMIN — ACETAMINOPHEN 650 MG: 325 TABLET ORAL at 08:11

## 2023-10-10 RX ADMIN — HUMAN IMMUNOGLOBULIN G 75 G: 40 LIQUID INTRAVENOUS at 08:20

## 2023-10-10 NOTE — PROGRESS NOTES
Infusion Nursing Note:  Emiliano Aguiar presents today for IVIG.    Patient seen by provider today: No   present during visit today: Not Applicable.    Note: Emiliano states that he is feeling more weak and off balance than usual, refused solu-Cortef.  Started IVIG rate at 0.5 ml/kg/hr and increased by 0.5 ml/kg/hr every 15 min with a max rate of 4 ml/kg/hr.      Intravenous Access:  Peripheral IV placed.    Treatment Conditions:  Patient denies recent reactions, fever, feeling ill, recent major or local infection, on antibiotics, productive cough, recent surgery or elevated temp.      Post Infusion Assessment:  Patient tolerated infusion without incident.  Blood return noted pre and post infusion.  Site patent and intact, free from redness, edema or discomfort.  No evidence of extravasations.  Access discontinued per protocol.       Discharge Plan:   AVS to patient via MYCHART.  Patient will return 10/31/23 for next appointment.   Patient discharged in stable condition accompanied by: self.  Departure Mode: Ambulatory.      Lynda Beltrán RN

## 2023-10-31 ENCOUNTER — INFUSION THERAPY VISIT (OUTPATIENT)
Dept: INFUSION THERAPY | Facility: CLINIC | Age: 58
End: 2023-10-31
Attending: PSYCHIATRY & NEUROLOGY
Payer: COMMERCIAL

## 2023-10-31 VITALS
DIASTOLIC BLOOD PRESSURE: 83 MMHG | OXYGEN SATURATION: 98 % | HEART RATE: 62 BPM | SYSTOLIC BLOOD PRESSURE: 140 MMHG | TEMPERATURE: 97.4 F | WEIGHT: 192.6 LBS | BODY MASS INDEX: 26.12 KG/M2

## 2023-10-31 DIAGNOSIS — G61.81 CIDP (CHRONIC INFLAMMATORY DEMYELINATING POLYNEUROPATHY) (H): Primary | ICD-10-CM

## 2023-10-31 PROCEDURE — 250N000013 HC RX MED GY IP 250 OP 250 PS 637: Performed by: PSYCHIATRY & NEUROLOGY

## 2023-10-31 PROCEDURE — 250N000011 HC RX IP 250 OP 636: Mod: JZ | Performed by: PSYCHIATRY & NEUROLOGY

## 2023-10-31 PROCEDURE — 96365 THER/PROPH/DIAG IV INF INIT: CPT

## 2023-10-31 PROCEDURE — 96366 THER/PROPH/DIAG IV INF ADDON: CPT

## 2023-10-31 RX ORDER — HEPARIN SODIUM,PORCINE 10 UNIT/ML
5-20 VIAL (ML) INTRAVENOUS DAILY PRN
Status: CANCELLED | OUTPATIENT
Start: 2023-11-21

## 2023-10-31 RX ORDER — DIPHENHYDRAMINE HCL 25 MG
50 CAPSULE ORAL ONCE
Status: CANCELLED | OUTPATIENT
Start: 2023-11-21

## 2023-10-31 RX ORDER — DIPHENHYDRAMINE HYDROCHLORIDE 50 MG/ML
50 INJECTION INTRAMUSCULAR; INTRAVENOUS
Status: CANCELLED
Start: 2023-11-21

## 2023-10-31 RX ORDER — METHYLPREDNISOLONE SODIUM SUCCINATE 125 MG/2ML
125 INJECTION, POWDER, LYOPHILIZED, FOR SOLUTION INTRAMUSCULAR; INTRAVENOUS
Status: CANCELLED
Start: 2023-11-21

## 2023-10-31 RX ORDER — HEPARIN SODIUM (PORCINE) LOCK FLUSH IV SOLN 100 UNIT/ML 100 UNIT/ML
5 SOLUTION INTRAVENOUS
Status: CANCELLED | OUTPATIENT
Start: 2023-11-21

## 2023-10-31 RX ORDER — ACETAMINOPHEN 325 MG/1
650 TABLET ORAL ONCE
Status: CANCELLED
Start: 2023-11-21

## 2023-10-31 RX ORDER — ACETAMINOPHEN 325 MG/1
650 TABLET ORAL ONCE
Status: COMPLETED | OUTPATIENT
Start: 2023-10-31 | End: 2023-10-31

## 2023-10-31 RX ORDER — DIPHENHYDRAMINE HCL 25 MG
50 CAPSULE ORAL ONCE
Status: COMPLETED | OUTPATIENT
Start: 2023-10-31 | End: 2023-10-31

## 2023-10-31 RX ORDER — EPINEPHRINE 1 MG/ML
0.3 INJECTION, SOLUTION INTRAMUSCULAR; SUBCUTANEOUS EVERY 5 MIN PRN
Status: CANCELLED | OUTPATIENT
Start: 2023-11-21

## 2023-10-31 RX ORDER — MEPERIDINE HYDROCHLORIDE 25 MG/ML
25 INJECTION INTRAMUSCULAR; INTRAVENOUS; SUBCUTANEOUS EVERY 30 MIN PRN
Status: CANCELLED | OUTPATIENT
Start: 2023-11-21

## 2023-10-31 RX ORDER — ALBUTEROL SULFATE 90 UG/1
1-2 AEROSOL, METERED RESPIRATORY (INHALATION)
Status: CANCELLED
Start: 2023-11-21

## 2023-10-31 RX ORDER — ALBUTEROL SULFATE 0.83 MG/ML
2.5 SOLUTION RESPIRATORY (INHALATION)
Status: CANCELLED | OUTPATIENT
Start: 2023-11-21

## 2023-10-31 RX ADMIN — DIPHENHYDRAMINE HYDROCHLORIDE 50 MG: 25 CAPSULE ORAL at 08:20

## 2023-10-31 RX ADMIN — ACETAMINOPHEN 650 MG: 325 TABLET ORAL at 08:20

## 2023-10-31 RX ADMIN — HUMAN IMMUNOGLOBULIN G 75 G: 40 LIQUID INTRAVENOUS at 08:25

## 2023-10-31 NOTE — PROGRESS NOTES
Infusion Nursing Note:  Emiliano Aguiar presents today for IVIG.    Patient seen by provider today: No   present during visit today: Not Applicable.    Note: Patient only gets Tylenol and Benadryl pre med. Patient states the last IVIG infusion seemed to work as normal.    IVIG started at 0.5mg/kg/hr and increased by 0.5mg/kg/hr every 15 minutes to a max of 4mg/kg/hr.        Intravenous Access:  Peripheral IV placed.    Treatment Conditions:  Patient denies fever, illness, infections, antibiotic use, productive cough, or recent surgery.      Post Infusion Assessment:  Patient tolerated infusion without incident.  Blood return noted pre and post infusion.  Site patent and intact, free from redness, edema or discomfort.  No evidence of extravasations.  Access discontinued per protocol.       Discharge Plan:   Discharge instructions reviewed with: Patient.  Patient and/or family verbalized understanding of discharge instructions and all questions answered.  AVS to patient via [employer].  Patient will return 11/21 for next appointment.   Patient discharged in stable condition accompanied by: self.  Departure Mode: Ambulatory.      Azalea Carcamo RN

## 2023-11-21 ENCOUNTER — INFUSION THERAPY VISIT (OUTPATIENT)
Dept: INFUSION THERAPY | Facility: CLINIC | Age: 58
End: 2023-11-21
Attending: PSYCHIATRY & NEUROLOGY
Payer: COMMERCIAL

## 2023-11-21 VITALS
WEIGHT: 194.6 LBS | DIASTOLIC BLOOD PRESSURE: 77 MMHG | TEMPERATURE: 98.1 F | OXYGEN SATURATION: 97 % | BODY MASS INDEX: 26.39 KG/M2 | SYSTOLIC BLOOD PRESSURE: 146 MMHG | HEART RATE: 60 BPM

## 2023-11-21 DIAGNOSIS — G61.81 CIDP (CHRONIC INFLAMMATORY DEMYELINATING POLYNEUROPATHY) (H): Primary | ICD-10-CM

## 2023-11-21 PROCEDURE — 250N000013 HC RX MED GY IP 250 OP 250 PS 637: Performed by: PSYCHIATRY & NEUROLOGY

## 2023-11-21 PROCEDURE — 250N000011 HC RX IP 250 OP 636: Mod: JZ | Performed by: PSYCHIATRY & NEUROLOGY

## 2023-11-21 PROCEDURE — 96365 THER/PROPH/DIAG IV INF INIT: CPT

## 2023-11-21 PROCEDURE — 96366 THER/PROPH/DIAG IV INF ADDON: CPT

## 2023-11-21 RX ORDER — DIPHENHYDRAMINE HCL 25 MG
50 CAPSULE ORAL ONCE
Status: COMPLETED | OUTPATIENT
Start: 2023-11-21 | End: 2023-11-21

## 2023-11-21 RX ORDER — ACETAMINOPHEN 325 MG/1
650 TABLET ORAL ONCE
Status: CANCELLED
Start: 2023-12-12

## 2023-11-21 RX ORDER — DIPHENHYDRAMINE HYDROCHLORIDE 50 MG/ML
50 INJECTION INTRAMUSCULAR; INTRAVENOUS
Status: CANCELLED
Start: 2023-12-12

## 2023-11-21 RX ORDER — HEPARIN SODIUM,PORCINE 10 UNIT/ML
5-20 VIAL (ML) INTRAVENOUS DAILY PRN
Status: CANCELLED | OUTPATIENT
Start: 2023-12-12

## 2023-11-21 RX ORDER — ALBUTEROL SULFATE 90 UG/1
1-2 AEROSOL, METERED RESPIRATORY (INHALATION)
Status: CANCELLED
Start: 2023-12-12

## 2023-11-21 RX ORDER — EPINEPHRINE 1 MG/ML
0.3 INJECTION, SOLUTION INTRAMUSCULAR; SUBCUTANEOUS EVERY 5 MIN PRN
Status: CANCELLED | OUTPATIENT
Start: 2023-12-12

## 2023-11-21 RX ORDER — MEPERIDINE HYDROCHLORIDE 25 MG/ML
25 INJECTION INTRAMUSCULAR; INTRAVENOUS; SUBCUTANEOUS EVERY 30 MIN PRN
Status: CANCELLED | OUTPATIENT
Start: 2023-12-12

## 2023-11-21 RX ORDER — METHYLPREDNISOLONE SODIUM SUCCINATE 125 MG/2ML
125 INJECTION, POWDER, LYOPHILIZED, FOR SOLUTION INTRAMUSCULAR; INTRAVENOUS
Status: CANCELLED
Start: 2023-12-12

## 2023-11-21 RX ORDER — ACETAMINOPHEN 325 MG/1
650 TABLET ORAL ONCE
Status: COMPLETED | OUTPATIENT
Start: 2023-11-21 | End: 2023-11-21

## 2023-11-21 RX ORDER — ALBUTEROL SULFATE 0.83 MG/ML
2.5 SOLUTION RESPIRATORY (INHALATION)
Status: CANCELLED | OUTPATIENT
Start: 2023-12-12

## 2023-11-21 RX ORDER — HEPARIN SODIUM (PORCINE) LOCK FLUSH IV SOLN 100 UNIT/ML 100 UNIT/ML
5 SOLUTION INTRAVENOUS
Status: CANCELLED | OUTPATIENT
Start: 2023-12-12

## 2023-11-21 RX ORDER — DIPHENHYDRAMINE HCL 25 MG
50 CAPSULE ORAL ONCE
Status: CANCELLED | OUTPATIENT
Start: 2023-12-12

## 2023-11-21 RX ADMIN — HUMAN IMMUNOGLOBULIN G 75 G: 40 LIQUID INTRAVENOUS at 08:20

## 2023-11-21 RX ADMIN — ACETAMINOPHEN 650 MG: 325 TABLET ORAL at 08:16

## 2023-11-21 RX ADMIN — DIPHENHYDRAMINE HYDROCHLORIDE 50 MG: 25 CAPSULE ORAL at 08:15

## 2023-11-21 NOTE — PROGRESS NOTES
Infusion Nursing Note:  Emiliano Aguiar presents today for Q3wk IVIG with premeds.    Patient seen by provider today: No   present during visit today: Not Applicable.    Note: Pt notes an increase in his fatigue and weakness over the past couple days, which he states is typical for his course  Continues to have headaches, chills, dizziness, nausea for up to 6 days post IVIG treatments    Pt requests only Tylenol and Benadryl as premeds  IVIG initiated at rate of 0.5mg/kg/hr. Increased by 0.5mg/kg/hr every 15 minutes to max rate of 4.0mg/kg/hr without incident      Intravenous Access:  Peripheral IV placed.    Treatment Conditions:  Pt denies fever/illness/infection/antibiotics or recent/upcoming surgery.      Post Infusion Assessment:  Patient tolerated infusion without incident.  Blood return noted pre and post infusion.  Site patent and intact, free from redness, edema or discomfort.  No evidence of extravasations.  Access discontinued per protocol.       Discharge Plan:   [facility]  Patient will return 12/12/23 for next dose of IVIG for next appointment.   Patient discharged in stable condition accompanied by: self.  Departure Mode: Ambulatory.      Carli Lopez RN

## 2023-12-12 ENCOUNTER — INFUSION THERAPY VISIT (OUTPATIENT)
Dept: INFUSION THERAPY | Facility: CLINIC | Age: 58
End: 2023-12-12
Attending: PSYCHIATRY & NEUROLOGY
Payer: COMMERCIAL

## 2023-12-12 VITALS
RESPIRATION RATE: 16 BRPM | TEMPERATURE: 97.8 F | OXYGEN SATURATION: 98 % | SYSTOLIC BLOOD PRESSURE: 152 MMHG | HEART RATE: 59 BPM | BODY MASS INDEX: 26.49 KG/M2 | DIASTOLIC BLOOD PRESSURE: 79 MMHG | WEIGHT: 195.3 LBS

## 2023-12-12 DIAGNOSIS — G61.81 CIDP (CHRONIC INFLAMMATORY DEMYELINATING POLYNEUROPATHY) (H): Primary | ICD-10-CM

## 2023-12-12 PROCEDURE — 250N000013 HC RX MED GY IP 250 OP 250 PS 637: Performed by: PSYCHIATRY & NEUROLOGY

## 2023-12-12 PROCEDURE — 96365 THER/PROPH/DIAG IV INF INIT: CPT

## 2023-12-12 PROCEDURE — 96366 THER/PROPH/DIAG IV INF ADDON: CPT

## 2023-12-12 PROCEDURE — 250N000011 HC RX IP 250 OP 636: Mod: JZ | Performed by: PSYCHIATRY & NEUROLOGY

## 2023-12-12 RX ORDER — DIPHENHYDRAMINE HCL 25 MG
50 CAPSULE ORAL ONCE
Status: CANCELLED | OUTPATIENT
Start: 2024-01-02

## 2023-12-12 RX ORDER — ALBUTEROL SULFATE 90 UG/1
1-2 AEROSOL, METERED RESPIRATORY (INHALATION)
Status: CANCELLED
Start: 2024-01-02

## 2023-12-12 RX ORDER — ACETAMINOPHEN 325 MG/1
650 TABLET ORAL ONCE
Status: COMPLETED | OUTPATIENT
Start: 2023-12-12 | End: 2023-12-12

## 2023-12-12 RX ORDER — METHYLPREDNISOLONE SODIUM SUCCINATE 125 MG/2ML
125 INJECTION, POWDER, LYOPHILIZED, FOR SOLUTION INTRAMUSCULAR; INTRAVENOUS
Status: CANCELLED
Start: 2024-01-02

## 2023-12-12 RX ORDER — MEPERIDINE HYDROCHLORIDE 25 MG/ML
25 INJECTION INTRAMUSCULAR; INTRAVENOUS; SUBCUTANEOUS EVERY 30 MIN PRN
Status: CANCELLED | OUTPATIENT
Start: 2024-01-02

## 2023-12-12 RX ORDER — HEPARIN SODIUM (PORCINE) LOCK FLUSH IV SOLN 100 UNIT/ML 100 UNIT/ML
5 SOLUTION INTRAVENOUS
Status: CANCELLED | OUTPATIENT
Start: 2024-01-02

## 2023-12-12 RX ORDER — DIPHENHYDRAMINE HCL 25 MG
50 CAPSULE ORAL ONCE
Status: COMPLETED | OUTPATIENT
Start: 2023-12-12 | End: 2023-12-12

## 2023-12-12 RX ORDER — HEPARIN SODIUM,PORCINE 10 UNIT/ML
5-20 VIAL (ML) INTRAVENOUS DAILY PRN
Status: CANCELLED | OUTPATIENT
Start: 2024-01-02

## 2023-12-12 RX ORDER — EPINEPHRINE 1 MG/ML
0.3 INJECTION, SOLUTION INTRAMUSCULAR; SUBCUTANEOUS EVERY 5 MIN PRN
Status: CANCELLED | OUTPATIENT
Start: 2024-01-02

## 2023-12-12 RX ORDER — DIPHENHYDRAMINE HYDROCHLORIDE 50 MG/ML
50 INJECTION INTRAMUSCULAR; INTRAVENOUS
Status: CANCELLED
Start: 2024-01-02

## 2023-12-12 RX ORDER — ACETAMINOPHEN 325 MG/1
650 TABLET ORAL ONCE
Status: CANCELLED
Start: 2024-01-02

## 2023-12-12 RX ORDER — ALBUTEROL SULFATE 0.83 MG/ML
2.5 SOLUTION RESPIRATORY (INHALATION)
Status: CANCELLED | OUTPATIENT
Start: 2024-01-02

## 2023-12-12 RX ADMIN — HUMAN IMMUNOGLOBULIN G 75 G: 40 LIQUID INTRAVENOUS at 08:21

## 2023-12-12 RX ADMIN — ACETAMINOPHEN 650 MG: 325 TABLET ORAL at 08:20

## 2023-12-12 RX ADMIN — DIPHENHYDRAMINE HYDROCHLORIDE 50 MG: 25 CAPSULE ORAL at 08:20

## 2023-12-12 NOTE — PROGRESS NOTES
Infusion Nursing Note:  Emiliano Aguiar presents today for IVIG and pre-meds.    Patient seen by provider today: No   present during visit today: Not Applicable.    Note: Patient states that he is feeling more weak and having more pain in his feet today. Patient also states that he usually has a headache up to 6 days after his IVIG infusion.  Refused solu-Cortef.   IVIG rate started at 0.5 ml/kg/hr and increased by 0.5 ml/kg/hr every 15 minutes with a max rate of 4 ml/kg/hr.      Intravenous Access:  Peripheral IV placed.    Treatment Conditions:   Patient denies recent reaction, fever, ill, recent major or local infection, on antibiotics, productive cough, recent surgery or elevated temp.      Post Infusion Assessment:  Patient tolerated infusion without incident.  Blood return noted pre and post infusion.  Site patent and intact, free from redness, edema or discomfort.  No evidence of extravasations.  Access discontinued per protocol.       Discharge Plan:    [facility]  Patient will return 1/04/24 for next appointment.   Patient discharged in stable condition accompanied by: self.  Departure Mode: Ambulatory.      Lynda Beltrán RN

## 2024-01-04 ENCOUNTER — INFUSION THERAPY VISIT (OUTPATIENT)
Dept: INFUSION THERAPY | Facility: CLINIC | Age: 59
End: 2024-01-04
Attending: PSYCHIATRY & NEUROLOGY
Payer: COMMERCIAL

## 2024-01-04 VITALS
WEIGHT: 195.5 LBS | HEART RATE: 67 BPM | TEMPERATURE: 97.8 F | DIASTOLIC BLOOD PRESSURE: 80 MMHG | OXYGEN SATURATION: 98 % | SYSTOLIC BLOOD PRESSURE: 147 MMHG | BODY MASS INDEX: 26.51 KG/M2 | RESPIRATION RATE: 16 BRPM

## 2024-01-04 DIAGNOSIS — G61.81 CIDP (CHRONIC INFLAMMATORY DEMYELINATING POLYNEUROPATHY) (H): Primary | ICD-10-CM

## 2024-01-04 PROCEDURE — 250N000011 HC RX IP 250 OP 636: Mod: JZ | Performed by: PSYCHIATRY & NEUROLOGY

## 2024-01-04 PROCEDURE — 96365 THER/PROPH/DIAG IV INF INIT: CPT

## 2024-01-04 PROCEDURE — 250N000013 HC RX MED GY IP 250 OP 250 PS 637: Performed by: PSYCHIATRY & NEUROLOGY

## 2024-01-04 PROCEDURE — 96366 THER/PROPH/DIAG IV INF ADDON: CPT

## 2024-01-04 RX ORDER — HEPARIN SODIUM (PORCINE) LOCK FLUSH IV SOLN 100 UNIT/ML 100 UNIT/ML
5 SOLUTION INTRAVENOUS
Status: CANCELLED | OUTPATIENT
Start: 2024-01-23

## 2024-01-04 RX ORDER — DIPHENHYDRAMINE HCL 25 MG
50 CAPSULE ORAL ONCE
Status: COMPLETED | OUTPATIENT
Start: 2024-01-04 | End: 2024-01-04

## 2024-01-04 RX ORDER — ALBUTEROL SULFATE 0.83 MG/ML
2.5 SOLUTION RESPIRATORY (INHALATION)
Status: CANCELLED | OUTPATIENT
Start: 2024-01-23

## 2024-01-04 RX ORDER — ACETAMINOPHEN 325 MG/1
650 TABLET ORAL ONCE
Status: COMPLETED | OUTPATIENT
Start: 2024-01-04 | End: 2024-01-04

## 2024-01-04 RX ORDER — ACETAMINOPHEN 325 MG/1
650 TABLET ORAL ONCE
Status: CANCELLED
Start: 2024-01-23

## 2024-01-04 RX ORDER — MEPERIDINE HYDROCHLORIDE 25 MG/ML
25 INJECTION INTRAMUSCULAR; INTRAVENOUS; SUBCUTANEOUS EVERY 30 MIN PRN
Status: CANCELLED | OUTPATIENT
Start: 2024-01-23

## 2024-01-04 RX ORDER — ALBUTEROL SULFATE 90 UG/1
1-2 AEROSOL, METERED RESPIRATORY (INHALATION)
Status: CANCELLED
Start: 2024-01-23

## 2024-01-04 RX ORDER — EPINEPHRINE 1 MG/ML
0.3 INJECTION, SOLUTION INTRAMUSCULAR; SUBCUTANEOUS EVERY 5 MIN PRN
Status: CANCELLED | OUTPATIENT
Start: 2024-01-23

## 2024-01-04 RX ORDER — METHYLPREDNISOLONE SODIUM SUCCINATE 125 MG/2ML
125 INJECTION, POWDER, LYOPHILIZED, FOR SOLUTION INTRAMUSCULAR; INTRAVENOUS
Status: CANCELLED
Start: 2024-01-23

## 2024-01-04 RX ORDER — DIPHENHYDRAMINE HCL 25 MG
50 CAPSULE ORAL ONCE
Status: CANCELLED | OUTPATIENT
Start: 2024-01-23

## 2024-01-04 RX ORDER — HEPARIN SODIUM,PORCINE 10 UNIT/ML
5-20 VIAL (ML) INTRAVENOUS DAILY PRN
Status: CANCELLED | OUTPATIENT
Start: 2024-01-23

## 2024-01-04 RX ORDER — DIPHENHYDRAMINE HYDROCHLORIDE 50 MG/ML
50 INJECTION INTRAMUSCULAR; INTRAVENOUS
Status: CANCELLED
Start: 2024-01-23

## 2024-01-04 RX ADMIN — DIPHENHYDRAMINE HYDROCHLORIDE 50 MG: 25 CAPSULE ORAL at 09:10

## 2024-01-04 RX ADMIN — ACETAMINOPHEN 650 MG: 325 TABLET ORAL at 09:10

## 2024-01-04 RX ADMIN — HUMAN IMMUNOGLOBULIN G 75 G: 40 LIQUID INTRAVENOUS at 09:26

## 2024-01-04 NOTE — PROGRESS NOTES
Infusion Nursing Note:  Emiliano Aguiar presents today for IVIG with premeds.    Patient seen by provider today: No   present during visit today: Not Applicable.    Note: Patient states he can tell he is due for his IVIG as his right leg was buckling and has pain in right thigh and hip pain and weakness down to ankle. Patient continues to have fatigue and headaches for 6 days post IVIG.     Started infusion at 0.5ml/kg/hr, increased by 0.5ml/kg/hr, max rate 4ml/kg/hr.    Patient takes tylenol and benadryl for premed but declines solucortef.      Intravenous Access:  Peripheral IV placed.    Treatment Conditions:  Patient states he recently had a sinus infection and possible pneumonia, was seen at urgent care and has been taking an antibiotic, has 2 days left of that.  Symptoms have now resolved, denies fevers and denies recent or upcoming surgery.  There is documentation (telephone note) in patient's chart from 12/28/23 from Dr. Godoy and Lluvia DE LA O, susan to proceed with IVIG today.      Post Infusion Assessment:  Patient tolerated infusion without incident.  Blood return noted pre and post infusion.  Site patent and intact, free from redness, edema or discomfort.  No evidence of extravasations.  Access discontinued per protocol.       Discharge Plan:   [facility]  Patient will return 1/23/24 for next appointment.   Patient discharged in stable condition accompanied by: self.  Departure Mode: Ambulatory.      Brenda Bui RN

## 2024-01-08 ENCOUNTER — VIRTUAL VISIT (OUTPATIENT)
Dept: NEUROLOGY | Facility: CLINIC | Age: 59
End: 2024-01-08
Payer: COMMERCIAL

## 2024-01-08 VITALS — BODY MASS INDEX: 27.3 KG/M2 | WEIGHT: 195 LBS | HEIGHT: 71 IN

## 2024-01-08 DIAGNOSIS — G61.81 CIDP (CHRONIC INFLAMMATORY DEMYELINATING POLYNEUROPATHY) (H): Primary | ICD-10-CM

## 2024-01-08 DIAGNOSIS — G61.81 CIDP (CHRONIC INFLAMMATORY DEMYELINATING POLYNEUROPATHY) (H): ICD-10-CM

## 2024-01-08 PROCEDURE — 99214 OFFICE O/P EST MOD 30 MIN: CPT | Mod: 95 | Performed by: PSYCHIATRY & NEUROLOGY

## 2024-01-08 RX ORDER — DULOXETIN HYDROCHLORIDE 60 MG/1
60 CAPSULE, DELAYED RELEASE ORAL DAILY
Qty: 30 CAPSULE | Refills: 3 | Status: SHIPPED | OUTPATIENT
Start: 2024-01-08 | End: 2024-09-03 | Stop reason: SINTOL

## 2024-01-08 RX ORDER — DULOXETIN HYDROCHLORIDE 20 MG/1
CAPSULE, DELAYED RELEASE ORAL
Qty: 60 CAPSULE | Refills: 1 | Status: SHIPPED | OUTPATIENT
Start: 2024-01-08 | End: 2024-01-08

## 2024-01-08 ASSESSMENT — PAIN SCALES - GENERAL: PAINLEVEL: SEVERE PAIN (6)

## 2024-01-08 NOTE — LETTER
1/8/2024       RE: Emiliano Aguiar  [address]  [place] [address]       Dear Colleague,    Thank you for referring your patient, Emiliano Aguiar, to the [facility] [place] at [facility]. Please see a copy of my visit note below.      Son Spenser White MD     [place], January 8, 2024     Dear Dr. White,     I had the pleasure to evaluate Emiliano via billable visit today .  In person visit was not possible because she had severe side effects after his recent IVIG infusion and had to stay home.  He has CIDP, manifesting predominantly with sensory ataxia, and minimal to no weakness.  He is on IVIG 75 g every 3 weeks.  Previously, he tried pulse IV weekly Solu-Medrol for 10 weeks, which was not effective. He is having chronic issues with back pain, headaches, and arthralgias that last for about 7 days after each IVIG infusion, and those symptoms could not be mitigated no matter what we premedicated him with, including Tylenol, corticosteroids, NSAIDs, etc.  He has found that CBD Gummies are the most helpful, and they tend to prevent the hives and itching associated with the IVIG infusions, but they do not fully prevent the arthralgias.    Because his symptoms are incompletely controlled on IVIG and he has a lot of SE, last year I offered him additional treatment with either rituximab, or participation in a complement C2 inhibitor clinical trial for CIDP at the [facility]. He felt that the risk (of infection) with those two options was excessive, and decided not to change anything medically.  He lost some weight, and he went on an anti-inflammatory diet, with less processed foods.      He tells me that his balance and gait may be slightly worse in the last visit.  Besides the side effects of IVIG infusion that lasted about a week to 10 days, he noticed pain in the right leg from the hip to the toes, with tingling and  paresthesias, and bilateral hand pain, as well as worsening balance that began about 6 days prior to his next scheduled IVIG.      We have recently tried to reassess the severity of his neuropathy with repeat EMG but she could not tolerate any nerve conduction studies unfortunately.    RODS CIDP score today was 23, which is 3 points less compared to June 2023.     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  It is my sense that he overall may be slightly worse than the last visit and in general his CIDP symptoms are incompletely controlled.  Because a lot of the symptoms are related to pain and paresthesias, either as side effects of the IVIG infusions or due to the neuropathy itself, I feel he would benefit from Cymbalta.  He has previously declined this drug out of fear of side effects but I encouraged him to try it.  I will offer him 20 mg daily for 2 weeks, then 40 mg daily for 2 weeks, then 60 mg daily thereafter if the dose of 40 mg is tolerated.  Side effects of Cymbalta were again explained and he is willing to try.  We will follow-up with Kortney adams in about  1 month.    Because the patient was hesitant to proceed with more aggressive immunotherapy, I will defer it for now.  I also explained to him that it is difficult to know whether the symptoms he is currently reporting reflect side effects of the IVIG, true worsening of his CIDP due to new demyelination that would potentially be responsive to immunotherapies, or fixed deficits from his neuropathy due to secondary axonal loss that can happen with longstanding CIDP, and generally does not respond to any immunotherapy.     Follow-up in clinic in 6 months, sooner if needed.Total time spent on this encounter today 30 minutes, of which 15 face-to-face, 10 in postvisit note dictation, editing, and orders, and 5 in previsit chart review.            Again, thank you for allowing me to participate in the care of your patient.       Sincerely,    Nixon Godoy MD

## 2024-01-08 NOTE — PROGRESS NOTES
Virtual Visit Details    Type of service:  Video Visit   Video Start Time:  9.45 AM  Video End Time: 10.00 AM    Originating Location (pt. Location): Home    Distant Location (provider location):  On-site  Platform used for Video Visit: Jayna White MD     [place], January 8, 2024     Dear Dr. White,     I had the pleasure to evaluate Emiliano via billable visit today .  In person visit was not possible because she had severe side effects after his recent IVIG infusion and had to stay home.  He has CIDP, manifesting predominantly with sensory ataxia, and minimal to no weakness.  He is on IVIG 75 g every 3 weeks.  Previously, he tried pulse IV weekly Solu-Medrol for 10 weeks, which was not effective. He is having chronic issues with back pain, headaches, and arthralgias that last for about 7 days after each IVIG infusion, and those symptoms could not be mitigated no matter what we premedicated him with, including Tylenol, corticosteroids, NSAIDs, etc.  He has found that CBD Gummies are the most helpful, and they tend to prevent the hives and itching associated with the IVIG infusions, but they do not fully prevent the arthralgias.    Because his symptoms are incompletely controlled on IVIG and he has a lot of SE, last year I offered him additional treatment with either rituximab, or participation in a complement C2 inhibitor clinical trial for CIDP at the [facility]. He felt that the risk (of infection) with those two options was excessive, and decided not to change anything medically.  He lost some weight, and he went on an anti-inflammatory diet, with less processed foods.      He tells me that his balance and gait may be slightly worse in the last visit.  Besides the side effects of IVIG infusion that lasted about a week to 10 days, he noticed pain in the right leg from the hip to the toes, with tingling and paresthesias, and bilateral hand pain, as well as worsening balance that  began about 6 days prior to his next scheduled IVIG.  He actually had a fall around [place] and had to get back home from work earlier, and another fall earlier (towards September-October 2023).    We have recently tried to reassess the severity of his neuropathy with repeat EMG but she could not tolerate any nerve conduction studies unfortunately.    RODS CIDP score today was 23, which is 3 points less compared to June 2023.     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  It is my sense that he overall may be slightly worse than the last visit and in general his CIDP symptoms are incompletely controlled.  Because a lot of the symptoms are related to pain and paresthesias, either as side effects of the IVIG infusions or due to the neuropathy itself, I feel he would benefit from Cymbalta.  He has previously declined this drug out of fear of side effects but I encouraged him to try it.  I will offer him 20 mg daily for 2 weeks, then 40 mg daily for 2 weeks, then 60 mg daily thereafter if the dose of 40 mg is tolerated.  Side effects of Cymbalta were again explained and he is willing to try.  We will follow-up with Kortney adams in about  1 month.    Because the patient was hesitant to proceed with more aggressive immunotherapy, I will defer it for now.  I also explained to him that it is difficult to know whether the symptoms he is currently reporting reflect side effects of the IVIG, true worsening of his CIDP due to new demyelination that would potentially be responsive to immunotherapies, or fixed deficits from his neuropathy due to secondary axonal loss that can happen with longstanding CIDP, and generally does not respond to any immunotherapy.     Follow-up in clinic in 6 months, sooner if needed.Total time spent on this encounter today 30 minutes, of which 15 face-to-face, 10 in postvisit note dictation, editing, and orders, and 5 in previsit chart review.     Sincerely,     Nixon  MD Jayne, FAAN

## 2024-01-08 NOTE — PATIENT INSTRUCTIONS
Follow-up in 6 months    Start taking Cymbalta 20 mg daily for 2 weeks, then 40 mg daily for 2 weeks, then 60 mg daily.  Please send me a [employer] message in about 3 to 4 weeks to see how you are doing on this drug.  This will determine our next steps.    Cymbalta may help with your pain and tingling.  It does not directly address the cause of your neuropathy but in your case I think it could be helpful.    No change in your IVIG dosing regimen.

## 2024-01-08 NOTE — TELEPHONE ENCOUNTER
Yes, but that's not what I intended to do. I wanted him to start 20 mg daily for two weeks, then 40 for two weeks, then 60, to minimize side effects and see how he tolerates it. I did not want to start from 60 mg daily. Thanks!

## 2024-01-08 NOTE — NURSING NOTE
Is the patient currently in the state of [place]? YES    Visit mode:VIDEO    If the visit is dropped, the patient can be reconnected by: VIDEO VISIT: Send to e-mail at: [email or web address]    Will anyone else be joining the visit? NO  (If patient encounters technical issues they should call [phone]    How would you like to obtain your AVS? MyChart    Are changes needed to the allergy or medication list? Pt stated no changes to allergies and Pt stated no med changes    Reason for visit: JESSICA WOOTENF

## 2024-01-23 ENCOUNTER — INFUSION THERAPY VISIT (OUTPATIENT)
Dept: INFUSION THERAPY | Facility: CLINIC | Age: 59
End: 2024-01-23
Attending: PSYCHIATRY & NEUROLOGY
Payer: COMMERCIAL

## 2024-01-23 VITALS
OXYGEN SATURATION: 98 % | WEIGHT: 194.8 LBS | DIASTOLIC BLOOD PRESSURE: 73 MMHG | HEART RATE: 57 BPM | SYSTOLIC BLOOD PRESSURE: 129 MMHG | RESPIRATION RATE: 16 BRPM | TEMPERATURE: 97.9 F | BODY MASS INDEX: 27.17 KG/M2

## 2024-01-23 DIAGNOSIS — G61.81 CIDP (CHRONIC INFLAMMATORY DEMYELINATING POLYNEUROPATHY) (H): Primary | ICD-10-CM

## 2024-01-23 PROCEDURE — 96366 THER/PROPH/DIAG IV INF ADDON: CPT

## 2024-01-23 PROCEDURE — 250N000011 HC RX IP 250 OP 636: Mod: JZ | Performed by: PSYCHIATRY & NEUROLOGY

## 2024-01-23 PROCEDURE — 250N000013 HC RX MED GY IP 250 OP 250 PS 637: Performed by: PSYCHIATRY & NEUROLOGY

## 2024-01-23 PROCEDURE — 96365 THER/PROPH/DIAG IV INF INIT: CPT

## 2024-01-23 RX ORDER — ACETAMINOPHEN 325 MG/1
650 TABLET ORAL ONCE
Status: CANCELLED
Start: 2024-01-25

## 2024-01-23 RX ORDER — ALBUTEROL SULFATE 0.83 MG/ML
2.5 SOLUTION RESPIRATORY (INHALATION)
Status: CANCELLED | OUTPATIENT
Start: 2024-01-25

## 2024-01-23 RX ORDER — MEPERIDINE HYDROCHLORIDE 25 MG/ML
25 INJECTION INTRAMUSCULAR; INTRAVENOUS; SUBCUTANEOUS EVERY 30 MIN PRN
Status: CANCELLED | OUTPATIENT
Start: 2024-01-25

## 2024-01-23 RX ORDER — DIPHENHYDRAMINE HCL 25 MG
50 CAPSULE ORAL ONCE
Status: CANCELLED | OUTPATIENT
Start: 2024-01-25

## 2024-01-23 RX ORDER — DIPHENHYDRAMINE HYDROCHLORIDE 50 MG/ML
50 INJECTION INTRAMUSCULAR; INTRAVENOUS
Status: CANCELLED
Start: 2024-01-25

## 2024-01-23 RX ORDER — ACETAMINOPHEN 325 MG/1
650 TABLET ORAL ONCE
Status: COMPLETED | OUTPATIENT
Start: 2024-01-23 | End: 2024-01-23

## 2024-01-23 RX ORDER — ALBUTEROL SULFATE 90 UG/1
1-2 AEROSOL, METERED RESPIRATORY (INHALATION)
Status: CANCELLED
Start: 2024-01-25

## 2024-01-23 RX ORDER — DIPHENHYDRAMINE HCL 25 MG
50 CAPSULE ORAL ONCE
Status: COMPLETED | OUTPATIENT
Start: 2024-01-23 | End: 2024-01-23

## 2024-01-23 RX ORDER — METHYLPREDNISOLONE SODIUM SUCCINATE 125 MG/2ML
125 INJECTION, POWDER, LYOPHILIZED, FOR SOLUTION INTRAMUSCULAR; INTRAVENOUS
Status: CANCELLED
Start: 2024-01-25

## 2024-01-23 RX ORDER — EPINEPHRINE 1 MG/ML
0.3 INJECTION, SOLUTION INTRAMUSCULAR; SUBCUTANEOUS EVERY 5 MIN PRN
Status: CANCELLED | OUTPATIENT
Start: 2024-01-25

## 2024-01-23 RX ORDER — HEPARIN SODIUM (PORCINE) LOCK FLUSH IV SOLN 100 UNIT/ML 100 UNIT/ML
5 SOLUTION INTRAVENOUS
Status: CANCELLED | OUTPATIENT
Start: 2024-01-25

## 2024-01-23 RX ORDER — HEPARIN SODIUM,PORCINE 10 UNIT/ML
5-20 VIAL (ML) INTRAVENOUS DAILY PRN
Status: CANCELLED | OUTPATIENT
Start: 2024-01-25

## 2024-01-23 RX ADMIN — DIPHENHYDRAMINE HYDROCHLORIDE 50 MG: 25 CAPSULE ORAL at 08:29

## 2024-01-23 RX ADMIN — ACETAMINOPHEN 650 MG: 325 TABLET ORAL at 08:29

## 2024-01-23 RX ADMIN — HUMAN IMMUNOGLOBULIN G 75 G: 40 LIQUID INTRAVENOUS at 08:40

## 2024-01-23 NOTE — PROGRESS NOTES
Infusion Nursing Note:  Emiliano Aguiar presents today for IVIG.    Patient seen by provider today: No   present during visit today: Not Applicable.    Note: Emiliano reports hives over the past weekend on scalp and right cheek, resolved with Benadryl, which he reports that this happens often after IVIG and his doctor is aware (noted in 1/8 MD note).  He also reports ear fullness (without pain) for past month and new swollen lymph node on right posterior neck.  He denies any other new symptoms, reactions, fever/elevated temperature, illness, recent major or local infection, being on antibiotics, productive cough, or recent surgery.     notified about ear fullness and swollen lymph node.  1/23/2024  0825  TORB Dr. Godoy/Ankur Germain RN:  OK to proceedwith IVIG.  Patient should go to PCP for evaluation of swollen lymph node if it persists.    Pre-medicated with Tylenol and Benadryl, patient declined Solu-Medrol.  IVIG infusion initiated at 0.5 ml/kg/hr and increased by 0.5 ml/kg/hr every 15 minutes to max rate of 4 ml/kg/hr.    Intravenous Access:  Peripheral IV placed.    Treatment Conditions:  Not Applicable.    Post Infusion Assessment:  Patient tolerated infusion without incident.  Blood return noted pre and post infusion.  Site patent and intact, free from redness, edema or discomfort.  No evidence of extravasations.  Access discontinued per protocol.     Discharge Plan:   Discharge instructions reviewed with: Patient.  Patient and/or family verbalized understanding of discharge instructions and all questions answered.  AVS to patient via [employer].  Patient will return 2/13 for next appointment.   Patient discharged in stable condition accompanied by: self.  Departure Mode: Ambulatory.      Ankur Germain RN

## 2024-02-13 ENCOUNTER — INFUSION THERAPY VISIT (OUTPATIENT)
Dept: INFUSION THERAPY | Facility: CLINIC | Age: 59
End: 2024-02-13
Attending: PSYCHIATRY & NEUROLOGY
Payer: COMMERCIAL

## 2024-02-13 VITALS
SYSTOLIC BLOOD PRESSURE: 130 MMHG | BODY MASS INDEX: 27.64 KG/M2 | DIASTOLIC BLOOD PRESSURE: 73 MMHG | TEMPERATURE: 97.9 F | RESPIRATION RATE: 16 BRPM | OXYGEN SATURATION: 98 % | HEART RATE: 56 BPM | WEIGHT: 198.2 LBS

## 2024-02-13 DIAGNOSIS — G61.81 CIDP (CHRONIC INFLAMMATORY DEMYELINATING POLYNEUROPATHY) (H): Primary | ICD-10-CM

## 2024-02-13 PROCEDURE — 250N000011 HC RX IP 250 OP 636: Mod: JZ | Performed by: PSYCHIATRY & NEUROLOGY

## 2024-02-13 PROCEDURE — 250N000013 HC RX MED GY IP 250 OP 250 PS 637: Performed by: PSYCHIATRY & NEUROLOGY

## 2024-02-13 PROCEDURE — 96366 THER/PROPH/DIAG IV INF ADDON: CPT

## 2024-02-13 PROCEDURE — 96365 THER/PROPH/DIAG IV INF INIT: CPT

## 2024-02-13 RX ORDER — ALBUTEROL SULFATE 0.83 MG/ML
2.5 SOLUTION RESPIRATORY (INHALATION)
Status: CANCELLED | OUTPATIENT
Start: 2024-02-15

## 2024-02-13 RX ORDER — HEPARIN SODIUM,PORCINE 10 UNIT/ML
5-20 VIAL (ML) INTRAVENOUS DAILY PRN
Status: CANCELLED | OUTPATIENT
Start: 2024-02-15

## 2024-02-13 RX ORDER — DIPHENHYDRAMINE HYDROCHLORIDE 50 MG/ML
50 INJECTION INTRAMUSCULAR; INTRAVENOUS
Status: CANCELLED
Start: 2024-02-15

## 2024-02-13 RX ORDER — MEPERIDINE HYDROCHLORIDE 25 MG/ML
25 INJECTION INTRAMUSCULAR; INTRAVENOUS; SUBCUTANEOUS EVERY 30 MIN PRN
Status: CANCELLED | OUTPATIENT
Start: 2024-02-15

## 2024-02-13 RX ORDER — METHYLPREDNISOLONE SODIUM SUCCINATE 125 MG/2ML
125 INJECTION, POWDER, LYOPHILIZED, FOR SOLUTION INTRAMUSCULAR; INTRAVENOUS
Status: CANCELLED
Start: 2024-02-15

## 2024-02-13 RX ORDER — DIPHENHYDRAMINE HCL 25 MG
50 CAPSULE ORAL ONCE
Status: CANCELLED | OUTPATIENT
Start: 2024-02-15

## 2024-02-13 RX ORDER — EPINEPHRINE 1 MG/ML
0.3 INJECTION, SOLUTION INTRAMUSCULAR; SUBCUTANEOUS EVERY 5 MIN PRN
Status: CANCELLED | OUTPATIENT
Start: 2024-02-15

## 2024-02-13 RX ORDER — DIPHENHYDRAMINE HCL 25 MG
50 CAPSULE ORAL ONCE
Status: COMPLETED | OUTPATIENT
Start: 2024-02-13 | End: 2024-02-13

## 2024-02-13 RX ORDER — ACETAMINOPHEN 325 MG/1
650 TABLET ORAL ONCE
Status: CANCELLED
Start: 2024-02-15

## 2024-02-13 RX ORDER — HEPARIN SODIUM (PORCINE) LOCK FLUSH IV SOLN 100 UNIT/ML 100 UNIT/ML
5 SOLUTION INTRAVENOUS
Status: CANCELLED | OUTPATIENT
Start: 2024-02-15

## 2024-02-13 RX ORDER — ACETAMINOPHEN 325 MG/1
650 TABLET ORAL ONCE
Status: COMPLETED | OUTPATIENT
Start: 2024-02-13 | End: 2024-02-13

## 2024-02-13 RX ORDER — ALBUTEROL SULFATE 90 UG/1
1-2 AEROSOL, METERED RESPIRATORY (INHALATION)
Status: CANCELLED
Start: 2024-02-15

## 2024-02-13 RX ADMIN — DIPHENHYDRAMINE HYDROCHLORIDE 50 MG: 25 CAPSULE ORAL at 08:50

## 2024-02-13 RX ADMIN — ACETAMINOPHEN 650 MG: 325 TABLET ORAL at 08:50

## 2024-02-13 RX ADMIN — HUMAN IMMUNOGLOBULIN G 75 G: 40 LIQUID INTRAVENOUS at 09:01

## 2024-02-13 NOTE — PROGRESS NOTES
Infusion Nursing Note:  Emiliano Aguiar presents today for IVIG.    Patient seen by provider today: No   present during visit today: Not Applicable.    Note: Finished ten day course of Augmentin yesterday for sinus infection, ears still feel plugged off and on. No fevers. Feeling quite fatigued over the past few days. Is going to make appt with ENT. Had possible swollen lymph node checked out, MD thinks it is a cyst, has to schedule follow up for that as well. Dr. Godoy paged [facility] ok to proceed with infusion today.     Pre-medicated with Tylenol and Benadryl, declined IV Solumedrol.     IVIG initiated at 0.5 ml/kg/hr and increased by 0.5 ml/kg/hr until max rate of 4 ml/kg/hr achieved.       Intravenous Access:  Peripheral IV placed.    Treatment Conditions:  Not Applicable.      Post Infusion Assessment:  Patient tolerated infusion without incident.  Blood return noted pre and post infusion.  Site patent and intact, free from redness, edema or discomfort.  No evidence of extravasations.  Access discontinued per protocol.       Discharge Plan:   Discharge instructions reviewed with: Patient.  Patient and/or family verbalized understanding of discharge instructions and all questions answered.  AVS to patient via [employer].  Patient will return 3/5/24 for next appointment.   Patient discharged in stable condition accompanied by: self.  Departure Mode: Ambulatory.      Myrna Madrid RN

## 2024-03-05 ENCOUNTER — INFUSION THERAPY VISIT (OUTPATIENT)
Dept: INFUSION THERAPY | Facility: CLINIC | Age: 59
End: 2024-03-05
Attending: PSYCHIATRY & NEUROLOGY
Payer: COMMERCIAL

## 2024-03-05 VITALS
SYSTOLIC BLOOD PRESSURE: 154 MMHG | WEIGHT: 198.1 LBS | BODY MASS INDEX: 27.63 KG/M2 | HEART RATE: 56 BPM | TEMPERATURE: 97.8 F | DIASTOLIC BLOOD PRESSURE: 71 MMHG | OXYGEN SATURATION: 98 % | RESPIRATION RATE: 16 BRPM

## 2024-03-05 DIAGNOSIS — G61.81 CIDP (CHRONIC INFLAMMATORY DEMYELINATING POLYNEUROPATHY) (H): Primary | ICD-10-CM

## 2024-03-05 PROCEDURE — 250N000013 HC RX MED GY IP 250 OP 250 PS 637: Performed by: PSYCHIATRY & NEUROLOGY

## 2024-03-05 PROCEDURE — 250N000011 HC RX IP 250 OP 636: Mod: JZ | Performed by: PSYCHIATRY & NEUROLOGY

## 2024-03-05 PROCEDURE — 96366 THER/PROPH/DIAG IV INF ADDON: CPT

## 2024-03-05 PROCEDURE — 96365 THER/PROPH/DIAG IV INF INIT: CPT

## 2024-03-05 RX ORDER — DIPHENHYDRAMINE HCL 25 MG
50 CAPSULE ORAL ONCE
Status: COMPLETED | OUTPATIENT
Start: 2024-03-05 | End: 2024-03-05

## 2024-03-05 RX ORDER — ALBUTEROL SULFATE 0.83 MG/ML
2.5 SOLUTION RESPIRATORY (INHALATION)
Status: CANCELLED | OUTPATIENT
Start: 2024-03-07

## 2024-03-05 RX ORDER — DIPHENHYDRAMINE HCL 25 MG
50 CAPSULE ORAL ONCE
Status: CANCELLED | OUTPATIENT
Start: 2024-03-07

## 2024-03-05 RX ORDER — MEPERIDINE HYDROCHLORIDE 25 MG/ML
25 INJECTION INTRAMUSCULAR; INTRAVENOUS; SUBCUTANEOUS EVERY 30 MIN PRN
Status: CANCELLED | OUTPATIENT
Start: 2024-03-07

## 2024-03-05 RX ORDER — HEPARIN SODIUM,PORCINE 10 UNIT/ML
5-20 VIAL (ML) INTRAVENOUS DAILY PRN
Status: CANCELLED | OUTPATIENT
Start: 2024-03-07

## 2024-03-05 RX ORDER — DIPHENHYDRAMINE HYDROCHLORIDE 50 MG/ML
50 INJECTION INTRAMUSCULAR; INTRAVENOUS
Status: CANCELLED
Start: 2024-03-07

## 2024-03-05 RX ORDER — HEPARIN SODIUM (PORCINE) LOCK FLUSH IV SOLN 100 UNIT/ML 100 UNIT/ML
5 SOLUTION INTRAVENOUS
Status: CANCELLED | OUTPATIENT
Start: 2024-03-07

## 2024-03-05 RX ORDER — METHYLPREDNISOLONE SODIUM SUCCINATE 125 MG/2ML
125 INJECTION, POWDER, LYOPHILIZED, FOR SOLUTION INTRAMUSCULAR; INTRAVENOUS
Status: CANCELLED
Start: 2024-03-07

## 2024-03-05 RX ORDER — EPINEPHRINE 1 MG/ML
0.3 INJECTION, SOLUTION INTRAMUSCULAR; SUBCUTANEOUS EVERY 5 MIN PRN
Status: CANCELLED | OUTPATIENT
Start: 2024-03-07

## 2024-03-05 RX ORDER — ACETAMINOPHEN 325 MG/1
650 TABLET ORAL ONCE
Status: COMPLETED | OUTPATIENT
Start: 2024-03-05 | End: 2024-03-05

## 2024-03-05 RX ORDER — ALBUTEROL SULFATE 90 UG/1
1-2 AEROSOL, METERED RESPIRATORY (INHALATION)
Status: CANCELLED
Start: 2024-03-07

## 2024-03-05 RX ORDER — ACETAMINOPHEN 325 MG/1
650 TABLET ORAL ONCE
Status: CANCELLED
Start: 2024-03-07

## 2024-03-05 RX ADMIN — HUMAN IMMUNOGLOBULIN G 75 G: 40 LIQUID INTRAVENOUS at 08:18

## 2024-03-05 RX ADMIN — ACETAMINOPHEN 650 MG: 325 TABLET ORAL at 08:09

## 2024-03-05 RX ADMIN — DIPHENHYDRAMINE HYDROCHLORIDE 50 MG: 25 CAPSULE ORAL at 08:09

## 2024-03-05 NOTE — PROGRESS NOTES
Infusion Nursing Note:  Emiliano Aguiar presents today for IVIG.    Patient seen by provider today: No   present during visit today: Not Applicable.    Note: Emiliano reports he is feeling ok today.  He reports that his CIDP symptoms are worse in the past few days leading up to this infusion, including bilateral leg pain, weakness, and imbalance requiring him to use a cane over the weekend.  He denies any new symptoms, reactions, fever/elevated temperature, illness, recent major or local infection, being on antibiotics, productive cough, or recent surgery.    Pre-medicated with Tylenol and Benadryl, declined Solu-Cortef.  IVIG infusion initiated at 0.5 ml/kg/hr and increased by 0.5 ml/kg/hr every 15 minutes to max rate of 4 ml/kg/hr.    Intravenous Access:  Peripheral IV placed.    Treatment Conditions:  Not Applicable.    Post Infusion Assessment:  Patient tolerated infusion without incident.  Blood return noted pre and post infusion.  Site patent and intact, free from redness, edema or discomfort.  No evidence of extravasations.  Access discontinued per protocol.     Discharge Plan:   Discharge instructions reviewed with: Patient.  Patient and/or family verbalized understanding of discharge instructions and all questions answered.  AVS to patient via [employer].  Patient will return 3/26 for next appointment.   Patient discharged in stable condition accompanied by: self.  Departure Mode: Ambulatory.      Ankur Germain RN

## 2024-03-26 ENCOUNTER — INFUSION THERAPY VISIT (OUTPATIENT)
Dept: INFUSION THERAPY | Facility: CLINIC | Age: 59
End: 2024-03-26
Attending: PSYCHIATRY & NEUROLOGY
Payer: COMMERCIAL

## 2024-03-26 VITALS
SYSTOLIC BLOOD PRESSURE: 146 MMHG | WEIGHT: 202.5 LBS | BODY MASS INDEX: 28.24 KG/M2 | HEART RATE: 60 BPM | OXYGEN SATURATION: 97 % | DIASTOLIC BLOOD PRESSURE: 83 MMHG | TEMPERATURE: 96.5 F

## 2024-03-26 DIAGNOSIS — G61.81 CIDP (CHRONIC INFLAMMATORY DEMYELINATING POLYNEUROPATHY) (H): Primary | ICD-10-CM

## 2024-03-26 PROCEDURE — 250N000011 HC RX IP 250 OP 636: Mod: JZ | Performed by: PSYCHIATRY & NEUROLOGY

## 2024-03-26 PROCEDURE — 250N000013 HC RX MED GY IP 250 OP 250 PS 637: Performed by: PSYCHIATRY & NEUROLOGY

## 2024-03-26 PROCEDURE — 96365 THER/PROPH/DIAG IV INF INIT: CPT

## 2024-03-26 PROCEDURE — 96366 THER/PROPH/DIAG IV INF ADDON: CPT

## 2024-03-26 RX ORDER — ACETAMINOPHEN 325 MG/1
650 TABLET ORAL ONCE
Status: CANCELLED
Start: 2024-03-28

## 2024-03-26 RX ORDER — ALBUTEROL SULFATE 90 UG/1
1-2 AEROSOL, METERED RESPIRATORY (INHALATION)
Status: CANCELLED
Start: 2024-03-28

## 2024-03-26 RX ORDER — DIPHENHYDRAMINE HCL 25 MG
50 CAPSULE ORAL ONCE
Status: COMPLETED | OUTPATIENT
Start: 2024-03-26 | End: 2024-03-26

## 2024-03-26 RX ORDER — METHYLPREDNISOLONE SODIUM SUCCINATE 125 MG/2ML
125 INJECTION, POWDER, LYOPHILIZED, FOR SOLUTION INTRAMUSCULAR; INTRAVENOUS
Status: CANCELLED
Start: 2024-03-28

## 2024-03-26 RX ORDER — ACETAMINOPHEN 325 MG/1
650 TABLET ORAL ONCE
Status: COMPLETED | OUTPATIENT
Start: 2024-03-26 | End: 2024-03-26

## 2024-03-26 RX ORDER — MEPERIDINE HYDROCHLORIDE 25 MG/ML
25 INJECTION INTRAMUSCULAR; INTRAVENOUS; SUBCUTANEOUS EVERY 30 MIN PRN
Status: CANCELLED | OUTPATIENT
Start: 2024-03-28

## 2024-03-26 RX ORDER — DIPHENHYDRAMINE HYDROCHLORIDE 50 MG/ML
50 INJECTION INTRAMUSCULAR; INTRAVENOUS
Status: CANCELLED
Start: 2024-03-28

## 2024-03-26 RX ORDER — EPINEPHRINE 1 MG/ML
0.3 INJECTION, SOLUTION INTRAMUSCULAR; SUBCUTANEOUS EVERY 5 MIN PRN
Status: CANCELLED | OUTPATIENT
Start: 2024-03-28

## 2024-03-26 RX ORDER — HEPARIN SODIUM (PORCINE) LOCK FLUSH IV SOLN 100 UNIT/ML 100 UNIT/ML
5 SOLUTION INTRAVENOUS
Status: CANCELLED | OUTPATIENT
Start: 2024-03-28

## 2024-03-26 RX ORDER — DIPHENHYDRAMINE HCL 25 MG
50 CAPSULE ORAL ONCE
Status: CANCELLED | OUTPATIENT
Start: 2024-03-28

## 2024-03-26 RX ORDER — HEPARIN SODIUM,PORCINE 10 UNIT/ML
5-20 VIAL (ML) INTRAVENOUS DAILY PRN
Status: CANCELLED | OUTPATIENT
Start: 2024-03-28

## 2024-03-26 RX ORDER — ALBUTEROL SULFATE 0.83 MG/ML
2.5 SOLUTION RESPIRATORY (INHALATION)
Status: CANCELLED | OUTPATIENT
Start: 2024-03-28

## 2024-03-26 RX ADMIN — DIPHENHYDRAMINE HYDROCHLORIDE 50 MG: 25 CAPSULE ORAL at 12:08

## 2024-03-26 RX ADMIN — HUMAN IMMUNOGLOBULIN G 75 G: 40 LIQUID INTRAVENOUS at 12:10

## 2024-03-26 RX ADMIN — ACETAMINOPHEN 650 MG: 325 TABLET ORAL at 12:08

## 2024-03-26 NOTE — PROGRESS NOTES
Infusion Nursing Note:  Emiliano Aguiar presents today for IVIG with premeds.    Patient seen by provider today: No   present during visit today: Not Applicable.    Note: Declines SoluCortef as premedication  IVIG initiated at rate of 0.5mg/kg/hr. Increased by 0.5mg/kg/hr every 15 minutes to max rate of 4mg/kg/hr.      Intravenous Access:  Peripheral IV placed.    Treatment Conditions:  Biological Infusion Checklist:  ~~~ NOTE: If the patient answers yes to any of the questions below, hold the infusion and contact ordering provider or on-call provider.    Have you recently had an elevated temperature, fever, chills, productive cough, coughing for 3 weeks or longer or hemoptysis,  abnormal vital signs, night sweats,  chest pain or have you noticed a decrease in your appetite, unexplained weight loss or fatigue? No  Do you have any open wounds or new incisions? No  Do you have any upcoming hospitalizations or surgeries? Does not include esophagogastroduodenoscopy, colonoscopy, endoscopic retrograde cholangiopancreatography (ERCP), endoscopic ultrasound (EUS), dental procedures or joint aspiration/steroid injections No  Do you currently have any signs of illness or infection or are you on any antibiotics? No  Have you had any new, sudden or worsening abdominal pain? No  Have you or anyone in your household received a live vaccination in the past 4 weeks? Please note: No live vaccines while on biologic/chemotherapy until 6 months after the last treatment. Patient can receive the flu vaccine (shot only), pneumovax and the Covid vaccine. It is optimal for the patient to get these vaccines mid cycle, but they can be given at any time as long as it is not on the day of the infusion. No  Have you recently been diagnosed with any new nervous system diseases (ie. Multiple sclerosis, Guillain [place], seizures, neurological changes) or cancer diagnosis? Are you on any form of radiation or chemotherapy? No  Have there  been any other new onset medical symptoms? No  Have you had any new blood clots? (IVIG only) No      Post Infusion Assessment:  Patient tolerated infusion without incident.  Blood return noted pre and post infusion.  Site patent and intact, free from redness, edema or discomfort.  No evidence of extravasations.  Access discontinued per protocol.       Discharge Plan:   AVS to patient via MYCHART.  Patient will return 4/16/24 for next dose of IVIG for next appointment.   Patient discharged in stable condition accompanied by: self.  Departure Mode: Ambulatory.      Carli Lopez RN

## 2024-04-16 ENCOUNTER — INFUSION THERAPY VISIT (OUTPATIENT)
Dept: INFUSION THERAPY | Facility: CLINIC | Age: 59
End: 2024-04-16
Attending: PSYCHIATRY & NEUROLOGY
Payer: COMMERCIAL

## 2024-04-16 VITALS
DIASTOLIC BLOOD PRESSURE: 78 MMHG | OXYGEN SATURATION: 97 % | TEMPERATURE: 97.5 F | HEART RATE: 53 BPM | WEIGHT: 196.8 LBS | SYSTOLIC BLOOD PRESSURE: 138 MMHG | BODY MASS INDEX: 27.45 KG/M2

## 2024-04-16 DIAGNOSIS — G61.81 CIDP (CHRONIC INFLAMMATORY DEMYELINATING POLYNEUROPATHY) (H): Primary | ICD-10-CM

## 2024-04-16 PROCEDURE — 96366 THER/PROPH/DIAG IV INF ADDON: CPT

## 2024-04-16 PROCEDURE — 250N000011 HC RX IP 250 OP 636: Mod: JZ | Performed by: PSYCHIATRY & NEUROLOGY

## 2024-04-16 PROCEDURE — 250N000013 HC RX MED GY IP 250 OP 250 PS 637: Performed by: PSYCHIATRY & NEUROLOGY

## 2024-04-16 PROCEDURE — 96365 THER/PROPH/DIAG IV INF INIT: CPT

## 2024-04-16 RX ORDER — HEPARIN SODIUM,PORCINE 10 UNIT/ML
5-20 VIAL (ML) INTRAVENOUS DAILY PRN
Status: CANCELLED | OUTPATIENT
Start: 2024-04-18

## 2024-04-16 RX ORDER — EPINEPHRINE 1 MG/ML
0.3 INJECTION, SOLUTION INTRAMUSCULAR; SUBCUTANEOUS EVERY 5 MIN PRN
Status: CANCELLED | OUTPATIENT
Start: 2024-04-18

## 2024-04-16 RX ORDER — DIPHENHYDRAMINE HCL 25 MG
50 CAPSULE ORAL ONCE
Status: CANCELLED | OUTPATIENT
Start: 2024-04-18

## 2024-04-16 RX ORDER — MEPERIDINE HYDROCHLORIDE 25 MG/ML
25 INJECTION INTRAMUSCULAR; INTRAVENOUS; SUBCUTANEOUS EVERY 30 MIN PRN
Status: CANCELLED | OUTPATIENT
Start: 2024-04-18

## 2024-04-16 RX ORDER — ALBUTEROL SULFATE 90 UG/1
1-2 AEROSOL, METERED RESPIRATORY (INHALATION)
Status: CANCELLED
Start: 2024-04-18

## 2024-04-16 RX ORDER — DIPHENHYDRAMINE HYDROCHLORIDE 50 MG/ML
50 INJECTION INTRAMUSCULAR; INTRAVENOUS
Status: CANCELLED
Start: 2024-04-18

## 2024-04-16 RX ORDER — ACETAMINOPHEN 325 MG/1
650 TABLET ORAL ONCE
Status: COMPLETED | OUTPATIENT
Start: 2024-04-16 | End: 2024-04-16

## 2024-04-16 RX ORDER — HEPARIN SODIUM (PORCINE) LOCK FLUSH IV SOLN 100 UNIT/ML 100 UNIT/ML
5 SOLUTION INTRAVENOUS
Status: CANCELLED | OUTPATIENT
Start: 2024-04-18

## 2024-04-16 RX ORDER — ACETAMINOPHEN 325 MG/1
650 TABLET ORAL ONCE
Status: CANCELLED
Start: 2024-04-18

## 2024-04-16 RX ORDER — DIPHENHYDRAMINE HCL 25 MG
50 CAPSULE ORAL ONCE
Status: COMPLETED | OUTPATIENT
Start: 2024-04-16 | End: 2024-04-16

## 2024-04-16 RX ORDER — METHYLPREDNISOLONE SODIUM SUCCINATE 125 MG/2ML
125 INJECTION, POWDER, LYOPHILIZED, FOR SOLUTION INTRAMUSCULAR; INTRAVENOUS
Status: CANCELLED
Start: 2024-04-18

## 2024-04-16 RX ORDER — ALBUTEROL SULFATE 0.83 MG/ML
2.5 SOLUTION RESPIRATORY (INHALATION)
Status: CANCELLED | OUTPATIENT
Start: 2024-04-18

## 2024-04-16 RX ADMIN — ACETAMINOPHEN 650 MG: 325 TABLET ORAL at 08:11

## 2024-04-16 RX ADMIN — HUMAN IMMUNOGLOBULIN G 75 G: 40 LIQUID INTRAVENOUS at 08:15

## 2024-04-16 RX ADMIN — DIPHENHYDRAMINE HYDROCHLORIDE 50 MG: 25 CAPSULE ORAL at 08:11

## 2024-04-16 NOTE — PROGRESS NOTES
Infusion Nursing Note:  Emiliano Aguiar presents today for IVIG + Premeds.    Patient seen by provider today: No   present during visit today: Not Applicable.    Note: Patient only takes tylenol and benadryl premeds.    IVIG started at 0.5mg/kg/hr and increased by 0.5mg/kg/hr every 15 minutes to a max of 4mg/kg/hr.      Intravenous Access:  Peripheral IV placed.    Treatment Conditions:  Denies fever, illness, infections, antibiotic use, productive cough, or recent surgeries.      Post Infusion Assessment:  Patient tolerated infusion without incident.  Blood return noted pre and post infusion.  Site patent and intact, free from redness, edema or discomfort.  No evidence of extravasations.  Access discontinued per protocol.       Discharge Plan:   Discharge instructions reviewed with: Patient.  Patient and/or family verbalized understanding of discharge instructions and all questions answered.  AVS to patient via [employer].  Patient will return 5/7 for next appointment.   Patient discharged in stable condition accompanied by: self.  Departure Mode: Ambulatory.      Azalea Carcamo RN

## 2024-05-07 ENCOUNTER — INFUSION THERAPY VISIT (OUTPATIENT)
Dept: INFUSION THERAPY | Facility: CLINIC | Age: 59
End: 2024-05-07
Attending: PSYCHIATRY & NEUROLOGY
Payer: COMMERCIAL

## 2024-05-07 VITALS
SYSTOLIC BLOOD PRESSURE: 130 MMHG | HEART RATE: 60 BPM | TEMPERATURE: 97.9 F | BODY MASS INDEX: 27.43 KG/M2 | WEIGHT: 196.7 LBS | RESPIRATION RATE: 20 BRPM | OXYGEN SATURATION: 97 % | DIASTOLIC BLOOD PRESSURE: 81 MMHG

## 2024-05-07 DIAGNOSIS — G61.81 CIDP (CHRONIC INFLAMMATORY DEMYELINATING POLYNEUROPATHY) (H): Primary | ICD-10-CM

## 2024-05-07 PROCEDURE — 96366 THER/PROPH/DIAG IV INF ADDON: CPT

## 2024-05-07 PROCEDURE — 96365 THER/PROPH/DIAG IV INF INIT: CPT

## 2024-05-07 PROCEDURE — 250N000011 HC RX IP 250 OP 636: Performed by: PSYCHIATRY & NEUROLOGY

## 2024-05-07 PROCEDURE — 250N000013 HC RX MED GY IP 250 OP 250 PS 637: Performed by: PSYCHIATRY & NEUROLOGY

## 2024-05-07 RX ORDER — ALBUTEROL SULFATE 0.83 MG/ML
2.5 SOLUTION RESPIRATORY (INHALATION)
Status: CANCELLED | OUTPATIENT
Start: 2024-05-09

## 2024-05-07 RX ORDER — DIPHENHYDRAMINE HCL 25 MG
50 CAPSULE ORAL ONCE
Status: CANCELLED | OUTPATIENT
Start: 2024-05-09

## 2024-05-07 RX ORDER — EPINEPHRINE 1 MG/ML
0.3 INJECTION, SOLUTION INTRAMUSCULAR; SUBCUTANEOUS EVERY 5 MIN PRN
Status: CANCELLED | OUTPATIENT
Start: 2024-05-09

## 2024-05-07 RX ORDER — HEPARIN SODIUM,PORCINE 10 UNIT/ML
5-20 VIAL (ML) INTRAVENOUS DAILY PRN
Status: CANCELLED | OUTPATIENT
Start: 2024-05-09

## 2024-05-07 RX ORDER — HEPARIN SODIUM (PORCINE) LOCK FLUSH IV SOLN 100 UNIT/ML 100 UNIT/ML
5 SOLUTION INTRAVENOUS
Status: CANCELLED | OUTPATIENT
Start: 2024-05-09

## 2024-05-07 RX ORDER — ACETAMINOPHEN 325 MG/1
650 TABLET ORAL ONCE
Status: CANCELLED
Start: 2024-05-09

## 2024-05-07 RX ORDER — DIPHENHYDRAMINE HYDROCHLORIDE 50 MG/ML
50 INJECTION INTRAMUSCULAR; INTRAVENOUS
Status: CANCELLED
Start: 2024-05-09

## 2024-05-07 RX ORDER — METHYLPREDNISOLONE SODIUM SUCCINATE 125 MG/2ML
125 INJECTION, POWDER, LYOPHILIZED, FOR SOLUTION INTRAMUSCULAR; INTRAVENOUS
Status: CANCELLED
Start: 2024-05-09

## 2024-05-07 RX ORDER — DIPHENHYDRAMINE HCL 25 MG
50 CAPSULE ORAL ONCE
Status: COMPLETED | OUTPATIENT
Start: 2024-05-07 | End: 2024-05-07

## 2024-05-07 RX ORDER — ALBUTEROL SULFATE 90 UG/1
1-2 AEROSOL, METERED RESPIRATORY (INHALATION)
Status: CANCELLED
Start: 2024-05-09

## 2024-05-07 RX ORDER — ACETAMINOPHEN 325 MG/1
650 TABLET ORAL ONCE
Status: COMPLETED | OUTPATIENT
Start: 2024-05-07 | End: 2024-05-07

## 2024-05-07 RX ORDER — MEPERIDINE HYDROCHLORIDE 25 MG/ML
25 INJECTION INTRAMUSCULAR; INTRAVENOUS; SUBCUTANEOUS EVERY 30 MIN PRN
Status: CANCELLED | OUTPATIENT
Start: 2024-05-09

## 2024-05-07 RX ADMIN — DIPHENHYDRAMINE HYDROCHLORIDE 50 MG: 25 CAPSULE ORAL at 08:19

## 2024-05-07 RX ADMIN — HUMAN IMMUNOGLOBULIN G 75 G: 40 LIQUID INTRAVENOUS at 08:30

## 2024-05-07 RX ADMIN — ACETAMINOPHEN 650 MG: 325 TABLET ORAL at 08:19

## 2024-05-07 NOTE — PROGRESS NOTES
Infusion Nursing Note:  Emiliano Aguiar presents today for IVIG and premeds.    Patient seen by provider today: No   present during visit today: Not Applicable.    Note: Started IVIG infusion rate at 0.5 ml/kg/hr and increased by 0.5 ml/kg/hr every 15 minutes with a max rate of 4 ml/kg/hr.  Patient states that symptoms after infusion are lasting longer now. Used to last till Friday and now Sunday and he has to take Monday off work sometimes.    Intravenous Access:  Peripheral IV placed.    Treatment Conditions: Patient denies symptoms, reactions, fever, feeling ill today, recent major or local infection, on antibiotics, productive cough, recent surgery or elevated temperature.    Post Infusion Assessment:  Patient tolerated infusion without incident.  Blood return noted pre and post infusion.  Site patent and intact, free from redness, edema or discomfort.  No evidence of extravasations.  Access discontinued per protocol.       Discharge Plan:   AVS to patient via MYCHART.  Patient will return in 3 weeks for next appointment.   Patient discharged in stable condition accompanied by: self.  Departure Mode: Ambulatory.      Lynda Beltrán RN

## 2024-05-16 NOTE — TELEPHONE ENCOUNTER
Received a phone call from [facility] at  [facility] and unfortunately because the patient has Medicare, they will not cover SCIg. They require that he get IV. There is nothing we can do to appeal this.   Dr. Godoy, how would you like to proceed? Continue IVIg as ordered?   Sahra x [phone]

## 2024-05-28 ENCOUNTER — INFUSION THERAPY VISIT (OUTPATIENT)
Dept: INFUSION THERAPY | Facility: HOSPITAL | Age: 59
End: 2024-05-28
Attending: PSYCHIATRY & NEUROLOGY
Payer: COMMERCIAL

## 2024-05-28 VITALS
RESPIRATION RATE: 16 BRPM | HEART RATE: 60 BPM | SYSTOLIC BLOOD PRESSURE: 143 MMHG | OXYGEN SATURATION: 98 % | DIASTOLIC BLOOD PRESSURE: 75 MMHG | TEMPERATURE: 98.1 F

## 2024-05-28 DIAGNOSIS — G61.81 CIDP (CHRONIC INFLAMMATORY DEMYELINATING POLYNEUROPATHY) (H): Primary | ICD-10-CM

## 2024-05-28 PROCEDURE — 96366 THER/PROPH/DIAG IV INF ADDON: CPT

## 2024-05-28 PROCEDURE — 250N000013 HC RX MED GY IP 250 OP 250 PS 637: Performed by: PSYCHIATRY & NEUROLOGY

## 2024-05-28 PROCEDURE — 96365 THER/PROPH/DIAG IV INF INIT: CPT

## 2024-05-28 PROCEDURE — 250N000011 HC RX IP 250 OP 636: Mod: JZ | Performed by: PSYCHIATRY & NEUROLOGY

## 2024-05-28 RX ORDER — ACETAMINOPHEN 325 MG/1
650 TABLET ORAL ONCE
Status: CANCELLED
Start: 2024-05-30

## 2024-05-28 RX ORDER — DIPHENHYDRAMINE HCL 50 MG
50 CAPSULE ORAL ONCE
Status: COMPLETED | OUTPATIENT
Start: 2024-05-28 | End: 2024-05-28

## 2024-05-28 RX ORDER — HEPARIN SODIUM,PORCINE 10 UNIT/ML
5-20 VIAL (ML) INTRAVENOUS DAILY PRN
Status: CANCELLED | OUTPATIENT
Start: 2024-05-30

## 2024-05-28 RX ORDER — MEPERIDINE HYDROCHLORIDE 50 MG/ML
25 INJECTION INTRAMUSCULAR; INTRAVENOUS; SUBCUTANEOUS EVERY 30 MIN PRN
Status: DISCONTINUED | OUTPATIENT
Start: 2024-05-28 | End: 2024-05-28 | Stop reason: HOSPADM

## 2024-05-28 RX ORDER — DIPHENHYDRAMINE HYDROCHLORIDE 50 MG/ML
50 INJECTION INTRAMUSCULAR; INTRAVENOUS
Status: DISCONTINUED | OUTPATIENT
Start: 2024-05-28 | End: 2024-05-28 | Stop reason: HOSPADM

## 2024-05-28 RX ORDER — METHYLPREDNISOLONE SODIUM SUCCINATE 125 MG/2ML
125 INJECTION, POWDER, LYOPHILIZED, FOR SOLUTION INTRAMUSCULAR; INTRAVENOUS
Status: CANCELLED
Start: 2024-05-30

## 2024-05-28 RX ORDER — ALBUTEROL SULFATE 90 UG/1
1-2 AEROSOL, METERED RESPIRATORY (INHALATION)
Status: CANCELLED
Start: 2024-05-30

## 2024-05-28 RX ORDER — EPINEPHRINE 1 MG/ML
0.3 INJECTION, SOLUTION INTRAMUSCULAR; SUBCUTANEOUS EVERY 5 MIN PRN
Status: CANCELLED | OUTPATIENT
Start: 2024-05-30

## 2024-05-28 RX ORDER — ACETAMINOPHEN 325 MG/1
650 TABLET ORAL ONCE
Status: COMPLETED | OUTPATIENT
Start: 2024-05-28 | End: 2024-05-28

## 2024-05-28 RX ORDER — DIPHENHYDRAMINE HCL 50 MG
50 CAPSULE ORAL ONCE
Status: CANCELLED | OUTPATIENT
Start: 2024-05-30

## 2024-05-28 RX ORDER — DIPHENHYDRAMINE HYDROCHLORIDE 50 MG/ML
50 INJECTION INTRAMUSCULAR; INTRAVENOUS
Status: CANCELLED
Start: 2024-05-30

## 2024-05-28 RX ORDER — HEPARIN SODIUM (PORCINE) LOCK FLUSH IV SOLN 100 UNIT/ML 100 UNIT/ML
5 SOLUTION INTRAVENOUS
Status: CANCELLED | OUTPATIENT
Start: 2024-05-30

## 2024-05-28 RX ORDER — ALBUTEROL SULFATE 0.83 MG/ML
2.5 SOLUTION RESPIRATORY (INHALATION)
Status: DISCONTINUED | OUTPATIENT
Start: 2024-05-28 | End: 2024-05-28 | Stop reason: HOSPADM

## 2024-05-28 RX ORDER — ALBUTEROL SULFATE 90 UG/1
1-2 AEROSOL, METERED RESPIRATORY (INHALATION)
Status: DISCONTINUED | OUTPATIENT
Start: 2024-05-28 | End: 2024-05-28 | Stop reason: HOSPADM

## 2024-05-28 RX ORDER — METHYLPREDNISOLONE SODIUM SUCCINATE 125 MG/2ML
125 INJECTION, POWDER, LYOPHILIZED, FOR SOLUTION INTRAMUSCULAR; INTRAVENOUS
Status: DISCONTINUED | OUTPATIENT
Start: 2024-05-28 | End: 2024-05-28 | Stop reason: HOSPADM

## 2024-05-28 RX ORDER — EPINEPHRINE 1 MG/ML
0.3 INJECTION, SOLUTION INTRAMUSCULAR; SUBCUTANEOUS EVERY 5 MIN PRN
Status: DISCONTINUED | OUTPATIENT
Start: 2024-05-28 | End: 2024-05-28 | Stop reason: HOSPADM

## 2024-05-28 RX ORDER — MEPERIDINE HYDROCHLORIDE 50 MG/ML
25 INJECTION INTRAMUSCULAR; INTRAVENOUS; SUBCUTANEOUS EVERY 30 MIN PRN
Status: CANCELLED | OUTPATIENT
Start: 2024-05-30

## 2024-05-28 RX ORDER — ALBUTEROL SULFATE 0.83 MG/ML
2.5 SOLUTION RESPIRATORY (INHALATION)
Status: CANCELLED | OUTPATIENT
Start: 2024-05-30

## 2024-05-28 RX ADMIN — HUMAN IMMUNOGLOBULIN G 75 G: 20 LIQUID INTRAVENOUS at 08:14

## 2024-05-28 RX ADMIN — ACETAMINOPHEN 650 MG: 325 TABLET ORAL at 07:54

## 2024-05-28 RX ADMIN — DIPHENHYDRAMINE HYDROCHLORIDE 50 MG: 50 CAPSULE ORAL at 07:54

## 2024-05-28 NOTE — PROGRESS NOTES
Infusion Nursing Note:  Emiliano Aguiar presents today for IVIG infusion.    Patient seen by provider today: No   present during visit today: Not Applicable.    Note: Tolerated infusion well, offers no complaints or side effects.  Discharge to home ambulating per self..      Intravenous Access:  Implanted Port.    Treatment Conditions:  Not Applicable.      Post Infusion Assessment:  Patient tolerated infusion without incident.  Blood return noted pre and post infusion.  No evidence of extravasations.  Access discontinued per protocol.       Discharge Plan:   Patient and/or family verbalized understanding of discharge instructions and all questions answered.      Zaira Salinas RN

## 2024-05-30 ENCOUNTER — TELEPHONE (OUTPATIENT)
Dept: PHARMACY | Facility: CLINIC | Age: 59
End: 2024-05-30
Payer: COMMERCIAL

## 2024-05-30 NOTE — TELEPHONE ENCOUNTER
IVIG Stewardship Program    Called patient to discuss VIG therapy and answer any questions or concerns they may have regarding medication. Wanted to inquire about slowing down the patients infusion due to the side effects he is experiencing. Left direct line callback number.     Will attempt patient again if no return phone call.     Erlinda Ford, PharmD, IgCP  Medication Access Pharmacist   [phone]

## 2024-05-30 NOTE — TELEPHONE ENCOUNTER
Pharmacist IVIG Stewardship Program     Diagnosis: Chronic Inflammatory Demyelinating Polyneuropathy (CIDP)  Nerve conduction studies and EMG completed on 8/28/2013 which showed:   Absent sensory responses in the right arm and leg and left sural sensory nerve  Prolongation of all motor nerve distal latencies with dispersed waveforms as well as slowing in all motor nerves except for the right common peroneal nerve across the lower leg  Severe prolongation of all F- waves     Current Dosing Regimen: Privigen 75 g IV every 3 weeks.  Pre-medications:  Acetaminophen tablet 650 mg, by mouth 30 minutes prior to infusion  Diphenhydramine capsule 50 mg, by mouth 30 minutes prior to infusion  Hydrocortisone injection 100 mg IV 30 minutes prior to infusion- patient typically declines due to lack of efficacy  Current Titration Regimen: 0.5 mL/kg/hr for 15 minutes. If tolerated, increase rate by 0.5 mL/kg/hr every 15 minutes to a maximum of 4 mL/kg/hr.   Previously Tried Ig Products: Gammagard - Anaphylaxis    Side Effects: Patient has been on therapy for about a decade he has always experienced side effects from the medication. He experiences fatigue the day of the infusion, with flu like symptoms the day after the infusion. 2 days post infusion he develops a headache which will last for several days. He used to experience rash and hives but manages this with CBD gummies which has eliminated the itching and hives.     Interventions: Chart review completed. Proposed new titration to patient to minimize headaches, patient would like to try this with his next infusion scheduled for 6/18/24. Infusion center informed to extend appt for 6/18/24 to accommodate the longer infusion. Orders updated in therapy plan as well.   Initiate Infusion at 25ml/hour x 15 minutes if patient tolerates,   Then increase to 50ml/hour x 15 minutes if patient tolerates,   Then increase to 75ml/hour x 15 minutes if patient tolerates,   Then increase to 100  ml/hour x 15 minutes if patient tolerates,   Then increase to 125 ml/hour x 15 minutes if patient tolerates,   Then increase to 150 ml/hour x 15 minutes if patient tolerates,   Then increase to 175 ml/hour x 15 minutes if patient tolerates,   Then increase to 200 ml/hour for the remainder of infusion  Total infusion time: 4 hour and 40 minutes     Assessment:   Assessment 1/30/2023 8/1/2022 6/13/2019   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 Infusions are effective at maintaining their daily function evident by their annual quantitative monitoring. Continued IVIG infusions are necessary to maintain patients function. When infusions are missed or delaying patient experiences symptoms of his condition. Without therapy they would decline drastically, putting them at an increased risk of additional complications.    Dose Reduction Trial: Patient transitioned from every 4 weeks to every 3 weeks in November 2019 due to worsening symptoms with more tingling, weakness, inflammation, and unsteadiness typically 4-5 days before each infusion. Due to this he is not a candidate for dose reduction. As is patient feels the therapy tapering off when he is due for his next infusion. Dose reductions are not appropriate.     Plan: Patient is okay to proceed with infusions as planned without mandated laboratory parameters.     Next Review Needed: 6/2024    Erlinda Ford, PharmD, IgCP  Medication Access Pharmacist

## 2024-06-18 ENCOUNTER — INFUSION THERAPY VISIT (OUTPATIENT)
Dept: INFUSION THERAPY | Facility: HOSPITAL | Age: 59
End: 2024-06-18
Attending: PSYCHIATRY & NEUROLOGY
Payer: COMMERCIAL

## 2024-06-18 VITALS
RESPIRATION RATE: 16 BRPM | HEART RATE: 61 BPM | OXYGEN SATURATION: 98 % | DIASTOLIC BLOOD PRESSURE: 83 MMHG | SYSTOLIC BLOOD PRESSURE: 139 MMHG | TEMPERATURE: 98.3 F

## 2024-06-18 DIAGNOSIS — G61.81 CIDP (CHRONIC INFLAMMATORY DEMYELINATING POLYNEUROPATHY) (H): Primary | ICD-10-CM

## 2024-06-18 PROCEDURE — 250N000013 HC RX MED GY IP 250 OP 250 PS 637: Performed by: PSYCHIATRY & NEUROLOGY

## 2024-06-18 PROCEDURE — 96366 THER/PROPH/DIAG IV INF ADDON: CPT

## 2024-06-18 PROCEDURE — 250N000011 HC RX IP 250 OP 636: Mod: JZ | Performed by: PSYCHIATRY & NEUROLOGY

## 2024-06-18 PROCEDURE — 96365 THER/PROPH/DIAG IV INF INIT: CPT

## 2024-06-18 RX ORDER — MEPERIDINE HYDROCHLORIDE 50 MG/ML
25 INJECTION INTRAMUSCULAR; INTRAVENOUS; SUBCUTANEOUS EVERY 30 MIN PRN
Status: DISCONTINUED | OUTPATIENT
Start: 2024-06-18 | End: 2024-06-18 | Stop reason: HOSPADM

## 2024-06-18 RX ORDER — ACETAMINOPHEN 325 MG/1
650 TABLET ORAL ONCE
Status: COMPLETED | OUTPATIENT
Start: 2024-06-18 | End: 2024-06-18

## 2024-06-18 RX ORDER — EPINEPHRINE 1 MG/ML
0.3 INJECTION, SOLUTION INTRAMUSCULAR; SUBCUTANEOUS EVERY 5 MIN PRN
Status: DISCONTINUED | OUTPATIENT
Start: 2024-06-18 | End: 2024-06-18 | Stop reason: HOSPADM

## 2024-06-18 RX ORDER — ALBUTEROL SULFATE 90 UG/1
1-2 AEROSOL, METERED RESPIRATORY (INHALATION)
Status: CANCELLED
Start: 2024-06-20

## 2024-06-18 RX ORDER — DIPHENHYDRAMINE HYDROCHLORIDE 50 MG/ML
50 INJECTION INTRAMUSCULAR; INTRAVENOUS
Status: DISCONTINUED | OUTPATIENT
Start: 2024-06-18 | End: 2024-06-18 | Stop reason: HOSPADM

## 2024-06-18 RX ORDER — ACETAMINOPHEN 325 MG/1
650 TABLET ORAL ONCE
Status: CANCELLED
Start: 2024-06-20

## 2024-06-18 RX ORDER — METHYLPREDNISOLONE SODIUM SUCCINATE 125 MG/2ML
125 INJECTION, POWDER, LYOPHILIZED, FOR SOLUTION INTRAMUSCULAR; INTRAVENOUS
Status: DISCONTINUED | OUTPATIENT
Start: 2024-06-18 | End: 2024-06-18 | Stop reason: HOSPADM

## 2024-06-18 RX ORDER — ALBUTEROL SULFATE 90 UG/1
1-2 AEROSOL, METERED RESPIRATORY (INHALATION)
Status: DISCONTINUED | OUTPATIENT
Start: 2024-06-18 | End: 2024-06-18 | Stop reason: HOSPADM

## 2024-06-18 RX ORDER — EPINEPHRINE 1 MG/ML
0.3 INJECTION, SOLUTION INTRAMUSCULAR; SUBCUTANEOUS EVERY 5 MIN PRN
Status: CANCELLED | OUTPATIENT
Start: 2024-06-20

## 2024-06-18 RX ORDER — DIPHENHYDRAMINE HYDROCHLORIDE 50 MG/ML
50 INJECTION INTRAMUSCULAR; INTRAVENOUS
Status: CANCELLED
Start: 2024-06-20

## 2024-06-18 RX ORDER — ALBUTEROL SULFATE 0.83 MG/ML
2.5 SOLUTION RESPIRATORY (INHALATION)
Status: DISCONTINUED | OUTPATIENT
Start: 2024-06-18 | End: 2024-06-18 | Stop reason: HOSPADM

## 2024-06-18 RX ORDER — DIPHENHYDRAMINE HCL 50 MG
50 CAPSULE ORAL ONCE
Status: COMPLETED | OUTPATIENT
Start: 2024-06-18 | End: 2024-06-18

## 2024-06-18 RX ORDER — DIPHENHYDRAMINE HCL 50 MG
50 CAPSULE ORAL ONCE
Status: CANCELLED | OUTPATIENT
Start: 2024-06-20

## 2024-06-18 RX ORDER — HEPARIN SODIUM (PORCINE) LOCK FLUSH IV SOLN 100 UNIT/ML 100 UNIT/ML
5 SOLUTION INTRAVENOUS
Status: CANCELLED | OUTPATIENT
Start: 2024-06-20

## 2024-06-18 RX ORDER — METHYLPREDNISOLONE SODIUM SUCCINATE 125 MG/2ML
125 INJECTION, POWDER, LYOPHILIZED, FOR SOLUTION INTRAMUSCULAR; INTRAVENOUS
Status: CANCELLED
Start: 2024-06-20

## 2024-06-18 RX ORDER — ALBUTEROL SULFATE 0.83 MG/ML
2.5 SOLUTION RESPIRATORY (INHALATION)
Status: CANCELLED | OUTPATIENT
Start: 2024-06-20

## 2024-06-18 RX ORDER — MEPERIDINE HYDROCHLORIDE 50 MG/ML
25 INJECTION INTRAMUSCULAR; INTRAVENOUS; SUBCUTANEOUS EVERY 30 MIN PRN
Status: CANCELLED | OUTPATIENT
Start: 2024-06-20

## 2024-06-18 RX ORDER — HEPARIN SODIUM,PORCINE 10 UNIT/ML
5-20 VIAL (ML) INTRAVENOUS DAILY PRN
Status: CANCELLED | OUTPATIENT
Start: 2024-06-20

## 2024-06-18 RX ADMIN — ACETAMINOPHEN 650 MG: 325 TABLET ORAL at 08:04

## 2024-06-18 RX ADMIN — HUMAN IMMUNOGLOBULIN G 75 G: 40 LIQUID INTRAVENOUS at 08:25

## 2024-06-18 RX ADMIN — DIPHENHYDRAMINE HYDROCHLORIDE 50 MG: 50 CAPSULE ORAL at 08:04

## 2024-06-18 NOTE — PROGRESS NOTES
Infusion Nursing Note:  Emiliano Aguiar presents today for IVIG.    Patient seen by provider today: No   present during visit today: Not Applicable.    Note: Emiliano was premedicated with Tylenol and Benadryl. He declines solucortef. There is a new IVIG titration rate today to try to eliminate the side effects Emiliano feels after IVIG. He normally has fatigue, headache that will last for 6 days after infusions. He tolerated his infusion well while here today.  IVIG was titrated as follows: 25 ml/hr x 15 minutes, 50 ml/hr x 15 minutes, 75 ml/hr x 15 minutes, 100 ml/hr x 15 minutes, 125 ml/hr x 15 minutes, 150 ml/hr x 15 minutes, 175 ml/hr x 15 minutes and 200 ml/hr for remainder of infusion.      Intravenous Access:  Peripheral IV placed.    Treatment Conditions:  Not Applicable.      Post Infusion Assessment:  Patient tolerated infusion without incident.  Site patent and intact, free from redness, edema or discomfort.  No evidence of extravasations.  Access discontinued per protocol.       Discharge Plan:   [facility]  Patient will return 7/9/24 for next appointment.   Patient discharged in stable condition accompanied by: self.  Departure Mode: Ambulatory.      Daija Reis RN

## 2024-06-25 ENCOUNTER — TELEPHONE (OUTPATIENT)
Dept: PHARMACY | Facility: CLINIC | Age: 59
End: 2024-06-25
Payer: COMMERCIAL

## 2024-06-25 NOTE — TELEPHONE ENCOUNTER
Pharmacist IVIG Stewardship Program     Diagnosis: Chronic Inflammatory Demyelinating Polyneuropathy (CIDP)  Nerve conduction studies and EMG completed on 8/28/2013 which showed:   Absent sensory responses in the right arm and leg and left sural sensory nerve  Prolongation of all motor nerve distal latencies with dispersed waveforms as well as slowing in all motor nerves except for the right common peroneal nerve across the lower leg  Severe prolongation of all F- waves     Current Dosing Regimen: Privigen 75 g IV every 3 weeks.  Pre-medications:  Acetaminophen tablet 650 mg, by mouth 30 minutes prior to infusion  Diphenhydramine capsule 50 mg, by mouth 30 minutes prior to infusion  Hydrocortisone injection 100 mg IV 30 minutes prior to infusion- patient typically declines due to lack of efficacy  Current Titration Regimen: 0.5 mL/kg/hr for 15 minutes. If tolerated, increase rate by 0.5 mL/kg/hr every 15 minutes to a maximum of 4 mL/kg/hr.   Previously Tried Ig Products: Gammagard - Anaphylaxis    Side Effects: Patient has been on therapy for about a decade he has always experienced side effects from the medication. He experiences fatigue the day of the infusion, with flu like symptoms the day after the infusion. 2 days post infusion he develops a headache which will last for several days. He used to experience rash and hives but manages this with CBD gummies which has eliminated the itching and hives. Author and patient attempted a slowed titration for 6/18/24 infusion, it did not benefit patient or alleviate side effects (may have made side effects worse). Will go back to standard titration for future infusions. Patient reports that he is able to tolerate the current side effects and the benefits of therapy outweigh the side effects.     Interventions: Chart review completed. Removed slowed titration from patient's chart.     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 Infusions are effective at maintaining their daily function evident by their annual quantitative monitoring. Continued IVIG infusions are necessary to maintain patients function. When infusions are missed or delaying patient experiences symptoms of his condition. Without therapy they would decline drastically, putting them at an increased risk of additional complications.    Dose Reduction Trial: Patient transitioned from every 4 weeks to every 3 weeks in November 2019 due to worsening symptoms with more tingling, weakness, inflammation, and unsteadiness typically 4-5 days before each infusion. Due to this he is not a candidate for dose reduction. As is patient feels the therapy tapering off when he is due for his next infusion. Dose reductions are not appropriate.     Plan: Patient is okay to proceed with infusions as planned without mandated laboratory parameters through 1/2025    Next Review Needed: 12/2024    Erlinda Ford, PharmD, IgCP  Medication Access Pharmacist

## 2024-06-29 ENCOUNTER — HEALTH MAINTENANCE LETTER (OUTPATIENT)
Age: 59
End: 2024-06-29

## 2024-07-01 ENCOUNTER — OFFICE VISIT (OUTPATIENT)
Dept: NEUROLOGY | Facility: CLINIC | Age: 59
End: 2024-07-01
Payer: COMMERCIAL

## 2024-07-01 VITALS
DIASTOLIC BLOOD PRESSURE: 78 MMHG | OXYGEN SATURATION: 98 % | SYSTOLIC BLOOD PRESSURE: 131 MMHG | RESPIRATION RATE: 16 BRPM | HEART RATE: 60 BPM

## 2024-07-01 DIAGNOSIS — G61.81 CIDP (CHRONIC INFLAMMATORY DEMYELINATING POLYNEUROPATHY) (H): Primary | ICD-10-CM

## 2024-07-01 PROCEDURE — G2211 COMPLEX E/M VISIT ADD ON: HCPCS | Performed by: PSYCHIATRY & NEUROLOGY

## 2024-07-01 PROCEDURE — 99214 OFFICE O/P EST MOD 30 MIN: CPT | Performed by: PSYCHIATRY & NEUROLOGY

## 2024-07-01 RX ORDER — EPINEPHRINE 1 MG/ML
0.3 INJECTION, SOLUTION, CONCENTRATE INTRAVENOUS EVERY 5 MIN PRN
Status: CANCELLED | OUTPATIENT
Start: 2024-07-15

## 2024-07-01 RX ORDER — ALBUTEROL SULFATE 0.83 MG/ML
2.5 SOLUTION RESPIRATORY (INHALATION)
Status: CANCELLED | OUTPATIENT
Start: 2024-07-15

## 2024-07-01 RX ORDER — MEPERIDINE HYDROCHLORIDE 25 MG/ML
25 INJECTION INTRAMUSCULAR; INTRAVENOUS; SUBCUTANEOUS EVERY 30 MIN PRN
Status: CANCELLED | OUTPATIENT
Start: 2024-07-15

## 2024-07-01 RX ORDER — ALBUTEROL SULFATE 90 UG/1
1-2 AEROSOL, METERED RESPIRATORY (INHALATION)
Status: CANCELLED
Start: 2024-07-15

## 2024-07-01 RX ORDER — DIPHENHYDRAMINE HYDROCHLORIDE 50 MG/ML
50 INJECTION INTRAMUSCULAR; INTRAVENOUS
Status: CANCELLED
Start: 2024-07-15

## 2024-07-01 RX ORDER — METHYLPREDNISOLONE SODIUM SUCCINATE 125 MG/2ML
125 INJECTION, POWDER, LYOPHILIZED, FOR SOLUTION INTRAMUSCULAR; INTRAVENOUS
Status: CANCELLED
Start: 2024-07-15

## 2024-07-01 ASSESSMENT — PAIN SCALES - GENERAL: PAINLEVEL: SEVERE PAIN (6)

## 2024-07-01 NOTE — LETTER
7/1/2024       RE: Emiliano Aguiar  [address]  [place] [address]       Dear Colleague,    Thank you for referring your patient, Emiilano Aguiar, to the [facility] [place] [facility]. Please see a copy of my visit note below.    Son Spenser White MD     [place], July 1, 2024     Dear Dr. White,     I had the pleasure to evaluate Emiliano at the [facility] MDA/neuromuscular clinic today.  He was escorted by his wife. He has CIDP, manifesting predominantly with sensory ataxia, and minimal distal weakness.  He is on IVIG 75 g every 3 weeks. Previously, he tried pulse IV weekly Solu-Medrol for 10 weeks, which was not effective. He is having chronic issues with back pain, headaches, and arthralgias that last for about 7 days after each IVIG infusion, and those symptoms could not be mitigated no matter what we premedicated him with, including Tylenol, corticosteroids, NSAIDs, etc.  He has found that CBD Gummies are the most helpful, and they tend to prevent the hives and itching associated with the IVIG infusions, but they do not fully prevent the arthralgias.  His last IVIG infusions were done over 4.5 hours as opposed to 3 hours, but the slower rate of infusion did not prevent those complications.    Because his symptoms are incompletely controlled on IVIG and he has a lot of SE, last year I offered him additional treatment with either rituximab, or participation in a complement C2 inhibitor clinical trial for CIDP at the [facility]. He felt that the risk (of infection) with those two options was excessive, and decided not to change anything medically.       He tells me that his balance and gait are a little bit worse in the last visit.  He has fallen 2 times and he has started using a cane intermittently.  His hands are also little more clumsy.    We tried to reassess the severity of his neuropathy with repeat  EMG in 2023, but he could not tolerate any nerve conduction studies unfortunately.     RODS CIDP score today was 24, same as last visit (23).    /78 (BP Location: Right arm, Patient Position: Sitting)   Pulse 60   Resp 16   SpO2 98%     Current Outpatient Medications   Medication Sig Dispense Refill    acetaminophen (TYLENOL) 500 MG tablet Take 1,000 mg by mouth every 6 hours as needed      diphenhydrAMINE (BENADRYL ALLERGY) 25 MG tablet Take 1 tablet (25 mg) by mouth every 6 hours as needed for itching or allergies 10 tablet 0    DULoxetine (CYMBALTA) 60 MG capsule Take 1 capsule (60 mg) by mouth daily 30 capsule 3     No current facility-administered medications for this visit.       EXAM: Strength is 5/5 for bilateral deltoids, biceps, triceps, handgrip, wrist extensors, finger extensors, and FDI.  APB strength is 4 bilaterally.  His hip flexion bilaterally and the right knee extension are 4+ with a giveaway component with tremor.  The left knee extension is 5.  Bilateral knee flexion is 5.  Bilateral foot dorsiflexion is 5 and so is great toe extension.  He says he can feel vibration for almost 9 seconds at the toes and 11 of the medial malleoli, but I am not sure I believe the result at the toes, because he has significant sensory ataxia.  His joint position sensation is mildly abnormal at the toes, although not completely gone.  He shows some uncertainty when asked if the toe is passively moved up or down (with eyes closed), but ultimately picks up the correct answer.  He is a bit slow to get up from a chair with arms crossed on the chest, indicating mild proximal leg weakness.  He also has a strikingly positive Romberg sign and a wide-based ataxic gait, unchanged from the last time I saw him in person.     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  His symptoms are clearly not optimally controlled on IVIG, the efficacy of the infusions is less in the last few years compared to  5 years ago, and he has considerable side effects after each infusion that can last up to up to a week.  I think it would be reasonable to switch him to the newly FDA approved drug for CIDP, Vyvgart Hytrulo (subcutaneous).  I explained to him the mode of action of this drug, the fact it is not compatible with the IVIG, the continued uncertainty whether it is really superior to IVIG, and the fact it has a more favorable side effect profile, and less likelihood of infusion reactions like headaches and joint pains.  Importantly, there is no significant increase in the risk of infection with this drug, which is advantageous compared to the options of rituximab or complement inhibitors that I discussed with him last year (and he declined, exactly because of this concern).  Emiliano was very excited to hear this and would like to switch.  We will enter a therapy plan, but I told him it could take a month or more until this is approved, because the CMS has not reviewed the FDA approval yet and it may take 6 weeks or more for this to be accomplished.    He will return to clinic for follow-up in 6 months, sooner if needed.    Billing is MDM level IV (moderate) based on: #1 problems assessed: One chronic disorder with progression, exacerbation, or side effects of treatment (CIDP), and #2 risk: Prescription drug management (see discussion above, consideration of switching treatments from IVIG to Vyvgart)    The longitudinal plan of care for the diagnosis(es)/condition(s) as documented were addressed during this visit. Due to the added complexity in care, I will continue to support Emiliano in the subsequent management and with ongoing continuity of care.      Again, thank you for allowing me to participate in the care of your patient.      Sincerely,    Nixon Godoy MD

## 2024-07-01 NOTE — PATIENT INSTRUCTIONS
I think it would be reasonable to switch from IVIG to the newly approved drug for CIDP Vyvgart Hytrulo.  This drug is given in the belly (subcutaneous infusion) weekly for 4 weeks, and then you are supposed to take a break of 4 weeks duration (because this is the way the drug was administered in the clinical trial, that made FDA approve it a week ago).  The advantage of Vyvgart is that it should not cause this degree of headaches and joint pains after the infusion (like IVIG does), and it does not increase the risk of infection.  We know that its effective for CIDP, but whether it is really superior to IVIG is still debatable (uncertain)    Follow-up in 6 months

## 2024-07-01 NOTE — PROGRESS NOTES
Son Spesner White MD     [place], July 1, 2024     Dear Dr. White,     I had the pleasure to evaluate Emiliano at the [facility]/neuromuscular clinic today.  He was escorted by his wife. He has CIDP, manifesting predominantly with sensory ataxia, and minimal distal weakness.  He is on IVIG 75 g every 3 weeks. Previously, he tried pulse IV weekly Solu-Medrol for 10 weeks, which was not effective. He is having chronic issues with back pain, headaches, and arthralgias that last for about 7 days after each IVIG infusion, and those symptoms could not be mitigated no matter what we premedicated him with, including Tylenol, corticosteroids, NSAIDs, etc.  He has found that CBD Gummies are the most helpful, and they tend to prevent the hives and itching associated with the IVIG infusions, but they do not fully prevent the arthralgias.  His last IVIG infusions were done over 4.5 hours as opposed to 3 hours, but the slower rate of infusion did not prevent those complications.    Because his symptoms are incompletely controlled on IVIG and he has a lot of SE, last year I offered him additional treatment with either rituximab, or participation in a complement C2 inhibitor clinical trial for CIDP at the [facility]. He felt that the risk (of infection) with those two options was excessive, and decided not to change anything medically.       He tells me that his balance and gait are a little bit worse in the last visit.  He has fallen 2 times and he has started using a cane intermittently.  His hands are also little more clumsy.    We tried to reassess the severity of his neuropathy with repeat EMG in 2023, but he could not tolerate any nerve conduction studies unfortunately.     RODS CIDP score today was 24, same as last visit (23).    /78 (BP Location: Right arm, Patient Position: Sitting)   Pulse 60   Resp 16   SpO2 98%     Current Outpatient Medications   Medication Sig Dispense Refill     acetaminophen (TYLENOL) 500 MG tablet Take 1,000 mg by mouth every 6 hours as needed      diphenhydrAMINE (BENADRYL ALLERGY) 25 MG tablet Take 1 tablet (25 mg) by mouth every 6 hours as needed for itching or allergies 10 tablet 0    DULoxetine (CYMBALTA) 60 MG capsule Take 1 capsule (60 mg) by mouth daily 30 capsule 3     No current facility-administered medications for this visit.       EXAM: Strength is 5/5 for bilateral deltoids, biceps, triceps, handgrip, wrist extensors, finger extensors, and FDI.  APB strength is 4 bilaterally.  His hip flexion bilaterally and the right knee extension are 4+ with a giveaway component with tremor.  The left knee extension is 5.  Bilateral knee flexion is 5.  Bilateral foot dorsiflexion is 5 and so is great toe extension.  He says he can feel vibration for almost 9 seconds at the toes and 11 of the medial malleoli, but I am not sure I believe the result at the toes, because he has significant sensory ataxia.  His joint position sensation is mildly abnormal at the toes, although not completely gone.  He shows some uncertainty when asked if the toe is passively moved up or down (with eyes closed), but ultimately picks up the correct answer.  He is a bit slow to get up from a chair with arms crossed on the chest, indicating mild proximal leg weakness.  He also has a strikingly positive Romberg sign and a wide-based ataxic gait, unchanged from the last time I saw him in person.     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  His symptoms are clearly not optimally controlled on IVIG, the efficacy of the infusions is less in the last few years compared to 5 years ago, and he has considerable side effects after each infusion that can last up to up to a week.  I think it would be reasonable to switch him to the newly FDA approved drug for CIDP, Vyvgart Hytrulo (subcutaneous).  I explained to him the mode of action of this drug, the fact it is not compatible with  the IVIG, the continued uncertainty whether it is really superior to IVIG, and the fact it has a more favorable side effect profile, and less likelihood of infusion reactions like headaches and joint pains.  Importantly, there is no significant increase in the risk of infection with this drug, which is advantageous compared to the options of rituximab or complement inhibitors that I discussed with him last year (and he declined, exactly because of this concern).  Emiliano was very excited to hear this and would like to switch.  We will enter a therapy plan, but I told him it could take a month or more until this is approved, because the CMS has not reviewed the FDA approval yet and it may take 6 weeks or more for this to be accomplished.    He will return to clinic for follow-up in 6 months, sooner if needed.    Sincerely,     Nixon Godoy MD, FAAN    Billing is MDM level IV (moderate) based on: #1 problems assessed: One chronic disorder with progression, exacerbation, or side effects of treatment (CIDP), and #2 risk: Prescription drug management (see discussion above, consideration of switching treatments from IVIG to Vyvgart)    The longitudinal plan of care for the diagnosis(es)/condition(s) as documented were addressed during this visit. Due to the added complexity in care, I will continue to support Emiliano in the subsequent management and with ongoing continuity of care.

## 2024-07-09 ENCOUNTER — INFUSION THERAPY VISIT (OUTPATIENT)
Dept: INFUSION THERAPY | Facility: HOSPITAL | Age: 59
End: 2024-07-09
Attending: PSYCHIATRY & NEUROLOGY
Payer: COMMERCIAL

## 2024-07-09 VITALS
HEART RATE: 58 BPM | BODY MASS INDEX: 27.52 KG/M2 | SYSTOLIC BLOOD PRESSURE: 138 MMHG | TEMPERATURE: 98 F | WEIGHT: 197.3 LBS | OXYGEN SATURATION: 97 % | DIASTOLIC BLOOD PRESSURE: 77 MMHG | RESPIRATION RATE: 16 BRPM

## 2024-07-09 DIAGNOSIS — G61.81 CIDP (CHRONIC INFLAMMATORY DEMYELINATING POLYNEUROPATHY) (H): Primary | ICD-10-CM

## 2024-07-09 PROCEDURE — 96366 THER/PROPH/DIAG IV INF ADDON: CPT

## 2024-07-09 PROCEDURE — 250N000013 HC RX MED GY IP 250 OP 250 PS 637: Performed by: PSYCHIATRY & NEUROLOGY

## 2024-07-09 PROCEDURE — 250N000011 HC RX IP 250 OP 636: Mod: JZ | Performed by: PSYCHIATRY & NEUROLOGY

## 2024-07-09 PROCEDURE — 96375 TX/PRO/DX INJ NEW DRUG ADDON: CPT

## 2024-07-09 PROCEDURE — 96365 THER/PROPH/DIAG IV INF INIT: CPT

## 2024-07-09 RX ORDER — METHYLPREDNISOLONE SODIUM SUCCINATE 125 MG/2ML
125 INJECTION, POWDER, LYOPHILIZED, FOR SOLUTION INTRAMUSCULAR; INTRAVENOUS
Status: CANCELLED
Start: 2024-07-11

## 2024-07-09 RX ORDER — ACETAMINOPHEN 325 MG/1
650 TABLET ORAL ONCE
Status: CANCELLED
Start: 2024-07-11

## 2024-07-09 RX ORDER — MEPERIDINE HYDROCHLORIDE 50 MG/ML
25 INJECTION INTRAMUSCULAR; INTRAVENOUS; SUBCUTANEOUS EVERY 30 MIN PRN
Status: DISCONTINUED | OUTPATIENT
Start: 2024-07-09 | End: 2024-07-09 | Stop reason: HOSPADM

## 2024-07-09 RX ORDER — EPINEPHRINE 1 MG/ML
0.3 INJECTION, SOLUTION INTRAMUSCULAR; SUBCUTANEOUS EVERY 5 MIN PRN
Status: DISCONTINUED | OUTPATIENT
Start: 2024-07-09 | End: 2024-07-09 | Stop reason: HOSPADM

## 2024-07-09 RX ORDER — DIPHENHYDRAMINE HCL 50 MG
50 CAPSULE ORAL ONCE
Status: CANCELLED | OUTPATIENT
Start: 2024-07-11

## 2024-07-09 RX ORDER — EPINEPHRINE 1 MG/ML
0.3 INJECTION, SOLUTION INTRAMUSCULAR; SUBCUTANEOUS EVERY 5 MIN PRN
Status: CANCELLED | OUTPATIENT
Start: 2024-07-11

## 2024-07-09 RX ORDER — HEPARIN SODIUM,PORCINE 10 UNIT/ML
5-20 VIAL (ML) INTRAVENOUS DAILY PRN
Status: DISCONTINUED | OUTPATIENT
Start: 2024-07-09 | End: 2024-07-09 | Stop reason: HOSPADM

## 2024-07-09 RX ORDER — DIPHENHYDRAMINE HCL 50 MG
50 CAPSULE ORAL ONCE
Status: COMPLETED | OUTPATIENT
Start: 2024-07-09 | End: 2024-07-09

## 2024-07-09 RX ORDER — HEPARIN SODIUM,PORCINE 10 UNIT/ML
5-20 VIAL (ML) INTRAVENOUS DAILY PRN
Status: CANCELLED | OUTPATIENT
Start: 2024-07-11

## 2024-07-09 RX ORDER — METHYLPREDNISOLONE SODIUM SUCCINATE 125 MG/2ML
125 INJECTION, POWDER, LYOPHILIZED, FOR SOLUTION INTRAMUSCULAR; INTRAVENOUS
Status: DISCONTINUED | OUTPATIENT
Start: 2024-07-09 | End: 2024-07-09 | Stop reason: HOSPADM

## 2024-07-09 RX ORDER — MEPERIDINE HYDROCHLORIDE 50 MG/ML
25 INJECTION INTRAMUSCULAR; INTRAVENOUS; SUBCUTANEOUS EVERY 30 MIN PRN
Status: CANCELLED | OUTPATIENT
Start: 2024-07-11

## 2024-07-09 RX ORDER — ACETAMINOPHEN 325 MG/1
650 TABLET ORAL ONCE
Status: COMPLETED | OUTPATIENT
Start: 2024-07-09 | End: 2024-07-09

## 2024-07-09 RX ORDER — ALBUTEROL SULFATE 90 UG/1
1-2 AEROSOL, METERED RESPIRATORY (INHALATION)
Status: CANCELLED
Start: 2024-07-11

## 2024-07-09 RX ORDER — HEPARIN SODIUM (PORCINE) LOCK FLUSH IV SOLN 100 UNIT/ML 100 UNIT/ML
5 SOLUTION INTRAVENOUS
Status: CANCELLED | OUTPATIENT
Start: 2024-07-11

## 2024-07-09 RX ORDER — ALBUTEROL SULFATE 0.83 MG/ML
2.5 SOLUTION RESPIRATORY (INHALATION)
Status: CANCELLED | OUTPATIENT
Start: 2024-07-11

## 2024-07-09 RX ORDER — ALBUTEROL SULFATE 0.83 MG/ML
2.5 SOLUTION RESPIRATORY (INHALATION)
Status: DISCONTINUED | OUTPATIENT
Start: 2024-07-09 | End: 2024-07-09 | Stop reason: HOSPADM

## 2024-07-09 RX ORDER — DIPHENHYDRAMINE HYDROCHLORIDE 50 MG/ML
50 INJECTION INTRAMUSCULAR; INTRAVENOUS
Status: CANCELLED
Start: 2024-07-11

## 2024-07-09 RX ORDER — ALBUTEROL SULFATE 90 UG/1
1-2 AEROSOL, METERED RESPIRATORY (INHALATION)
Status: DISCONTINUED | OUTPATIENT
Start: 2024-07-09 | End: 2024-07-09 | Stop reason: HOSPADM

## 2024-07-09 RX ORDER — DIPHENHYDRAMINE HYDROCHLORIDE 50 MG/ML
50 INJECTION INTRAMUSCULAR; INTRAVENOUS
Status: DISCONTINUED | OUTPATIENT
Start: 2024-07-09 | End: 2024-07-09 | Stop reason: HOSPADM

## 2024-07-09 RX ADMIN — ACETAMINOPHEN 650 MG: 325 TABLET ORAL at 08:19

## 2024-07-09 RX ADMIN — HUMAN IMMUNOGLOBULIN G 75 G: 20 LIQUID INTRAVENOUS at 08:32

## 2024-07-09 RX ADMIN — DIPHENHYDRAMINE HYDROCHLORIDE 50 MG: 50 CAPSULE ORAL at 08:19

## 2024-07-09 RX ADMIN — HYDROCORTISONE SODIUM SUCCINATE 100 MG: 100 INJECTION, POWDER, FOR SOLUTION INTRAMUSCULAR; INTRAVENOUS at 08:25

## 2024-07-09 NOTE — PROGRESS NOTES
Infusion Nursing Note:  Emiliano Aguiar presents today for IVIG infusion.    Patient seen by provider today: No   present during visit today: Not Applicable.    Note: N/A.      Intravenous Access:  Peripheral IV placed.    Treatment Conditions:  Not Applicable.      Post Infusion Assessment:  Patient tolerated infusion without incident.  Site patent and intact, free from redness, edema or discomfort.  No evidence of extravasations.  Access discontinued per protocol.       Discharge Plan:   Patient and/or family verbalized understanding of discharge instructions and all questions answered.      Zaira Salinas RN

## 2024-07-30 ENCOUNTER — INFUSION THERAPY VISIT (OUTPATIENT)
Dept: INFUSION THERAPY | Facility: HOSPITAL | Age: 59
End: 2024-07-30
Attending: PSYCHIATRY & NEUROLOGY
Payer: COMMERCIAL

## 2024-07-30 VITALS
SYSTOLIC BLOOD PRESSURE: 141 MMHG | TEMPERATURE: 98.1 F | RESPIRATION RATE: 16 BRPM | DIASTOLIC BLOOD PRESSURE: 74 MMHG | OXYGEN SATURATION: 97 % | HEART RATE: 59 BPM

## 2024-07-30 DIAGNOSIS — G61.81 CIDP (CHRONIC INFLAMMATORY DEMYELINATING POLYNEUROPATHY) (H): Primary | ICD-10-CM

## 2024-07-30 PROCEDURE — 250N000011 HC RX IP 250 OP 636: Performed by: PSYCHIATRY & NEUROLOGY

## 2024-07-30 PROCEDURE — 250N000013 HC RX MED GY IP 250 OP 250 PS 637: Performed by: PSYCHIATRY & NEUROLOGY

## 2024-07-30 PROCEDURE — 96366 THER/PROPH/DIAG IV INF ADDON: CPT

## 2024-07-30 PROCEDURE — 96365 THER/PROPH/DIAG IV INF INIT: CPT

## 2024-07-30 RX ORDER — METHYLPREDNISOLONE SODIUM SUCCINATE 125 MG/2ML
125 INJECTION, POWDER, LYOPHILIZED, FOR SOLUTION INTRAMUSCULAR; INTRAVENOUS
Status: CANCELLED
Start: 2024-08-20

## 2024-07-30 RX ORDER — MEPERIDINE HYDROCHLORIDE 50 MG/ML
25 INJECTION INTRAMUSCULAR; INTRAVENOUS; SUBCUTANEOUS EVERY 30 MIN PRN
Status: DISCONTINUED | OUTPATIENT
Start: 2024-07-30 | End: 2024-07-30 | Stop reason: HOSPADM

## 2024-07-30 RX ORDER — ALBUTEROL SULFATE 90 UG/1
1-2 AEROSOL, METERED RESPIRATORY (INHALATION)
Status: CANCELLED
Start: 2024-08-20

## 2024-07-30 RX ORDER — EPINEPHRINE 1 MG/ML
0.3 INJECTION, SOLUTION INTRAMUSCULAR; SUBCUTANEOUS EVERY 5 MIN PRN
Status: DISCONTINUED | OUTPATIENT
Start: 2024-07-30 | End: 2024-07-30 | Stop reason: HOSPADM

## 2024-07-30 RX ORDER — DIPHENHYDRAMINE HYDROCHLORIDE 50 MG/ML
50 INJECTION INTRAMUSCULAR; INTRAVENOUS
Status: DISCONTINUED | OUTPATIENT
Start: 2024-07-30 | End: 2024-07-30 | Stop reason: HOSPADM

## 2024-07-30 RX ORDER — DIPHENHYDRAMINE HYDROCHLORIDE 50 MG/ML
50 INJECTION INTRAMUSCULAR; INTRAVENOUS
Status: CANCELLED
Start: 2024-08-20

## 2024-07-30 RX ORDER — HEPARIN SODIUM,PORCINE 10 UNIT/ML
5-20 VIAL (ML) INTRAVENOUS DAILY PRN
Status: CANCELLED | OUTPATIENT
Start: 2024-08-20

## 2024-07-30 RX ORDER — DIPHENHYDRAMINE HCL 50 MG
50 CAPSULE ORAL ONCE
Status: COMPLETED | OUTPATIENT
Start: 2024-07-30 | End: 2024-07-30

## 2024-07-30 RX ORDER — HEPARIN SODIUM (PORCINE) LOCK FLUSH IV SOLN 100 UNIT/ML 100 UNIT/ML
5 SOLUTION INTRAVENOUS
Status: CANCELLED | OUTPATIENT
Start: 2024-08-20

## 2024-07-30 RX ORDER — EPINEPHRINE 1 MG/ML
0.3 INJECTION, SOLUTION INTRAMUSCULAR; SUBCUTANEOUS EVERY 5 MIN PRN
Status: CANCELLED | OUTPATIENT
Start: 2024-08-20

## 2024-07-30 RX ORDER — ALBUTEROL SULFATE 0.83 MG/ML
2.5 SOLUTION RESPIRATORY (INHALATION)
Status: DISCONTINUED | OUTPATIENT
Start: 2024-07-30 | End: 2024-07-30 | Stop reason: HOSPADM

## 2024-07-30 RX ORDER — DIPHENHYDRAMINE HCL 50 MG
50 CAPSULE ORAL ONCE
Status: CANCELLED | OUTPATIENT
Start: 2024-08-20

## 2024-07-30 RX ORDER — METHYLPREDNISOLONE SODIUM SUCCINATE 125 MG/2ML
125 INJECTION, POWDER, LYOPHILIZED, FOR SOLUTION INTRAMUSCULAR; INTRAVENOUS
Status: DISCONTINUED | OUTPATIENT
Start: 2024-07-30 | End: 2024-07-30 | Stop reason: HOSPADM

## 2024-07-30 RX ORDER — ACETAMINOPHEN 325 MG/1
650 TABLET ORAL ONCE
Status: CANCELLED
Start: 2024-08-20

## 2024-07-30 RX ORDER — MEPERIDINE HYDROCHLORIDE 50 MG/ML
25 INJECTION INTRAMUSCULAR; INTRAVENOUS; SUBCUTANEOUS EVERY 30 MIN PRN
Status: CANCELLED | OUTPATIENT
Start: 2024-08-20

## 2024-07-30 RX ORDER — ALBUTEROL SULFATE 90 UG/1
1-2 AEROSOL, METERED RESPIRATORY (INHALATION)
Status: DISCONTINUED | OUTPATIENT
Start: 2024-07-30 | End: 2024-07-30 | Stop reason: HOSPADM

## 2024-07-30 RX ORDER — ACETAMINOPHEN 325 MG/1
650 TABLET ORAL ONCE
Status: COMPLETED | OUTPATIENT
Start: 2024-07-30 | End: 2024-07-30

## 2024-07-30 RX ORDER — ALBUTEROL SULFATE 0.83 MG/ML
2.5 SOLUTION RESPIRATORY (INHALATION)
Status: CANCELLED | OUTPATIENT
Start: 2024-08-20

## 2024-07-30 RX ADMIN — ACETAMINOPHEN 650 MG: 325 TABLET ORAL at 08:21

## 2024-07-30 RX ADMIN — DIPHENHYDRAMINE HYDROCHLORIDE 50 MG: 50 CAPSULE ORAL at 08:21

## 2024-07-30 RX ADMIN — HUMAN IMMUNOGLOBULIN G 75 G: 20 LIQUID INTRAVENOUS at 08:54

## 2024-07-30 NOTE — PROGRESS NOTES
Infusion Nursing Note:  Emiliano Aguiar presents today for IVIG.    Patient seen by provider today: No   present during visit today: Not Applicable.    Note: Emiliano arrived ambulatory by himself for IVIG. Plan of care reviewed and he has no questions. Tylenol and Benadryl administered 30 minutes prior to treatment. Emiliano declined solu cortef as it does not help reduce the side effects he experiences after his IVIG infusions.   Emiliano is awaiting approval of subcutaneous Vyvgart. His neurologist if hopeful he will have fewer side effects from Vyvgart.    Intravenous Access:  Peripheral IV placed.    Treatment Conditions:  Writer verified no symptoms, reactions, fever, ill, recent major or local infection, on antibiotics, productive cough, recent surgery or elevated temperature. Ok to proceed with treatment.    Post Infusion Assessment:  Patient tolerated infusion without incident.  Blood return noted pre and post infusion.  Site patent and intact, free from redness, edema or discomfort.  Access discontinued per protocol.     Discharge Plan:   Patient will return August 20th for next appointment.  Patient discharged in stable condition accompanied by: self.  Departure Mode: Ambulatory.      Asuncion Esquivel RN

## 2024-08-14 ENCOUNTER — TELEPHONE (OUTPATIENT)
Dept: NEUROLOGY | Facility: CLINIC | Age: 59
End: 2024-08-14

## 2024-08-14 DIAGNOSIS — G61.81 CIDP (CHRONIC INFLAMMATORY DEMYELINATING POLYNEUROPATHY) (H): Primary | ICD-10-CM

## 2024-08-14 RX ORDER — DIPHENHYDRAMINE HYDROCHLORIDE 50 MG/ML
50 INJECTION INTRAMUSCULAR; INTRAVENOUS
Status: CANCELLED
Start: 2024-08-19

## 2024-08-14 RX ORDER — ALBUTEROL SULFATE 0.83 MG/ML
2.5 SOLUTION RESPIRATORY (INHALATION)
Status: CANCELLED | OUTPATIENT
Start: 2024-08-19

## 2024-08-14 RX ORDER — HEPARIN SODIUM (PORCINE) LOCK FLUSH IV SOLN 100 UNIT/ML 100 UNIT/ML
5 SOLUTION INTRAVENOUS
Status: CANCELLED | OUTPATIENT
Start: 2024-08-19

## 2024-08-14 RX ORDER — ACETAMINOPHEN 325 MG/1
650 TABLET ORAL ONCE
Status: CANCELLED
Start: 2024-08-19

## 2024-08-14 RX ORDER — HEPARIN SODIUM,PORCINE 10 UNIT/ML
5-20 VIAL (ML) INTRAVENOUS DAILY PRN
Status: CANCELLED | OUTPATIENT
Start: 2024-08-19

## 2024-08-14 RX ORDER — DIPHENHYDRAMINE HCL 25 MG
50 CAPSULE ORAL ONCE
Status: CANCELLED | OUTPATIENT
Start: 2024-08-19

## 2024-08-14 RX ORDER — EPINEPHRINE 1 MG/ML
0.3 INJECTION, SOLUTION, CONCENTRATE INTRAVENOUS EVERY 5 MIN PRN
Status: CANCELLED | OUTPATIENT
Start: 2024-08-19

## 2024-08-14 RX ORDER — MEPERIDINE HYDROCHLORIDE 25 MG/ML
25 INJECTION INTRAMUSCULAR; INTRAVENOUS; SUBCUTANEOUS EVERY 30 MIN PRN
Status: CANCELLED | OUTPATIENT
Start: 2024-08-19

## 2024-08-14 RX ORDER — ALBUTEROL SULFATE 90 UG/1
1-2 AEROSOL, METERED RESPIRATORY (INHALATION)
Status: CANCELLED
Start: 2024-08-19

## 2024-08-14 RX ORDER — METHYLPREDNISOLONE SODIUM SUCCINATE 125 MG/2ML
125 INJECTION, POWDER, LYOPHILIZED, FOR SOLUTION INTRAMUSCULAR; INTRAVENOUS
Status: CANCELLED
Start: 2024-08-19

## 2024-08-14 NOTE — TELEPHONE ENCOUNTER
Emiliano's IVIG orders have .  Attempting to switch Emiliano to Vyvgart Hytrulo but set up pending. Until then, Emiliano will continue to infuse IVIG every 21 days.  IVIG orders pended to Dr. Godoy for signature. Emiliano scheduled for next IVIG infusion on .    Lluvia Barksdale RN

## 2024-08-20 ENCOUNTER — INFUSION THERAPY VISIT (OUTPATIENT)
Dept: INFUSION THERAPY | Facility: HOSPITAL | Age: 59
End: 2024-08-20
Attending: PSYCHIATRY & NEUROLOGY
Payer: COMMERCIAL

## 2024-08-20 VITALS
OXYGEN SATURATION: 99 % | RESPIRATION RATE: 16 BRPM | TEMPERATURE: 98.1 F | HEART RATE: 61 BPM | SYSTOLIC BLOOD PRESSURE: 139 MMHG | DIASTOLIC BLOOD PRESSURE: 78 MMHG

## 2024-08-20 DIAGNOSIS — G61.81 CIDP (CHRONIC INFLAMMATORY DEMYELINATING POLYNEUROPATHY) (H): Primary | ICD-10-CM

## 2024-08-20 PROCEDURE — 250N000011 HC RX IP 250 OP 636: Performed by: PSYCHIATRY & NEUROLOGY

## 2024-08-20 PROCEDURE — 96365 THER/PROPH/DIAG IV INF INIT: CPT

## 2024-08-20 PROCEDURE — 96366 THER/PROPH/DIAG IV INF ADDON: CPT

## 2024-08-20 PROCEDURE — 96375 TX/PRO/DX INJ NEW DRUG ADDON: CPT

## 2024-08-20 PROCEDURE — 250N000013 HC RX MED GY IP 250 OP 250 PS 637: Performed by: PSYCHIATRY & NEUROLOGY

## 2024-08-20 RX ORDER — ALBUTEROL SULFATE 90 UG/1
1-2 AEROSOL, METERED RESPIRATORY (INHALATION)
Status: CANCELLED
Start: 2024-09-10

## 2024-08-20 RX ORDER — EPINEPHRINE 1 MG/ML
0.3 INJECTION, SOLUTION INTRAMUSCULAR; SUBCUTANEOUS EVERY 5 MIN PRN
Status: DISCONTINUED | OUTPATIENT
Start: 2024-08-20 | End: 2024-08-20 | Stop reason: HOSPADM

## 2024-08-20 RX ORDER — MEPERIDINE HYDROCHLORIDE 50 MG/ML
25 INJECTION INTRAMUSCULAR; INTRAVENOUS; SUBCUTANEOUS EVERY 30 MIN PRN
Status: CANCELLED | OUTPATIENT
Start: 2024-09-10

## 2024-08-20 RX ORDER — METHYLPREDNISOLONE SODIUM SUCCINATE 125 MG/2ML
125 INJECTION, POWDER, LYOPHILIZED, FOR SOLUTION INTRAMUSCULAR; INTRAVENOUS
Status: DISCONTINUED | OUTPATIENT
Start: 2024-08-20 | End: 2024-08-20 | Stop reason: HOSPADM

## 2024-08-20 RX ORDER — ALBUTEROL SULFATE 90 UG/1
1-2 AEROSOL, METERED RESPIRATORY (INHALATION)
Status: DISCONTINUED | OUTPATIENT
Start: 2024-08-20 | End: 2024-08-20 | Stop reason: HOSPADM

## 2024-08-20 RX ORDER — HEPARIN SODIUM,PORCINE 10 UNIT/ML
5-20 VIAL (ML) INTRAVENOUS DAILY PRN
Status: CANCELLED | OUTPATIENT
Start: 2024-09-10

## 2024-08-20 RX ORDER — ALBUTEROL SULFATE 0.83 MG/ML
2.5 SOLUTION RESPIRATORY (INHALATION)
Status: CANCELLED | OUTPATIENT
Start: 2024-09-10

## 2024-08-20 RX ORDER — DIPHENHYDRAMINE HCL 50 MG
50 CAPSULE ORAL ONCE
Status: CANCELLED | OUTPATIENT
Start: 2024-09-10

## 2024-08-20 RX ORDER — ACETAMINOPHEN 325 MG/1
650 TABLET ORAL ONCE
Status: COMPLETED | OUTPATIENT
Start: 2024-08-20 | End: 2024-08-20

## 2024-08-20 RX ORDER — DIPHENHYDRAMINE HYDROCHLORIDE 50 MG/ML
50 INJECTION INTRAMUSCULAR; INTRAVENOUS
Status: DISCONTINUED | OUTPATIENT
Start: 2024-08-20 | End: 2024-08-20 | Stop reason: HOSPADM

## 2024-08-20 RX ORDER — DIPHENHYDRAMINE HCL 50 MG
50 CAPSULE ORAL ONCE
Status: COMPLETED | OUTPATIENT
Start: 2024-08-20 | End: 2024-08-20

## 2024-08-20 RX ORDER — DIPHENHYDRAMINE HYDROCHLORIDE 50 MG/ML
50 INJECTION INTRAMUSCULAR; INTRAVENOUS
Status: CANCELLED
Start: 2024-09-10

## 2024-08-20 RX ORDER — ACETAMINOPHEN 325 MG/1
650 TABLET ORAL ONCE
Status: CANCELLED
Start: 2024-09-10

## 2024-08-20 RX ORDER — HEPARIN SODIUM (PORCINE) LOCK FLUSH IV SOLN 100 UNIT/ML 100 UNIT/ML
5 SOLUTION INTRAVENOUS
Status: DISCONTINUED | OUTPATIENT
Start: 2024-08-20 | End: 2024-08-20 | Stop reason: HOSPADM

## 2024-08-20 RX ORDER — METHYLPREDNISOLONE SODIUM SUCCINATE 125 MG/2ML
125 INJECTION, POWDER, LYOPHILIZED, FOR SOLUTION INTRAMUSCULAR; INTRAVENOUS
Status: CANCELLED
Start: 2024-09-10

## 2024-08-20 RX ORDER — HEPARIN SODIUM (PORCINE) LOCK FLUSH IV SOLN 100 UNIT/ML 100 UNIT/ML
5 SOLUTION INTRAVENOUS
Status: CANCELLED | OUTPATIENT
Start: 2024-09-10

## 2024-08-20 RX ORDER — ALBUTEROL SULFATE 0.83 MG/ML
2.5 SOLUTION RESPIRATORY (INHALATION)
Status: DISCONTINUED | OUTPATIENT
Start: 2024-08-20 | End: 2024-08-20 | Stop reason: HOSPADM

## 2024-08-20 RX ORDER — EPINEPHRINE 1 MG/ML
0.3 INJECTION, SOLUTION INTRAMUSCULAR; SUBCUTANEOUS EVERY 5 MIN PRN
Status: CANCELLED | OUTPATIENT
Start: 2024-09-10

## 2024-08-20 RX ORDER — MEPERIDINE HYDROCHLORIDE 50 MG/ML
25 INJECTION INTRAMUSCULAR; INTRAVENOUS; SUBCUTANEOUS EVERY 30 MIN PRN
Status: DISCONTINUED | OUTPATIENT
Start: 2024-08-20 | End: 2024-08-20 | Stop reason: HOSPADM

## 2024-08-20 RX ADMIN — HUMAN IMMUNOGLOBULIN G 75 G: 20 LIQUID INTRAVENOUS at 08:41

## 2024-08-20 RX ADMIN — ACETAMINOPHEN 650 MG: 325 TABLET ORAL at 08:13

## 2024-08-20 RX ADMIN — DIPHENHYDRAMINE HYDROCHLORIDE 50 MG: 50 CAPSULE ORAL at 08:13

## 2024-08-20 RX ADMIN — HYDROCORTISONE SODIUM SUCCINATE 100 MG: 100 INJECTION, POWDER, FOR SOLUTION INTRAMUSCULAR; INTRAVENOUS at 08:13

## 2024-08-20 NOTE — PROGRESS NOTES
Infusion Nursing Note:  Emiliano Aguiar presents today for IVIG infusion.    Patient seen by provider today: No   present during visit today: Not Applicable.    Note: Tolerated infusion well, offers no complaints.  Discharge to home.      Intravenous Access:  Peripheral IV placed.    Treatment Conditions:  Not Applicable.      Post Infusion Assessment:  Patient tolerated infusion without incident.  Blood return noted pre and post infusion.  No evidence of extravasations.  Access discontinued per protocol.       Discharge Plan:   Patient and/or family verbalized understanding of discharge instructions and all questions answered.      Zaira Salinas RN

## 2024-09-03 ENCOUNTER — INFUSION THERAPY VISIT (OUTPATIENT)
Dept: INFUSION THERAPY | Facility: HOSPITAL | Age: 59
End: 2024-09-03
Attending: PSYCHIATRY & NEUROLOGY
Payer: COMMERCIAL

## 2024-09-03 VITALS
OXYGEN SATURATION: 98 % | TEMPERATURE: 97.9 F | DIASTOLIC BLOOD PRESSURE: 81 MMHG | HEART RATE: 60 BPM | SYSTOLIC BLOOD PRESSURE: 129 MMHG | RESPIRATION RATE: 18 BRPM

## 2024-09-03 DIAGNOSIS — G61.81 CIDP (CHRONIC INFLAMMATORY DEMYELINATING POLYNEUROPATHY) (H): Primary | ICD-10-CM

## 2024-09-03 PROCEDURE — 250N000011 HC RX IP 250 OP 636: Performed by: PSYCHIATRY & NEUROLOGY

## 2024-09-03 PROCEDURE — 96401 CHEMO ANTI-NEOPL SQ/IM: CPT

## 2024-09-03 RX ORDER — ALBUTEROL SULFATE 0.83 MG/ML
2.5 SOLUTION RESPIRATORY (INHALATION)
Status: CANCELLED | OUTPATIENT
Start: 2024-09-10

## 2024-09-03 RX ORDER — METHYLPREDNISOLONE SODIUM SUCCINATE 125 MG/2ML
125 INJECTION, POWDER, LYOPHILIZED, FOR SOLUTION INTRAMUSCULAR; INTRAVENOUS
Status: CANCELLED
Start: 2024-09-10

## 2024-09-03 RX ORDER — ALBUTEROL SULFATE 90 UG/1
1-2 AEROSOL, METERED RESPIRATORY (INHALATION)
Status: CANCELLED
Start: 2024-09-10

## 2024-09-03 RX ORDER — EPINEPHRINE 1 MG/ML
0.3 INJECTION, SOLUTION INTRAMUSCULAR; SUBCUTANEOUS EVERY 5 MIN PRN
Status: CANCELLED | OUTPATIENT
Start: 2024-09-10

## 2024-09-03 RX ORDER — METHYLPREDNISOLONE SODIUM SUCCINATE 125 MG/2ML
125 INJECTION, POWDER, LYOPHILIZED, FOR SOLUTION INTRAMUSCULAR; INTRAVENOUS
Status: DISCONTINUED | OUTPATIENT
Start: 2024-09-03 | End: 2024-09-03 | Stop reason: HOSPADM

## 2024-09-03 RX ORDER — EPINEPHRINE 1 MG/ML
0.3 INJECTION, SOLUTION INTRAMUSCULAR; SUBCUTANEOUS EVERY 5 MIN PRN
Status: DISCONTINUED | OUTPATIENT
Start: 2024-09-03 | End: 2024-09-03 | Stop reason: HOSPADM

## 2024-09-03 RX ORDER — ALBUTEROL SULFATE 0.83 MG/ML
2.5 SOLUTION RESPIRATORY (INHALATION)
Status: DISCONTINUED | OUTPATIENT
Start: 2024-09-03 | End: 2024-09-03 | Stop reason: HOSPADM

## 2024-09-03 RX ORDER — MEPERIDINE HYDROCHLORIDE 50 MG/ML
25 INJECTION INTRAMUSCULAR; INTRAVENOUS; SUBCUTANEOUS EVERY 30 MIN PRN
Status: CANCELLED | OUTPATIENT
Start: 2024-09-10

## 2024-09-03 RX ORDER — MEPERIDINE HYDROCHLORIDE 50 MG/ML
25 INJECTION INTRAMUSCULAR; INTRAVENOUS; SUBCUTANEOUS EVERY 30 MIN PRN
Status: DISCONTINUED | OUTPATIENT
Start: 2024-09-03 | End: 2024-09-03 | Stop reason: HOSPADM

## 2024-09-03 RX ORDER — ATORVASTATIN CALCIUM 20 MG/1
20 TABLET, FILM COATED ORAL DAILY
COMMUNITY

## 2024-09-03 RX ORDER — ALBUTEROL SULFATE 90 UG/1
1-2 AEROSOL, METERED RESPIRATORY (INHALATION)
Status: DISCONTINUED | OUTPATIENT
Start: 2024-09-03 | End: 2024-09-03 | Stop reason: HOSPADM

## 2024-09-03 RX ORDER — DIPHENHYDRAMINE HYDROCHLORIDE 50 MG/ML
50 INJECTION INTRAMUSCULAR; INTRAVENOUS
Status: DISCONTINUED | OUTPATIENT
Start: 2024-09-03 | End: 2024-09-03 | Stop reason: HOSPADM

## 2024-09-03 RX ORDER — DIPHENHYDRAMINE HYDROCHLORIDE 50 MG/ML
50 INJECTION INTRAMUSCULAR; INTRAVENOUS
Status: CANCELLED
Start: 2024-09-10

## 2024-09-03 RX ADMIN — EFGARTIGIMOD ALFA AND HYALURONIDASE (HUMAN RECOMBINANT) 1008 MG: 180; 2000 INJECTION, SOLUTION SUBCUTANEOUS at 09:48

## 2024-09-03 NOTE — PROGRESS NOTES
Infusion Nursing Note:  Emiliano Aguiar presents today for first Vyvgart Hytrulo - Sub-Q.    Patient seen by provider today: No   present during visit today: Not Applicable.    Note: Reviewed plan of care. No premedications needed for this injection.      Intravenous Access:  Peripheral IV placed by Carli winter prn emergency meds .    Treatment Conditions:  Biological Infusion Checklist:  ~~~ NOTE: If the patient answers yes to any of the questions below, hold the infusion and contact ordering provider or on-call provider.    Have you recently had an elevated temperature, fever, chills, productive cough, coughing for 3 weeks or longer or hemoptysis,  abnormal vital signs, night sweats,  chest pain or have you noticed a decrease in your appetite, unexplained weight loss or fatigue? No  Do you have any open wounds or new incisions? No  Do you have any upcoming hospitalizations or surgeries? Does not include esophagogastroduodenoscopy, colonoscopy, endoscopic retrograde cholangiopancreatography (ERCP), endoscopic ultrasound (EUS), dental procedures or joint aspiration/steroid injections No  Do you currently have any signs of illness or infection or are you on any antibiotics? No  Have you had any new, sudden or worsening abdominal pain? No  Have you or anyone in your household received a live vaccination in the past 4 weeks? Please note: No live vaccines while on biologic/chemotherapy until 6 months after the last treatment. Patient can receive the flu vaccine (shot only), pneumovax and the Covid vaccine. It is optimal for the patient to get these vaccines mid cycle, but they can be given at any time as long as it is not on the day of the infusion. No  Have you recently been diagnosed with any new nervous system diseases (ie. Multiple sclerosis, Guillain [place], seizures, neurological changes) or cancer diagnosis? Are you on any form of radiation or chemotherapy? No  Are you pregnant or breast feeding or do  you have plans of pregnancy in the future? No  Have you been having any signs of worsening depression or suicidal ideations?  (benlysta only) No  Have there been any other new onset medical symptoms? No  Have you had any new blood clots? (IVIG only) No      Post Infusion Assessment:  Patient tolerated injection without incident to LEFT abd.   Patient monitored for 30+ minutes post injection.       Discharge Plan:   Patient will return 9/10 for next appointment.   Reviewed discharge instructions including whom and when to call if issues.  Patient discharged in stable condition accompanied by: self.  Departure Mode: Ambulatory.      Sofie Mehta RN

## 2024-09-04 ENCOUNTER — TELEPHONE (OUTPATIENT)
Dept: NEUROLOGY | Facility: CLINIC | Age: 59
End: 2024-09-04
Payer: COMMERCIAL

## 2024-09-04 NOTE — TELEPHONE ENCOUNTER
Call placed to patient to check in today, see how he tolerated first injection yesterday, see if he had any questions or concerns. LM on patient's cell phone.

## 2024-09-10 ENCOUNTER — INFUSION THERAPY VISIT (OUTPATIENT)
Dept: INFUSION THERAPY | Facility: HOSPITAL | Age: 59
End: 2024-09-10
Attending: PSYCHIATRY & NEUROLOGY
Payer: COMMERCIAL

## 2024-09-10 VITALS
HEART RATE: 57 BPM | RESPIRATION RATE: 16 BRPM | OXYGEN SATURATION: 98 % | DIASTOLIC BLOOD PRESSURE: 82 MMHG | SYSTOLIC BLOOD PRESSURE: 146 MMHG | TEMPERATURE: 98 F

## 2024-09-10 DIAGNOSIS — G61.81 CIDP (CHRONIC INFLAMMATORY DEMYELINATING POLYNEUROPATHY) (H): Primary | ICD-10-CM

## 2024-09-10 PROCEDURE — 96401 CHEMO ANTI-NEOPL SQ/IM: CPT

## 2024-09-10 PROCEDURE — 250N000011 HC RX IP 250 OP 636: Performed by: PSYCHIATRY & NEUROLOGY

## 2024-09-10 RX ORDER — METHYLPREDNISOLONE SODIUM SUCCINATE 125 MG/2ML
125 INJECTION, POWDER, LYOPHILIZED, FOR SOLUTION INTRAMUSCULAR; INTRAVENOUS
Status: CANCELLED
Start: 2024-09-17

## 2024-09-10 RX ORDER — ALBUTEROL SULFATE 90 UG/1
1-2 AEROSOL, METERED RESPIRATORY (INHALATION)
Status: DISCONTINUED | OUTPATIENT
Start: 2024-09-10 | End: 2024-09-10 | Stop reason: HOSPADM

## 2024-09-10 RX ORDER — MEPERIDINE HYDROCHLORIDE 50 MG/ML
25 INJECTION INTRAMUSCULAR; INTRAVENOUS; SUBCUTANEOUS EVERY 30 MIN PRN
Status: CANCELLED | OUTPATIENT
Start: 2024-09-17

## 2024-09-10 RX ORDER — EPINEPHRINE 1 MG/ML
0.3 INJECTION, SOLUTION INTRAMUSCULAR; SUBCUTANEOUS EVERY 5 MIN PRN
Status: DISCONTINUED | OUTPATIENT
Start: 2024-09-10 | End: 2024-09-10 | Stop reason: HOSPADM

## 2024-09-10 RX ORDER — ALBUTEROL SULFATE 0.83 MG/ML
2.5 SOLUTION RESPIRATORY (INHALATION)
Status: CANCELLED | OUTPATIENT
Start: 2024-09-17

## 2024-09-10 RX ORDER — DIPHENHYDRAMINE HYDROCHLORIDE 50 MG/ML
50 INJECTION INTRAMUSCULAR; INTRAVENOUS
Status: CANCELLED
Start: 2024-09-17

## 2024-09-10 RX ORDER — ALBUTEROL SULFATE 90 UG/1
1-2 AEROSOL, METERED RESPIRATORY (INHALATION)
Status: CANCELLED
Start: 2024-09-17

## 2024-09-10 RX ORDER — MEPERIDINE HYDROCHLORIDE 50 MG/ML
25 INJECTION INTRAMUSCULAR; INTRAVENOUS; SUBCUTANEOUS EVERY 30 MIN PRN
Status: DISCONTINUED | OUTPATIENT
Start: 2024-09-10 | End: 2024-09-10 | Stop reason: HOSPADM

## 2024-09-10 RX ORDER — ALBUTEROL SULFATE 0.83 MG/ML
2.5 SOLUTION RESPIRATORY (INHALATION)
Status: DISCONTINUED | OUTPATIENT
Start: 2024-09-10 | End: 2024-09-10 | Stop reason: HOSPADM

## 2024-09-10 RX ORDER — DIPHENHYDRAMINE HYDROCHLORIDE 50 MG/ML
50 INJECTION INTRAMUSCULAR; INTRAVENOUS
Status: DISCONTINUED | OUTPATIENT
Start: 2024-09-10 | End: 2024-09-10 | Stop reason: HOSPADM

## 2024-09-10 RX ORDER — EPINEPHRINE 1 MG/ML
0.3 INJECTION, SOLUTION INTRAMUSCULAR; SUBCUTANEOUS EVERY 5 MIN PRN
Status: CANCELLED | OUTPATIENT
Start: 2024-09-17

## 2024-09-10 RX ORDER — METHYLPREDNISOLONE SODIUM SUCCINATE 125 MG/2ML
125 INJECTION, POWDER, LYOPHILIZED, FOR SOLUTION INTRAMUSCULAR; INTRAVENOUS
Status: DISCONTINUED | OUTPATIENT
Start: 2024-09-10 | End: 2024-09-10 | Stop reason: HOSPADM

## 2024-09-10 RX ADMIN — EFGARTIGIMOD ALFA AND HYALURONIDASE (HUMAN RECOMBINANT) 1008 MG: 180; 2000 INJECTION, SOLUTION SUBCUTANEOUS at 09:06

## 2024-09-10 NOTE — PROGRESS NOTES
Infusion Nursing Note:  Emiliano Aguiar presents today for Vyvgart.    Patient seen by provider today: No   present during visit today: Not Applicable.    Note: Emiliano arrived ambulatory by himself for his second dose of Vyvgart. Plan of care reviewed. Emiliano is wondering how soon he may notice the benefits of this medication. He noticed on Wednesday of last week that he felt more fatigued, off balance, and his left upper arm was twitching almost constantly. These symptoms often occur when he is due for IVIG which is currently on hold while receiving Vyvgart. He plans to send a message to his provider re the above.  Vyvgart administered subcutaneous via winged infusion set as ordered over 1.5 minutes in right lower abd. Emiliano reported discomfort which resolved shortly after the needle was removed. No swelling or bleeding noted.    Intravenous Access:  No Intravenous access/labs at this visit.    Treatment Conditions:  Biological Infusion Checklist:  ~~~ NOTE: If the patient answers yes to any of the questions below, hold the infusion and contact ordering provider or on-call provider.    Have you recently had an elevated temperature, fever, chills, productive cough, coughing for 3 weeks or longer or hemoptysis,  abnormal vital signs, night sweats,  chest pain or have you noticed a decrease in your appetite, unexplained weight loss or fatigue? No  Do you have any open wounds or new incisions? No  Do you have any upcoming hospitalizations or surgeries? Does not include esophagogastroduodenoscopy, colonoscopy, endoscopic retrograde cholangiopancreatography (ERCP), endoscopic ultrasound (EUS), dental procedures or joint aspiration/steroid injections No  Do you currently have any signs of illness or infection or are you on any antibiotics? No  Have you had any new, sudden or worsening abdominal pain? No  Have you or anyone in your household received a live vaccination in the past 4 weeks? Please note: No live  vaccines while on biologic/chemotherapy until 6 months after the last treatment. Patient can receive the flu vaccine (shot only), pneumovax and the Covid vaccine. It is optimal for the patient to get these vaccines mid cycle, but they can be given at any time as long as it is not on the day of the infusion. No  Have you recently been diagnosed with any new nervous system diseases (ie. Multiple sclerosis, Guillain [place], seizures, neurological changes) or cancer diagnosis? Are you on any form of radiation or chemotherapy? No  Are you pregnant or breast feeding or do you have plans of pregnancy in the future? No  Have you been having any signs of worsening depression or suicidal ideations?  (benlysta only) NA  Have there been any other new onset medical symptoms? No  Have you had any new blood clots? (IVIG only) NA    Post Infusion Assessment:  Patient tolerated injection.  Patient observed for 30 minutes post Vyvgart injection per protocol. VSS post injection and post 30 minute observation.    Discharge Plan:   Patient will return Sept 17th for next appointment.   Patient discharged in stable condition accompanied by: self.  Departure Mode: Ambulatory.      Asuncion Esquivel RN

## 2024-09-17 ENCOUNTER — INFUSION THERAPY VISIT (OUTPATIENT)
Dept: INFUSION THERAPY | Facility: HOSPITAL | Age: 59
End: 2024-09-17
Attending: PSYCHIATRY & NEUROLOGY
Payer: COMMERCIAL

## 2024-09-17 VITALS
OXYGEN SATURATION: 97 % | TEMPERATURE: 98.1 F | SYSTOLIC BLOOD PRESSURE: 136 MMHG | DIASTOLIC BLOOD PRESSURE: 88 MMHG | RESPIRATION RATE: 16 BRPM | HEART RATE: 67 BPM

## 2024-09-17 DIAGNOSIS — G61.81 CIDP (CHRONIC INFLAMMATORY DEMYELINATING POLYNEUROPATHY) (H): Primary | ICD-10-CM

## 2024-09-17 PROCEDURE — 96401 CHEMO ANTI-NEOPL SQ/IM: CPT

## 2024-09-17 PROCEDURE — 250N000011 HC RX IP 250 OP 636: Performed by: PSYCHIATRY & NEUROLOGY

## 2024-09-17 RX ORDER — MEPERIDINE HYDROCHLORIDE 50 MG/ML
25 INJECTION INTRAMUSCULAR; INTRAVENOUS; SUBCUTANEOUS EVERY 30 MIN PRN
Status: CANCELLED | OUTPATIENT
Start: 2024-09-24

## 2024-09-17 RX ORDER — ALBUTEROL SULFATE 90 UG/1
1-2 AEROSOL, METERED RESPIRATORY (INHALATION)
Status: DISCONTINUED | OUTPATIENT
Start: 2024-09-17 | End: 2024-09-17 | Stop reason: HOSPADM

## 2024-09-17 RX ORDER — EPINEPHRINE 1 MG/ML
0.3 INJECTION, SOLUTION INTRAMUSCULAR; SUBCUTANEOUS EVERY 5 MIN PRN
Status: DISCONTINUED | OUTPATIENT
Start: 2024-09-17 | End: 2024-09-17 | Stop reason: HOSPADM

## 2024-09-17 RX ORDER — DIPHENHYDRAMINE HYDROCHLORIDE 50 MG/ML
50 INJECTION INTRAMUSCULAR; INTRAVENOUS
Status: DISCONTINUED | OUTPATIENT
Start: 2024-09-17 | End: 2024-09-17 | Stop reason: HOSPADM

## 2024-09-17 RX ORDER — ALBUTEROL SULFATE 0.83 MG/ML
2.5 SOLUTION RESPIRATORY (INHALATION)
Status: DISCONTINUED | OUTPATIENT
Start: 2024-09-17 | End: 2024-09-17 | Stop reason: HOSPADM

## 2024-09-17 RX ORDER — DIPHENHYDRAMINE HYDROCHLORIDE 50 MG/ML
50 INJECTION INTRAMUSCULAR; INTRAVENOUS
Status: CANCELLED
Start: 2024-09-24

## 2024-09-17 RX ORDER — METHYLPREDNISOLONE SODIUM SUCCINATE 125 MG/2ML
125 INJECTION, POWDER, LYOPHILIZED, FOR SOLUTION INTRAMUSCULAR; INTRAVENOUS
Status: DISCONTINUED | OUTPATIENT
Start: 2024-09-17 | End: 2024-09-17 | Stop reason: HOSPADM

## 2024-09-17 RX ORDER — MEPERIDINE HYDROCHLORIDE 50 MG/ML
25 INJECTION INTRAMUSCULAR; INTRAVENOUS; SUBCUTANEOUS EVERY 30 MIN PRN
Status: DISCONTINUED | OUTPATIENT
Start: 2024-09-17 | End: 2024-09-17 | Stop reason: HOSPADM

## 2024-09-17 RX ORDER — EPINEPHRINE 1 MG/ML
0.3 INJECTION, SOLUTION INTRAMUSCULAR; SUBCUTANEOUS EVERY 5 MIN PRN
Status: CANCELLED | OUTPATIENT
Start: 2024-09-24

## 2024-09-17 RX ORDER — ALBUTEROL SULFATE 90 UG/1
1-2 AEROSOL, METERED RESPIRATORY (INHALATION)
Status: CANCELLED
Start: 2024-09-24

## 2024-09-17 RX ORDER — ALBUTEROL SULFATE 0.83 MG/ML
2.5 SOLUTION RESPIRATORY (INHALATION)
Status: CANCELLED | OUTPATIENT
Start: 2024-09-24

## 2024-09-17 RX ORDER — METHYLPREDNISOLONE SODIUM SUCCINATE 125 MG/2ML
125 INJECTION, POWDER, LYOPHILIZED, FOR SOLUTION INTRAMUSCULAR; INTRAVENOUS
Status: CANCELLED
Start: 2024-09-24

## 2024-09-17 RX ADMIN — EFGARTIGIMOD ALFA AND HYALURONIDASE (HUMAN RECOMBINANT) 1008 MG: 180; 2000 INJECTION, SOLUTION SUBCUTANEOUS at 08:31

## 2024-09-17 NOTE — PROGRESS NOTES
Infusion Nursing Note:  Emiliano Aguiar presents today for Vyvgart subcutaneous injection.    Patient seen by provider today: No   present during visit today: Not Applicable.    Note: /88 (Patient Position: Sitting)   Pulse 67   Temp 98.1  F (36.7  C)   Resp 16   SpO2 97%   .      Intravenous Access:  No Intravenous access/labs at this visit.    Treatment Conditions:  Biological Infusion Checklist:  ~~~ NOTE: If the patient answers yes to any of the questions below, hold the infusion and contact ordering provider or on-call provider.    Have you recently had an elevated temperature, fever, chills, productive cough, coughing for 3 weeks or longer or hemoptysis,  abnormal vital signs, night sweats,  chest pain or have you noticed a decrease in your appetite, unexplained weight loss or fatigue? No  Do you have any open wounds or new incisions? No  Do you have any upcoming hospitalizations or surgeries? Does not include esophagogastroduodenoscopy, colonoscopy, endoscopic retrograde cholangiopancreatography (ERCP), endoscopic ultrasound (EUS), dental procedures or joint aspiration/steroid injections No  Do you currently have any signs of illness or infection or are you on any antibiotics? No  Have you had any new, sudden or worsening abdominal pain? No  Have you or anyone in your household received a live vaccination in the past 4 weeks? Please note: No live vaccines while on biologic/chemotherapy until 6 months after the last treatment. Patient can receive the flu vaccine (shot only), pneumovax and the Covid vaccine. It is optimal for the patient to get these vaccines mid cycle, but they can be given at any time as long as it is not on the day of the infusion. No  Have you recently been diagnosed with any new nervous system diseases (ie. Multiple sclerosis, Guillain [place], seizures, neurological changes) or cancer diagnosis? Are you on any form of radiation or chemotherapy? No  Are you pregnant or  breast feeding or do you have plans of pregnancy in the future? No  Have you been having any signs of worsening depression or suicidal ideations?  (benlysta only) No- NA  Have there been any other new onset medical symptoms? No  Have you had any new blood clots? (IVIG only) No- NA      Post Infusion Assessment:  Patient tolerated injection without incident into LLQ with winged extension set attached to the medication syringe. He reported mild burning upon removing the needle after the injection. Patient was observed for 30 min per protocol and denied any changes in sx.       Discharge Plan:   Discharge instructions reviewed with: Patient.  Patient and/or family verbalized understanding of discharge instructions and all questions answered.  Patient discharged in stable condition accompanied by: self.  Departure Mode: Ambulatory.      Winter Cheung RN

## 2024-09-24 ENCOUNTER — TELEPHONE (OUTPATIENT)
Dept: NEUROLOGY | Facility: CLINIC | Age: 59
End: 2024-09-24

## 2024-09-24 ENCOUNTER — INFUSION THERAPY VISIT (OUTPATIENT)
Dept: INFUSION THERAPY | Facility: HOSPITAL | Age: 59
End: 2024-09-24
Attending: PSYCHIATRY & NEUROLOGY
Payer: COMMERCIAL

## 2024-09-24 VITALS
OXYGEN SATURATION: 99 % | HEART RATE: 68 BPM | RESPIRATION RATE: 16 BRPM | HEIGHT: 71 IN | SYSTOLIC BLOOD PRESSURE: 147 MMHG | DIASTOLIC BLOOD PRESSURE: 68 MMHG | BODY MASS INDEX: 27.52 KG/M2

## 2024-09-24 DIAGNOSIS — G61.81 CIDP (CHRONIC INFLAMMATORY DEMYELINATING POLYNEUROPATHY) (H): Primary | ICD-10-CM

## 2024-09-24 PROCEDURE — 250N000011 HC RX IP 250 OP 636: Performed by: PSYCHIATRY & NEUROLOGY

## 2024-09-24 PROCEDURE — 96401 CHEMO ANTI-NEOPL SQ/IM: CPT

## 2024-09-24 RX ORDER — DIPHENHYDRAMINE HYDROCHLORIDE 50 MG/ML
50 INJECTION INTRAMUSCULAR; INTRAVENOUS
Status: CANCELLED
Start: 2024-09-24

## 2024-09-24 RX ORDER — ALBUTEROL SULFATE 0.83 MG/ML
2.5 SOLUTION RESPIRATORY (INHALATION)
OUTPATIENT
Start: 2024-10-23

## 2024-09-24 RX ORDER — METHYLPREDNISOLONE SODIUM SUCCINATE 125 MG/2ML
125 INJECTION, POWDER, LYOPHILIZED, FOR SOLUTION INTRAMUSCULAR; INTRAVENOUS
Status: DISCONTINUED | OUTPATIENT
Start: 2024-09-24 | End: 2024-09-24

## 2024-09-24 RX ORDER — ALBUTEROL SULFATE 90 UG/1
1-2 AEROSOL, METERED RESPIRATORY (INHALATION)
Status: DISCONTINUED | OUTPATIENT
Start: 2024-09-24 | End: 2024-09-24

## 2024-09-24 RX ORDER — EPINEPHRINE 1 MG/ML
0.3 INJECTION, SOLUTION INTRAMUSCULAR; SUBCUTANEOUS EVERY 5 MIN PRN
Status: CANCELLED | OUTPATIENT
Start: 2024-09-24

## 2024-09-24 RX ORDER — ALBUTEROL SULFATE 0.83 MG/ML
2.5 SOLUTION RESPIRATORY (INHALATION)
Status: CANCELLED | OUTPATIENT
Start: 2024-09-24

## 2024-09-24 RX ORDER — MEPERIDINE HYDROCHLORIDE 50 MG/ML
25 INJECTION INTRAMUSCULAR; INTRAVENOUS; SUBCUTANEOUS EVERY 30 MIN PRN
Status: DISCONTINUED | OUTPATIENT
Start: 2024-09-24 | End: 2024-09-24

## 2024-09-24 RX ORDER — DIPHENHYDRAMINE HYDROCHLORIDE 50 MG/ML
50 INJECTION INTRAMUSCULAR; INTRAVENOUS
Status: DISCONTINUED | OUTPATIENT
Start: 2024-09-24 | End: 2024-09-24

## 2024-09-24 RX ORDER — ALBUTEROL SULFATE 90 UG/1
1-2 AEROSOL, METERED RESPIRATORY (INHALATION)
Status: CANCELLED
Start: 2024-09-24

## 2024-09-24 RX ORDER — MEPERIDINE HYDROCHLORIDE 25 MG/ML
25 INJECTION INTRAMUSCULAR; INTRAVENOUS; SUBCUTANEOUS EVERY 30 MIN PRN
OUTPATIENT
Start: 2024-10-23

## 2024-09-24 RX ORDER — METHYLPREDNISOLONE SODIUM SUCCINATE 125 MG/2ML
125 INJECTION, POWDER, LYOPHILIZED, FOR SOLUTION INTRAMUSCULAR; INTRAVENOUS
Start: 2024-10-23

## 2024-09-24 RX ORDER — EPINEPHRINE 1 MG/ML
0.3 INJECTION, SOLUTION, CONCENTRATE INTRAVENOUS EVERY 5 MIN PRN
OUTPATIENT
Start: 2024-10-23

## 2024-09-24 RX ORDER — ALBUTEROL SULFATE 90 UG/1
1-2 AEROSOL, METERED RESPIRATORY (INHALATION)
Start: 2024-10-23

## 2024-09-24 RX ORDER — EPINEPHRINE 1 MG/ML
0.3 INJECTION, SOLUTION INTRAMUSCULAR; SUBCUTANEOUS EVERY 5 MIN PRN
Status: DISCONTINUED | OUTPATIENT
Start: 2024-09-24 | End: 2024-09-24

## 2024-09-24 RX ORDER — ALBUTEROL SULFATE 0.83 MG/ML
2.5 SOLUTION RESPIRATORY (INHALATION)
Status: DISCONTINUED | OUTPATIENT
Start: 2024-09-24 | End: 2024-09-24

## 2024-09-24 RX ORDER — DIPHENHYDRAMINE HYDROCHLORIDE 50 MG/ML
50 INJECTION INTRAMUSCULAR; INTRAVENOUS
Start: 2024-10-23

## 2024-09-24 RX ORDER — METHYLPREDNISOLONE SODIUM SUCCINATE 125 MG/2ML
125 INJECTION, POWDER, LYOPHILIZED, FOR SOLUTION INTRAMUSCULAR; INTRAVENOUS
Status: CANCELLED
Start: 2024-09-24

## 2024-09-24 RX ORDER — MEPERIDINE HYDROCHLORIDE 50 MG/ML
25 INJECTION INTRAMUSCULAR; INTRAVENOUS; SUBCUTANEOUS EVERY 30 MIN PRN
Status: CANCELLED | OUTPATIENT
Start: 2024-09-24

## 2024-09-24 RX ADMIN — EFGARTIGIMOD ALFA AND HYALURONIDASE (HUMAN RECOMBINANT) 1008 MG: 180; 2000 INJECTION, SOLUTION SUBCUTANEOUS at 08:49

## 2024-09-24 ASSESSMENT — PAIN SCALES - GENERAL: PAINLEVEL: MODERATE PAIN (4)

## 2024-09-24 NOTE — PROGRESS NOTES
Infusion Nursing Note:  Emiliano Aguiar presents today for Vyvgart.    Patient seen by provider today: No   present during visit today: Not Applicable.    Note: Emiliano arrived ambulatory by himself for Vyvgart. Plan of care reviewed and biological checklist completed. Ok to proceed with treatment. Emiliano has been tolerating Vyvgart injections well and does not note any side effects afterwards.  Vyvgart administered subcutaneous via winged infusion set as ordered over 1.5 minutes in right lower abd. No swelling or bleeding noted.    Intravenous Access:  No Intravenous access/labs at this visit.    Treatment Conditions:  Biological Infusion Checklist:  ~~~ NOTE: If the patient answers yes to any of the questions below, hold the infusion and contact ordering provider or on-call provider.    Have you recently had an elevated temperature, fever, chills, productive cough, coughing for 3 weeks or longer or hemoptysis,  abnormal vital signs, night sweats,  chest pain or have you noticed a decrease in your appetite, unexplained weight loss or fatigue? No  Do you have any open wounds or new incisions? No  Do you have any upcoming hospitalizations or surgeries? Does not include esophagogastroduodenoscopy, colonoscopy, endoscopic retrograde cholangiopancreatography (ERCP), endoscopic ultrasound (EUS), dental procedures or joint aspiration/steroid injections No  Do you currently have any signs of illness or infection or are you on any antibiotics? No  Have you had any new, sudden or worsening abdominal pain? No  Have you or anyone in your household received a live vaccination in the past 4 weeks? Please note: No live vaccines while on biologic/chemotherapy until 6 months after the last treatment. Patient can receive the flu vaccine (shot only), pneumovax and the Covid vaccine. It is optimal for the patient to get these vaccines mid cycle, but they can be given at any time as long as it is not on the day of the infusion.  No  Have you recently been diagnosed with any new nervous system diseases (ie. Multiple sclerosis, Guillain [place], seizures, neurological changes) or cancer diagnosis? Are you on any form of radiation or chemotherapy? No  Are you pregnant or breast feeding or do you have plans of pregnancy in the future? NA  Have you been having any signs of worsening depression or suicidal ideations?  (benlysta only) NA  Have there been any other new onset medical symptoms? No  Have you had any new blood clots? (IVIG only) NA    Post Infusion Assessment:  Patient tolerated injection without incident.  Patient was observed for 30 minutes post Vyvgart injection per policy.     Discharge Plan:   Patient will return October 24th for next appointment. He is checking with his provider to verify frequency of his upcoming appts.  Patient discharged in stable condition accompanied by: self.  Departure Mode: Ambulatory.      Asuncion Esquivel RN

## 2024-09-24 NOTE — TELEPHONE ENCOUNTER
Emiliano had his final Hytrulo injection of his cycle on 9/24.  Next cycle can begin in four weeks so next injection can be on 10/23.  Vyvgart orders routed to Dr. Godoy for signature.    Lluvia Barksdale RN

## 2024-10-20 ENCOUNTER — HOSPITAL ENCOUNTER (EMERGENCY)
Facility: CLINIC | Age: 59
Discharge: HOME | End: 2024-10-20
Attending: EMERGENCY MEDICINE | Admitting: EMERGENCY MEDICINE
Payer: COMMERCIAL

## 2024-10-20 VITALS
TEMPERATURE: 97.2 F | HEIGHT: 71 IN | WEIGHT: 198 LBS | SYSTOLIC BLOOD PRESSURE: 166 MMHG | HEART RATE: 71 BPM | BODY MASS INDEX: 27.72 KG/M2 | DIASTOLIC BLOOD PRESSURE: 92 MMHG | OXYGEN SATURATION: 97 % | RESPIRATION RATE: 16 BRPM

## 2024-10-20 DIAGNOSIS — G61.81 CIDP (CHRONIC INFLAMMATORY DEMYELINATING POLYNEUROPATHY) (H): ICD-10-CM

## 2024-10-20 PROCEDURE — 99283 EMERGENCY DEPT VISIT LOW MDM: CPT | Performed by: EMERGENCY MEDICINE

## 2024-10-20 ASSESSMENT — COLUMBIA-SUICIDE SEVERITY RATING SCALE - C-SSRS
6. HAVE YOU EVER DONE ANYTHING, STARTED TO DO ANYTHING, OR PREPARED TO DO ANYTHING TO END YOUR LIFE?: NO
1. IN THE PAST MONTH, HAVE YOU WISHED YOU WERE DEAD OR WISHED YOU COULD GO TO SLEEP AND NOT WAKE UP?: NO
2. HAVE YOU ACTUALLY HAD ANY THOUGHTS OF KILLING YOURSELF IN THE PAST MONTH?: NO

## 2024-10-20 ASSESSMENT — ACTIVITIES OF DAILY LIVING (ADL)
ADLS_ACUITY_SCORE: 37
ADLS_ACUITY_SCORE: 37

## 2024-10-20 NOTE — ED PROVIDER NOTES
EMERGENCY DEPARTMENT ENCOUnter      NAME: Emiliano Aguiar  AGE: 59 year old male  YOB: 1965  MRN: [id number]  EVALUATION DATE & TIME: No admission date for patient encounter.    PCP: Alexander White    ED PROVIDER: Joy Ramirez MD      Chief Complaint   Patient presents with    Extremity Weakness         FINAL IMPRESSION:  1. CIDP (chronic inflammatory demyelinating polyneuropathy) (H)          ED COURSE & MEDICAL DECISION MAKING:      In summary, the patient is a 59-year-old male with CIDP [place] to the emergency department with request of receiving his Vyvgart injection earlier than scheduled.  I consulted with neurology and he stated that he would not recommend this patient receive this med in the ED. he stated that the patient could message his neurologist to see if they could administer this medication sooner than scheduled.  He also said if the patient was concerned about worsening symptoms that he could go to [facility] emergency department for further neurologic eval.  Discharge to home with close outpatient follow-up.  0347-I met with the patient, obtained history, performed an initial exam, and discussed options and plan for diagnostics and treatment here in the ED. previous medical records were reviewed including neurology clinic visits.  0452-I spoke with Dr. Ewing, Neurology.  He recommended against administration of the Vyvgart in the emergency department.  Commended that the patient message his neurologist.  He also recommended that the patient go to the [facility] emergency department should he have concerning symptoms for an emergent neurologic eval.  0500-updated and discharged patient.    Medical Decision Making  Obtained supplemental history:Supplemental history obtained?: No  Reviewed external records: External records reviewed?: Documented in chart  Care impacted by chronic illness:Cerebrovascular Disease,  Hyperlipidemia, Mental Health, Smoking / Nicotine Use, and Other: chronic inflammatory demyelinating polyneuropathy  Did you consider but not order tests?: Work up considered but not performed and documented in chart, if applicable  Did you interpret images independently?: Independent interpretation of ECG and images noted in documentation, when applicable.  Consultation discussion with other provider:Did you involve another provider (consultant, , pharmacy, etc.)?: I discussed the care with another health care provider, see documentation for details.  Discharge. No recommendations on prescription strength medication(s). See documentation for any additional details.    MIPS: Not Applicable        At the conclusion of the encounter I discussed the results of all of the tests and the disposition. The questions were answered. The patient or family acknowledged understanding and was agreeable with the care plan.         MEDICATIONS GIVEN IN THE EMERGENCY:  Medications - No data to display    NEW PRESCRIPTIONS STARTED AT TODAY'S ER VISIT  New Prescriptions    No medications on file          =================================================================    HPI        Emiliano Aguiar is a 59 year old male with a pertinent history of HLD, tobacco abuse, chronic inflammatory demyelinating polyneuropathy, hemorrhagic stroke, who presents to this ED via walk-in for evaluation of extremity weakness.    The patient reports he has a history of Chronic Inflammatory Demyelinating Polyneuropathy and is being managed by receiving a recurrent injection of Vyvgart once a week for a course 4 weeks and will wait for another 4 weeks before the next injection. His last injection occurred on 09/24/2024. He is currently being managed by Dr. Nixon Godoy, Neurologist at [facility]. He developed tingling to his left thumb on 10/17/2024 and reports his tingling is now prominent to both hands and both feet. He was woken up tonight due  to his worsening back pain. He also endorses balance issues. The patient is not sure he can wait until his next injection on 10/21/2024, thus he came to the ER. No complaints of any other associated symptoms at this time.    REVIEW OF SYSTEMS     Constitutional:  Denies fever or chills  HENT:  Denies sore throat   Respiratory:  Denies cough or shortness of breath   Cardiovascular:  Denies chest pain or palpitations  GI:  Denies abdominal pain, nausea, or vomiting  Musculoskeletal:  Positive for bilateral leg soreness and back pain.  Skin:  Denies rash   Neurologic:  Denies headache. Positive for numbness/tingling to bilateral hands and feet.   All other systems reviewed and are negative      PAST MEDICAL HISTORY:  Past Medical History:   Diagnosis Date    Anxiety state, unspecified     Bipolar affective (H)     Depressive disorder, not elsewhere classified     GERD (gastroesophageal reflux disease)        PAST SURGICAL HISTORY:  Past Surgical History:   Procedure Laterality Date    IR LUMBAR PUNCTURE  9/5/2013    NO HISTORY OF SURGERY             CURRENT MEDICATIONS:    acetaminophen (TYLENOL) 500 MG tablet  atorvastatin (LIPITOR) 20 MG tablet  diphenhydrAMINE (BENADRYL ALLERGY) 25 MG tablet        ALLERGIES:  Allergies   Allergen Reactions    Gammagard [Immune Globulin] Anaphylaxis       FAMILY HISTORY:  No family history on file.    SOCIAL HISTORY:   Social History     Socioeconomic History    Marital status:      Spouse name: Funmilayo   Tobacco Use    Smoking status: Former    Smokeless tobacco: Current     Types: Chew    Tobacco comments:     quit 15 yrs ago before that 1 pk q2 days x 10 yrs   Substance and Sexual Activity    Alcohol use: Yes     Comment: weekends    Drug use: No    Sexual activity: Yes     Partners: Female     Social Determinants of Health     Food Insecurity: No Food Insecurity (8/23/2024)    [facility]    Hunger Vital Sign     Worried About Running Out of Food in the  "Last Year: Never true     Ran Out of Food in the Last Year: Never true   Transportation Needs: No Transportation Needs (8/23/2024)    Received from [facility]    [facility] - Transportation     Lack of Transportation (Medical): No     Lack of Transportation (Non-Medical): No   Housing Stability: Low Risk  (8/23/2024)    Received from [facility]    Housing Stability Vital Sign     Unable to Pay for Housing in the Last Year: No     Number of Times Moved in the Last Year: 0     Homeless in the Last Year: No       VITALS:  Patient Vitals for the past 24 hrs:   BP Temp Temp src Pulse Resp SpO2 Height Weight   10/20/24 0344 (!) 148/77 97.2  F (36.2  C) Oral 71 16 98 % 1.803 m (5' 11\") 89.8 kg (198 lb)       PHYSICAL EXAM    Constitutional:  Well developed, Well nourished,  HENT:  Normocephalic, Atraumatic, Bilateral external ears normal, Oropharynx moist, Nose normal.   Neck:  Normal range of motion, No meningismus, No stridor.   Eyes:  EOMI, Conjunctiva normal, No discharge.   Respiratory:  Normal breath sounds, No respiratory distress, No wheezing, No chest tenderness.   Cardiovascular:  Normal heart rate, Normal rhythm, No murmurs  GI:  Soft, No tenderness, No guarding,   Musculoskeletal:  Neurovascularly intact distally, No edema, No tenderness, No cyanosis, Good range of motion in all major joints.  Integument:  Warm, Dry, No erythema, No rash.   Lymphatic:  No lymphadenopathy noted.   Neurologic:  Alert & oriented , Normal motor function, Normal sensory function, No focal deficits noted.   Psychiatric:  Affect normal, Judgment normal, Mood normal.                I, Ta Denton, am serving as a scribe to document services personally performed by Dr. Ramirez based on my observation and the provider's statements to me. I, Joy Ramirez MD attest that Ta Denton is acting in a scribe capacity, has observed my performance of the services and has documented them in accordance with my direction.    Joy" MD Ashley  Emergency Medicine  [facility] EMERGENCY ROOM  [address] [id number] [phone]  Dept: [phone]     Joy Ramirez MD  10/20/24 0513

## 2024-10-20 NOTE — DISCHARGE INSTRUCTIONS
Please message or call your neurologist to discuss whether they can administer your medication sooner than scheduled.  You can go to [facility] emergency department for an emergent neuro evaluation if your symptoms are concerning or different

## 2024-10-20 NOTE — ED TRIAGE NOTES
Pt has CIDP and started a new Vyvgart in sept. Pt states he gets injects every week x 4.  He doesn't feel like it is working because on Thursday he has numbness to his fingertips again and back pain started as well as his feet.        Triage Assessment (Adult)       Row Name 10/20/24 0344          Triage Assessment    Airway WDL WDL        Respiratory WDL    Respiratory WDL WDL        Skin Circulation/Temperature WDL    Skin Circulation/Temperature WDL WDL        Cardiac WDL    Cardiac WDL WDL        Peripheral/Neurovascular WDL    Peripheral Neurovascular WDL WDL        Cognitive/Neuro/Behavioral WDL    Cognitive/Neuro/Behavioral WDL WDL

## 2024-10-21 ENCOUNTER — INFUSION THERAPY VISIT (OUTPATIENT)
Dept: INFUSION THERAPY | Facility: HOSPITAL | Age: 59
End: 2024-10-21
Attending: PSYCHIATRY & NEUROLOGY
Payer: COMMERCIAL

## 2024-10-21 VITALS
HEART RATE: 66 BPM | DIASTOLIC BLOOD PRESSURE: 84 MMHG | OXYGEN SATURATION: 98 % | TEMPERATURE: 98 F | SYSTOLIC BLOOD PRESSURE: 146 MMHG | RESPIRATION RATE: 16 BRPM

## 2024-10-21 DIAGNOSIS — G61.81 CIDP (CHRONIC INFLAMMATORY DEMYELINATING POLYNEUROPATHY) (H): Primary | ICD-10-CM

## 2024-10-21 PROCEDURE — 250N000011 HC RX IP 250 OP 636: Mod: JZ | Performed by: PSYCHIATRY & NEUROLOGY

## 2024-10-21 PROCEDURE — 96409 CHEMO IV PUSH SNGL DRUG: CPT

## 2024-10-21 RX ORDER — MEPERIDINE HYDROCHLORIDE 50 MG/ML
25 INJECTION INTRAMUSCULAR; INTRAVENOUS; SUBCUTANEOUS EVERY 30 MIN PRN
Status: CANCELLED | OUTPATIENT
Start: 2024-10-28

## 2024-10-21 RX ORDER — DIPHENHYDRAMINE HYDROCHLORIDE 50 MG/ML
50 INJECTION INTRAMUSCULAR; INTRAVENOUS
Status: CANCELLED
Start: 2024-10-28

## 2024-10-21 RX ORDER — EPINEPHRINE 1 MG/ML
0.3 INJECTION, SOLUTION INTRAMUSCULAR; SUBCUTANEOUS EVERY 5 MIN PRN
Status: CANCELLED | OUTPATIENT
Start: 2024-10-28

## 2024-10-21 RX ORDER — ALBUTEROL SULFATE 90 UG/1
1-2 INHALANT RESPIRATORY (INHALATION)
Status: CANCELLED
Start: 2024-10-28

## 2024-10-21 RX ORDER — METHYLPREDNISOLONE SODIUM SUCCINATE 125 MG/2ML
125 INJECTION INTRAMUSCULAR; INTRAVENOUS
Status: CANCELLED
Start: 2024-10-28

## 2024-10-21 RX ORDER — ALBUTEROL SULFATE 0.83 MG/ML
2.5 SOLUTION RESPIRATORY (INHALATION)
Status: CANCELLED | OUTPATIENT
Start: 2024-10-28

## 2024-10-21 RX ADMIN — EFGARTIGIMOD ALFA AND HYALURONIDASE (HUMAN RECOMBINANT) 1008 MG: 180; 2000 INJECTION, SOLUTION SUBCUTANEOUS at 10:16

## 2024-10-21 NOTE — PROGRESS NOTES
Infusion Nursing Note:  Emiliano Aguiar presents today for add-on Vyvgart injection.    Patient seen by provider today: No   present during visit today: Not Applicable.    Note: BP (!) 146/84 (Patient Position: Sitting)   Pulse 66   Temp 98  F (36.7  C) (Oral)   Resp 16   SpO2 98%    Patient arrived today with worsening symptoms requesting to get injection early. Patient c/o numbness/tingling in hands, arms, legs and feet. Provider Dr. Godoy team contacted for okay to give dose early. Provider approved. Finance contacted and approval was granted to proceed today with early administration of drug.    Vyvgart injection given to RLQ.     Intravenous Access:  No Intravenous access/labs at this visit.    Treatment Conditions:  Not Applicable.      Post Infusion Assessment:  Patient tolerated injection without incident.   Monitored patient 30 minutes post injection per protocol.     Discharge Plan:   Discharge instructions reviewed with: Patient.  Patient and/or family verbalized understanding of discharge instructions and all questions answered.  Patient discharged in stable condition accompanied by: self.  Departure Mode: Ambulatory.      Sarath Norton RN

## 2024-10-24 RX ORDER — ALBUTEROL SULFATE 0.83 MG/ML
2.5 SOLUTION RESPIRATORY (INHALATION)
Status: CANCELLED | OUTPATIENT
Start: 2024-10-24

## 2024-10-24 RX ORDER — DIPHENHYDRAMINE HYDROCHLORIDE 50 MG/ML
50 INJECTION INTRAMUSCULAR; INTRAVENOUS
Status: CANCELLED
Start: 2024-10-24

## 2024-10-24 RX ORDER — METHYLPREDNISOLONE SODIUM SUCCINATE 40 MG/ML
40 INJECTION INTRAMUSCULAR; INTRAVENOUS
Status: CANCELLED
Start: 2024-10-24

## 2024-10-24 RX ORDER — EPINEPHRINE 1 MG/ML
0.3 INJECTION, SOLUTION, CONCENTRATE INTRAVENOUS EVERY 5 MIN PRN
Status: CANCELLED | OUTPATIENT
Start: 2024-10-24

## 2024-10-24 RX ORDER — ALBUTEROL SULFATE 90 UG/1
1-2 INHALANT RESPIRATORY (INHALATION)
Status: CANCELLED
Start: 2024-10-24

## 2024-10-24 RX ORDER — DIPHENHYDRAMINE HYDROCHLORIDE 50 MG/ML
25 INJECTION INTRAMUSCULAR; INTRAVENOUS
Status: CANCELLED
Start: 2024-10-24

## 2024-10-24 RX ORDER — MEPERIDINE HYDROCHLORIDE 25 MG/ML
25 INJECTION INTRAMUSCULAR; INTRAVENOUS; SUBCUTANEOUS
Status: CANCELLED | OUTPATIENT
Start: 2024-10-24

## 2024-10-29 ENCOUNTER — INFUSION THERAPY VISIT (OUTPATIENT)
Dept: INFUSION THERAPY | Facility: HOSPITAL | Age: 59
End: 2024-10-29
Attending: PSYCHIATRY & NEUROLOGY
Payer: COMMERCIAL

## 2024-10-29 VITALS
RESPIRATION RATE: 16 BRPM | TEMPERATURE: 98 F | SYSTOLIC BLOOD PRESSURE: 146 MMHG | HEART RATE: 73 BPM | OXYGEN SATURATION: 98 % | DIASTOLIC BLOOD PRESSURE: 77 MMHG

## 2024-10-29 DIAGNOSIS — G61.81 CIDP (CHRONIC INFLAMMATORY DEMYELINATING POLYNEUROPATHY) (H): Primary | ICD-10-CM

## 2024-10-29 PROCEDURE — 250N000011 HC RX IP 250 OP 636: Mod: JZ | Performed by: PSYCHIATRY & NEUROLOGY

## 2024-10-29 PROCEDURE — 96401 CHEMO ANTI-NEOPL SQ/IM: CPT

## 2024-10-29 RX ORDER — ALBUTEROL SULFATE 90 UG/1
1-2 INHALANT RESPIRATORY (INHALATION)
Status: DISCONTINUED | OUTPATIENT
Start: 2024-10-29 | End: 2024-10-29 | Stop reason: HOSPADM

## 2024-10-29 RX ORDER — METHYLPREDNISOLONE SODIUM SUCCINATE 40 MG/ML
40 INJECTION INTRAMUSCULAR; INTRAVENOUS
Status: CANCELLED
Start: 2024-11-04

## 2024-10-29 RX ORDER — DIPHENHYDRAMINE HYDROCHLORIDE 50 MG/ML
25 INJECTION INTRAMUSCULAR; INTRAVENOUS
Status: DISCONTINUED | OUTPATIENT
Start: 2024-10-29 | End: 2024-10-29 | Stop reason: HOSPADM

## 2024-10-29 RX ORDER — MEPERIDINE HYDROCHLORIDE 50 MG/ML
25 INJECTION INTRAMUSCULAR; INTRAVENOUS; SUBCUTANEOUS
Status: CANCELLED | OUTPATIENT
Start: 2024-11-04

## 2024-10-29 RX ORDER — DIPHENHYDRAMINE HYDROCHLORIDE 50 MG/ML
25 INJECTION INTRAMUSCULAR; INTRAVENOUS
Status: CANCELLED
Start: 2024-11-04

## 2024-10-29 RX ORDER — DIPHENHYDRAMINE HYDROCHLORIDE 50 MG/ML
50 INJECTION INTRAMUSCULAR; INTRAVENOUS
Status: DISCONTINUED | OUTPATIENT
Start: 2024-10-29 | End: 2024-10-29 | Stop reason: HOSPADM

## 2024-10-29 RX ORDER — ALBUTEROL SULFATE 0.83 MG/ML
2.5 SOLUTION RESPIRATORY (INHALATION)
Status: CANCELLED | OUTPATIENT
Start: 2024-11-04

## 2024-10-29 RX ORDER — MEPERIDINE HYDROCHLORIDE 50 MG/ML
25 INJECTION INTRAMUSCULAR; INTRAVENOUS; SUBCUTANEOUS
Status: DISCONTINUED | OUTPATIENT
Start: 2024-10-29 | End: 2024-10-29 | Stop reason: HOSPADM

## 2024-10-29 RX ORDER — METHYLPREDNISOLONE SODIUM SUCCINATE 40 MG/ML
40 INJECTION INTRAMUSCULAR; INTRAVENOUS
Status: DISCONTINUED | OUTPATIENT
Start: 2024-10-29 | End: 2024-10-29 | Stop reason: HOSPADM

## 2024-10-29 RX ORDER — ALBUTEROL SULFATE 90 UG/1
1-2 INHALANT RESPIRATORY (INHALATION)
Status: CANCELLED
Start: 2024-11-04

## 2024-10-29 RX ORDER — EPINEPHRINE 1 MG/ML
0.3 INJECTION, SOLUTION INTRAMUSCULAR; SUBCUTANEOUS EVERY 5 MIN PRN
Status: CANCELLED | OUTPATIENT
Start: 2024-11-04

## 2024-10-29 RX ORDER — EPINEPHRINE 1 MG/ML
0.3 INJECTION, SOLUTION INTRAMUSCULAR; SUBCUTANEOUS EVERY 5 MIN PRN
Status: DISCONTINUED | OUTPATIENT
Start: 2024-10-29 | End: 2024-10-29 | Stop reason: HOSPADM

## 2024-10-29 RX ORDER — DIPHENHYDRAMINE HYDROCHLORIDE 50 MG/ML
50 INJECTION INTRAMUSCULAR; INTRAVENOUS
Status: CANCELLED
Start: 2024-11-04

## 2024-10-29 RX ORDER — ALBUTEROL SULFATE 0.83 MG/ML
2.5 SOLUTION RESPIRATORY (INHALATION)
Status: DISCONTINUED | OUTPATIENT
Start: 2024-10-29 | End: 2024-10-29 | Stop reason: HOSPADM

## 2024-10-29 RX ADMIN — EFGARTIGIMOD ALFA AND HYALURONIDASE (HUMAN RECOMBINANT) 1008 MG: 180; 2000 INJECTION, SOLUTION SUBCUTANEOUS at 08:28

## 2024-10-29 NOTE — PROGRESS NOTES
Infusion Nursing Note:  Emiliano Aguiar presents today for Vyvgart.    Patient seen by provider today: No   present during visit today: Not Applicable.    Note: Emiliano arrives ambulatory with a cane to [facility] infusion today for Vyvgart infusion. Pain in feet, arms and low back. Vyvgart given right upper abdomen with subcutaneous infusion set. He tolerated well. He was observed for 30 minutes post injection.      Intravenous Access:  No Intravenous access/labs at this visit.    Treatment Conditions:  Biological Infusion Checklist:  ~~~ NOTE: If the patient answers yes to any of the questions below, hold the infusion and contact ordering provider or on-call provider.    Have you recently had an elevated temperature, fever, chills, productive cough, coughing for 3 weeks or longer or hemoptysis,  abnormal vital signs, night sweats,  chest pain or have you noticed a decrease in your appetite, unexplained weight loss or fatigue? No  Do you have any open wounds or new incisions? No  Do you have any upcoming hospitalizations or surgeries? Does not include esophagogastroduodenoscopy, colonoscopy, endoscopic retrograde cholangiopancreatography (ERCP), endoscopic ultrasound (EUS), dental procedures or joint aspiration/steroid injections No  Do you currently have any signs of illness or infection or are you on any antibiotics? No  Have you had any new, sudden or worsening abdominal pain? No  Have you or anyone in your household received a live vaccination in the past 4 weeks? Please note: No live vaccines while on biologic/chemotherapy until 6 months after the last treatment. Patient can receive the flu vaccine (shot only), pneumovax and the Covid vaccine. It is optimal for the patient to get these vaccines mid cycle, but they can be given at any time as long as it is not on the day of the infusion. No  Have you recently been diagnosed with any new nervous system diseases (ie. Multiple sclerosis, Guillain [place],  seizures, neurological changes) or cancer diagnosis? Are you on any form of radiation or chemotherapy? No  Are you pregnant or breast feeding or do you have plans of pregnancy in the future? No  Have you been having any signs of worsening depression or suicidal ideations?  (benlysta only) No  Have there been any other new onset medical symptoms? No  Have you had any new blood clots? (IVIG only) No      Post Infusion Assessment:  Patient tolerated injection without incident.  Patient observed for 30 minutes post Vyvgart per protocol.       Discharge Plan:   Patient and/or family verbalized understanding of discharge instructions and all questions answered.  AVS to patient via [employer].  Patient will return 11/5 for next appointment.   Patient discharged in stable condition accompanied by: self.  Departure Mode: Ambulatory.      Daija Reis RN

## 2024-11-05 ENCOUNTER — INFUSION THERAPY VISIT (OUTPATIENT)
Dept: INFUSION THERAPY | Facility: HOSPITAL | Age: 59
End: 2024-11-05
Attending: PSYCHIATRY & NEUROLOGY
Payer: COMMERCIAL

## 2024-11-05 VITALS
RESPIRATION RATE: 16 BRPM | SYSTOLIC BLOOD PRESSURE: 149 MMHG | TEMPERATURE: 98 F | HEART RATE: 66 BPM | DIASTOLIC BLOOD PRESSURE: 78 MMHG | OXYGEN SATURATION: 99 %

## 2024-11-05 DIAGNOSIS — G61.81 CIDP (CHRONIC INFLAMMATORY DEMYELINATING POLYNEUROPATHY) (H): Primary | ICD-10-CM

## 2024-11-05 PROCEDURE — 96401 CHEMO ANTI-NEOPL SQ/IM: CPT

## 2024-11-05 PROCEDURE — 250N000011 HC RX IP 250 OP 636: Mod: JZ | Performed by: PSYCHIATRY & NEUROLOGY

## 2024-11-05 RX ORDER — MEPERIDINE HYDROCHLORIDE 50 MG/ML
25 INJECTION INTRAMUSCULAR; INTRAVENOUS; SUBCUTANEOUS
Status: DISCONTINUED | OUTPATIENT
Start: 2024-11-05 | End: 2024-11-05 | Stop reason: HOSPADM

## 2024-11-05 RX ORDER — EPINEPHRINE 1 MG/ML
0.3 INJECTION, SOLUTION INTRAMUSCULAR; SUBCUTANEOUS EVERY 5 MIN PRN
Status: CANCELLED | OUTPATIENT
Start: 2024-11-12

## 2024-11-05 RX ORDER — EPINEPHRINE 1 MG/ML
0.3 INJECTION, SOLUTION INTRAMUSCULAR; SUBCUTANEOUS EVERY 5 MIN PRN
Status: DISCONTINUED | OUTPATIENT
Start: 2024-11-05 | End: 2024-11-05 | Stop reason: HOSPADM

## 2024-11-05 RX ORDER — DIPHENHYDRAMINE HYDROCHLORIDE 50 MG/ML
25 INJECTION INTRAMUSCULAR; INTRAVENOUS
Status: DISCONTINUED | OUTPATIENT
Start: 2024-11-05 | End: 2024-11-05 | Stop reason: HOSPADM

## 2024-11-05 RX ORDER — ALBUTEROL SULFATE 0.83 MG/ML
2.5 SOLUTION RESPIRATORY (INHALATION)
Status: DISCONTINUED | OUTPATIENT
Start: 2024-11-05 | End: 2024-11-05 | Stop reason: HOSPADM

## 2024-11-05 RX ORDER — ALBUTEROL SULFATE 90 UG/1
1-2 INHALANT RESPIRATORY (INHALATION)
Status: DISCONTINUED | OUTPATIENT
Start: 2024-11-05 | End: 2024-11-05 | Stop reason: HOSPADM

## 2024-11-05 RX ORDER — METHYLPREDNISOLONE SODIUM SUCCINATE 40 MG/ML
40 INJECTION INTRAMUSCULAR; INTRAVENOUS
Status: DISCONTINUED | OUTPATIENT
Start: 2024-11-05 | End: 2024-11-05 | Stop reason: HOSPADM

## 2024-11-05 RX ORDER — ALBUTEROL SULFATE 90 UG/1
1-2 INHALANT RESPIRATORY (INHALATION)
Status: CANCELLED
Start: 2024-11-12

## 2024-11-05 RX ORDER — DIPHENHYDRAMINE HYDROCHLORIDE 50 MG/ML
25 INJECTION INTRAMUSCULAR; INTRAVENOUS
Status: CANCELLED
Start: 2024-11-12

## 2024-11-05 RX ORDER — DIPHENHYDRAMINE HYDROCHLORIDE 50 MG/ML
50 INJECTION INTRAMUSCULAR; INTRAVENOUS
Status: DISCONTINUED | OUTPATIENT
Start: 2024-11-05 | End: 2024-11-05 | Stop reason: HOSPADM

## 2024-11-05 RX ORDER — METHYLPREDNISOLONE SODIUM SUCCINATE 40 MG/ML
40 INJECTION INTRAMUSCULAR; INTRAVENOUS
Status: CANCELLED
Start: 2024-11-12

## 2024-11-05 RX ORDER — ALBUTEROL SULFATE 0.83 MG/ML
2.5 SOLUTION RESPIRATORY (INHALATION)
Status: CANCELLED | OUTPATIENT
Start: 2024-11-12

## 2024-11-05 RX ORDER — MEPERIDINE HYDROCHLORIDE 50 MG/ML
25 INJECTION INTRAMUSCULAR; INTRAVENOUS; SUBCUTANEOUS
Status: CANCELLED | OUTPATIENT
Start: 2024-11-12

## 2024-11-05 RX ORDER — DIPHENHYDRAMINE HYDROCHLORIDE 50 MG/ML
50 INJECTION INTRAMUSCULAR; INTRAVENOUS
Status: CANCELLED
Start: 2024-11-12

## 2024-11-05 RX ADMIN — EFGARTIGIMOD ALFA AND HYALURONIDASE (HUMAN RECOMBINANT) 1008 MG: 180; 2000 INJECTION, SOLUTION SUBCUTANEOUS at 08:05

## 2024-11-05 NOTE — PROGRESS NOTES
Infusion Nursing Note:  Emiliano Aguiar presents today for Vyvgart injection.    Patient seen by provider today: No   present during visit today: Not Applicable.    Note:   Emiliano continues to have pain in arms, wrists, finger, legs, knees, feet. It has slightly improved from last week. Vyvgart injection given right upper abdomen today..      Intravenous Access:  No Intravenous access/labs at this visit.    Treatment Conditions:  Biological Infusion Checklist:  ~~~ NOTE: If the patient answers yes to any of the questions below, hold the infusion and contact ordering provider or on-call provider.    Have you recently had an elevated temperature, fever, chills, productive cough, coughing for 3 weeks or longer or hemoptysis,  abnormal vital signs, night sweats,  chest pain or have you noticed a decrease in your appetite, unexplained weight loss or fatigue? No  Do you have any open wounds or new incisions? No  Do you have any upcoming hospitalizations or surgeries? Does not include esophagogastroduodenoscopy, colonoscopy, endoscopic retrograde cholangiopancreatography (ERCP), endoscopic ultrasound (EUS), dental procedures or joint aspiration/steroid injections No  Do you currently have any signs of illness or infection or are you on any antibiotics? No  Have you had any new, sudden or worsening abdominal pain? No  Have you or anyone in your household received a live vaccination in the past 4 weeks? Please note: No live vaccines while on biologic/chemotherapy until 6 months after the last treatment. Patient can receive the flu vaccine (shot only), pneumovax and the Covid vaccine. It is optimal for the patient to get these vaccines mid cycle, but they can be given at any time as long as it is not on the day of the infusion. No  Have you recently been diagnosed with any new nervous system diseases (ie. Multiple sclerosis, Guillain [place], seizures, neurological changes) or cancer diagnosis? Are you on any form of  radiation or chemotherapy? No  Are you pregnant or breast feeding or do you have plans of pregnancy in the future? NA  Have you been having any signs of worsening depression or suicidal ideations?  (benlysta only) NA  Have there been any other new onset medical symptoms? No  Have you had any new blood clots? (IVIG only) NA      Post Infusion Assessment:  Patient tolerated injection without incident.   Emiliano was observed 30 minutes post injection per [facility] policy.      Discharge Plan:   Patient and/or family verbalized understanding of discharge instructions and all questions answered.  AVS to patient via [employer].  Patient will return 11/12 for next appointment.   Patient discharged in stable condition accompanied by: self.  Departure Mode: Ambulatory.      Daija Reis RN

## 2024-11-12 ENCOUNTER — INFUSION THERAPY VISIT (OUTPATIENT)
Dept: INFUSION THERAPY | Facility: HOSPITAL | Age: 59
End: 2024-11-12
Attending: PSYCHIATRY & NEUROLOGY
Payer: COMMERCIAL

## 2024-11-12 VITALS
HEART RATE: 66 BPM | OXYGEN SATURATION: 98 % | SYSTOLIC BLOOD PRESSURE: 152 MMHG | TEMPERATURE: 98.2 F | DIASTOLIC BLOOD PRESSURE: 81 MMHG | RESPIRATION RATE: 16 BRPM

## 2024-11-12 DIAGNOSIS — G61.81 CIDP (CHRONIC INFLAMMATORY DEMYELINATING POLYNEUROPATHY) (H): Primary | ICD-10-CM

## 2024-11-12 PROCEDURE — 250N000011 HC RX IP 250 OP 636: Mod: JZ | Performed by: PSYCHIATRY & NEUROLOGY

## 2024-11-12 PROCEDURE — 96401 CHEMO ANTI-NEOPL SQ/IM: CPT

## 2024-11-12 RX ORDER — DIPHENHYDRAMINE HYDROCHLORIDE 50 MG/ML
50 INJECTION INTRAMUSCULAR; INTRAVENOUS
Start: 2024-11-19

## 2024-11-12 RX ORDER — DIPHENHYDRAMINE HYDROCHLORIDE 50 MG/ML
25 INJECTION INTRAMUSCULAR; INTRAVENOUS
Start: 2024-11-19

## 2024-11-12 RX ORDER — MEPERIDINE HYDROCHLORIDE 50 MG/ML
25 INJECTION INTRAMUSCULAR; INTRAVENOUS; SUBCUTANEOUS
Status: DISCONTINUED | OUTPATIENT
Start: 2024-11-12 | End: 2024-11-12 | Stop reason: HOSPADM

## 2024-11-12 RX ORDER — ALBUTEROL SULFATE 0.83 MG/ML
2.5 SOLUTION RESPIRATORY (INHALATION)
OUTPATIENT
Start: 2024-11-19

## 2024-11-12 RX ORDER — DIPHENHYDRAMINE HYDROCHLORIDE 50 MG/ML
50 INJECTION INTRAMUSCULAR; INTRAVENOUS
Status: DISCONTINUED | OUTPATIENT
Start: 2024-11-12 | End: 2024-11-12 | Stop reason: HOSPADM

## 2024-11-12 RX ORDER — EPINEPHRINE 1 MG/ML
0.3 INJECTION, SOLUTION INTRAMUSCULAR; SUBCUTANEOUS EVERY 5 MIN PRN
Status: DISCONTINUED | OUTPATIENT
Start: 2024-11-12 | End: 2024-11-12 | Stop reason: HOSPADM

## 2024-11-12 RX ORDER — METHYLPREDNISOLONE SODIUM SUCCINATE 40 MG/ML
40 INJECTION INTRAMUSCULAR; INTRAVENOUS
Start: 2024-11-19

## 2024-11-12 RX ORDER — MEPERIDINE HYDROCHLORIDE 50 MG/ML
25 INJECTION INTRAMUSCULAR; INTRAVENOUS; SUBCUTANEOUS
OUTPATIENT
Start: 2024-11-19

## 2024-11-12 RX ORDER — ALBUTEROL SULFATE 90 UG/1
1-2 INHALANT RESPIRATORY (INHALATION)
Start: 2024-11-19

## 2024-11-12 RX ORDER — ALBUTEROL SULFATE 0.83 MG/ML
2.5 SOLUTION RESPIRATORY (INHALATION)
Status: DISCONTINUED | OUTPATIENT
Start: 2024-11-12 | End: 2024-11-12 | Stop reason: HOSPADM

## 2024-11-12 RX ORDER — EPINEPHRINE 1 MG/ML
0.3 INJECTION, SOLUTION INTRAMUSCULAR; SUBCUTANEOUS EVERY 5 MIN PRN
OUTPATIENT
Start: 2024-11-19

## 2024-11-12 RX ORDER — ALBUTEROL SULFATE 90 UG/1
1-2 INHALANT RESPIRATORY (INHALATION)
Status: DISCONTINUED | OUTPATIENT
Start: 2024-11-12 | End: 2024-11-12 | Stop reason: HOSPADM

## 2024-11-12 RX ORDER — DIPHENHYDRAMINE HYDROCHLORIDE 50 MG/ML
25 INJECTION INTRAMUSCULAR; INTRAVENOUS
Status: DISCONTINUED | OUTPATIENT
Start: 2024-11-12 | End: 2024-11-12 | Stop reason: HOSPADM

## 2024-11-12 RX ORDER — METHYLPREDNISOLONE SODIUM SUCCINATE 40 MG/ML
40 INJECTION INTRAMUSCULAR; INTRAVENOUS
Status: DISCONTINUED | OUTPATIENT
Start: 2024-11-12 | End: 2024-11-12 | Stop reason: HOSPADM

## 2024-11-12 RX ADMIN — EFGARTIGIMOD ALFA AND HYALURONIDASE (HUMAN RECOMBINANT) 1008 MG: 180; 2000 INJECTION, SOLUTION SUBCUTANEOUS at 08:42

## 2024-11-12 NOTE — PROGRESS NOTES
Infusion Nursing Note:  Emiliano Aguiar presents today for efgartigimod olegario/hyaluronidase-qvfc (Vyvgart Hytrulo - Sub-Q .    Patient seen by provider today: No   present during visit today: Not Applicable.    Note: Patient presents today using a cane. Pt reports constant pain and or tingling in feet, arms, thighs. Vyvgart given right upper abdomen with subcutaneous infusion set.     In-basket message sent to Dr. Godoy requesting orders.      Intravenous Access:  No Intravenous access/labs at this visit.      Treatment Conditions:  Biological Infusion Checklist:  ~~~ NOTE: If the patient answers yes to any of the questions below, hold the infusion and contact ordering provider or on-call provider.    Have you recently had an elevated temperature, fever, chills, productive cough, coughing for 3 weeks or longer or hemoptysis,  abnormal vital signs, night sweats,  chest pain or have you noticed a decrease in your appetite, unexplained weight loss or fatigue? No  Do you have any open wounds or new incisions? No  Do you have any upcoming hospitalizations or surgeries? Does not include esophagogastroduodenoscopy, colonoscopy, endoscopic retrograde cholangiopancreatography (ERCP), endoscopic ultrasound (EUS), dental procedures or joint aspiration/steroid injections No  Do you currently have any signs of illness or infection or are you on any antibiotics? No  Have you had any new, sudden or worsening abdominal pain? No  Have you or anyone in your household received a live vaccination in the past 4 weeks? Please note: No live vaccines while on biologic/chemotherapy until 6 months after the last treatment. Patient can receive the flu vaccine (shot only), pneumovax and the Covid vaccine. It is optimal for the patient to get these vaccines mid cycle, but they can be given at any time as long as it is not on the day of the infusion. No  Have you recently been diagnosed with any new nervous system diseases (ie.  Multiple sclerosis, Guillain [place], seizures, neurological changes) or cancer diagnosis? Are you on any form of radiation or chemotherapy? No  Are you pregnant or breast feeding or do you have plans of pregnancy in the future? No  Have you been having any signs of worsening depression or suicidal ideations?  (benlysta only) No  Have there been any other new onset medical symptoms? No  Have you had any new blood clots? (IVIG only) No      Post Infusion Assessment:  Patient tolerated injection to left without incident.  Patient observed for 30 minutes post Vygart injection per protocol.       Discharge Plan:   Patient will return 12/3 for next appointment unless order frequency changes.   Patient discharged in stable condition accompanied by: self.  Departure Mode: Ambulatory with a cane.      Sofie Mehta RN

## 2024-11-19 ENCOUNTER — INFUSION THERAPY VISIT (OUTPATIENT)
Dept: INFUSION THERAPY | Facility: HOSPITAL | Age: 59
End: 2024-11-19
Attending: PSYCHIATRY & NEUROLOGY
Payer: COMMERCIAL

## 2024-11-19 VITALS
OXYGEN SATURATION: 99 % | TEMPERATURE: 98.2 F | SYSTOLIC BLOOD PRESSURE: 116 MMHG | DIASTOLIC BLOOD PRESSURE: 71 MMHG | HEART RATE: 66 BPM

## 2024-11-19 DIAGNOSIS — G61.81 CIDP (CHRONIC INFLAMMATORY DEMYELINATING POLYNEUROPATHY) (H): Primary | ICD-10-CM

## 2024-11-19 PROCEDURE — 96401 CHEMO ANTI-NEOPL SQ/IM: CPT

## 2024-11-19 PROCEDURE — 250N000011 HC RX IP 250 OP 636: Mod: JZ | Performed by: PSYCHIATRY & NEUROLOGY

## 2024-11-19 RX ORDER — DIPHENHYDRAMINE HYDROCHLORIDE 50 MG/ML
50 INJECTION INTRAMUSCULAR; INTRAVENOUS
Status: DISCONTINUED | OUTPATIENT
Start: 2024-11-19 | End: 2024-11-19 | Stop reason: HOSPADM

## 2024-11-19 RX ORDER — ALBUTEROL SULFATE 0.83 MG/ML
2.5 SOLUTION RESPIRATORY (INHALATION)
OUTPATIENT
Start: 2024-11-26

## 2024-11-19 RX ORDER — METHYLPREDNISOLONE SODIUM SUCCINATE 40 MG/ML
40 INJECTION INTRAMUSCULAR; INTRAVENOUS
Status: DISCONTINUED | OUTPATIENT
Start: 2024-11-19 | End: 2024-11-19 | Stop reason: HOSPADM

## 2024-11-19 RX ORDER — EPINEPHRINE 1 MG/ML
0.3 INJECTION, SOLUTION INTRAMUSCULAR; SUBCUTANEOUS EVERY 5 MIN PRN
Status: DISCONTINUED | OUTPATIENT
Start: 2024-11-19 | End: 2024-11-19 | Stop reason: HOSPADM

## 2024-11-19 RX ORDER — MEPERIDINE HYDROCHLORIDE 50 MG/ML
25 INJECTION INTRAMUSCULAR; INTRAVENOUS; SUBCUTANEOUS
OUTPATIENT
Start: 2024-11-26

## 2024-11-19 RX ORDER — ALBUTEROL SULFATE 90 UG/1
1-2 INHALANT RESPIRATORY (INHALATION)
Start: 2024-11-26

## 2024-11-19 RX ORDER — DIPHENHYDRAMINE HYDROCHLORIDE 50 MG/ML
25 INJECTION INTRAMUSCULAR; INTRAVENOUS
Start: 2024-11-26

## 2024-11-19 RX ORDER — ALBUTEROL SULFATE 0.83 MG/ML
2.5 SOLUTION RESPIRATORY (INHALATION)
Status: DISCONTINUED | OUTPATIENT
Start: 2024-11-19 | End: 2024-11-19 | Stop reason: HOSPADM

## 2024-11-19 RX ORDER — METHYLPREDNISOLONE SODIUM SUCCINATE 40 MG/ML
40 INJECTION INTRAMUSCULAR; INTRAVENOUS
Start: 2024-11-26

## 2024-11-19 RX ORDER — MEPERIDINE HYDROCHLORIDE 50 MG/ML
25 INJECTION INTRAMUSCULAR; INTRAVENOUS; SUBCUTANEOUS
Status: DISCONTINUED | OUTPATIENT
Start: 2024-11-19 | End: 2024-11-19 | Stop reason: HOSPADM

## 2024-11-19 RX ORDER — EPINEPHRINE 1 MG/ML
0.3 INJECTION, SOLUTION INTRAMUSCULAR; SUBCUTANEOUS EVERY 5 MIN PRN
OUTPATIENT
Start: 2024-11-26

## 2024-11-19 RX ORDER — DIPHENHYDRAMINE HYDROCHLORIDE 50 MG/ML
25 INJECTION INTRAMUSCULAR; INTRAVENOUS
Status: DISCONTINUED | OUTPATIENT
Start: 2024-11-19 | End: 2024-11-19 | Stop reason: HOSPADM

## 2024-11-19 RX ORDER — DIPHENHYDRAMINE HYDROCHLORIDE 50 MG/ML
50 INJECTION INTRAMUSCULAR; INTRAVENOUS
Start: 2024-11-26

## 2024-11-19 RX ORDER — ALBUTEROL SULFATE 90 UG/1
1-2 INHALANT RESPIRATORY (INHALATION)
Status: DISCONTINUED | OUTPATIENT
Start: 2024-11-19 | End: 2024-11-19 | Stop reason: HOSPADM

## 2024-11-19 RX ADMIN — EFGARTIGIMOD ALFA AND HYALURONIDASE (HUMAN RECOMBINANT) 1008 MG: 180; 2000 INJECTION, SOLUTION SUBCUTANEOUS at 08:04

## 2024-11-19 NOTE — PROGRESS NOTES
Infusion Nursing Note:  Emiliano Aguiar presents today for Vyvgart injection.    Patient seen by provider today: No   present during visit today: Not Applicable.    Note: Emiliano reports that pain and numbness are improving. He noticed improvement after the last injection. He continues to have fatigue that is not improving.  Vyvgart injection given right upper abdomen. He was observed for 30 minutes. He tolerated well.      Intravenous Access:  No Intravenous access/labs at this visit.    Treatment Conditions:  Biological Infusion Checklist:  ~~~ NOTE: If the patient answers yes to any of the questions below, hold the infusion and contact ordering provider or on-call provider.    Have you recently had an elevated temperature, fever, chills, productive cough, coughing for 3 weeks or longer or hemoptysis,  abnormal vital signs, night sweats,  chest pain or have you noticed a decrease in your appetite, unexplained weight loss or fatigue? No  Do you have any open wounds or new incisions? No  Do you have any upcoming hospitalizations or surgeries? Does not include esophagogastroduodenoscopy, colonoscopy, endoscopic retrograde cholangiopancreatography (ERCP), endoscopic ultrasound (EUS), dental procedures or joint aspiration/steroid injections No  Do you currently have any signs of illness or infection or are you on any antibiotics? No  Have you had any new, sudden or worsening abdominal pain? No  Have you or anyone in your household received a live vaccination in the past 4 weeks? Please note: No live vaccines while on biologic/chemotherapy until 6 months after the last treatment. Patient can receive the flu vaccine (shot only), pneumovax and the Covid vaccine. It is optimal for the patient to get these vaccines mid cycle, but they can be given at any time as long as it is not on the day of the infusion. No  Have you recently been diagnosed with any new nervous system diseases (ie. Multiple sclerosis, Guillain  [place], seizures, neurological changes) or cancer diagnosis? Are you on any form of radiation or chemotherapy? No  Are you pregnant or breast feeding or do you have plans of pregnancy in the future? No  Have you been having any signs of worsening depression or suicidal ideations?  (benlysta only) NA  Have there been any other new onset medical symptoms? No  Have you had any new blood clots? (IVIG only) NA      Post Infusion Assessment:  Patient tolerated injection without incident.       Discharge Plan:   Patient and/or family verbalized understanding of discharge instructions and all questions answered.  AVS to patient via [email or web address].  Patient will return 11/26 for next appointment.   Patient discharged in stable condition accompanied by: self.  Departure Mode: Ambulatory.      Daija Reis RN

## 2024-11-26 ENCOUNTER — INFUSION THERAPY VISIT (OUTPATIENT)
Dept: INFUSION THERAPY | Facility: HOSPITAL | Age: 59
End: 2024-11-26
Attending: PSYCHIATRY & NEUROLOGY
Payer: COMMERCIAL

## 2024-11-26 VITALS
HEART RATE: 57 BPM | OXYGEN SATURATION: 99 % | SYSTOLIC BLOOD PRESSURE: 149 MMHG | RESPIRATION RATE: 16 BRPM | TEMPERATURE: 98.1 F | DIASTOLIC BLOOD PRESSURE: 79 MMHG

## 2024-11-26 DIAGNOSIS — G61.81 CIDP (CHRONIC INFLAMMATORY DEMYELINATING POLYNEUROPATHY) (H): Primary | ICD-10-CM

## 2024-11-26 PROCEDURE — 250N000011 HC RX IP 250 OP 636: Mod: JZ | Performed by: PSYCHIATRY & NEUROLOGY

## 2024-11-26 PROCEDURE — 96401 CHEMO ANTI-NEOPL SQ/IM: CPT

## 2024-11-26 RX ORDER — ALBUTEROL SULFATE 0.83 MG/ML
2.5 SOLUTION RESPIRATORY (INHALATION)
OUTPATIENT
Start: 2024-12-03

## 2024-11-26 RX ORDER — DIPHENHYDRAMINE HYDROCHLORIDE 50 MG/ML
25 INJECTION INTRAMUSCULAR; INTRAVENOUS
Start: 2024-12-03

## 2024-11-26 RX ORDER — DIPHENHYDRAMINE HYDROCHLORIDE 50 MG/ML
50 INJECTION INTRAMUSCULAR; INTRAVENOUS
Start: 2024-12-03

## 2024-11-26 RX ORDER — EPINEPHRINE 1 MG/ML
0.3 INJECTION, SOLUTION INTRAMUSCULAR; SUBCUTANEOUS EVERY 5 MIN PRN
OUTPATIENT
Start: 2024-12-03

## 2024-11-26 RX ORDER — METHYLPREDNISOLONE SODIUM SUCCINATE 40 MG/ML
40 INJECTION INTRAMUSCULAR; INTRAVENOUS
Start: 2024-12-03

## 2024-11-26 RX ORDER — ALBUTEROL SULFATE 90 UG/1
1-2 INHALANT RESPIRATORY (INHALATION)
Start: 2024-12-03

## 2024-11-26 RX ORDER — MEPERIDINE HYDROCHLORIDE 50 MG/ML
25 INJECTION INTRAMUSCULAR; INTRAVENOUS; SUBCUTANEOUS
OUTPATIENT
Start: 2024-12-03

## 2024-11-26 RX ADMIN — EFGARTIGIMOD ALFA AND HYALURONIDASE (HUMAN RECOMBINANT) 1008 MG: 180; 2000 INJECTION, SOLUTION SUBCUTANEOUS at 08:48

## 2024-11-26 NOTE — PROGRESS NOTES
Infusion Nursing Note:  Emiliano Aguiar presents today for Vyvgart 1,0008mg subq.    Patient seen by provider today: No    Note: Pt arrives ambulatory to [facility]. Pt reports improvement in strength and sensation; pt was able to walk without cane today.     BP (!) 149/79 (Patient Position: Sitting)   Pulse 57   Temp 98.1  F (36.7  C)   Resp 16   SpO2 99%     Intravenous Access:  No Intravenous access/labs at this visit.    Treatment Conditions:  Biological Infusion Checklist:  ~~~ NOTE: If the patient answers yes to any of the questions below, hold the infusion and contact ordering provider or on-call provider.    Have you recently had an elevated temperature, fever, chills, productive cough, coughing for 3 weeks or longer or hemoptysis,  abnormal vital signs, night sweats,  chest pain or have you noticed a decrease in your appetite, unexplained weight loss or fatigue? No  Do you have any open wounds or new incisions? No  Do you have any upcoming hospitalizations or surgeries? Does not include esophagogastroduodenoscopy, colonoscopy, endoscopic retrograde cholangiopancreatography (ERCP), endoscopic ultrasound (EUS), dental procedures or joint aspiration/steroid injections No  Do you currently have any signs of illness or infection or are you on any antibiotics? No  Have you had any new, sudden or worsening abdominal pain? No  Have you or anyone in your household received a live vaccination in the past 4 weeks? Please note: No live vaccines while on biologic/chemotherapy until 6 months after the last treatment. Patient can receive the flu vaccine (shot only), pneumovax and the Covid vaccine. It is optimal for the patient to get these vaccines mid cycle, but they can be given at any time as long as it is not on the day of the infusion. No  Have you recently been diagnosed with any new nervous system diseases (ie. Multiple sclerosis, Guillain [place], seizures, neurological changes) or cancer diagnosis? Are  you on any form of radiation or chemotherapy? No  Are you pregnant or breast feeding or do you have plans of pregnancy in the future? N/A  Have you been having any signs of worsening depression or suicidal ideations?  (benlysta only) N/A  Have there been any other new onset medical symptoms? No  Have you had any new blood clots? (IVIG only) N/A  Post Infusion Assessment:  Patient tolerated subcutaneous injection without incident in LEFT upper abdomen.   Pt reports having tolerated injection well in the past, and was willing to wait for 15min to observe post inj.    Discharge Plan:   Patient discharged in stable condition accompanied by: self.  Departure Mode: Ambulatory.      Gely Odom RN

## 2024-12-03 ENCOUNTER — INFUSION THERAPY VISIT (OUTPATIENT)
Dept: INFUSION THERAPY | Facility: HOSPITAL | Age: 59
End: 2024-12-03
Attending: PSYCHIATRY & NEUROLOGY
Payer: COMMERCIAL

## 2024-12-03 VITALS
RESPIRATION RATE: 18 BRPM | DIASTOLIC BLOOD PRESSURE: 86 MMHG | TEMPERATURE: 98.2 F | HEART RATE: 59 BPM | OXYGEN SATURATION: 99 % | SYSTOLIC BLOOD PRESSURE: 156 MMHG

## 2024-12-03 DIAGNOSIS — G61.81 CIDP (CHRONIC INFLAMMATORY DEMYELINATING POLYNEUROPATHY) (H): Primary | ICD-10-CM

## 2024-12-03 PROCEDURE — 96402 CHEMO HORMON ANTINEOPL SQ/IM: CPT

## 2024-12-03 PROCEDURE — 250N000011 HC RX IP 250 OP 636: Mod: JZ | Performed by: PSYCHIATRY & NEUROLOGY

## 2024-12-03 RX ORDER — DIPHENHYDRAMINE HYDROCHLORIDE 50 MG/ML
50 INJECTION INTRAMUSCULAR; INTRAVENOUS
Start: 2024-12-10

## 2024-12-03 RX ORDER — EPINEPHRINE 1 MG/ML
0.3 INJECTION, SOLUTION INTRAMUSCULAR; SUBCUTANEOUS EVERY 5 MIN PRN
OUTPATIENT
Start: 2024-12-10

## 2024-12-03 RX ORDER — ALBUTEROL SULFATE 90 UG/1
1-2 INHALANT RESPIRATORY (INHALATION)
Start: 2024-12-10

## 2024-12-03 RX ORDER — DIPHENHYDRAMINE HYDROCHLORIDE 50 MG/ML
25 INJECTION INTRAMUSCULAR; INTRAVENOUS
Start: 2024-12-10

## 2024-12-03 RX ORDER — ALBUTEROL SULFATE 0.83 MG/ML
2.5 SOLUTION RESPIRATORY (INHALATION)
OUTPATIENT
Start: 2024-12-10

## 2024-12-03 RX ORDER — MEPERIDINE HYDROCHLORIDE 50 MG/ML
25 INJECTION INTRAMUSCULAR; INTRAVENOUS; SUBCUTANEOUS
Status: DISCONTINUED | OUTPATIENT
Start: 2024-12-03 | End: 2024-12-03 | Stop reason: HOSPADM

## 2024-12-03 RX ORDER — METHYLPREDNISOLONE SODIUM SUCCINATE 40 MG/ML
40 INJECTION INTRAMUSCULAR; INTRAVENOUS
Status: DISCONTINUED | OUTPATIENT
Start: 2024-12-03 | End: 2024-12-03 | Stop reason: HOSPADM

## 2024-12-03 RX ORDER — EPINEPHRINE 1 MG/ML
0.3 INJECTION, SOLUTION INTRAMUSCULAR; SUBCUTANEOUS EVERY 5 MIN PRN
Status: DISCONTINUED | OUTPATIENT
Start: 2024-12-03 | End: 2024-12-03 | Stop reason: HOSPADM

## 2024-12-03 RX ORDER — ALBUTEROL SULFATE 0.83 MG/ML
2.5 SOLUTION RESPIRATORY (INHALATION)
Status: DISCONTINUED | OUTPATIENT
Start: 2024-12-03 | End: 2024-12-03 | Stop reason: HOSPADM

## 2024-12-03 RX ORDER — MEPERIDINE HYDROCHLORIDE 50 MG/ML
25 INJECTION INTRAMUSCULAR; INTRAVENOUS; SUBCUTANEOUS
OUTPATIENT
Start: 2024-12-10

## 2024-12-03 RX ORDER — DIPHENHYDRAMINE HYDROCHLORIDE 50 MG/ML
50 INJECTION INTRAMUSCULAR; INTRAVENOUS
Status: DISCONTINUED | OUTPATIENT
Start: 2024-12-03 | End: 2024-12-03 | Stop reason: HOSPADM

## 2024-12-03 RX ORDER — METHYLPREDNISOLONE SODIUM SUCCINATE 40 MG/ML
40 INJECTION INTRAMUSCULAR; INTRAVENOUS
Start: 2024-12-10

## 2024-12-03 RX ORDER — ALBUTEROL SULFATE 90 UG/1
1-2 INHALANT RESPIRATORY (INHALATION)
Status: DISCONTINUED | OUTPATIENT
Start: 2024-12-03 | End: 2024-12-03 | Stop reason: HOSPADM

## 2024-12-03 RX ORDER — DIPHENHYDRAMINE HYDROCHLORIDE 50 MG/ML
25 INJECTION INTRAMUSCULAR; INTRAVENOUS
Status: DISCONTINUED | OUTPATIENT
Start: 2024-12-03 | End: 2024-12-03 | Stop reason: HOSPADM

## 2024-12-03 RX ADMIN — EFGARTIGIMOD ALFA AND HYALURONIDASE (HUMAN RECOMBINANT) 1008 MG: 180; 2000 INJECTION, SOLUTION SUBCUTANEOUS at 08:43

## 2024-12-03 NOTE — PROGRESS NOTES
Infusion Nursing Note:  Emiliano Aguiar presents today for Vyvgart injection.    Patient seen by provider today: No   present during visit today: Not Applicable.    Note: Emiliano arrives ambulatory to [facility]. He has not been needing his cane for the last 1-2 weeks. Pain and numbness have improved.  Tingling in hands, feet and ankles. Pain 3/10 today.  Vyvgart injection given right upper abdomen. He tolerated well. He was observed for 30 minutes post injection.      Intravenous Access:  No Intravenous access/labs at this visit.    Treatment Conditions:  Biological Infusion Checklist:  ~~~ NOTE: If the patient answers yes to any of the questions below, hold the infusion and contact ordering provider or on-call provider.    Have you recently had an elevated temperature, fever, chills, productive cough, coughing for 3 weeks or longer or hemoptysis,  abnormal vital signs, night sweats,  chest pain or have you noticed a decrease in your appetite, unexplained weight loss or fatigue? No  Do you have any open wounds or new incisions? No  Do you have any upcoming hospitalizations or surgeries? Does not include esophagogastroduodenoscopy, colonoscopy, endoscopic retrograde cholangiopancreatography (ERCP), endoscopic ultrasound (EUS), dental procedures or joint aspiration/steroid injections No  Do you currently have any signs of illness or infection or are you on any antibiotics? No  Have you had any new, sudden or worsening abdominal pain? No  Have you or anyone in your household received a live vaccination in the past 4 weeks? Please note: No live vaccines while on biologic/chemotherapy until 6 months after the last treatment. Patient can receive the flu vaccine (shot only), pneumovax and the Covid vaccine. It is optimal for the patient to get these vaccines mid cycle, but they can be given at any time as long as it is not on the day of the infusion. No  Have you recently been diagnosed with any new  nervous system diseases (ie. Multiple sclerosis, Guillain [place], seizures, neurological changes) or cancer diagnosis? Are you on any form of radiation or chemotherapy? No  Are you pregnant or breast feeding or do you have plans of pregnancy in the future? NA  Have you been having any signs of worsening depression or suicidal ideations?  (benlysta only) No  Have there been any other new onset medical symptoms? No  Have you had any new blood clots? (IVIG only) No      Post Infusion Assessment:  Patient tolerated injection without incident.       Discharge Plan:   Patient and/or family verbalized understanding of discharge instructions and all questions answered.  AVS to patient via [employer].  Patient will return 12/10 for next appointment.   Patient discharged in stable condition accompanied by: self.  Departure Mode: Ambulatory.      Daija Reis RN

## 2024-12-10 ENCOUNTER — INFUSION THERAPY VISIT (OUTPATIENT)
Dept: INFUSION THERAPY | Facility: HOSPITAL | Age: 59
End: 2024-12-10
Attending: PSYCHIATRY & NEUROLOGY
Payer: COMMERCIAL

## 2024-12-10 VITALS
SYSTOLIC BLOOD PRESSURE: 139 MMHG | HEART RATE: 63 BPM | RESPIRATION RATE: 18 BRPM | TEMPERATURE: 97.9 F | DIASTOLIC BLOOD PRESSURE: 80 MMHG | OXYGEN SATURATION: 98 %

## 2024-12-10 DIAGNOSIS — G61.81 CIDP (CHRONIC INFLAMMATORY DEMYELINATING POLYNEUROPATHY) (H): Primary | ICD-10-CM

## 2024-12-10 PROCEDURE — 96401 CHEMO ANTI-NEOPL SQ/IM: CPT

## 2024-12-10 PROCEDURE — 250N000011 HC RX IP 250 OP 636: Mod: JZ | Performed by: PSYCHIATRY & NEUROLOGY

## 2024-12-10 RX ORDER — DIPHENHYDRAMINE HYDROCHLORIDE 50 MG/ML
50 INJECTION INTRAMUSCULAR; INTRAVENOUS
Start: 2024-12-17

## 2024-12-10 RX ORDER — ALBUTEROL SULFATE 90 UG/1
1-2 INHALANT RESPIRATORY (INHALATION)
Start: 2024-12-17

## 2024-12-10 RX ORDER — ALBUTEROL SULFATE 0.83 MG/ML
2.5 SOLUTION RESPIRATORY (INHALATION)
OUTPATIENT
Start: 2024-12-17

## 2024-12-10 RX ORDER — MEPERIDINE HYDROCHLORIDE 50 MG/ML
25 INJECTION INTRAMUSCULAR; INTRAVENOUS; SUBCUTANEOUS
OUTPATIENT
Start: 2024-12-17

## 2024-12-10 RX ORDER — DIPHENHYDRAMINE HYDROCHLORIDE 50 MG/ML
25 INJECTION INTRAMUSCULAR; INTRAVENOUS
Start: 2024-12-17

## 2024-12-10 RX ORDER — EPINEPHRINE 1 MG/ML
0.3 INJECTION, SOLUTION INTRAMUSCULAR; SUBCUTANEOUS EVERY 5 MIN PRN
OUTPATIENT
Start: 2024-12-17

## 2024-12-10 RX ORDER — METHYLPREDNISOLONE SODIUM SUCCINATE 40 MG/ML
40 INJECTION INTRAMUSCULAR; INTRAVENOUS
Start: 2024-12-17

## 2024-12-10 RX ADMIN — EFGARTIGIMOD ALFA AND HYALURONIDASE (HUMAN RECOMBINANT) 1008 MG: 180; 2000 INJECTION, SOLUTION SUBCUTANEOUS at 08:05

## 2024-12-10 NOTE — PROGRESS NOTES
Infusion Nursing Note:  Emiliano Aguiar presents today for Vyvgart injection.    Patient seen by provider today: No   present during visit today: Not Applicable.    Note: Pain and numbness have improved. Fatigue continues to be a complaint; pt states he got home from work about 1630 and went to bed about 1830 last night.      Intravenous Access:  No Intravenous access/labs at this visit.    Treatment Conditions:  Biological Infusion Checklist:  ~~~ NOTE: If the patient answers yes to any of the questions below, hold the infusion and contact ordering provider or on-call provider.    Have you recently had an elevated temperature, fever, chills, productive cough, coughing for 3 weeks or longer or hemoptysis,  abnormal vital signs, night sweats,  chest pain or have you noticed a decrease in your appetite, unexplained weight loss or fatigue? No  Do you have any open wounds or new incisions? No  Do you have any upcoming hospitalizations or surgeries? Does not include esophagogastroduodenoscopy, colonoscopy, endoscopic retrograde cholangiopancreatography (ERCP), endoscopic ultrasound (EUS), dental procedures or joint aspiration/steroid injections No  Do you currently have any signs of illness or infection or are you on any antibiotics? No  Have you had any new, sudden or worsening abdominal pain? No  Have you or anyone in your household received a live vaccination in the past 4 weeks? Please note: No live vaccines while on biologic/chemotherapy until 6 months after the last treatment. Patient can receive the flu vaccine (shot only), pneumovax and the Covid vaccine. It is optimal for the patient to get these vaccines mid cycle, but they can be given at any time as long as it is not on the day of the infusion. No  Have you recently been diagnosed with any new nervous system diseases (ie. Multiple sclerosis, Guillain [place], seizures, neurological changes) or cancer diagnosis? Are you on any form of radiation or  chemotherapy? No  Are you pregnant or breast feeding or do you have plans of pregnancy in the future? No  Have you been having any signs of worsening depression or suicidal ideations?  (benlysta only) No  Have there been any other new onset medical symptoms? No  Have you had any new blood clots? (IVIG only) No      Post Infusion Assessment:  Patient tolerated injection to LEFT mid abd without incident.  Patient observed for 30 minutes post Vyvgart per protocol.       Discharge Plan:   Patient will return 12/17 for next appointment.   Patient discharged in stable condition accompanied by: self.  Departure Mode: Ambulatory.      Sofie Mehta RN

## 2024-12-17 ENCOUNTER — INFUSION THERAPY VISIT (OUTPATIENT)
Dept: INFUSION THERAPY | Facility: HOSPITAL | Age: 59
End: 2024-12-17
Attending: PSYCHIATRY & NEUROLOGY
Payer: COMMERCIAL

## 2024-12-17 VITALS
HEART RATE: 60 BPM | RESPIRATION RATE: 14 BRPM | OXYGEN SATURATION: 99 % | TEMPERATURE: 97.9 F | SYSTOLIC BLOOD PRESSURE: 142 MMHG | DIASTOLIC BLOOD PRESSURE: 74 MMHG

## 2024-12-17 DIAGNOSIS — G61.81 CIDP (CHRONIC INFLAMMATORY DEMYELINATING POLYNEUROPATHY) (H): Primary | ICD-10-CM

## 2024-12-17 PROCEDURE — 250N000011 HC RX IP 250 OP 636: Mod: JZ | Performed by: PSYCHIATRY & NEUROLOGY

## 2024-12-17 PROCEDURE — 96401 CHEMO ANTI-NEOPL SQ/IM: CPT

## 2024-12-17 RX ORDER — METHYLPREDNISOLONE SODIUM SUCCINATE 40 MG/ML
40 INJECTION INTRAMUSCULAR; INTRAVENOUS
Start: 2024-12-24

## 2024-12-17 RX ORDER — MEPERIDINE HYDROCHLORIDE 50 MG/ML
25 INJECTION INTRAMUSCULAR; INTRAVENOUS; SUBCUTANEOUS
OUTPATIENT
Start: 2024-12-24

## 2024-12-17 RX ORDER — DIPHENHYDRAMINE HYDROCHLORIDE 50 MG/ML
50 INJECTION INTRAMUSCULAR; INTRAVENOUS
Start: 2024-12-24

## 2024-12-17 RX ORDER — DIPHENHYDRAMINE HYDROCHLORIDE 50 MG/ML
25 INJECTION INTRAMUSCULAR; INTRAVENOUS
Start: 2024-12-24

## 2024-12-17 RX ORDER — ALBUTEROL SULFATE 90 UG/1
1-2 INHALANT RESPIRATORY (INHALATION)
Start: 2024-12-24

## 2024-12-17 RX ORDER — EPINEPHRINE 1 MG/ML
0.3 INJECTION, SOLUTION INTRAMUSCULAR; SUBCUTANEOUS EVERY 5 MIN PRN
OUTPATIENT
Start: 2024-12-24

## 2024-12-17 RX ORDER — ALBUTEROL SULFATE 0.83 MG/ML
2.5 SOLUTION RESPIRATORY (INHALATION)
OUTPATIENT
Start: 2024-12-24

## 2024-12-17 RX ADMIN — EFGARTIGIMOD ALFA AND HYALURONIDASE (HUMAN RECOMBINANT) 1008 MG: 180; 2000 INJECTION, SOLUTION SUBCUTANEOUS at 08:29

## 2024-12-17 NOTE — PROGRESS NOTES
Infusion Nursing Note:  Emiliano Aguiar presents today for Vyvgart injection.    Patient seen by provider today: No   present during visit today: Not Applicable.    Note: Emiliano is ambulating well today. He has some pain in his feet and some generalized weakness.  He continued to feel fatigued. Vyvgart injection given right upper abdomen. He was observed for 30 minutes after his injection. He tolerated well      Intravenous Access:  No Intravenous access/labs at this visit.    Treatment Conditions:  Biological Infusion Checklist:  ~~~ NOTE: If the patient answers yes to any of the questions below, hold the infusion and contact ordering provider or on-call provider.    Have you recently had an elevated temperature, fever, chills, productive cough, coughing for 3 weeks or longer or hemoptysis,  abnormal vital signs, night sweats,  chest pain or have you noticed a decrease in your appetite, unexplained weight loss or fatigue? No  Do you have any open wounds or new incisions? No  Do you have any upcoming hospitalizations or surgeries? Does not include esophagogastroduodenoscopy, colonoscopy, endoscopic retrograde cholangiopancreatography (ERCP), endoscopic ultrasound (EUS), dental procedures or joint aspiration/steroid injections No  Do you currently have any signs of illness or infection or are you on any antibiotics? No  Have you had any new, sudden or worsening abdominal pain? No  Have you or anyone in your household received a live vaccination in the past 4 weeks? Please note: No live vaccines while on biologic/chemotherapy until 6 months after the last treatment. Patient can receive the flu vaccine (shot only), pneumovax and the Covid vaccine. It is optimal for the patient to get these vaccines mid cycle, but they can be given at any time as long as it is not on the day of the infusion. No  Have you recently been diagnosed with any new nervous system diseases (ie. Multiple sclerosis, Guillain [place],  seizures, neurological changes) or cancer diagnosis? Are you on any form of radiation or chemotherapy? No  Are you pregnant or breast feeding or do you have plans of pregnancy in the future? No  Have you been having any signs of worsening depression or suicidal ideations?  NA  Have there been any other new onset medical symptoms? No  Have you had any new blood clots? (IVIG only) NA      Post Infusion Assessment:  Patient tolerated injection without incident.       Discharge Plan:   Patient and/or family verbalized understanding of discharge instructions and all questions answered.  AVS to patient via [employer].  Patient will return 12/24 for next appointment.   Patient discharged in stable condition accompanied by: self.  Departure Mode: Ambulatory.      Daija Reis RN

## 2024-12-24 ENCOUNTER — INFUSION THERAPY VISIT (OUTPATIENT)
Dept: INFUSION THERAPY | Facility: HOSPITAL | Age: 59
End: 2024-12-24
Attending: PSYCHIATRY & NEUROLOGY
Payer: COMMERCIAL

## 2024-12-24 VITALS
RESPIRATION RATE: 16 BRPM | SYSTOLIC BLOOD PRESSURE: 142 MMHG | TEMPERATURE: 97.7 F | OXYGEN SATURATION: 100 % | HEART RATE: 61 BPM | DIASTOLIC BLOOD PRESSURE: 89 MMHG

## 2024-12-24 DIAGNOSIS — G61.81 CIDP (CHRONIC INFLAMMATORY DEMYELINATING POLYNEUROPATHY) (H): Primary | ICD-10-CM

## 2024-12-24 PROCEDURE — 96401 CHEMO ANTI-NEOPL SQ/IM: CPT

## 2024-12-24 RX ORDER — METHYLPREDNISOLONE SODIUM SUCCINATE 40 MG/ML
40 INJECTION INTRAMUSCULAR; INTRAVENOUS
Status: DISCONTINUED | OUTPATIENT
Start: 2024-12-24 | End: 2024-12-24 | Stop reason: HOSPADM

## 2024-12-24 RX ORDER — DIPHENHYDRAMINE HYDROCHLORIDE 50 MG/ML
25 INJECTION INTRAMUSCULAR; INTRAVENOUS
Status: DISCONTINUED | OUTPATIENT
Start: 2024-12-24 | End: 2024-12-24 | Stop reason: HOSPADM

## 2024-12-24 RX ORDER — DIPHENHYDRAMINE HYDROCHLORIDE 50 MG/ML
50 INJECTION INTRAMUSCULAR; INTRAVENOUS
Start: 2024-12-31

## 2024-12-24 RX ORDER — ALBUTEROL SULFATE 0.83 MG/ML
2.5 SOLUTION RESPIRATORY (INHALATION)
Status: DISCONTINUED | OUTPATIENT
Start: 2024-12-24 | End: 2024-12-24 | Stop reason: HOSPADM

## 2024-12-24 RX ORDER — METHYLPREDNISOLONE SODIUM SUCCINATE 40 MG/ML
40 INJECTION INTRAMUSCULAR; INTRAVENOUS
Start: 2024-12-31

## 2024-12-24 RX ORDER — DIPHENHYDRAMINE HYDROCHLORIDE 50 MG/ML
50 INJECTION INTRAMUSCULAR; INTRAVENOUS
Status: DISCONTINUED | OUTPATIENT
Start: 2024-12-24 | End: 2024-12-24 | Stop reason: HOSPADM

## 2024-12-24 RX ORDER — ALBUTEROL SULFATE 90 UG/1
1-2 INHALANT RESPIRATORY (INHALATION)
Status: DISCONTINUED | OUTPATIENT
Start: 2024-12-24 | End: 2024-12-24 | Stop reason: HOSPADM

## 2024-12-24 RX ORDER — EPINEPHRINE 1 MG/ML
0.3 INJECTION, SOLUTION INTRAMUSCULAR; SUBCUTANEOUS EVERY 5 MIN PRN
OUTPATIENT
Start: 2024-12-31

## 2024-12-24 RX ORDER — MEPERIDINE HYDROCHLORIDE 50 MG/ML
25 INJECTION INTRAMUSCULAR; INTRAVENOUS; SUBCUTANEOUS
OUTPATIENT
Start: 2024-12-31

## 2024-12-24 RX ORDER — ALBUTEROL SULFATE 90 UG/1
1-2 INHALANT RESPIRATORY (INHALATION)
Start: 2024-12-31

## 2024-12-24 RX ORDER — MEPERIDINE HYDROCHLORIDE 50 MG/ML
25 INJECTION INTRAMUSCULAR; INTRAVENOUS; SUBCUTANEOUS
Status: DISCONTINUED | OUTPATIENT
Start: 2024-12-24 | End: 2024-12-24 | Stop reason: HOSPADM

## 2024-12-24 RX ORDER — EPINEPHRINE 1 MG/ML
0.3 INJECTION, SOLUTION INTRAMUSCULAR; SUBCUTANEOUS EVERY 5 MIN PRN
Status: DISCONTINUED | OUTPATIENT
Start: 2024-12-24 | End: 2024-12-24 | Stop reason: HOSPADM

## 2024-12-24 RX ORDER — ALBUTEROL SULFATE 0.83 MG/ML
2.5 SOLUTION RESPIRATORY (INHALATION)
OUTPATIENT
Start: 2024-12-31

## 2024-12-24 RX ORDER — DIPHENHYDRAMINE HYDROCHLORIDE 50 MG/ML
25 INJECTION INTRAMUSCULAR; INTRAVENOUS
Start: 2024-12-31

## 2024-12-24 NOTE — PROGRESS NOTES
Infusion Nursing Note:  Emiliano Aguiar presents today for Vyvgart.    Patient seen by provider today: No   present during visit today: Not Applicable.    Note: Emiliano arrived ambulatory by himself for Vyvgart. Plan of care reviewed and biological checklist completed. Ok to proceed with treatment. Emiliano has been tolerating Vyvgart injections well and does not note any side effects afterwards.  Vyvgart administered subcutaneous via winged infusion set as ordered over 1.5 minutes in left lower abd. No swelling or bleeding noted.    Intravenous Access:  No Intravenous access/labs at this visit.    Treatment Conditions:  Biological Infusion Checklist:  ~~~ NOTE: If the patient answers yes to any of the questions below, hold the infusion and contact ordering provider or on-call provider.    Have you recently had an elevated temperature, fever, chills, productive cough, coughing for 3 weeks or longer or hemoptysis,  abnormal vital signs, night sweats,  chest pain or have you noticed a decrease in your appetite, unexplained weight loss or fatigue? No  Do you have any open wounds or new incisions? No  Do you have any upcoming hospitalizations or surgeries? Does not include esophagogastroduodenoscopy, colonoscopy, endoscopic retrograde cholangiopancreatography (ERCP), endoscopic ultrasound (EUS), dental procedures or joint aspiration/steroid injections No  Do you currently have any signs of illness or infection or are you on any antibiotics? No  Have you had any new, sudden or worsening abdominal pain? No  Have you or anyone in your household received a live vaccination in the past 4 weeks? Please note: No live vaccines while on biologic/chemotherapy until 6 months after the last treatment. Patient can receive the flu vaccine (shot only), pneumovax and the Covid vaccine. It is optimal for the patient to get these vaccines mid cycle, but they can be given at any time as long as it is not on the day of the infusion.  No  Have you recently been diagnosed with any new nervous system diseases (ie. Multiple sclerosis, Guillain [place], seizures, neurological changes) or cancer diagnosis? Are you on any form of radiation or chemotherapy? No  Are you pregnant or breast feeding or do you have plans of pregnancy in the future? NA  Have you been having any signs of worsening depression or suicidal ideations?  (benlysta only) NA  Have there been any other new onset medical symptoms? No  Have you had any new blood clots? (IVIG only) NA    Post Infusion Assessment:  Patient tolerated injection without incident.  Patient observed for 30 minutes post Vyvgart per protocol.     Discharge Plan:   Patient will return Dec 31st for next appointment.   Patient discharged in stable condition accompanied by: self.  Departure Mode: Ambulatory.      Asuncion Esquivel RN

## 2024-12-31 ENCOUNTER — INFUSION THERAPY VISIT (OUTPATIENT)
Dept: INFUSION THERAPY | Facility: HOSPITAL | Age: 59
End: 2024-12-31
Attending: PSYCHIATRY & NEUROLOGY
Payer: COMMERCIAL

## 2024-12-31 VITALS
OXYGEN SATURATION: 100 % | DIASTOLIC BLOOD PRESSURE: 91 MMHG | HEART RATE: 60 BPM | RESPIRATION RATE: 16 BRPM | SYSTOLIC BLOOD PRESSURE: 164 MMHG | TEMPERATURE: 97.8 F

## 2024-12-31 DIAGNOSIS — G61.81 CIDP (CHRONIC INFLAMMATORY DEMYELINATING POLYNEUROPATHY) (H): Primary | ICD-10-CM

## 2024-12-31 PROCEDURE — 96401 CHEMO ANTI-NEOPL SQ/IM: CPT

## 2024-12-31 PROCEDURE — 250N000011 HC RX IP 250 OP 636: Mod: JZ | Performed by: PSYCHIATRY & NEUROLOGY

## 2024-12-31 RX ORDER — ALBUTEROL SULFATE 90 UG/1
1-2 INHALANT RESPIRATORY (INHALATION)
Status: CANCELLED
Start: 2025-01-07

## 2024-12-31 RX ORDER — MEPERIDINE HYDROCHLORIDE 50 MG/ML
25 INJECTION INTRAMUSCULAR; INTRAVENOUS; SUBCUTANEOUS
Status: CANCELLED | OUTPATIENT
Start: 2025-01-07

## 2024-12-31 RX ORDER — DIPHENHYDRAMINE HYDROCHLORIDE 50 MG/ML
50 INJECTION INTRAMUSCULAR; INTRAVENOUS
Status: CANCELLED
Start: 2025-01-07

## 2024-12-31 RX ORDER — ALBUTEROL SULFATE 0.83 MG/ML
2.5 SOLUTION RESPIRATORY (INHALATION)
Status: CANCELLED | OUTPATIENT
Start: 2025-01-07

## 2024-12-31 RX ORDER — DIPHENHYDRAMINE HYDROCHLORIDE 50 MG/ML
25 INJECTION INTRAMUSCULAR; INTRAVENOUS
Status: CANCELLED
Start: 2025-01-07

## 2024-12-31 RX ORDER — EPINEPHRINE 1 MG/ML
0.3 INJECTION, SOLUTION INTRAMUSCULAR; SUBCUTANEOUS EVERY 5 MIN PRN
Status: CANCELLED | OUTPATIENT
Start: 2025-01-07

## 2024-12-31 RX ORDER — METHYLPREDNISOLONE SODIUM SUCCINATE 40 MG/ML
40 INJECTION INTRAMUSCULAR; INTRAVENOUS
Status: CANCELLED
Start: 2025-01-07

## 2024-12-31 RX ADMIN — EFGARTIGIMOD ALFA AND HYALURONIDASE (HUMAN RECOMBINANT) 1008 MG: 180; 2000 INJECTION, SOLUTION SUBCUTANEOUS at 08:12

## 2024-12-31 NOTE — PROGRESS NOTES
Infusion Nursing Note:  Emiliano Aguiar presents today for Vyvgart injection.    Patient seen by provider today: No   present during visit today: Not Applicable.    Note: Emiliano arrives ambulatory to [facility]. He has noticed more joint pain and inflammation, more numbness/tingling in hands and feet and right leg weakness this week.  He does have an appointment to see his neurologist on 1/6/25. Vyvgart injection given right lower abdomen. Pre injection /81. BP after 30 minute post observation period was 164/91. Emiliano had a primary provider appointment recently and was instructed to get a home BP monitor and take his BP twice weekly at home. He was reminded to get the monitor and plans to do so.      Intravenous Access:  No Intravenous access/labs at this visit.    Treatment Conditions:  Biological Infusion Checklist:  ~~~ NOTE: If the patient answers yes to any of the questions below, hold the infusion and contact ordering provider or on-call provider.    Have you recently had an elevated temperature, fever, chills, productive cough, coughing for 3 weeks or longer or hemoptysis,  abnormal vital signs, night sweats,  chest pain or have you noticed a decrease in your appetite, unexplained weight loss or fatigue? No  Do you have any open wounds or new incisions? No  Do you have any upcoming hospitalizations or surgeries? Does not include esophagogastroduodenoscopy, colonoscopy, endoscopic retrograde cholangiopancreatography (ERCP), endoscopic ultrasound (EUS), dental procedures or joint aspiration/steroid injections No  Do you currently have any signs of illness or infection or are you on any antibiotics? No  Have you had any new, sudden or worsening abdominal pain? No  Have you or anyone in your household received a live vaccination in the past 4 weeks? Please note: No live vaccines while on biologic/chemotherapy until 6 months after the last treatment. Patient can receive the flu vaccine  (shot only), pneumovax and the Covid vaccine. It is optimal for the patient to get these vaccines mid cycle, but they can be given at any time as long as it is not on the day of the infusion. No  Have you recently been diagnosed with any new nervous system diseases (ie. Multiple sclerosis, Guillain [place], seizures, neurological changes) or cancer diagnosis? Are you on any form of radiation or chemotherapy? No  Are you pregnant or breast feeding or do you have plans of pregnancy in the future? No  Have you been having any signs of worsening depression or suicidal ideations?  (benlysta only) NA  Have there been any other new onset medical symptoms? No  Have you had any new blood clots? (IVIG only) NA      Post Infusion Assessment:  Patient tolerated injection without incident.       Discharge Plan:   Patient and/or family verbalized understanding of discharge instructions and all questions answered.  AVS to patient via [employer].  Patient will return 1/7 for next appointment.   Patient discharged in stable condition accompanied by: self.  Departure Mode: Ambulatory.      Daija Reis RN

## 2025-01-02 ENCOUNTER — TELEPHONE (OUTPATIENT)
Dept: NEUROLOGY | Facility: CLINIC | Age: 60
End: 2025-01-02
Payer: COMMERCIAL

## 2025-01-02 DIAGNOSIS — G61.81 CIDP (CHRONIC INFLAMMATORY DEMYELINATING POLYNEUROPATHY) (H): Primary | ICD-10-CM

## 2025-01-02 RX ORDER — METHYLPREDNISOLONE SODIUM SUCCINATE 40 MG/ML
40 INJECTION INTRAMUSCULAR; INTRAVENOUS
Start: 2025-01-07

## 2025-01-02 RX ORDER — EPINEPHRINE 1 MG/ML
0.3 INJECTION, SOLUTION, CONCENTRATE INTRAVENOUS EVERY 5 MIN PRN
OUTPATIENT
Start: 2025-01-07

## 2025-01-02 RX ORDER — ALBUTEROL SULFATE 90 UG/1
1-2 INHALANT RESPIRATORY (INHALATION)
Start: 2025-01-07

## 2025-01-02 RX ORDER — MEPERIDINE HYDROCHLORIDE 25 MG/ML
25 INJECTION INTRAMUSCULAR; INTRAVENOUS; SUBCUTANEOUS
OUTPATIENT
Start: 2025-01-07

## 2025-01-02 RX ORDER — ALBUTEROL SULFATE 0.83 MG/ML
2.5 SOLUTION RESPIRATORY (INHALATION)
OUTPATIENT
Start: 2025-01-07

## 2025-01-02 RX ORDER — DIPHENHYDRAMINE HYDROCHLORIDE 50 MG/ML
50 INJECTION INTRAMUSCULAR; INTRAVENOUS
Start: 2025-01-07

## 2025-01-02 RX ORDER — DIPHENHYDRAMINE HYDROCHLORIDE 50 MG/ML
25 INJECTION INTRAMUSCULAR; INTRAVENOUS
Start: 2025-01-07

## 2025-01-02 NOTE — TELEPHONE ENCOUNTER
Emiliano will continue weekly Hytrulo injections, with no break between cycles, at .  New orders pended to Dr. Godoy.    Lluvia Barksdale RN

## 2025-01-06 ENCOUNTER — OFFICE VISIT (OUTPATIENT)
Dept: NEUROLOGY | Facility: CLINIC | Age: 60
End: 2025-01-06
Payer: COMMERCIAL

## 2025-01-06 VITALS
DIASTOLIC BLOOD PRESSURE: 84 MMHG | HEIGHT: 71 IN | SYSTOLIC BLOOD PRESSURE: 159 MMHG | WEIGHT: 196.3 LBS | HEART RATE: 66 BPM | BODY MASS INDEX: 27.48 KG/M2 | OXYGEN SATURATION: 99 %

## 2025-01-06 DIAGNOSIS — G61.81 CIDP (CHRONIC INFLAMMATORY DEMYELINATING POLYNEUROPATHY) (H): Primary | ICD-10-CM

## 2025-01-06 PROCEDURE — 99213 OFFICE O/P EST LOW 20 MIN: CPT | Mod: GC | Performed by: PSYCHIATRY & NEUROLOGY

## 2025-01-06 PROCEDURE — G2211 COMPLEX E/M VISIT ADD ON: HCPCS | Mod: GC | Performed by: PSYCHIATRY & NEUROLOGY

## 2025-01-06 ASSESSMENT — PAIN SCALES - GENERAL: PAINLEVEL_OUTOF10: MILD PAIN (3)

## 2025-01-06 NOTE — LETTER
1/6/2025       RE: Emiliano Aguiar  [address]  [place] [address]     Dear Colleague,    Thank you for referring your patient, Emiliano Aguiar, to the [facility] [place] [facility]. Please see a copy of my visit note below.    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  Since last visit, he was started on Vyvgart weekly infusions about 10 weeks ago. Previously he was on IVIG, however he was experiencing infusions reactions described as severe myalgias 10 days following the infusions. He was not interested in rituximab due to risk of infections.  He was briefly off the Vyvgart, during which time his symptoms worsened to the point were he went to an ED asking for an earlier dose of Vyvgart. Now he is back on weekly Vyvgart.     He feels that Vyvgart causes some fatigue every day. He feels that drug wears off a few days prior to his next dose. He feels that the medication does help his pain and balance issues. No falls while taking the medication, but did have one off of the medication. Overall, he is satisfied with the current medication regimen in terms of symptom benefit and side effects.      RODS CIDP score today was 26, last vist was 24.        Current Medications:      Current Outpatient Medications   Medication Sig Dispense Refill     acetaminophen (TYLENOL) 500 MG tablet Take 1,000 mg by mouth every 6 hours as needed       atorvastatin (LIPITOR) 20 MG tablet Take 20 mg by mouth daily.       diphenhydrAMINE (BENADRYL ALLERGY) 25 MG tablet Take 1 tablet (25 mg) by mouth every 6 hours as needed for itching or allergies 10 tablet 0     No current facility-administered medications for this visit.  "      Review of Systems:   A complete review of systems was obtained and was negative except for what was noted above.    Physical examination:    BP (!) 159/84 (BP Location: Left arm, Patient Position: Sitting, Cuff Size: Adult Regular)   Pulse 66   Ht 1.813 m (5' 11.38\")   Wt 89 kg (196 lb 4.8 oz)   SpO2 99%   BMI 27.09 kg/m        There are no carotid bruits.       General Appearance: NAD    Skin: There are no rashes or other skin lesions.    Musculoskeletal:  There is no scoliosis, lordosis, kyphosis, pes cavus, or hammertoes.    Neurologic examination:    Mental status:  Patient is alert, attentive, and oriented x 3.  Language is coherent and fluent without dysarthria or aphasia.  Memory, comprehension and ability to follow commands were intact.       Cranial nerves:  Pupils were round and reacted to light.  Extraocular movements were full. There was no face, jaw, palate or tongue weakness or atrophy. Hearing was grossly intact.  Shoulder shrug was normal.        Motor exam: No atrophy or fasciculations.  No action or percussion myotonia or paramyotonia.  Manual muscle testing revealed the following MRC grade muscle power:     Right Left   Neck flexion 5    Neck extension: 5    Shoulder abduction:  5 5   Elbow Flexion: 5 5   Elbow Extension:  5 5   Wrist Extension:  5 5   Finger Extension:  5 5   FDI 5 5   APB 5 5   Finger Flexion 5 5   Wrist Flexion 5 5   Hip Flexion 5 5   Hip Extension 5 5   Knee Extension 5 5   Knee Flexion 5 5   Dorsiflexion 5 5   Plantar flexion 5 5   Foot Inversion 5 5   Foot Eversion 5 5   AH                                      4                             4    Complex motor skills revealed normal coordination.  Finger-nose- finger intact. Heel to shin with mild ataxia bilaterally       Sensory exam revealed decreased vibratory perception in the toes bilaterally. Proprioception was intact. Pinprick and temperature were decreased to the ankles bilaterally.  Light touch was normal.  "       Gait was normal.  Normal causal gait. Tandem walk with moderate difficulty.       Deep tendon reflexes:   Right Left   Triceps 2 2   Biceps 2 2   Brachioradialis 2 2   Knee jerk 2 2   Ankle jerk 0 0         Assessment:    Emiliano Aguiar is a 59 year old male who presents for follow up regarding sensory ataxia predominant CIPD. He was recently started on weekly Vyvgart infusions. He feels that it is beneficial for his balance and pain. However, he notes that it does cause fatigue and wears off a few days prior to the next dose. On exam he as a lower extremity predominant sensory ataxia. Overall, he is satisfied with the Vyvgart in terms of benefit and side effects.       Plan:      -plan to continue Vyygart weekly infusions, from which he will continue to benefit from.   - plan to follow up in 3 months.     Patient was seen and evaluated with Neuromuscular Attending, Dr. Jayne Fuller, DO   Neurophysiology Fellow    Attending addendum: Patient seen and examined with neurophysiology fellow Dr. Fuller at the [facility]/neuromuscular clinic today.  I agree with his assessment and plan. Mr Aguiar overall is doing better with regards to his CIDP on Vyvgart Hytrulo subcutaneous weekly infusions, compared to his previous treatment with IVIG.  He is not getting the prolonged joint pain/myalgias that he was getting after each IVIG infusion that were very disabling and undermining his quality of life.  His gait is also little better.  We will continue the current treatment plan.  Return to clinic as above.    Nixon Godoy MD, FAAN    Billing MDM level III (low) based on 1) Problems assessed: One stable chronic disorder (CIDP) and 2) Risk: prescription drug management, see above.     The longitudinal plan of care for the diagnosis(es)/condition(s) as documented were addressed during this visit. Due to the added complexity in care, I will continue to support Emiliano in the subsequent management  and with ongoing continuity of care.      Again, thank you for allowing me to participate in the care of your patient.      Sincerely,    Nixon Godoy MD

## 2025-01-06 NOTE — PROGRESS NOTES
"Referral:   Son Spenser White MD  [facility]  [address] [place] [address]    Chief Complaint:  CIPD follow up      History of Present Illness:      Emiliano Aguiar is a 59 year old male who presents to clinic today for sensory ataxia predominant CIPD.  Since last visit, he was started on Vyvgart weekly infusions about 10 weeks ago. Previously he was on IVIG, however he was experiencing infusions reactions described as severe myalgias 10 days following the infusions. He was not interested in rituximab due to risk of infections.  He was briefly off the Vyvgart, during which time his symptoms worsened to the point were he went to an ED asking for an earlier dose of Vyvgart. Now he is back on weekly Vyvgart.     He feels that Vyvgart causes some fatigue every day. He feels that drug wears off a few days prior to his next dose. He feels that the medication does help his pain and balance issues. No falls while taking the medication, but did have one off of the medication. Overall, he is satisfied with the current medication regimen in terms of symptom benefit and side effects.      RODS CIDP score today was 26, last vist was 24.        Current Medications:      Current Outpatient Medications   Medication Sig Dispense Refill    acetaminophen (TYLENOL) 500 MG tablet Take 1,000 mg by mouth every 6 hours as needed      atorvastatin (LIPITOR) 20 MG tablet Take 20 mg by mouth daily.      diphenhydrAMINE (BENADRYL ALLERGY) 25 MG tablet Take 1 tablet (25 mg) by mouth every 6 hours as needed for itching or allergies 10 tablet 0     No current facility-administered medications for this visit.       Review of Systems:   A complete review of systems was obtained and was negative except for what was noted above.    Physical examination:    BP (!) 159/84 (BP Location: Left arm, Patient Position: Sitting, Cuff Size: Adult Regular)   Pulse 66   Ht 1.813 m (5' 11.38\")   Wt 89 kg (196 lb 4.8 oz)   SpO2 99%   BMI " 27.09 kg/m        There are no carotid bruits.       General Appearance: NAD    Skin: There are no rashes or other skin lesions.    Musculoskeletal:  There is no scoliosis, lordosis, kyphosis, pes cavus, or hammertoes.    Neurologic examination:    Mental status:  Patient is alert, attentive, and oriented x 3.  Language is coherent and fluent without dysarthria or aphasia.  Memory, comprehension and ability to follow commands were intact.       Cranial nerves:  Pupils were round and reacted to light.  Extraocular movements were full. There was no face, jaw, palate or tongue weakness or atrophy. Hearing was grossly intact.  Shoulder shrug was normal.        Motor exam: No atrophy or fasciculations.  No action or percussion myotonia or paramyotonia.  Manual muscle testing revealed the following MRC grade muscle power:     Right Left   Neck flexion 5    Neck extension: 5    Shoulder abduction:  5 5   Elbow Flexion: 5 5   Elbow Extension:  5 5   Wrist Extension:  5 5   Finger Extension:  5 5   FDI 5 5   APB 5 5   Finger Flexion 5 5   Wrist Flexion 5 5   Hip Flexion 5 5   Hip Extension 5 5   Knee Extension 5 5   Knee Flexion 5 5   Dorsiflexion 5 5   Plantar flexion 5 5   Foot Inversion 5 5   Foot Eversion 5 5   AH                                      4                             4    Complex motor skills revealed normal coordination.  Finger-nose- finger intact. Heel to shin with mild ataxia bilaterally       Sensory exam revealed decreased vibratory perception in the toes bilaterally. Proprioception was intact. Pinprick and temperature were decreased to the ankles bilaterally.  Light touch was normal.        Gait was normal.  Normal causal gait. Tandem walk with moderate difficulty.       Deep tendon reflexes:   Right Left   Triceps 2 2   Biceps 2 2   Brachioradialis 2 2   Knee jerk 2 2   Ankle jerk 0 0         Assessment:    Emiliano Aguiar is a 59 year old male who presents for follow up regarding sensory ataxia  predominant CIPD. He was recently started on weekly Vyvgart infusions. He feels that it is beneficial for his balance and pain. However, he notes that it does cause fatigue and wears off a few days prior to the next dose. On exam he as a lower extremity predominant sensory ataxia. Overall, he is satisfied with the Vyvgart in terms of benefit and side effects.       Plan:      -plan to continue Vyygart weekly infusions, from which he will continue to benefit from.   - plan to follow up in 3 months.     Patient was seen and evaluated with Neuromuscular Attending, Dr. Jayne Fuller, DO   Neurophysiology Fellow    Attending addendum: Patient seen and examined with neurophysiology fellow Dr. Fuller at the [facility]/neuromuscular clinic today.  I agree with his assessment and plan. Mr Aguira overall is doing better with regards to his CIDP on Vyvgart Hytrulo subcutaneous weekly infusions, compared to his previous treatment with IVIG.  He is not getting the prolonged joint pain/myalgias that he was getting after each IVIG infusion that were very disabling and undermining his quality of life.  His gait is also little better.  We will continue the current treatment plan.  Return to clinic as above.    Nixon Godoy MD, FAAN    Billing MDM level III (low) based on 1) Problems assessed: One stable chronic disorder (CIDP) and 2) Risk: prescription drug management, see above.     The longitudinal plan of care for the diagnosis(es)/condition(s) as documented were addressed during this visit. Due to the added complexity in care, I will continue to support Emiliano in the subsequent management and with ongoing continuity of care.

## 2025-01-06 NOTE — NURSING NOTE
Chief Complaint   Patient presents with    RECHECK     Neuropathy      Vitals were taken and medications were reconciled.    Connor Monzon, EMT  10:39 AM

## 2025-01-07 ENCOUNTER — INFUSION THERAPY VISIT (OUTPATIENT)
Dept: INFUSION THERAPY | Facility: HOSPITAL | Age: 60
End: 2025-01-07
Attending: PSYCHIATRY & NEUROLOGY
Payer: COMMERCIAL

## 2025-01-07 VITALS
SYSTOLIC BLOOD PRESSURE: 144 MMHG | RESPIRATION RATE: 18 BRPM | DIASTOLIC BLOOD PRESSURE: 71 MMHG | HEART RATE: 69 BPM | OXYGEN SATURATION: 96 % | TEMPERATURE: 98.2 F

## 2025-01-07 DIAGNOSIS — G61.81 CIDP (CHRONIC INFLAMMATORY DEMYELINATING POLYNEUROPATHY) (H): Primary | ICD-10-CM

## 2025-01-07 PROCEDURE — 96401 CHEMO ANTI-NEOPL SQ/IM: CPT

## 2025-01-07 PROCEDURE — 250N000011 HC RX IP 250 OP 636: Mod: JZ | Performed by: PSYCHIATRY & NEUROLOGY

## 2025-01-07 RX ORDER — DIPHENHYDRAMINE HYDROCHLORIDE 50 MG/ML
25 INJECTION INTRAMUSCULAR; INTRAVENOUS
Start: 2025-01-14

## 2025-01-07 RX ORDER — DIPHENHYDRAMINE HYDROCHLORIDE 50 MG/ML
50 INJECTION INTRAMUSCULAR; INTRAVENOUS
Start: 2025-01-14

## 2025-01-07 RX ORDER — EPINEPHRINE 1 MG/ML
0.3 INJECTION, SOLUTION INTRAMUSCULAR; SUBCUTANEOUS EVERY 5 MIN PRN
OUTPATIENT
Start: 2025-01-14

## 2025-01-07 RX ORDER — MEPERIDINE HYDROCHLORIDE 50 MG/ML
25 INJECTION INTRAMUSCULAR; INTRAVENOUS; SUBCUTANEOUS
OUTPATIENT
Start: 2025-01-14

## 2025-01-07 RX ORDER — ALBUTEROL SULFATE 0.83 MG/ML
2.5 SOLUTION RESPIRATORY (INHALATION)
OUTPATIENT
Start: 2025-01-14

## 2025-01-07 RX ORDER — METHYLPREDNISOLONE SODIUM SUCCINATE 40 MG/ML
40 INJECTION INTRAMUSCULAR; INTRAVENOUS
Start: 2025-01-14

## 2025-01-07 RX ORDER — ALBUTEROL SULFATE 90 UG/1
1-2 INHALANT RESPIRATORY (INHALATION)
Start: 2025-01-14

## 2025-01-07 RX ADMIN — EFGARTIGIMOD ALFA AND HYALURONIDASE (HUMAN RECOMBINANT) 1008 MG: 180; 2000 INJECTION, SOLUTION SUBCUTANEOUS at 13:23

## 2025-01-07 NOTE — PROGRESS NOTES
Infusion Nursing Note:  Emiliano Aguiar presents today for Vyvgart injection.    Patient seen by provider today: No   present during visit today: Not Applicable.    Note: Emiliano arrives ambulatory to [facility] infusion today.  Vyvgart given left upper abdomen. He was observed for 30 minutes per protocol. He tolerated well.      Intravenous Access:  No Intravenous access/labs at this visit.    Treatment Conditions:  Not Applicable.      Post Infusion Assessment:  Patient tolerated injection without incident.   Patient was observed for 30 minutes post injection per Vyvgart protocol.      Discharge Plan:   Patient and/or family verbalized understanding of discharge instructions and all questions answered.  AVS to patient via [employer].  Patient will return 1/14 for next appointment.   Patient discharged in stable condition accompanied by: self.  Departure Mode: Ambulatory.      Daija Reis RN

## 2025-01-07 NOTE — PROGRESS NOTES
~~~ NOTE: If the patient answers yes to any of the questions below, hold the infusion and contact ordering provider or on-call provider.    Do you currently have any signs of illness or infection or are you on any antibiotics? No  Have you recently had an elevated temperature, fever, chills, productive cough, coughing for 3 weeks or longer or hemoptysis, abnormal vital signs, night sweats, chest pain or have you noticed a decrease in your appetite, unexplained weight loss or fatigue? No  Have you had any new, sudden, or worsening abdominal pain? No  Do you have any open wounds or new incisions? (exclude for patients with hidradenitis suppurativa) No  Have you recently been diagnosed with any new nervous system diseases (ie. Multiple sclerosis, Guillain [place], seizures, neurological changes) or cancer diagnosis? Are you on any form of radiation or chemotherapy? No  Have there been any other new onset medical symptoms? No  Are you pregnant or breast feeding or do you have plans of pregnancy in the future? No; N/A  Do you have any upcoming hospitalizations or surgeries? Does not include esophagogastroduodenoscopy, colonoscopy, endoscopic retrograde cholangiopancreatography (ERCP), endoscopic ultrasound (EUS), dental procedures (including cleanings, fillings, implants, extractions)  or joint aspiration/steroid injections No  Have you or anyone in your household received a live vaccination in the past 4 weeks? Please note: No live vaccines while on biologic/chemotherapy until 6 months after the last treatment. Patient can receive the flu vaccine (shot only).  It is optimal for the patient to get it mid cycle, but it can be given at any time as long as it is not on the day of the infusion. No  If applicable to prescribed medication, confirm negative PPD or quantiferon gold MTB. If positive, verify has negative chest x-ray or the patient is at least 4 weeks post initiation of INH/B6 therapy and have clearance from provider  before infusion (Y/N:[id number])  If applicable to prescribed medication, confirm negative hepatitis B surface antigen or hepatitis C. If positive, clearance from provider before infusion. (Y/N: [id number])  Rheumatology patients receiving tocilizumab (Actemra): If labs were drawn within the past week, hold dosing until cleared to infuse If AST/ALT > 2 X upper limit normal; ANC < 1.0. NO; N/A  Patients receiving belimumab (Benlysta): Have you been having any signs of worsening depression or suicidal ideations? No; N/A

## 2025-01-14 ENCOUNTER — INFUSION THERAPY VISIT (OUTPATIENT)
Dept: INFUSION THERAPY | Facility: HOSPITAL | Age: 60
End: 2025-01-14
Attending: PSYCHIATRY & NEUROLOGY
Payer: COMMERCIAL

## 2025-01-14 VITALS
DIASTOLIC BLOOD PRESSURE: 82 MMHG | RESPIRATION RATE: 16 BRPM | SYSTOLIC BLOOD PRESSURE: 144 MMHG | OXYGEN SATURATION: 98 % | HEART RATE: 57 BPM | TEMPERATURE: 97.9 F

## 2025-01-14 DIAGNOSIS — G61.81 CIDP (CHRONIC INFLAMMATORY DEMYELINATING POLYNEUROPATHY) (H): Primary | ICD-10-CM

## 2025-01-14 PROCEDURE — 250N000011 HC RX IP 250 OP 636: Mod: JZ | Performed by: PSYCHIATRY & NEUROLOGY

## 2025-01-14 PROCEDURE — 96401 CHEMO ANTI-NEOPL SQ/IM: CPT

## 2025-01-14 RX ORDER — EPINEPHRINE 1 MG/ML
0.3 INJECTION, SOLUTION INTRAMUSCULAR; SUBCUTANEOUS EVERY 5 MIN PRN
OUTPATIENT
Start: 2025-01-21

## 2025-01-14 RX ORDER — ALBUTEROL SULFATE 0.83 MG/ML
2.5 SOLUTION RESPIRATORY (INHALATION)
Status: DISCONTINUED | OUTPATIENT
Start: 2025-01-14 | End: 2025-01-14 | Stop reason: HOSPADM

## 2025-01-14 RX ORDER — ALBUTEROL SULFATE 90 UG/1
1-2 INHALANT RESPIRATORY (INHALATION)
Start: 2025-01-21

## 2025-01-14 RX ORDER — MEPERIDINE HYDROCHLORIDE 25 MG/ML
25 INJECTION INTRAMUSCULAR; INTRAVENOUS; SUBCUTANEOUS
OUTPATIENT
Start: 2025-01-21

## 2025-01-14 RX ORDER — ALBUTEROL SULFATE 90 UG/1
1-2 INHALANT RESPIRATORY (INHALATION)
Status: DISCONTINUED | OUTPATIENT
Start: 2025-01-14 | End: 2025-01-14 | Stop reason: HOSPADM

## 2025-01-14 RX ORDER — ALBUTEROL SULFATE 0.83 MG/ML
2.5 SOLUTION RESPIRATORY (INHALATION)
OUTPATIENT
Start: 2025-01-21

## 2025-01-14 RX ORDER — DIPHENHYDRAMINE HYDROCHLORIDE 50 MG/ML
25 INJECTION INTRAMUSCULAR; INTRAVENOUS
Status: DISCONTINUED | OUTPATIENT
Start: 2025-01-14 | End: 2025-01-14 | Stop reason: HOSPADM

## 2025-01-14 RX ORDER — METHYLPREDNISOLONE SODIUM SUCCINATE 40 MG/ML
40 INJECTION INTRAMUSCULAR; INTRAVENOUS
Status: DISCONTINUED | OUTPATIENT
Start: 2025-01-14 | End: 2025-01-14 | Stop reason: HOSPADM

## 2025-01-14 RX ORDER — EPINEPHRINE 1 MG/ML
0.3 INJECTION, SOLUTION INTRAMUSCULAR; SUBCUTANEOUS EVERY 5 MIN PRN
Status: DISCONTINUED | OUTPATIENT
Start: 2025-01-14 | End: 2025-01-14 | Stop reason: HOSPADM

## 2025-01-14 RX ORDER — DIPHENHYDRAMINE HYDROCHLORIDE 50 MG/ML
25 INJECTION INTRAMUSCULAR; INTRAVENOUS
Start: 2025-01-21

## 2025-01-14 RX ORDER — METHYLPREDNISOLONE SODIUM SUCCINATE 40 MG/ML
40 INJECTION INTRAMUSCULAR; INTRAVENOUS
Start: 2025-01-21

## 2025-01-14 RX ORDER — MEPERIDINE HYDROCHLORIDE 25 MG/ML
25 INJECTION INTRAMUSCULAR; INTRAVENOUS; SUBCUTANEOUS
Status: DISCONTINUED | OUTPATIENT
Start: 2025-01-14 | End: 2025-01-14 | Stop reason: HOSPADM

## 2025-01-14 RX ORDER — DIPHENHYDRAMINE HYDROCHLORIDE 50 MG/ML
50 INJECTION INTRAMUSCULAR; INTRAVENOUS
Start: 2025-01-21

## 2025-01-14 RX ORDER — DIPHENHYDRAMINE HYDROCHLORIDE 50 MG/ML
50 INJECTION INTRAMUSCULAR; INTRAVENOUS
Status: DISCONTINUED | OUTPATIENT
Start: 2025-01-14 | End: 2025-01-14 | Stop reason: HOSPADM

## 2025-01-14 RX ADMIN — EFGARTIGIMOD ALFA AND HYALURONIDASE (HUMAN RECOMBINANT) 1008 MG: 180; 2000 INJECTION, SOLUTION SUBCUTANEOUS at 13:29

## 2025-01-14 NOTE — PROGRESS NOTES
Infusion Nursing Note:  Emiliano Aguiar presents today for Vyvgart subcutaneous inj.    Patient seen by provider today: No    Note: Pt arrives ambulatory to [facility]. Pt reports no changes in baseline.    BP (!) 144/82 (Patient Position: Sitting)   Pulse 57   Temp 97.9  F (36.6  C)   Resp 16   SpO2 98%     Intravenous Access:  No Intravenous access/labs at this visit.    Treatment Conditions:  Biological Infusion Checklist:  ~~~ NOTE: If the patient answers yes to any of the questions below, hold the infusion and contact ordering provider or on-call provider.    Have you recently had an elevated temperature, fever, chills, productive cough, coughing for 3 weeks or longer or hemoptysis,  abnormal vital signs, night sweats,  chest pain or have you noticed a decrease in your appetite, unexplained weight loss or fatigue? No  Do you have any open wounds or new incisions? No  Do you have any upcoming hospitalizations or surgeries? Does not include esophagogastroduodenoscopy, colonoscopy, endoscopic retrograde cholangiopancreatography (ERCP), endoscopic ultrasound (EUS), dental procedures or joint aspiration/steroid injections No  Do you currently have any signs of illness or infection or are you on any antibiotics? No  Have you had any new, sudden or worsening abdominal pain? No  Have you or anyone in your household received a live vaccination in the past 4 weeks? Please note: No live vaccines while on biologic/chemotherapy until 6 months after the last treatment. Patient can receive the flu vaccine (shot only), pneumovax and the Covid vaccine. It is optimal for the patient to get these vaccines mid cycle, but they can be given at any time as long as it is not on the day of the infusion. No  Have you recently been diagnosed with any new nervous system diseases (ie. Multiple sclerosis, Guillain [place], seizures, neurological changes) or cancer diagnosis? Are you on any form of radiation or chemotherapy?  No  Are you pregnant or breast feeding or do you have plans of pregnancy in the future? N/A  Have you been having any signs of worsening depression or suicidal ideations?  (benlysta only) N/A  Have there been any other new onset medical symptoms? No  Have you had any new blood clots? (IVIG only) N/A  Post Infusion Assessment:  Patient tolerated subcutaneous injection without incident in right upper abdomen.  Patient observed for 30 minutes post vyvgart per protocol.     Discharge Plan:   Patient discharged in stable condition accompanied by: self.  Departure Mode: Ambulatory.      Gely Odom RN

## 2025-01-21 ENCOUNTER — INFUSION THERAPY VISIT (OUTPATIENT)
Dept: INFUSION THERAPY | Facility: HOSPITAL | Age: 60
End: 2025-01-21
Attending: PSYCHIATRY & NEUROLOGY
Payer: COMMERCIAL

## 2025-01-21 VITALS
HEART RATE: 71 BPM | DIASTOLIC BLOOD PRESSURE: 78 MMHG | OXYGEN SATURATION: 98 % | RESPIRATION RATE: 18 BRPM | SYSTOLIC BLOOD PRESSURE: 139 MMHG | TEMPERATURE: 97.8 F

## 2025-01-21 DIAGNOSIS — G61.81 CIDP (CHRONIC INFLAMMATORY DEMYELINATING POLYNEUROPATHY) (H): Primary | ICD-10-CM

## 2025-01-21 PROCEDURE — 96401 CHEMO ANTI-NEOPL SQ/IM: CPT

## 2025-01-21 PROCEDURE — 250N000011 HC RX IP 250 OP 636: Mod: JZ | Performed by: PSYCHIATRY & NEUROLOGY

## 2025-01-21 RX ORDER — MEPERIDINE HYDROCHLORIDE 25 MG/ML
25 INJECTION INTRAMUSCULAR; INTRAVENOUS; SUBCUTANEOUS
OUTPATIENT
Start: 2025-01-28

## 2025-01-21 RX ORDER — ALBUTEROL SULFATE 0.83 MG/ML
2.5 SOLUTION RESPIRATORY (INHALATION)
OUTPATIENT
Start: 2025-01-28

## 2025-01-21 RX ORDER — EPINEPHRINE 1 MG/ML
0.3 INJECTION, SOLUTION INTRAMUSCULAR; SUBCUTANEOUS EVERY 5 MIN PRN
OUTPATIENT
Start: 2025-01-28

## 2025-01-21 RX ORDER — DIPHENHYDRAMINE HYDROCHLORIDE 50 MG/ML
50 INJECTION INTRAMUSCULAR; INTRAVENOUS
Start: 2025-01-28

## 2025-01-21 RX ORDER — DIPHENHYDRAMINE HYDROCHLORIDE 50 MG/ML
25 INJECTION INTRAMUSCULAR; INTRAVENOUS
Start: 2025-01-28

## 2025-01-21 RX ORDER — ALBUTEROL SULFATE 90 UG/1
1-2 INHALANT RESPIRATORY (INHALATION)
Start: 2025-01-28

## 2025-01-21 RX ORDER — METHYLPREDNISOLONE SODIUM SUCCINATE 40 MG/ML
40 INJECTION INTRAMUSCULAR; INTRAVENOUS
Start: 2025-01-28

## 2025-01-21 RX ADMIN — EFGARTIGIMOD ALFA AND HYALURONIDASE (HUMAN RECOMBINANT) 1008 MG: 180; 2000 INJECTION, SOLUTION SUBCUTANEOUS at 13:30

## 2025-01-21 NOTE — PROGRESS NOTES
Infusion Nursing Note:  Emiliano Aguiar presents today for Vyvgart injection.    Patient seen by provider today: No   present during visit today: Not Applicable.    Note: Vyvgart injection given left upper abdomen. Emiliano tolerated well. He was observed for 30 minutes post injection.      Intravenous Access:  No Intravenous access/labs at this visit.    Treatment Conditions:  Biological Infusion Checklist:  ~~~ NOTE: If the patient answers yes to any of the questions below, hold the infusion and contact ordering provider or on-call provider.    Have you recently had an elevated temperature, fever, chills, productive cough, coughing for 3 weeks or longer or hemoptysis,  abnormal vital signs, night sweats,  chest pain or have you noticed a decrease in your appetite, unexplained weight loss or fatigue? No  Do you have any open wounds or new incisions? No  Do you have any upcoming hospitalizations or surgeries? Does not include esophagogastroduodenoscopy, colonoscopy, endoscopic retrograde cholangiopancreatography (ERCP), endoscopic ultrasound (EUS), dental procedures or joint aspiration/steroid injections No  Do you currently have any signs of illness or infection or are you on any antibiotics? No  Have you had any new, sudden or worsening abdominal pain? No  Have you or anyone in your household received a live vaccination in the past 4 weeks? Please note: No live vaccines while on biologic/chemotherapy until 6 months after the last treatment. Patient can receive the flu vaccine (shot only), pneumovax and the Covid vaccine. It is optimal for the patient to get these vaccines mid cycle, but they can be given at any time as long as it is not on the day of the infusion. No  Have you recently been diagnosed with any new nervous system diseases (ie. Multiple sclerosis, Guillain [place], seizures, neurological changes) or cancer diagnosis? Are you on any form of radiation or chemotherapy? No  Are you pregnant or  breast feeding or do you have plans of pregnancy in the future? No  Have you been having any signs of worsening depression or suicidal ideations?  (benlysta only) NA  Have there been any other new onset medical symptoms? No  Have you had any new blood clots? (IVIG only) NA      Post Infusion Assessment:  Patient tolerated injection without incident.       Discharge Plan:   Patient and/or family verbalized understanding of discharge instructions and all questions answered.  AVS to patient via [employer]Up[employer].  Patient will return 1/28 for next appointment.   Patient discharged in stable condition accompanied by: self.  Departure Mode: Ambulatory.      Daija Reis RN

## 2025-01-28 ENCOUNTER — INFUSION THERAPY VISIT (OUTPATIENT)
Dept: INFUSION THERAPY | Facility: HOSPITAL | Age: 60
End: 2025-01-28
Attending: PSYCHIATRY & NEUROLOGY
Payer: COMMERCIAL

## 2025-01-28 VITALS
TEMPERATURE: 98.1 F | SYSTOLIC BLOOD PRESSURE: 133 MMHG | HEART RATE: 70 BPM | OXYGEN SATURATION: 98 % | DIASTOLIC BLOOD PRESSURE: 66 MMHG | RESPIRATION RATE: 18 BRPM

## 2025-01-28 DIAGNOSIS — G61.81 CIDP (CHRONIC INFLAMMATORY DEMYELINATING POLYNEUROPATHY) (H): Primary | ICD-10-CM

## 2025-01-28 PROCEDURE — 250N000011 HC RX IP 250 OP 636: Mod: JZ | Performed by: PSYCHIATRY & NEUROLOGY

## 2025-01-28 PROCEDURE — 96401 CHEMO ANTI-NEOPL SQ/IM: CPT

## 2025-01-28 RX ORDER — ALBUTEROL SULFATE 90 UG/1
1-2 INHALANT RESPIRATORY (INHALATION)
Start: 2025-02-04

## 2025-01-28 RX ORDER — ALBUTEROL SULFATE 0.83 MG/ML
2.5 SOLUTION RESPIRATORY (INHALATION)
Status: DISCONTINUED | OUTPATIENT
Start: 2025-01-28 | End: 2025-01-28 | Stop reason: HOSPADM

## 2025-01-28 RX ORDER — ALBUTEROL SULFATE 0.83 MG/ML
2.5 SOLUTION RESPIRATORY (INHALATION)
OUTPATIENT
Start: 2025-02-04

## 2025-01-28 RX ORDER — METHYLPREDNISOLONE SODIUM SUCCINATE 40 MG/ML
40 INJECTION INTRAMUSCULAR; INTRAVENOUS
Status: DISCONTINUED | OUTPATIENT
Start: 2025-01-28 | End: 2025-01-28 | Stop reason: HOSPADM

## 2025-01-28 RX ORDER — DIPHENHYDRAMINE HYDROCHLORIDE 50 MG/ML
50 INJECTION INTRAMUSCULAR; INTRAVENOUS
Status: DISCONTINUED | OUTPATIENT
Start: 2025-01-28 | End: 2025-01-28 | Stop reason: HOSPADM

## 2025-01-28 RX ORDER — MEPERIDINE HYDROCHLORIDE 25 MG/ML
25 INJECTION INTRAMUSCULAR; INTRAVENOUS; SUBCUTANEOUS
OUTPATIENT
Start: 2025-02-04

## 2025-01-28 RX ORDER — EPINEPHRINE 1 MG/ML
0.3 INJECTION, SOLUTION INTRAMUSCULAR; SUBCUTANEOUS EVERY 5 MIN PRN
Status: DISCONTINUED | OUTPATIENT
Start: 2025-01-28 | End: 2025-01-28 | Stop reason: HOSPADM

## 2025-01-28 RX ORDER — DIPHENHYDRAMINE HYDROCHLORIDE 50 MG/ML
25 INJECTION INTRAMUSCULAR; INTRAVENOUS
Start: 2025-02-04

## 2025-01-28 RX ORDER — DIPHENHYDRAMINE HYDROCHLORIDE 50 MG/ML
25 INJECTION INTRAMUSCULAR; INTRAVENOUS
Status: DISCONTINUED | OUTPATIENT
Start: 2025-01-28 | End: 2025-01-28 | Stop reason: HOSPADM

## 2025-01-28 RX ORDER — EPINEPHRINE 1 MG/ML
0.3 INJECTION, SOLUTION INTRAMUSCULAR; SUBCUTANEOUS EVERY 5 MIN PRN
OUTPATIENT
Start: 2025-02-04

## 2025-01-28 RX ORDER — MEPERIDINE HYDROCHLORIDE 25 MG/ML
25 INJECTION INTRAMUSCULAR; INTRAVENOUS; SUBCUTANEOUS
Status: DISCONTINUED | OUTPATIENT
Start: 2025-01-28 | End: 2025-01-28 | Stop reason: HOSPADM

## 2025-01-28 RX ORDER — DIPHENHYDRAMINE HYDROCHLORIDE 50 MG/ML
50 INJECTION INTRAMUSCULAR; INTRAVENOUS
Start: 2025-02-04

## 2025-01-28 RX ORDER — METHYLPREDNISOLONE SODIUM SUCCINATE 40 MG/ML
40 INJECTION INTRAMUSCULAR; INTRAVENOUS
Start: 2025-02-04

## 2025-01-28 RX ORDER — ALBUTEROL SULFATE 90 UG/1
1-2 INHALANT RESPIRATORY (INHALATION)
Status: DISCONTINUED | OUTPATIENT
Start: 2025-01-28 | End: 2025-01-28 | Stop reason: HOSPADM

## 2025-01-28 RX ADMIN — EFGARTIGIMOD ALFA AND HYALURONIDASE (HUMAN RECOMBINANT) 1008 MG: 180; 2000 INJECTION, SOLUTION SUBCUTANEOUS at 13:44

## 2025-01-28 NOTE — PROGRESS NOTES
"Infusion Nursing Note:  Emiliano Aguiar presents today for Vyvgart subcutaneous inj.    Patient seen by provider today: No   present during visit today: Not Applicable.    Note: [email or web address]. Pt reports noticing 4 weeks ago, that by the Saturday prior to injection he will have increased \"inflammation and tingling\" in bilat fingers/toes, and be \"off balance\". Pt reports symptoms will start to improve Thurs/Fri after inj.    /66 (Patient Position: Sitting)   Pulse 70   Temp 98.1  F (36.7  C)   Resp 18   SpO2 98%     Intravenous Access:  No Intravenous access/labs at this visit.    ~~~ NOTE: If the patient answers yes to any of the questions below, hold the infusion and contact ordering provider or on-call provider.    Do you currently have any signs of illness or infection or are you on any antibiotics? No  Have you recently had an elevated temperature, fever, chills, productive cough, coughing for 3 weeks or longer or hemoptysis, abnormal vital signs, night sweats, chest pain or have you noticed a decrease in your appetite, unexplained weight loss or fatigue? No  Have you had any new, sudden, or worsening abdominal pain? No  Do you have any open wounds or new incisions? (exclude for patients with hidradenitis suppurativa) No  Have you recently been diagnosed with any new nervous system diseases (ie. Multiple sclerosis, Guillain [place], seizures, neurological changes) or cancer diagnosis? Are you on any form of radiation or chemotherapy? No  Have there been any other new onset medical symptoms? No  Are you pregnant or breast feeding or do you have plans of pregnancy in the future? N/A  Do you have any upcoming hospitalizations or surgeries? Does not include esophagogastroduodenoscopy, colonoscopy, endoscopic retrograde cholangiopancreatography (ERCP), endoscopic ultrasound (EUS), dental procedures (including cleanings, fillings, implants, extractions)  or joint " aspiration/steroid injections No  Have you or anyone in your household received a live vaccination in the past 4 weeks? Please note: No live vaccines while on biologic/chemotherapy until 6 months after the last treatment. Patient can receive the flu vaccine (shot only).  It is optimal for the patient to get it mid cycle, but it can be given at any time as long as it is not on the day of the infusion. No  If applicable to prescribed medication, confirm negative PPD or quantiferon gold MTB. If positive, verify has negative chest x-ray or the patient is at least 4 weeks post initiation of INH/B6 therapy and have clearance from provider before infusion N/A  If applicable to prescribed medication, confirm negative hepatitis B surface antigen or hepatitis C. If positive, clearance from provider before infusion. N/A  Rheumatology patients receiving tocilizumab (Actemra): If labs were drawn within the past week, hold dosing until cleared to infuse If AST/ALT > 2 X upper limit normal; ANC < 1.0. N/A  Patients receiving belimumab (Benlysta): Have you been having any signs of worsening depression or suicidal ideations? N/A    Post Infusion Assessment:  Patient tolerated subcutaneous injection without incident in RIGHT side abdomen. Pt observed for 30min after injection.    Discharge Plan:   Patient discharged in stable condition accompanied by: self.  Departure Mode: Ambulatory.      Gely Odom RN

## 2025-02-04 ENCOUNTER — INFUSION THERAPY VISIT (OUTPATIENT)
Dept: INFUSION THERAPY | Facility: HOSPITAL | Age: 60
End: 2025-02-04
Attending: PSYCHIATRY & NEUROLOGY
Payer: COMMERCIAL

## 2025-02-04 VITALS
TEMPERATURE: 98 F | DIASTOLIC BLOOD PRESSURE: 82 MMHG | HEART RATE: 65 BPM | OXYGEN SATURATION: 97 % | RESPIRATION RATE: 18 BRPM | SYSTOLIC BLOOD PRESSURE: 148 MMHG

## 2025-02-04 DIAGNOSIS — G61.81 CIDP (CHRONIC INFLAMMATORY DEMYELINATING POLYNEUROPATHY) (H): Primary | ICD-10-CM

## 2025-02-04 PROCEDURE — 96401 CHEMO ANTI-NEOPL SQ/IM: CPT

## 2025-02-04 PROCEDURE — 250N000011 HC RX IP 250 OP 636: Mod: JZ | Performed by: PSYCHIATRY & NEUROLOGY

## 2025-02-04 RX ORDER — MEPERIDINE HYDROCHLORIDE 25 MG/ML
25 INJECTION INTRAMUSCULAR; INTRAVENOUS; SUBCUTANEOUS
Status: DISCONTINUED | OUTPATIENT
Start: 2025-02-04 | End: 2025-02-04 | Stop reason: HOSPADM

## 2025-02-04 RX ORDER — METHYLPREDNISOLONE SODIUM SUCCINATE 40 MG/ML
40 INJECTION INTRAMUSCULAR; INTRAVENOUS
Start: 2025-02-11

## 2025-02-04 RX ORDER — DIPHENHYDRAMINE HYDROCHLORIDE 50 MG/ML
50 INJECTION INTRAMUSCULAR; INTRAVENOUS
Start: 2025-02-11

## 2025-02-04 RX ORDER — DIPHENHYDRAMINE HYDROCHLORIDE 50 MG/ML
50 INJECTION INTRAMUSCULAR; INTRAVENOUS
Status: DISCONTINUED | OUTPATIENT
Start: 2025-02-04 | End: 2025-02-04 | Stop reason: HOSPADM

## 2025-02-04 RX ORDER — ALBUTEROL SULFATE 0.83 MG/ML
2.5 SOLUTION RESPIRATORY (INHALATION)
OUTPATIENT
Start: 2025-02-11

## 2025-02-04 RX ORDER — EPINEPHRINE 1 MG/ML
0.3 INJECTION, SOLUTION INTRAMUSCULAR; SUBCUTANEOUS EVERY 5 MIN PRN
Status: DISCONTINUED | OUTPATIENT
Start: 2025-02-04 | End: 2025-02-04 | Stop reason: HOSPADM

## 2025-02-04 RX ORDER — MEPERIDINE HYDROCHLORIDE 25 MG/ML
25 INJECTION INTRAMUSCULAR; INTRAVENOUS; SUBCUTANEOUS
OUTPATIENT
Start: 2025-02-11

## 2025-02-04 RX ORDER — METHYLPREDNISOLONE SODIUM SUCCINATE 40 MG/ML
40 INJECTION INTRAMUSCULAR; INTRAVENOUS
Status: DISCONTINUED | OUTPATIENT
Start: 2025-02-04 | End: 2025-02-04 | Stop reason: HOSPADM

## 2025-02-04 RX ORDER — DIPHENHYDRAMINE HYDROCHLORIDE 50 MG/ML
25 INJECTION INTRAMUSCULAR; INTRAVENOUS
Status: DISCONTINUED | OUTPATIENT
Start: 2025-02-04 | End: 2025-02-04 | Stop reason: HOSPADM

## 2025-02-04 RX ORDER — ALBUTEROL SULFATE 90 UG/1
1-2 INHALANT RESPIRATORY (INHALATION)
Start: 2025-02-11

## 2025-02-04 RX ORDER — EPINEPHRINE 1 MG/ML
0.3 INJECTION, SOLUTION INTRAMUSCULAR; SUBCUTANEOUS EVERY 5 MIN PRN
OUTPATIENT
Start: 2025-02-11

## 2025-02-04 RX ORDER — DIPHENHYDRAMINE HYDROCHLORIDE 50 MG/ML
25 INJECTION INTRAMUSCULAR; INTRAVENOUS
Start: 2025-02-11

## 2025-02-04 RX ORDER — ALBUTEROL SULFATE 0.83 MG/ML
2.5 SOLUTION RESPIRATORY (INHALATION)
Status: DISCONTINUED | OUTPATIENT
Start: 2025-02-04 | End: 2025-02-04 | Stop reason: HOSPADM

## 2025-02-04 RX ORDER — ALBUTEROL SULFATE 90 UG/1
1-2 INHALANT RESPIRATORY (INHALATION)
Status: DISCONTINUED | OUTPATIENT
Start: 2025-02-04 | End: 2025-02-04 | Stop reason: HOSPADM

## 2025-02-04 RX ADMIN — EFGARTIGIMOD ALFA AND HYALURONIDASE (HUMAN RECOMBINANT) 1008 MG: 180; 2000 INJECTION, SOLUTION SUBCUTANEOUS at 13:47

## 2025-02-04 NOTE — PROGRESS NOTES
Infusion Nursing Note:  Emiliano Aguiar presents today for Vyvgart.    Patient seen by provider today: No   present during visit today: Not Applicable.    Note: Emiliano arrived ambulatory by himself for Vyvgart. Plan of care reviewed and biological checklist completed. Ok to proceed with treatment. Emiliano has been tolerating Vyvgart injections well and does not note any side effects afterwards. When he began treatment, he noted improvement of his symptoms for nearly 1 month. He now gets weekly injections and the improvement seems to only last for a few days. Fatigue, neuropathy and arthralgia are unchanged. He notes weakness in his shoulders and upper arms.  Vyvgart administered subcutaneous via winged infusion set as ordered over 1.5 minutes in left lower abd. No swelling or bleeding noted.    Intravenous Access:  No Intravenous access/labs at this visit.    Treatment Conditions:  Biological Infusion Checklist:  ~~~ NOTE: If the patient answers yes to any of the questions below, hold the infusion and contact ordering provider or on-call provider.    Have you recently had an elevated temperature, fever, chills, productive cough, coughing for 3 weeks or longer or hemoptysis,  abnormal vital signs, night sweats,  chest pain or have you noticed a decrease in your appetite, unexplained weight loss or fatigue? No  Do you have any open wounds or new incisions? No  Do you have any upcoming hospitalizations or surgeries? Does not include esophagogastroduodenoscopy, colonoscopy, endoscopic retrograde cholangiopancreatography (ERCP), endoscopic ultrasound (EUS), dental procedures or joint aspiration/steroid injections No  Do you currently have any signs of illness or infection or are you on any antibiotics? No  Have you had any new, sudden or worsening abdominal pain? No  Have you or anyone in your household received a live vaccination in the past 4 weeks? Please note: No live vaccines while on biologic/chemotherapy  until 6 months after the last treatment. Patient can receive the flu vaccine (shot only), pneumovax and the Covid vaccine. It is optimal for the patient to get these vaccines mid cycle, but they can be given at any time as long as it is not on the day of the infusion. No  Have you recently been diagnosed with any new nervous system diseases (ie. Multiple sclerosis, Guillain [place], seizures, neurological changes) or cancer diagnosis? Are you on any form of radiation or chemotherapy? No  Are you pregnant or breast feeding or do you have plans of pregnancy in the future? NA  Have you been having any signs of worsening depression or suicidal ideations?  (benlysta only) NA  Have there been any other new onset medical symptoms? No  Have you had any new blood clots? (IVIG only) NA    Post Infusion Assessment:  Patient tolerated injection without incident.     Discharge Plan:   Patient will return Feb 11th for next appointment.   Patient discharged in stable condition accompanied by: self.  Departure Mode: Ambulatory.      Asuncion Esquivel RN

## 2025-02-18 ENCOUNTER — INFUSION THERAPY VISIT (OUTPATIENT)
Dept: INFUSION THERAPY | Facility: HOSPITAL | Age: 60
End: 2025-02-18
Attending: PSYCHIATRY & NEUROLOGY
Payer: COMMERCIAL

## 2025-02-18 VITALS
HEART RATE: 65 BPM | SYSTOLIC BLOOD PRESSURE: 142 MMHG | OXYGEN SATURATION: 98 % | RESPIRATION RATE: 18 BRPM | TEMPERATURE: 97.5 F | DIASTOLIC BLOOD PRESSURE: 70 MMHG

## 2025-02-18 DIAGNOSIS — G61.81 CIDP (CHRONIC INFLAMMATORY DEMYELINATING POLYNEUROPATHY) (H): Primary | ICD-10-CM

## 2025-02-18 PROCEDURE — 96401 CHEMO ANTI-NEOPL SQ/IM: CPT

## 2025-02-18 PROCEDURE — 250N000011 HC RX IP 250 OP 636: Mod: JZ | Performed by: PSYCHIATRY & NEUROLOGY

## 2025-02-18 RX ORDER — ALBUTEROL SULFATE 0.83 MG/ML
2.5 SOLUTION RESPIRATORY (INHALATION)
OUTPATIENT
Start: 2025-02-25

## 2025-02-18 RX ORDER — ALBUTEROL SULFATE 90 UG/1
1-2 INHALANT RESPIRATORY (INHALATION)
Status: DISCONTINUED | OUTPATIENT
Start: 2025-02-18 | End: 2025-02-18 | Stop reason: HOSPADM

## 2025-02-18 RX ORDER — MEPERIDINE HYDROCHLORIDE 25 MG/ML
25 INJECTION INTRAMUSCULAR; INTRAVENOUS; SUBCUTANEOUS
Status: DISCONTINUED | OUTPATIENT
Start: 2025-02-18 | End: 2025-02-18 | Stop reason: HOSPADM

## 2025-02-18 RX ORDER — ALBUTEROL SULFATE 0.83 MG/ML
2.5 SOLUTION RESPIRATORY (INHALATION)
Status: DISCONTINUED | OUTPATIENT
Start: 2025-02-18 | End: 2025-02-18 | Stop reason: HOSPADM

## 2025-02-18 RX ORDER — METHYLPREDNISOLONE SODIUM SUCCINATE 40 MG/ML
40 INJECTION INTRAMUSCULAR; INTRAVENOUS
Start: 2025-02-25

## 2025-02-18 RX ORDER — MEPERIDINE HYDROCHLORIDE 25 MG/ML
25 INJECTION INTRAMUSCULAR; INTRAVENOUS; SUBCUTANEOUS
OUTPATIENT
Start: 2025-02-25

## 2025-02-18 RX ORDER — DIPHENHYDRAMINE HYDROCHLORIDE 50 MG/ML
50 INJECTION INTRAMUSCULAR; INTRAVENOUS
Status: DISCONTINUED | OUTPATIENT
Start: 2025-02-18 | End: 2025-02-18 | Stop reason: HOSPADM

## 2025-02-18 RX ORDER — EPINEPHRINE 1 MG/ML
0.3 INJECTION, SOLUTION INTRAMUSCULAR; SUBCUTANEOUS EVERY 5 MIN PRN
Status: DISCONTINUED | OUTPATIENT
Start: 2025-02-18 | End: 2025-02-18 | Stop reason: HOSPADM

## 2025-02-18 RX ORDER — DIPHENHYDRAMINE HYDROCHLORIDE 50 MG/ML
25 INJECTION INTRAMUSCULAR; INTRAVENOUS
Start: 2025-02-25

## 2025-02-18 RX ORDER — EPINEPHRINE 1 MG/ML
0.3 INJECTION, SOLUTION INTRAMUSCULAR; SUBCUTANEOUS EVERY 5 MIN PRN
OUTPATIENT
Start: 2025-02-25

## 2025-02-18 RX ORDER — DIPHENHYDRAMINE HYDROCHLORIDE 50 MG/ML
25 INJECTION INTRAMUSCULAR; INTRAVENOUS
Status: DISCONTINUED | OUTPATIENT
Start: 2025-02-18 | End: 2025-02-18 | Stop reason: HOSPADM

## 2025-02-18 RX ORDER — METHYLPREDNISOLONE SODIUM SUCCINATE 40 MG/ML
40 INJECTION INTRAMUSCULAR; INTRAVENOUS
Status: DISCONTINUED | OUTPATIENT
Start: 2025-02-18 | End: 2025-02-18 | Stop reason: HOSPADM

## 2025-02-18 RX ORDER — ALBUTEROL SULFATE 90 UG/1
1-2 INHALANT RESPIRATORY (INHALATION)
Start: 2025-02-25

## 2025-02-18 RX ORDER — DIPHENHYDRAMINE HYDROCHLORIDE 50 MG/ML
50 INJECTION INTRAMUSCULAR; INTRAVENOUS
Start: 2025-02-25

## 2025-02-18 RX ADMIN — EFGARTIGIMOD ALFA AND HYALURONIDASE (HUMAN RECOMBINANT) 1008 MG: 180; 2000 INJECTION, SOLUTION SUBCUTANEOUS at 13:17

## 2025-02-18 NOTE — PROGRESS NOTES
Infusion Nursing Note:  Emiliano Aguiar presents today for Vyvgart 1008mg subq inj.    Patient seen by provider today: No    Note: Pt arrives ambulatory with family member to [facility]. Pt reports having fever x 3 days last week and chest congestion. Pt reports feeling well today. Pt reports that he takes claritin 24hr prior to each inj. Pt reports recent inj where he did not take claritin, because he didn't have any, and experienced hives and throat itching. Pt quickly took benadryl and hives resolved within 20min. Pt reports that he did take claritin yesterday.    BP (!) 142/70 (Patient Position: Sitting)   Pulse 65   Temp 97.5  F (36.4  C)   Resp 18   SpO2 98%     Treatment Conditions:  Biological Infusion Checklist:  ~~~ NOTE: If the patient answers yes to any of the questions below, hold the infusion and contact ordering provider or on-call provider.    Have you recently had an elevated temperature, fever, chills, productive cough, coughing for 3 weeks or longer or hemoptysis,  abnormal vital signs, night sweats,  chest pain or have you noticed a decrease in your appetite, unexplained weight loss or fatigue? No  Do you have any open wounds or new incisions? No  Do you have any upcoming hospitalizations or surgeries? Does not include esophagogastroduodenoscopy, colonoscopy, endoscopic retrograde cholangiopancreatography (ERCP), endoscopic ultrasound (EUS), dental procedures or joint aspiration/steroid injections No  Do you currently have any signs of illness or infection or are you on any antibiotics? No  Have you had any new, sudden or worsening abdominal pain? No  Have you or anyone in your household received a live vaccination in the past 4 weeks? Please note: No live vaccines while on biologic/chemotherapy until 6 months after the last treatment. Patient can receive the flu vaccine (shot only), pneumovax and the Covid vaccine. It is optimal for the patient to get these vaccines mid cycle, but  they can be given at any time as long as it is not on the day of the infusion. No  Have you recently been diagnosed with any new nervous system diseases (ie. Multiple sclerosis, Guillain [place], seizures, neurological changes) or cancer diagnosis? Are you on any form of radiation or chemotherapy? No  Are you pregnant or breast feeding or do you have plans of pregnancy in the future? N/A  Have you been having any signs of worsening depression or suicidal ideations?  (benlysta only) N/A  Have there been any other new onset medical symptoms? No  Have you had any new blood clots? (IVIG only) N/A  Post Infusion Assessment:  Patient tolerated injection without incident in RIGHT lower abdomen.  Patient observed for 30 minutes post vyvgart inj per protocol. Pt denied any hives or other side effects/changes.    Discharge Plan:   Patient discharged in stable condition accompanied by: family member.  Departure Mode: Ambulatory.      Gely Odom RN

## 2025-02-25 ENCOUNTER — INFUSION THERAPY VISIT (OUTPATIENT)
Dept: INFUSION THERAPY | Facility: HOSPITAL | Age: 60
End: 2025-02-25
Attending: PSYCHIATRY & NEUROLOGY
Payer: COMMERCIAL

## 2025-02-25 VITALS
SYSTOLIC BLOOD PRESSURE: 129 MMHG | HEART RATE: 75 BPM | OXYGEN SATURATION: 96 % | DIASTOLIC BLOOD PRESSURE: 71 MMHG | RESPIRATION RATE: 18 BRPM

## 2025-02-25 DIAGNOSIS — G61.81 CIDP (CHRONIC INFLAMMATORY DEMYELINATING POLYNEUROPATHY) (H): Primary | ICD-10-CM

## 2025-02-25 PROCEDURE — 96401 CHEMO ANTI-NEOPL SQ/IM: CPT

## 2025-02-25 PROCEDURE — 250N000011 HC RX IP 250 OP 636: Mod: JZ | Performed by: PSYCHIATRY & NEUROLOGY

## 2025-02-25 RX ORDER — EPINEPHRINE 1 MG/ML
0.3 INJECTION, SOLUTION INTRAMUSCULAR; SUBCUTANEOUS EVERY 5 MIN PRN
OUTPATIENT
Start: 2025-03-04

## 2025-02-25 RX ORDER — METHYLPREDNISOLONE SODIUM SUCCINATE 40 MG/ML
40 INJECTION INTRAMUSCULAR; INTRAVENOUS
Start: 2025-03-04

## 2025-02-25 RX ORDER — DIPHENHYDRAMINE HYDROCHLORIDE 50 MG/ML
50 INJECTION INTRAMUSCULAR; INTRAVENOUS
Start: 2025-03-04

## 2025-02-25 RX ORDER — DIPHENHYDRAMINE HYDROCHLORIDE 50 MG/ML
25 INJECTION INTRAMUSCULAR; INTRAVENOUS
Start: 2025-03-04

## 2025-02-25 RX ORDER — ALBUTEROL SULFATE 0.83 MG/ML
2.5 SOLUTION RESPIRATORY (INHALATION)
OUTPATIENT
Start: 2025-03-04

## 2025-02-25 RX ORDER — MEPERIDINE HYDROCHLORIDE 25 MG/ML
25 INJECTION INTRAMUSCULAR; INTRAVENOUS; SUBCUTANEOUS
OUTPATIENT
Start: 2025-03-04

## 2025-02-25 RX ORDER — ALBUTEROL SULFATE 90 UG/1
1-2 INHALANT RESPIRATORY (INHALATION)
Start: 2025-03-04

## 2025-02-25 RX ADMIN — EFGARTIGIMOD ALFA AND HYALURONIDASE (HUMAN RECOMBINANT) 1008 MG: 180; 2000 INJECTION, SOLUTION SUBCUTANEOUS at 13:24

## 2025-02-25 NOTE — PROGRESS NOTES
Infusion Nursing Note:  Emiliano Aguiar presents today for weekly Vyvgart injection.    Patient seen by provider today: No   present during visit today: Not Applicable.    Note: /71 (BP Location: Right arm, Patient Position: Sitting, Cuff Size: Adult Regular)   Pulse 75   Resp 18   SpO2 96% .  Vyvgart given subcutaneous injection to the LEFT side of abdomen.   Paged provider regarding pt being on antibiotics/steroids for cough. Per susan Rowe to give Vyvgart injection today.     Intravenous Access:  No Intravenous access/labs at this visit.    Treatment Conditions:  Biological Infusion Checklist:  ~~~ NOTE: If the patient answers yes to any of the questions below, hold the infusion and contact ordering provider or on-call provider.    Have you recently had an elevated temperature, fever, chills, productive cough, coughing for 3 weeks or longer or hemoptysis,  abnormal vital signs, night sweats,  chest pain or have you noticed a decrease in your appetite, unexplained weight loss or fatigue? Yes, cough for 3 weeks  Do you have any open wounds or new incisions? No  Do you have any upcoming hospitalizations or surgeries? Does not include esophagogastroduodenoscopy, colonoscopy, endoscopic retrograde cholangiopancreatography (ERCP), endoscopic ultrasound (EUS), dental procedures or joint aspiration/steroid injections No  Do you currently have any signs of illness or infection or are you on any antibiotics? Yes, on steroids and antibiotics  Have you had any new, sudden or worsening abdominal pain? No  Have you or anyone in your household received a live vaccination in the past 4 weeks? Please note: No live vaccines while on biologic/chemotherapy until 6 months after the last treatment. Patient can receive the flu vaccine (shot only), pneumovax and the Covid vaccine. It is optimal for the patient to get these vaccines mid cycle, but they can be given at any time as long as it is not on the day  of the infusion. No  Have you recently been diagnosed with any new nervous system diseases (ie. Multiple sclerosis, Guillain [place], seizures, neurological changes) or cancer diagnosis? Are you on any form of radiation or chemotherapy? No  Are you pregnant or breast feeding or do you have plans of pregnancy in the future? No  Have you been having any signs of worsening depression or suicidal ideations?  (benlysta only) No  Have there been any other new onset medical symptoms? No    Post Infusion Assessment:  Patient tolerated injection without incident.   Patient monitored post injection for 30 minutes per protocol. Stable at discharge.       Discharge Plan:   Discharge instructions reviewed with: Patient.  Patient and/or family verbalized understanding of discharge instructions and all questions answered.  AVS to patient via [employer].  Patient will return 3/4/25 for next appointment.   Patient discharged in stable condition accompanied by: self.  Departure Mode: Ambulatory.      Sarath Norton RN

## 2025-03-04 ENCOUNTER — INFUSION THERAPY VISIT (OUTPATIENT)
Dept: INFUSION THERAPY | Facility: HOSPITAL | Age: 60
End: 2025-03-04
Attending: PSYCHIATRY & NEUROLOGY
Payer: COMMERCIAL

## 2025-03-04 VITALS
HEART RATE: 70 BPM | OXYGEN SATURATION: 99 % | DIASTOLIC BLOOD PRESSURE: 73 MMHG | TEMPERATURE: 98 F | RESPIRATION RATE: 16 BRPM | SYSTOLIC BLOOD PRESSURE: 139 MMHG

## 2025-03-04 DIAGNOSIS — G61.81 CIDP (CHRONIC INFLAMMATORY DEMYELINATING POLYNEUROPATHY) (H): Primary | ICD-10-CM

## 2025-03-04 PROCEDURE — 250N000011 HC RX IP 250 OP 636: Mod: JZ | Performed by: PSYCHIATRY & NEUROLOGY

## 2025-03-04 PROCEDURE — 96401 CHEMO ANTI-NEOPL SQ/IM: CPT

## 2025-03-04 RX ORDER — ALBUTEROL SULFATE 0.83 MG/ML
2.5 SOLUTION RESPIRATORY (INHALATION)
OUTPATIENT
Start: 2025-03-11

## 2025-03-04 RX ORDER — METHYLPREDNISOLONE SODIUM SUCCINATE 40 MG/ML
40 INJECTION INTRAMUSCULAR; INTRAVENOUS
Start: 2025-03-11

## 2025-03-04 RX ORDER — MEPERIDINE HYDROCHLORIDE 25 MG/ML
25 INJECTION INTRAMUSCULAR; INTRAVENOUS; SUBCUTANEOUS
OUTPATIENT
Start: 2025-03-11

## 2025-03-04 RX ORDER — DIPHENHYDRAMINE HYDROCHLORIDE 50 MG/ML
25 INJECTION, SOLUTION INTRAMUSCULAR; INTRAVENOUS
Start: 2025-03-11

## 2025-03-04 RX ORDER — EPINEPHRINE 1 MG/ML
0.3 INJECTION, SOLUTION INTRAMUSCULAR; SUBCUTANEOUS EVERY 5 MIN PRN
OUTPATIENT
Start: 2025-03-11

## 2025-03-04 RX ORDER — DIPHENHYDRAMINE HYDROCHLORIDE 50 MG/ML
50 INJECTION, SOLUTION INTRAMUSCULAR; INTRAVENOUS
Start: 2025-03-11

## 2025-03-04 RX ORDER — ALBUTEROL SULFATE 90 UG/1
1-2 INHALANT RESPIRATORY (INHALATION)
Start: 2025-03-11

## 2025-03-04 RX ADMIN — EFGARTIGIMOD ALFA AND HYALURONIDASE (HUMAN RECOMBINANT) 1008 MG: 180; 2000 INJECTION, SOLUTION SUBCUTANEOUS at 13:49

## 2025-03-04 NOTE — PROGRESS NOTES
Infusion Nursing Note:  Emiliano Aguiar presents today for Vyvgart.    Patient seen by provider today: No   present during visit today: Not Applicable.    Note: Emiliano arrived ambulatory by himself for Vyvgart. Plan of care reviewed and biological checklist completed. Ok to proceed with treatment. Emiliano has been tolerating Vyvgart injections well and does not note any side effects afterwards. He reports neuropathy is less intense and weakness has improved.  Vyvgart administered subcutaneous via winged infusion set as ordered over 1 minute in RIGHT lower abd. No swelling or bleeding noted.    Intravenous Access:  No Intravenous access/labs at this visit.    Treatment Conditions:  Biological Infusion Checklist:  ~~~ NOTE: If the patient answers yes to any of the questions below, hold the infusion and contact ordering provider or on-call provider.    Have you recently had an elevated temperature, fever, chills, productive cough, coughing for 3 weeks or longer or hemoptysis,  abnormal vital signs, night sweats,  chest pain or have you noticed a decrease in your appetite, unexplained weight loss or fatigue? No  Do you have any open wounds or new incisions? No  Do you have any upcoming hospitalizations or surgeries? Does not include esophagogastroduodenoscopy, colonoscopy, endoscopic retrograde cholangiopancreatography (ERCP), endoscopic ultrasound (EUS), dental procedures or joint aspiration/steroid injections No  Do you currently have any signs of illness or infection or are you on any antibiotics? No  Have you had any new, sudden or worsening abdominal pain? No  Have you or anyone in your household received a live vaccination in the past 4 weeks? Please note: No live vaccines while on biologic/chemotherapy until 6 months after the last treatment. Patient can receive the flu vaccine (shot only), pneumovax and the Covid vaccine. It is optimal for the patient to get these vaccines mid cycle, but they can be  given at any time as long as it is not on the day of the infusion. No  Have you recently been diagnosed with any new nervous system diseases (ie. Multiple sclerosis, Guillain [place], seizures, neurological changes) or cancer diagnosis? Are you on any form of radiation or chemotherapy? No  Are you pregnant or breast feeding or do you have plans of pregnancy in the future? NA  Have you been having any signs of worsening depression or suicidal ideations?  (benlysta only) NA  Have there been any other new onset medical symptoms? No  Have you had any new blood clots? (IVIG only) NA    Post Infusion Assessment:  Patient tolerated injection without incident.  Patient observed for 30 minutes post Vyvgart injection per protocol.      Discharge Plan:   Patient will return March 11th for next appointment.  Patient discharged in stable condition accompanied by: self.  Departure Mode: Ambulatory.      Asuncion Esquivel RN

## 2025-03-11 ENCOUNTER — INFUSION THERAPY VISIT (OUTPATIENT)
Dept: INFUSION THERAPY | Facility: HOSPITAL | Age: 60
End: 2025-03-11
Attending: PSYCHIATRY & NEUROLOGY
Payer: COMMERCIAL

## 2025-03-11 VITALS
HEART RATE: 75 BPM | TEMPERATURE: 97.8 F | RESPIRATION RATE: 18 BRPM | SYSTOLIC BLOOD PRESSURE: 127 MMHG | DIASTOLIC BLOOD PRESSURE: 74 MMHG | OXYGEN SATURATION: 96 %

## 2025-03-11 DIAGNOSIS — G61.81 CIDP (CHRONIC INFLAMMATORY DEMYELINATING POLYNEUROPATHY) (H): Primary | ICD-10-CM

## 2025-03-11 PROCEDURE — 96401 CHEMO ANTI-NEOPL SQ/IM: CPT

## 2025-03-11 PROCEDURE — 250N000011 HC RX IP 250 OP 636: Mod: JZ | Performed by: PSYCHIATRY & NEUROLOGY

## 2025-03-11 RX ORDER — ALBUTEROL SULFATE 0.83 MG/ML
2.5 SOLUTION RESPIRATORY (INHALATION)
OUTPATIENT
Start: 2025-03-18

## 2025-03-11 RX ORDER — ALBUTEROL SULFATE 90 UG/1
1-2 INHALANT RESPIRATORY (INHALATION)
Start: 2025-03-18

## 2025-03-11 RX ORDER — METHYLPREDNISOLONE SODIUM SUCCINATE 40 MG/ML
40 INJECTION INTRAMUSCULAR; INTRAVENOUS
Start: 2025-03-18

## 2025-03-11 RX ORDER — DIPHENHYDRAMINE HYDROCHLORIDE 50 MG/ML
50 INJECTION, SOLUTION INTRAMUSCULAR; INTRAVENOUS
Start: 2025-03-18

## 2025-03-11 RX ORDER — EPINEPHRINE 1 MG/ML
0.3 INJECTION, SOLUTION INTRAMUSCULAR; SUBCUTANEOUS EVERY 5 MIN PRN
OUTPATIENT
Start: 2025-03-18

## 2025-03-11 RX ORDER — MEPERIDINE HYDROCHLORIDE 25 MG/ML
25 INJECTION INTRAMUSCULAR; INTRAVENOUS; SUBCUTANEOUS
OUTPATIENT
Start: 2025-03-18

## 2025-03-11 RX ORDER — DIPHENHYDRAMINE HYDROCHLORIDE 50 MG/ML
25 INJECTION, SOLUTION INTRAMUSCULAR; INTRAVENOUS
Start: 2025-03-18

## 2025-03-11 RX ADMIN — EFGARTIGIMOD ALFA AND HYALURONIDASE (HUMAN RECOMBINANT) 1008 MG: 180; 2000 INJECTION, SOLUTION SUBCUTANEOUS at 14:19

## 2025-03-11 NOTE — PROGRESS NOTES
Infusion Nursing Note:  Emiliano Aguiar presents today for Vyvgart subcutaneous injection.    Patient seen by provider today: No   present during visit today: Not Applicable.    Note: Pt. Denies any new concerns or changes. Tolerating his vyvgart injections well. Vyvgart given subcutaneous in right mid abdomen.      Intravenous Access:  No Intravenous access/labs at this visit.    Treatment Conditions:  Biological Infusion Checklist:  ~~~ NOTE: If the patient answers yes to any of the questions below, hold the infusion and contact ordering provider or on-call provider.    Have you recently had an elevated temperature, fever, chills, productive cough, coughing for 3 weeks or longer or hemoptysis,  abnormal vital signs, night sweats,  chest pain or have you noticed a decrease in your appetite, unexplained weight loss or fatigue? No  Do you have any open wounds or new incisions? No  Do you have any upcoming hospitalizations or surgeries? Does not include esophagogastroduodenoscopy, colonoscopy, endoscopic retrograde cholangiopancreatography (ERCP), endoscopic ultrasound (EUS), dental procedures or joint aspiration/steroid injections No  Do you currently have any signs of illness or infection or are you on any antibiotics? No  Have you had any new, sudden or worsening abdominal pain? No  Have you or anyone in your household received a live vaccination in the past 4 weeks? Please note: No live vaccines while on biologic/chemotherapy until 6 months after the last treatment. Patient can receive the flu vaccine (shot only), pneumovax and the Covid vaccine. It is optimal for the patient to get these vaccines mid cycle, but they can be given at any time as long as it is not on the day of the infusion. No  Have you recently been diagnosed with any new nervous system diseases (ie. Multiple sclerosis, Guillain [place], seizures, neurological changes) or cancer diagnosis? Are you on any form of radiation or chemotherapy?  No  Are you pregnant or breast feeding or do you have plans of pregnancy in the future? No  Have you been having any signs of worsening depression or suicidal ideations?  (benlysta only) No  Have there been any other new onset medical symptoms? No  Have you had any new blood clots? (IVIG only) No      Post Infusion Assessment:  Patient tolerated injection without incident.  Site patent and intact, free from redness, edema or discomfort.  No evidence of extravasations.   Patient refused to stay 30 minutes after his injection.    Discharge Plan:   Patient and/or family verbalized understanding of discharge instructions and all questions answered.      Shayna Gagnon RN

## 2025-03-18 ENCOUNTER — INFUSION THERAPY VISIT (OUTPATIENT)
Dept: INFUSION THERAPY | Facility: HOSPITAL | Age: 60
End: 2025-03-18
Attending: PSYCHIATRY & NEUROLOGY
Payer: COMMERCIAL

## 2025-03-18 VITALS
OXYGEN SATURATION: 97 % | RESPIRATION RATE: 18 BRPM | SYSTOLIC BLOOD PRESSURE: 129 MMHG | HEART RATE: 76 BPM | DIASTOLIC BLOOD PRESSURE: 78 MMHG | TEMPERATURE: 98 F

## 2025-03-18 DIAGNOSIS — G61.81 CIDP (CHRONIC INFLAMMATORY DEMYELINATING POLYNEUROPATHY) (H): Primary | ICD-10-CM

## 2025-03-18 PROCEDURE — 250N000011 HC RX IP 250 OP 636: Mod: JZ | Performed by: PSYCHIATRY & NEUROLOGY

## 2025-03-18 PROCEDURE — 96401 CHEMO ANTI-NEOPL SQ/IM: CPT

## 2025-03-18 RX ORDER — METHYLPREDNISOLONE SODIUM SUCCINATE 40 MG/ML
40 INJECTION INTRAMUSCULAR; INTRAVENOUS
Status: DISCONTINUED | OUTPATIENT
Start: 2025-03-18 | End: 2025-03-18 | Stop reason: HOSPADM

## 2025-03-18 RX ORDER — DIPHENHYDRAMINE HYDROCHLORIDE 50 MG/ML
50 INJECTION, SOLUTION INTRAMUSCULAR; INTRAVENOUS
Status: DISCONTINUED | OUTPATIENT
Start: 2025-03-18 | End: 2025-03-18 | Stop reason: HOSPADM

## 2025-03-18 RX ORDER — METHYLPREDNISOLONE SODIUM SUCCINATE 40 MG/ML
40 INJECTION INTRAMUSCULAR; INTRAVENOUS
Start: 2025-03-25

## 2025-03-18 RX ORDER — DIPHENHYDRAMINE HYDROCHLORIDE 50 MG/ML
25 INJECTION, SOLUTION INTRAMUSCULAR; INTRAVENOUS
Start: 2025-03-25

## 2025-03-18 RX ORDER — DIPHENHYDRAMINE HYDROCHLORIDE 50 MG/ML
50 INJECTION, SOLUTION INTRAMUSCULAR; INTRAVENOUS
Start: 2025-03-25

## 2025-03-18 RX ORDER — MEPERIDINE HYDROCHLORIDE 25 MG/ML
25 INJECTION INTRAMUSCULAR; INTRAVENOUS; SUBCUTANEOUS
Status: DISCONTINUED | OUTPATIENT
Start: 2025-03-18 | End: 2025-03-18 | Stop reason: HOSPADM

## 2025-03-18 RX ORDER — DIPHENHYDRAMINE HYDROCHLORIDE 50 MG/ML
25 INJECTION, SOLUTION INTRAMUSCULAR; INTRAVENOUS
Status: DISCONTINUED | OUTPATIENT
Start: 2025-03-18 | End: 2025-03-18 | Stop reason: HOSPADM

## 2025-03-18 RX ORDER — ALBUTEROL SULFATE 90 UG/1
1-2 INHALANT RESPIRATORY (INHALATION)
Status: DISCONTINUED | OUTPATIENT
Start: 2025-03-18 | End: 2025-03-18 | Stop reason: HOSPADM

## 2025-03-18 RX ORDER — ALBUTEROL SULFATE 90 UG/1
1-2 INHALANT RESPIRATORY (INHALATION)
Start: 2025-03-25

## 2025-03-18 RX ORDER — EPINEPHRINE 1 MG/ML
0.3 INJECTION, SOLUTION INTRAMUSCULAR; SUBCUTANEOUS EVERY 5 MIN PRN
Status: DISCONTINUED | OUTPATIENT
Start: 2025-03-18 | End: 2025-03-18 | Stop reason: HOSPADM

## 2025-03-18 RX ORDER — EPINEPHRINE 1 MG/ML
0.3 INJECTION, SOLUTION INTRAMUSCULAR; SUBCUTANEOUS EVERY 5 MIN PRN
OUTPATIENT
Start: 2025-03-25

## 2025-03-18 RX ORDER — MEPERIDINE HYDROCHLORIDE 25 MG/ML
25 INJECTION INTRAMUSCULAR; INTRAVENOUS; SUBCUTANEOUS
OUTPATIENT
Start: 2025-03-25

## 2025-03-18 RX ORDER — ALBUTEROL SULFATE 0.83 MG/ML
2.5 SOLUTION RESPIRATORY (INHALATION)
OUTPATIENT
Start: 2025-03-25

## 2025-03-18 RX ORDER — ALBUTEROL SULFATE 0.83 MG/ML
2.5 SOLUTION RESPIRATORY (INHALATION)
Status: DISCONTINUED | OUTPATIENT
Start: 2025-03-18 | End: 2025-03-18 | Stop reason: HOSPADM

## 2025-03-18 RX ADMIN — EFGARTIGIMOD ALFA AND HYALURONIDASE (HUMAN RECOMBINANT) 1008 MG: 180; 2000 INJECTION, SOLUTION SUBCUTANEOUS at 13:46

## 2025-03-18 NOTE — PROGRESS NOTES
~~~ NOTE: If the patient answers yes to any of the questions below, hold the infusion and contact ordering provider or on-call provider.    Do you currently have any signs of illness or infection or are you on any antibiotics? No  Have you recently had an elevated temperature, fever, chills, productive cough, coughing for 3 weeks or longer or hemoptysis, abnormal vital signs, night sweats, chest pain or have you noticed a decrease in your appetite, unexplained weight loss or fatigue? No  Have you had any new, sudden, or worsening abdominal pain? No  Do you have any open wounds or new incisions? (exclude for patients with hidradenitis suppurativa) No  Have you recently been diagnosed with any new nervous system diseases (ie. Multiple sclerosis, Guillain [place], seizures, neurological changes) or cancer diagnosis? Are you on any form of radiation or chemotherapy? No  Have there been any other new onset medical symptoms? No  Are you pregnant or breast feeding or do you have plans of pregnancy in the future? No; N/A  Do you have any upcoming hospitalizations or surgeries? Does not include esophagogastroduodenoscopy, colonoscopy, endoscopic retrograde cholangiopancreatography (ERCP), endoscopic ultrasound (EUS), dental procedures (including cleanings, fillings, implants, extractions)  or joint aspiration/steroid injections No  Have you or anyone in your household received a live vaccination in the past 4 weeks? Please note: No live vaccines while on biologic/chemotherapy until 6 months after the last treatment. Patient can receive the flu vaccine (shot only).  It is optimal for the patient to get it mid cycle, but it can be given at any time as long as it is not on the day of the infusion. No  If applicable to prescribed medication, confirm negative PPD or quantiferon gold MTB. If positive, verify has negative chest x-ray or the patient is at least 4 weeks post initiation of INH/B6 therapy and have clearance from provider  before infusion (Y/N:[id number])  If applicable to prescribed medication, confirm negative hepatitis B surface antigen or hepatitis C. If positive, clearance from provider before infusion. (Y/N: [id number])  Rheumatology patients receiving tocilizumab (Actemra): If labs were drawn within the past week, hold dosing until cleared to infuse If AST/ALT > 2 X upper limit normal; ANC < 1.0. NO; N/A  Patients receiving belimumab (Benlysta): Have you been having any signs of worsening depression or suicidal ideations? No; N/A   Infusion Nursing Note:  Emiliano Aguiar presents today for vyvgart injection  Patient seen by provider today: No   present during visit today: Not Applicable.    Note: injection given in left abdomen today and pt told to stay for observation for 30 minutes per protocol.  /78 (Patient Position: Sitting)   Pulse 76   Temp 98  F (36.7  C)   Resp 18   SpO2 97%      Intravenous Access:  No Intravenous access/labs at this visit.    Treatment Conditions:  Not Applicable.      Post Infusion Assessment:  Patient tolerated injection without incident.       Discharge Plan:   Patient and/or family verbalized understanding of discharge instructions and all questions answered.      Deanna Dalton RN

## 2025-03-25 ENCOUNTER — INFUSION THERAPY VISIT (OUTPATIENT)
Dept: INFUSION THERAPY | Facility: HOSPITAL | Age: 60
End: 2025-03-25
Attending: PSYCHIATRY & NEUROLOGY
Payer: COMMERCIAL

## 2025-03-25 VITALS
SYSTOLIC BLOOD PRESSURE: 135 MMHG | HEART RATE: 73 BPM | TEMPERATURE: 97.8 F | OXYGEN SATURATION: 96 % | DIASTOLIC BLOOD PRESSURE: 81 MMHG | RESPIRATION RATE: 18 BRPM

## 2025-03-25 DIAGNOSIS — G61.81 CIDP (CHRONIC INFLAMMATORY DEMYELINATING POLYNEUROPATHY) (H): Primary | ICD-10-CM

## 2025-03-25 PROCEDURE — 96402 CHEMO HORMON ANTINEOPL SQ/IM: CPT

## 2025-03-25 PROCEDURE — 250N000011 HC RX IP 250 OP 636: Mod: JZ | Performed by: PSYCHIATRY & NEUROLOGY

## 2025-03-25 RX ORDER — ALBUTEROL SULFATE 0.83 MG/ML
2.5 SOLUTION RESPIRATORY (INHALATION)
OUTPATIENT
Start: 2025-04-01

## 2025-03-25 RX ORDER — MEPERIDINE HYDROCHLORIDE 25 MG/ML
25 INJECTION INTRAMUSCULAR; INTRAVENOUS; SUBCUTANEOUS
Status: DISCONTINUED | OUTPATIENT
Start: 2025-03-25 | End: 2025-03-25 | Stop reason: HOSPADM

## 2025-03-25 RX ORDER — DIPHENHYDRAMINE HYDROCHLORIDE 50 MG/ML
25 INJECTION, SOLUTION INTRAMUSCULAR; INTRAVENOUS
Start: 2025-04-01

## 2025-03-25 RX ORDER — ALBUTEROL SULFATE 90 UG/1
1-2 INHALANT RESPIRATORY (INHALATION)
Start: 2025-04-01

## 2025-03-25 RX ORDER — METHYLPREDNISOLONE SODIUM SUCCINATE 40 MG/ML
40 INJECTION INTRAMUSCULAR; INTRAVENOUS
Start: 2025-04-01

## 2025-03-25 RX ORDER — EPINEPHRINE 1 MG/ML
0.3 INJECTION, SOLUTION INTRAMUSCULAR; SUBCUTANEOUS EVERY 5 MIN PRN
OUTPATIENT
Start: 2025-04-01

## 2025-03-25 RX ORDER — ALBUTEROL SULFATE 0.83 MG/ML
2.5 SOLUTION RESPIRATORY (INHALATION)
Status: DISCONTINUED | OUTPATIENT
Start: 2025-03-25 | End: 2025-03-25 | Stop reason: HOSPADM

## 2025-03-25 RX ORDER — MEPERIDINE HYDROCHLORIDE 25 MG/ML
25 INJECTION INTRAMUSCULAR; INTRAVENOUS; SUBCUTANEOUS
OUTPATIENT
Start: 2025-04-01

## 2025-03-25 RX ORDER — DIPHENHYDRAMINE HYDROCHLORIDE 50 MG/ML
50 INJECTION, SOLUTION INTRAMUSCULAR; INTRAVENOUS
Start: 2025-04-01

## 2025-03-25 RX ORDER — DIPHENHYDRAMINE HYDROCHLORIDE 50 MG/ML
50 INJECTION, SOLUTION INTRAMUSCULAR; INTRAVENOUS
Status: DISCONTINUED | OUTPATIENT
Start: 2025-03-25 | End: 2025-03-25 | Stop reason: HOSPADM

## 2025-03-25 RX ORDER — EPINEPHRINE 1 MG/ML
0.3 INJECTION, SOLUTION INTRAMUSCULAR; SUBCUTANEOUS EVERY 5 MIN PRN
Status: DISCONTINUED | OUTPATIENT
Start: 2025-03-25 | End: 2025-03-25 | Stop reason: HOSPADM

## 2025-03-25 RX ORDER — ALBUTEROL SULFATE 90 UG/1
1-2 INHALANT RESPIRATORY (INHALATION)
Status: DISCONTINUED | OUTPATIENT
Start: 2025-03-25 | End: 2025-03-25 | Stop reason: HOSPADM

## 2025-03-25 RX ORDER — METHYLPREDNISOLONE SODIUM SUCCINATE 40 MG/ML
40 INJECTION INTRAMUSCULAR; INTRAVENOUS
Status: DISCONTINUED | OUTPATIENT
Start: 2025-03-25 | End: 2025-03-25 | Stop reason: HOSPADM

## 2025-03-25 RX ORDER — DIPHENHYDRAMINE HYDROCHLORIDE 50 MG/ML
25 INJECTION, SOLUTION INTRAMUSCULAR; INTRAVENOUS
Status: DISCONTINUED | OUTPATIENT
Start: 2025-03-25 | End: 2025-03-25 | Stop reason: HOSPADM

## 2025-03-25 RX ADMIN — EFGARTIGIMOD ALFA AND HYALURONIDASE (HUMAN RECOMBINANT) 1008 MG: 180; 2000 INJECTION, SOLUTION SUBCUTANEOUS at 13:27

## 2025-03-25 NOTE — PROGRESS NOTES
Infusion Nursing Note:  Emiliano Aguiar presents today for Vyvgart subcutaneous injection    Patient seen by provider today: No   present during visit today: Not Applicable.    Note: Emiliano came to the clinic unaccompanied and ambulatory. Vyvgart subcutaneous injection given on R lower abdomen over 1.5 min. Tolerated injection well.       Intravenous Access:  No Intravenous access/labs at this visit.    Treatment Conditions:  Biological Infusion Checklist:  ~~~ NOTE: If the patient answers yes to any of the questions below, hold the infusion and contact ordering provider or on-call provider.    Have you recently had an elevated temperature, fever, chills, productive cough, coughing for 3 weeks or longer or hemoptysis,  abnormal vital signs, night sweats,  chest pain or have you noticed a decrease in your appetite, unexplained weight loss or fatigue? Yes, fatigued at baseline, has not increased or changed since diagnosis  Do you have any open wounds or new incisions? No  Do you have any upcoming hospitalizations or surgeries? Does not include esophagogastroduodenoscopy, colonoscopy, endoscopic retrograde cholangiopancreatography (ERCP), endoscopic ultrasound (EUS), dental procedures or joint aspiration/steroid injections No  Do you currently have any signs of illness or infection or are you on any antibiotics? No  Have you had any new, sudden or worsening abdominal pain? No  Have you or anyone in your household received a live vaccination in the past 4 weeks? Please note: No live vaccines while on biologic/chemotherapy until 6 months after the last treatment. Patient can receive the flu vaccine (shot only), pneumovax and the Covid vaccine. It is optimal for the patient to get these vaccines mid cycle, but they can be given at any time as long as it is not on the day of the infusion. No  Have you recently been diagnosed with any new nervous system diseases (ie. Multiple sclerosis, Guillain [place], seizures,  neurological changes) or cancer diagnosis? Are you on any form of radiation or chemotherapy? No  Are you pregnant or breast feeding or do you have plans of pregnancy in the future? No  Have you been having any signs of worsening depression or suicidal ideations?  (benlysta only) No  Have there been any other new onset medical symptoms? No  Have you had any new blood clots? (IVIG only) No      Post Infusion Assessment:  Patient tolerated injection without incident.  Site patent and intact, free from redness, edema or discomfort.       Discharge Plan:   Discharge instructions reviewed with: Patient.  Patient and/or family verbalized understanding of discharge instructions and all questions answered.  Patient discharged in stable condition accompanied by: self.  Departure Mode: Ambulatory.      Colleen Armstrong RN

## 2025-04-01 ENCOUNTER — INFUSION THERAPY VISIT (OUTPATIENT)
Dept: INFUSION THERAPY | Facility: HOSPITAL | Age: 60
End: 2025-04-01
Attending: PSYCHIATRY & NEUROLOGY
Payer: COMMERCIAL

## 2025-04-01 VITALS
HEART RATE: 62 BPM | RESPIRATION RATE: 18 BRPM | TEMPERATURE: 98.3 F | DIASTOLIC BLOOD PRESSURE: 84 MMHG | OXYGEN SATURATION: 98 % | SYSTOLIC BLOOD PRESSURE: 146 MMHG

## 2025-04-01 DIAGNOSIS — G61.81 CIDP (CHRONIC INFLAMMATORY DEMYELINATING POLYNEUROPATHY) (H): Primary | ICD-10-CM

## 2025-04-01 PROCEDURE — 96401 CHEMO ANTI-NEOPL SQ/IM: CPT

## 2025-04-01 PROCEDURE — 250N000011 HC RX IP 250 OP 636: Mod: JZ | Performed by: PSYCHIATRY & NEUROLOGY

## 2025-04-01 RX ORDER — DIPHENHYDRAMINE HYDROCHLORIDE 50 MG/ML
25 INJECTION, SOLUTION INTRAMUSCULAR; INTRAVENOUS
Start: 2025-04-08

## 2025-04-01 RX ORDER — ALBUTEROL SULFATE 90 UG/1
1-2 INHALANT RESPIRATORY (INHALATION)
Status: DISCONTINUED | OUTPATIENT
Start: 2025-04-01 | End: 2025-04-01 | Stop reason: HOSPADM

## 2025-04-01 RX ORDER — DIPHENHYDRAMINE HYDROCHLORIDE 50 MG/ML
50 INJECTION, SOLUTION INTRAMUSCULAR; INTRAVENOUS
Status: DISCONTINUED | OUTPATIENT
Start: 2025-04-01 | End: 2025-04-01 | Stop reason: HOSPADM

## 2025-04-01 RX ORDER — MEPERIDINE HYDROCHLORIDE 25 MG/ML
25 INJECTION INTRAMUSCULAR; INTRAVENOUS; SUBCUTANEOUS
OUTPATIENT
Start: 2025-04-08

## 2025-04-01 RX ORDER — DIPHENHYDRAMINE HYDROCHLORIDE 50 MG/ML
50 INJECTION, SOLUTION INTRAMUSCULAR; INTRAVENOUS
Start: 2025-04-08

## 2025-04-01 RX ORDER — DIPHENHYDRAMINE HYDROCHLORIDE 50 MG/ML
25 INJECTION, SOLUTION INTRAMUSCULAR; INTRAVENOUS
Status: DISCONTINUED | OUTPATIENT
Start: 2025-04-01 | End: 2025-04-01 | Stop reason: HOSPADM

## 2025-04-01 RX ORDER — MEPERIDINE HYDROCHLORIDE 25 MG/ML
25 INJECTION INTRAMUSCULAR; INTRAVENOUS; SUBCUTANEOUS
Status: DISCONTINUED | OUTPATIENT
Start: 2025-04-01 | End: 2025-04-01 | Stop reason: HOSPADM

## 2025-04-01 RX ORDER — EPINEPHRINE 1 MG/ML
0.3 INJECTION, SOLUTION INTRAMUSCULAR; SUBCUTANEOUS EVERY 5 MIN PRN
Status: DISCONTINUED | OUTPATIENT
Start: 2025-04-01 | End: 2025-04-01 | Stop reason: HOSPADM

## 2025-04-01 RX ORDER — METHYLPREDNISOLONE SODIUM SUCCINATE 40 MG/ML
40 INJECTION INTRAMUSCULAR; INTRAVENOUS
Status: DISCONTINUED | OUTPATIENT
Start: 2025-04-01 | End: 2025-04-01 | Stop reason: HOSPADM

## 2025-04-01 RX ORDER — EPINEPHRINE 1 MG/ML
0.3 INJECTION, SOLUTION INTRAMUSCULAR; SUBCUTANEOUS EVERY 5 MIN PRN
OUTPATIENT
Start: 2025-04-08

## 2025-04-01 RX ORDER — ALBUTEROL SULFATE 0.83 MG/ML
2.5 SOLUTION RESPIRATORY (INHALATION)
OUTPATIENT
Start: 2025-04-08

## 2025-04-01 RX ORDER — ALBUTEROL SULFATE 0.83 MG/ML
2.5 SOLUTION RESPIRATORY (INHALATION)
Status: DISCONTINUED | OUTPATIENT
Start: 2025-04-01 | End: 2025-04-01 | Stop reason: HOSPADM

## 2025-04-01 RX ORDER — METHYLPREDNISOLONE SODIUM SUCCINATE 40 MG/ML
40 INJECTION INTRAMUSCULAR; INTRAVENOUS
Start: 2025-04-08

## 2025-04-01 RX ORDER — ALBUTEROL SULFATE 90 UG/1
1-2 INHALANT RESPIRATORY (INHALATION)
Start: 2025-04-08

## 2025-04-01 RX ADMIN — EFGARTIGIMOD ALFA AND HYALURONIDASE (HUMAN RECOMBINANT) 1008 MG: 180; 2000 INJECTION, SOLUTION SUBCUTANEOUS at 13:25

## 2025-04-01 NOTE — PROGRESS NOTES
Infusion Nursing Note:  Emiliano Aguiar presents today for Vyvgard injection.    Patient seen by provider today: No   present during visit today: Not Applicable.    Note: Tolerated injection well, offers no complaints.  Observed for 30 minutes post injection the discharge to home..    Premeds Given: none    Intravenous Access:  No Intravenous access/labs at this visit.    Treatment Conditions:  Not Applicable.      Post Infusion Assessment:  Patient tolerated injection without incident.       Discharge Plan:   Patient and/or family verbalized understanding of discharge instructions and all questions answered.      JAIRON Kidd

## 2025-04-08 ENCOUNTER — INFUSION THERAPY VISIT (OUTPATIENT)
Dept: INFUSION THERAPY | Facility: HOSPITAL | Age: 60
End: 2025-04-08
Attending: PSYCHIATRY & NEUROLOGY
Payer: COMMERCIAL

## 2025-04-08 VITALS
SYSTOLIC BLOOD PRESSURE: 138 MMHG | TEMPERATURE: 98 F | RESPIRATION RATE: 16 BRPM | HEART RATE: 71 BPM | DIASTOLIC BLOOD PRESSURE: 74 MMHG | OXYGEN SATURATION: 97 %

## 2025-04-08 DIAGNOSIS — G61.81 CIDP (CHRONIC INFLAMMATORY DEMYELINATING POLYNEUROPATHY) (H): Primary | ICD-10-CM

## 2025-04-08 PROCEDURE — 96401 CHEMO ANTI-NEOPL SQ/IM: CPT

## 2025-04-08 PROCEDURE — 250N000011 HC RX IP 250 OP 636: Mod: JZ | Performed by: PSYCHIATRY & NEUROLOGY

## 2025-04-08 RX ORDER — MEPERIDINE HYDROCHLORIDE 25 MG/ML
25 INJECTION INTRAMUSCULAR; INTRAVENOUS; SUBCUTANEOUS
OUTPATIENT
Start: 2025-04-15

## 2025-04-08 RX ORDER — EPINEPHRINE 1 MG/ML
0.3 INJECTION, SOLUTION INTRAMUSCULAR; SUBCUTANEOUS EVERY 5 MIN PRN
Status: DISCONTINUED | OUTPATIENT
Start: 2025-04-08 | End: 2025-04-08 | Stop reason: HOSPADM

## 2025-04-08 RX ORDER — METHYLPREDNISOLONE SODIUM SUCCINATE 40 MG/ML
40 INJECTION INTRAMUSCULAR; INTRAVENOUS
Status: DISCONTINUED | OUTPATIENT
Start: 2025-04-08 | End: 2025-04-08 | Stop reason: HOSPADM

## 2025-04-08 RX ORDER — METHYLPREDNISOLONE SODIUM SUCCINATE 40 MG/ML
40 INJECTION INTRAMUSCULAR; INTRAVENOUS
Start: 2025-04-15

## 2025-04-08 RX ORDER — DIPHENHYDRAMINE HYDROCHLORIDE 50 MG/ML
25 INJECTION, SOLUTION INTRAMUSCULAR; INTRAVENOUS
Status: DISCONTINUED | OUTPATIENT
Start: 2025-04-08 | End: 2025-04-08 | Stop reason: HOSPADM

## 2025-04-08 RX ORDER — DIPHENHYDRAMINE HYDROCHLORIDE 50 MG/ML
50 INJECTION, SOLUTION INTRAMUSCULAR; INTRAVENOUS
Start: 2025-04-15

## 2025-04-08 RX ORDER — MEPERIDINE HYDROCHLORIDE 25 MG/ML
25 INJECTION INTRAMUSCULAR; INTRAVENOUS; SUBCUTANEOUS
Status: DISCONTINUED | OUTPATIENT
Start: 2025-04-08 | End: 2025-04-08 | Stop reason: HOSPADM

## 2025-04-08 RX ORDER — ALBUTEROL SULFATE 0.83 MG/ML
2.5 SOLUTION RESPIRATORY (INHALATION)
Status: DISCONTINUED | OUTPATIENT
Start: 2025-04-08 | End: 2025-04-08 | Stop reason: HOSPADM

## 2025-04-08 RX ORDER — ALBUTEROL SULFATE 0.83 MG/ML
2.5 SOLUTION RESPIRATORY (INHALATION)
OUTPATIENT
Start: 2025-04-15

## 2025-04-08 RX ORDER — ALBUTEROL SULFATE 90 UG/1
1-2 INHALANT RESPIRATORY (INHALATION)
Start: 2025-04-15

## 2025-04-08 RX ORDER — ALBUTEROL SULFATE 90 UG/1
1-2 INHALANT RESPIRATORY (INHALATION)
Status: DISCONTINUED | OUTPATIENT
Start: 2025-04-08 | End: 2025-04-08 | Stop reason: HOSPADM

## 2025-04-08 RX ORDER — DIPHENHYDRAMINE HYDROCHLORIDE 50 MG/ML
25 INJECTION, SOLUTION INTRAMUSCULAR; INTRAVENOUS
Start: 2025-04-15

## 2025-04-08 RX ORDER — DIPHENHYDRAMINE HYDROCHLORIDE 50 MG/ML
50 INJECTION, SOLUTION INTRAMUSCULAR; INTRAVENOUS
Status: DISCONTINUED | OUTPATIENT
Start: 2025-04-08 | End: 2025-04-08 | Stop reason: HOSPADM

## 2025-04-08 RX ORDER — EPINEPHRINE 1 MG/ML
0.3 INJECTION, SOLUTION INTRAMUSCULAR; SUBCUTANEOUS EVERY 5 MIN PRN
OUTPATIENT
Start: 2025-04-15

## 2025-04-08 RX ADMIN — EFGARTIGIMOD ALFA AND HYALURONIDASE (HUMAN RECOMBINANT) 1008 MG: 180; 2000 INJECTION, SOLUTION SUBCUTANEOUS at 13:44

## 2025-04-08 NOTE — PROGRESS NOTES
Infusion Nursing Note:  Emiliano Aguiar presents today for Vyvgart subcutaneous injection    Patient seen by provider today: No   present during visit today: Not Applicable.     Note: Emiliano came to the clinic unaccompanied and ambulatory. Vyvgart subcutaneous injection given on R lower abdomen over 1.5 min. Tolerated injection well. Monitored for 30 min post injection        Intravenous Access:  No Intravenous access/labs at this visit.     Treatment Conditions:  Biological Infusion Checklist:  ~~~ NOTE: If the patient answers yes to any of the questions below, hold the infusion and contact ordering provider or on-call provider.    Have you recently had an elevated temperature, fever, chills, productive cough, coughing for 3 weeks or longer or hemoptysis,  abnormal vital signs, night sweats,  chest pain or have you noticed a decrease in your appetite, unexplained weight loss or fatigue? Yes, The patient is taking an antibiotic for a recurrent URI, Dr. Godoy aware, ordered to proceed with treatment  Do you have any open wounds or new incisions? No  Do you have any upcoming hospitalizations or surgeries? Does not include esophagogastroduodenoscopy, colonoscopy, endoscopic retrograde cholangiopancreatography (ERCP), endoscopic ultrasound (EUS), dental procedures or joint aspiration/steroid injections No  Do you currently have any signs of illness or infection or are you on any antibiotics? No  Have you had any new, sudden or worsening abdominal pain? No  Have you or anyone in your household received a live vaccination in the past 4 weeks? Please note: No live vaccines while on biologic/chemotherapy until 6 months after the last treatment. Patient can receive the flu vaccine (shot only), pneumovax and the Covid vaccine. It is optimal for the patient to get these vaccines mid cycle, but they can be given at any time as long as it is not on the day of the infusion. No  Have you recently been diagnosed with  any new nervous system diseases (ie. Multiple sclerosis, Guillain [place], seizures, neurological changes) or cancer diagnosis? Are you on any form of radiation or chemotherapy? No  Are you pregnant or breast feeding or do you have plans of pregnancy in the future? No  Have you been having any signs of worsening depression or suicidal ideations?  (benlysta only) No  Have there been any other new onset medical symptoms? No  Have you had any new blood clots? (IVIG only) No        Post Infusion Assessment:  Patient tolerated injection without incident.  Site patent and intact, free from redness, edema or discomfort.         Discharge Plan:   Discharge instructions reviewed with: Patient.  Patient and/or family verbalized understanding of discharge instructions and all questions answered.  Patient discharged in stable condition accompanied by: self.  Departure Mode: Ambulatory.        Colleen Armstrong RN

## 2025-04-15 ENCOUNTER — INFUSION THERAPY VISIT (OUTPATIENT)
Dept: INFUSION THERAPY | Facility: HOSPITAL | Age: 60
End: 2025-04-15
Attending: PSYCHIATRY & NEUROLOGY
Payer: COMMERCIAL

## 2025-04-15 VITALS
RESPIRATION RATE: 18 BRPM | DIASTOLIC BLOOD PRESSURE: 71 MMHG | TEMPERATURE: 97.8 F | SYSTOLIC BLOOD PRESSURE: 125 MMHG | OXYGEN SATURATION: 98 % | HEART RATE: 70 BPM

## 2025-04-15 DIAGNOSIS — G61.81 CIDP (CHRONIC INFLAMMATORY DEMYELINATING POLYNEUROPATHY) (H): Primary | ICD-10-CM

## 2025-04-15 PROCEDURE — 250N000011 HC RX IP 250 OP 636: Mod: JZ | Performed by: PSYCHIATRY & NEUROLOGY

## 2025-04-15 PROCEDURE — 96401 CHEMO ANTI-NEOPL SQ/IM: CPT

## 2025-04-15 RX ORDER — ALBUTEROL SULFATE 90 UG/1
1-2 INHALANT RESPIRATORY (INHALATION)
Status: DISCONTINUED | OUTPATIENT
Start: 2025-04-15 | End: 2025-04-15 | Stop reason: HOSPADM

## 2025-04-15 RX ORDER — METHYLPREDNISOLONE SODIUM SUCCINATE 40 MG/ML
40 INJECTION INTRAMUSCULAR; INTRAVENOUS
Start: 2025-04-22

## 2025-04-15 RX ORDER — MEPERIDINE HYDROCHLORIDE 25 MG/ML
25 INJECTION INTRAMUSCULAR; INTRAVENOUS; SUBCUTANEOUS
Status: DISCONTINUED | OUTPATIENT
Start: 2025-04-15 | End: 2025-04-15 | Stop reason: HOSPADM

## 2025-04-15 RX ORDER — DIPHENHYDRAMINE HYDROCHLORIDE 50 MG/ML
50 INJECTION, SOLUTION INTRAMUSCULAR; INTRAVENOUS
Start: 2025-04-22

## 2025-04-15 RX ORDER — METHYLPREDNISOLONE SODIUM SUCCINATE 40 MG/ML
40 INJECTION INTRAMUSCULAR; INTRAVENOUS
Status: DISCONTINUED | OUTPATIENT
Start: 2025-04-15 | End: 2025-04-15 | Stop reason: HOSPADM

## 2025-04-15 RX ORDER — DIPHENHYDRAMINE HYDROCHLORIDE 50 MG/ML
25 INJECTION, SOLUTION INTRAMUSCULAR; INTRAVENOUS
Start: 2025-04-22

## 2025-04-15 RX ORDER — ALBUTEROL SULFATE 0.83 MG/ML
2.5 SOLUTION RESPIRATORY (INHALATION)
OUTPATIENT
Start: 2025-04-22

## 2025-04-15 RX ORDER — MEPERIDINE HYDROCHLORIDE 25 MG/ML
25 INJECTION INTRAMUSCULAR; INTRAVENOUS; SUBCUTANEOUS
OUTPATIENT
Start: 2025-04-22

## 2025-04-15 RX ORDER — ALBUTEROL SULFATE 90 UG/1
1-2 INHALANT RESPIRATORY (INHALATION)
Start: 2025-04-22

## 2025-04-15 RX ORDER — EPINEPHRINE 1 MG/ML
0.3 INJECTION, SOLUTION INTRAMUSCULAR; SUBCUTANEOUS EVERY 5 MIN PRN
Status: DISCONTINUED | OUTPATIENT
Start: 2025-04-15 | End: 2025-04-15 | Stop reason: HOSPADM

## 2025-04-15 RX ORDER — DIPHENHYDRAMINE HYDROCHLORIDE 50 MG/ML
50 INJECTION, SOLUTION INTRAMUSCULAR; INTRAVENOUS
Status: DISCONTINUED | OUTPATIENT
Start: 2025-04-15 | End: 2025-04-15 | Stop reason: HOSPADM

## 2025-04-15 RX ORDER — DIPHENHYDRAMINE HYDROCHLORIDE 50 MG/ML
25 INJECTION, SOLUTION INTRAMUSCULAR; INTRAVENOUS
Status: DISCONTINUED | OUTPATIENT
Start: 2025-04-15 | End: 2025-04-15 | Stop reason: HOSPADM

## 2025-04-15 RX ORDER — EPINEPHRINE 1 MG/ML
0.3 INJECTION, SOLUTION INTRAMUSCULAR; SUBCUTANEOUS EVERY 5 MIN PRN
OUTPATIENT
Start: 2025-04-22

## 2025-04-15 RX ORDER — ALBUTEROL SULFATE 0.83 MG/ML
2.5 SOLUTION RESPIRATORY (INHALATION)
Status: DISCONTINUED | OUTPATIENT
Start: 2025-04-15 | End: 2025-04-15 | Stop reason: HOSPADM

## 2025-04-15 RX ADMIN — EFGARTIGIMOD ALFA AND HYALURONIDASE (HUMAN RECOMBINANT) 1008 MG: 180; 2000 INJECTION, SOLUTION SUBCUTANEOUS at 13:23

## 2025-04-15 NOTE — PROGRESS NOTES
"Infusion Nursing Note:  Emiliano Aguiar presents today for Vyvgart subcutaneous inj.    Patient seen by provider today: No    Note: Pt arrives ambulatory to [facility]. Pt reports doing \"great\". Pt excited about the possibility of him being able to give himself injections at home in the future. Discussed the process of the injection.    Premeds Given: none    Intravenous Access:  No Intravenous access/labs at this visit.    Treatment parameters:  ~~~ NOTE: If the patient answers yes to any of the questions below, hold the infusion and contact ordering provider or on-call provider.    Do you currently have any signs of illness or infection or are you on any antibiotics? No  Have you recently had an elevated temperature, fever, chills, productive cough, coughing for 3 weeks or longer or hemoptysis, abnormal vital signs, night sweats, chest pain or have you noticed a decrease in your appetite, unexplained weight loss or fatigue? No  Have you had any new, sudden, or worsening abdominal pain? No  Do you have any open wounds or new incisions? (exclude for patients with hidradenitis suppurativa) No  Have you recently been diagnosed with any new nervous system diseases (ie. Multiple sclerosis, Guillain [place], seizures, neurological changes) or cancer diagnosis? Are you on any form of radiation or chemotherapy? No  Have there been any other new onset medical symptoms? No  Are you pregnant or breast feeding or do you have plans of pregnancy in the future? N/A  Do you have any upcoming hospitalizations or surgeries? Does not include esophagogastroduodenoscopy, colonoscopy, endoscopic retrograde cholangiopancreatography (ERCP), endoscopic ultrasound (EUS), dental procedures (including cleanings, fillings, implants, extractions)  or joint aspiration/steroid injections No  Have you or anyone in your household received a live vaccination in the past 4 weeks? Please note: No live vaccines while on biologic/chemotherapy " until 6 months after the last treatment. Patient can receive the flu vaccine (shot only).  It is optimal for the patient to get it mid cycle, but it can be given at any time as long as it is not on the day of the infusion. No  If applicable to prescribed medication, confirm negative PPD or quantiferon gold MTB. If positive, verify has negative chest x-ray or the patient is at least 4 weeks post initiation of INH/B6 therapy and have clearance from provider before infusion N/A  If applicable to prescribed medication, confirm negative hepatitis B surface antigen or hepatitis C. If positive, clearance from provider before infusion. N/A  Rheumatology patients receiving tocilizumab (Actemra): If labs were drawn within the past week, hold dosing until cleared to infuse If AST/ALT > 2 X upper limit normal; ANC < 1.0. N/A  Patients receiving belimumab (Benlysta): Have you been having any signs of worsening depression or suicidal ideations?  N/A    Post Infusion Assessment:  Patient tolerated subq injection without incident in LEFT lower abdomen. Pt observed for 30min post inj.    Discharge Plan:   Patient discharged in stable condition accompanied by: self.  Departure Mode: Ambulatory.      Gely Odom RN

## 2025-04-22 ENCOUNTER — INFUSION THERAPY VISIT (OUTPATIENT)
Dept: INFUSION THERAPY | Facility: HOSPITAL | Age: 60
End: 2025-04-22
Attending: PSYCHIATRY & NEUROLOGY
Payer: COMMERCIAL

## 2025-04-22 VITALS
RESPIRATION RATE: 19 BRPM | HEIGHT: 71 IN | DIASTOLIC BLOOD PRESSURE: 68 MMHG | OXYGEN SATURATION: 96 % | HEART RATE: 69 BPM | SYSTOLIC BLOOD PRESSURE: 148 MMHG | BODY MASS INDEX: 27.38 KG/M2

## 2025-04-22 DIAGNOSIS — G61.81 CIDP (CHRONIC INFLAMMATORY DEMYELINATING POLYNEUROPATHY) (H): Primary | ICD-10-CM

## 2025-04-22 PROCEDURE — 250N000011 HC RX IP 250 OP 636: Mod: JZ | Performed by: PSYCHIATRY & NEUROLOGY

## 2025-04-22 PROCEDURE — 96401 CHEMO ANTI-NEOPL SQ/IM: CPT

## 2025-04-22 RX ORDER — DIPHENHYDRAMINE HYDROCHLORIDE 50 MG/ML
25 INJECTION, SOLUTION INTRAMUSCULAR; INTRAVENOUS
Start: 2025-04-29

## 2025-04-22 RX ORDER — DIPHENHYDRAMINE HYDROCHLORIDE 50 MG/ML
50 INJECTION, SOLUTION INTRAMUSCULAR; INTRAVENOUS
Start: 2025-04-29

## 2025-04-22 RX ORDER — ALBUTEROL SULFATE 0.83 MG/ML
2.5 SOLUTION RESPIRATORY (INHALATION)
OUTPATIENT
Start: 2025-04-29

## 2025-04-22 RX ORDER — ALBUTEROL SULFATE 90 UG/1
2 INHALANT RESPIRATORY (INHALATION) EVERY 4 HOURS PRN
COMMUNITY
Start: 2024-08-27

## 2025-04-22 RX ORDER — METHYLPREDNISOLONE SODIUM SUCCINATE 40 MG/ML
40 INJECTION INTRAMUSCULAR; INTRAVENOUS
Start: 2025-04-29

## 2025-04-22 RX ORDER — MEPERIDINE HYDROCHLORIDE 25 MG/ML
25 INJECTION INTRAMUSCULAR; INTRAVENOUS; SUBCUTANEOUS
OUTPATIENT
Start: 2025-04-29

## 2025-04-22 RX ORDER — AMLODIPINE BESYLATE 5 MG/1
5 TABLET ORAL DAILY
COMMUNITY
Start: 2025-04-01 | End: 2026-04-01

## 2025-04-22 RX ORDER — EPINEPHRINE 1 MG/ML
0.3 INJECTION, SOLUTION INTRAMUSCULAR; SUBCUTANEOUS EVERY 5 MIN PRN
OUTPATIENT
Start: 2025-04-29

## 2025-04-22 RX ORDER — FLUTICASONE PROPIONATE 50 MCG
2 SPRAY, SUSPENSION (ML) NASAL
COMMUNITY
Start: 2025-04-01

## 2025-04-22 RX ORDER — ALBUTEROL SULFATE 90 UG/1
1-2 INHALANT RESPIRATORY (INHALATION)
Start: 2025-04-29

## 2025-04-22 RX ADMIN — EFGARTIGIMOD ALFA AND HYALURONIDASE (HUMAN RECOMBINANT) 1008 MG: 180; 2000 INJECTION, SOLUTION SUBCUTANEOUS at 13:46

## 2025-04-22 NOTE — PROGRESS NOTES
Infusion Nursing Note:  Emiliano Aguiar presents today for Vyvgart injection.    Patient seen by provider today: No   present during visit today: Not Applicable.    Note: Vyvgart injection given right abdomen.  Emiliano tolerated well. He was observed for 30 minutes after his injection.      Intravenous Access:  No Intravenous access/labs at this visit.    Treatment Conditions:  Biological Infusion Checklist:  ~~~ NOTE: If the patient answers yes to any of the questions below, hold the infusion and contact ordering provider or on-call provider.    Have you recently had an elevated temperature, fever, chills, productive cough, coughing for 3 weeks or longer or hemoptysis,  abnormal vital signs, night sweats,  chest pain or have you noticed a decrease in your appetite, unexplained weight loss or fatigue? No  Do you have any open wounds or new incisions? No  Do you have any upcoming hospitalizations or surgeries? Does not include esophagogastroduodenoscopy, colonoscopy, endoscopic retrograde cholangiopancreatography (ERCP), endoscopic ultrasound (EUS), dental procedures or joint aspiration/steroid injections No  Do you currently have any signs of illness or infection or are you on any antibiotics? No  Have you had any new, sudden or worsening abdominal pain? No  Have you or anyone in your household received a live vaccination in the past 4 weeks? Please note: No live vaccines while on biologic/chemotherapy until 6 months after the last treatment. Patient can receive the flu vaccine (shot only), pneumovax and the Covid vaccine. It is optimal for the patient to get these vaccines mid cycle, but they can be given at any time as long as it is not on the day of the infusion. No  Have you recently been diagnosed with any new nervous system diseases (ie. Multiple sclerosis, Guillain [place], seizures, neurological changes) or cancer diagnosis? Are you on any form of radiation or chemotherapy? No  Are you pregnant or  breast feeding or do you have plans of pregnancy in the future? No  Have you been having any signs of worsening depression or suicidal ideations?  (benlysta only) No  Have there been any other new onset medical symptoms? No  Have you had any new blood clots? (IVIG only) No      Post Infusion Assessment:  Patient tolerated injection without incident.   Emiliano was observed for 30 minutes post injection per treatment plan.      Discharge Plan:   Patient and/or family verbalized understanding of discharge instructions and all questions answered.  AVS to patient via Everstring[employer].  Patient will return 4/29 for next appointment.   Patient discharged in stable condition accompanied by: self.  Departure Mode: Ambulatory.      Daija Reis RN

## 2025-04-29 ENCOUNTER — INFUSION THERAPY VISIT (OUTPATIENT)
Dept: INFUSION THERAPY | Facility: HOSPITAL | Age: 60
End: 2025-04-29
Attending: PSYCHIATRY & NEUROLOGY
Payer: COMMERCIAL

## 2025-04-29 VITALS
TEMPERATURE: 98.2 F | SYSTOLIC BLOOD PRESSURE: 121 MMHG | HEART RATE: 74 BPM | RESPIRATION RATE: 16 BRPM | OXYGEN SATURATION: 97 % | DIASTOLIC BLOOD PRESSURE: 72 MMHG

## 2025-04-29 DIAGNOSIS — G61.81 CIDP (CHRONIC INFLAMMATORY DEMYELINATING POLYNEUROPATHY) (H): Primary | ICD-10-CM

## 2025-04-29 PROCEDURE — 96401 CHEMO ANTI-NEOPL SQ/IM: CPT

## 2025-04-29 PROCEDURE — 250N000011 HC RX IP 250 OP 636: Mod: JZ | Performed by: PSYCHIATRY & NEUROLOGY

## 2025-04-29 RX ORDER — MEPERIDINE HYDROCHLORIDE 25 MG/ML
25 INJECTION INTRAMUSCULAR; INTRAVENOUS; SUBCUTANEOUS
OUTPATIENT
Start: 2025-05-06

## 2025-04-29 RX ORDER — EPINEPHRINE 1 MG/ML
0.3 INJECTION, SOLUTION INTRAMUSCULAR; SUBCUTANEOUS EVERY 5 MIN PRN
OUTPATIENT
Start: 2025-05-06

## 2025-04-29 RX ORDER — DIPHENHYDRAMINE HYDROCHLORIDE 50 MG/ML
25 INJECTION, SOLUTION INTRAMUSCULAR; INTRAVENOUS
Start: 2025-05-06

## 2025-04-29 RX ORDER — METHYLPREDNISOLONE SODIUM SUCCINATE 40 MG/ML
40 INJECTION INTRAMUSCULAR; INTRAVENOUS
Start: 2025-05-06

## 2025-04-29 RX ORDER — ALBUTEROL SULFATE 0.83 MG/ML
2.5 SOLUTION RESPIRATORY (INHALATION)
OUTPATIENT
Start: 2025-05-06

## 2025-04-29 RX ORDER — DIPHENHYDRAMINE HYDROCHLORIDE 50 MG/ML
50 INJECTION, SOLUTION INTRAMUSCULAR; INTRAVENOUS
Start: 2025-05-06

## 2025-04-29 RX ORDER — ALBUTEROL SULFATE 90 UG/1
1-2 INHALANT RESPIRATORY (INHALATION)
Start: 2025-05-06

## 2025-04-29 RX ADMIN — EFGARTIGIMOD ALFA AND HYALURONIDASE (HUMAN RECOMBINANT) 1008 MG: 180; 2000 INJECTION, SOLUTION SUBCUTANEOUS at 13:33

## 2025-04-29 NOTE — PROGRESS NOTES
Infusion Nursing Note:  Emiliano Aguiar presents today for Vyvgart injection.    Patient seen by provider today: No   present during visit today: Not Applicable.    Note: Vyvgart injection given left lower side. Emiliano was observed for 30 minutes post injection. He tolerated well.      Intravenous Access:  No Intravenous access/labs at this visit.    Treatment Conditions:  Biological Infusion Checklist:  ~~~ NOTE: If the patient answers yes to any of the questions below, hold the infusion and contact ordering provider or on-call provider.    Have you recently had an elevated temperature, fever, chills, productive cough, coughing for 3 weeks or longer or hemoptysis,  abnormal vital signs, night sweats,  chest pain or have you noticed a decrease in your appetite, unexplained weight loss or fatigue? No  Do you have any open wounds or new incisions? No  Do you have any upcoming hospitalizations or surgeries? Does not include esophagogastroduodenoscopy, colonoscopy, endoscopic retrograde cholangiopancreatography (ERCP), endoscopic ultrasound (EUS), dental procedures or joint aspiration/steroid injections No  Do you currently have any signs of illness or infection or are you on any antibiotics? No  Have you had any new, sudden or worsening abdominal pain? No  Have you or anyone in your household received a live vaccination in the past 4 weeks? Please note: No live vaccines while on biologic/chemotherapy until 6 months after the last treatment. Patient can receive the flu vaccine (shot only), pneumovax and the Covid vaccine. It is optimal for the patient to get these vaccines mid cycle, but they can be given at any time as long as it is not on the day of the infusion. No  Have you recently been diagnosed with any new nervous system diseases (ie. Multiple sclerosis, Guillain [place], seizures, neurological changes) or cancer diagnosis? Are you on any form of radiation or chemotherapy? No  Are you pregnant or breast  feeding or do you have plans of pregnancy in the future? na  Have you been having any signs of worsening depression or suicidal ideations?  (benlysta only) NA  Have there been any other new onset medical symptoms? No  Have you had any new blood clots? (IVIG only) NA      Post Infusion Assessment:  Patient tolerated injection without incident.       Discharge Plan:   Patient and/or family verbalized understanding of discharge instructions and all questions answered.  AVS to patient via BablicHART.  Patient will return 5/6 for next appointment.   Patient discharged in stable condition accompanied by: self.  Departure Mode: Ambulatory.      Daija Reis RN

## 2025-05-05 ENCOUNTER — OFFICE VISIT (OUTPATIENT)
Dept: NEUROLOGY | Facility: CLINIC | Age: 60
End: 2025-05-05
Payer: COMMERCIAL

## 2025-05-05 VITALS
RESPIRATION RATE: 16 BRPM | SYSTOLIC BLOOD PRESSURE: 138 MMHG | OXYGEN SATURATION: 99 % | HEART RATE: 58 BPM | DIASTOLIC BLOOD PRESSURE: 79 MMHG

## 2025-05-05 DIAGNOSIS — G61.81 CIDP (CHRONIC INFLAMMATORY DEMYELINATING POLYNEUROPATHY) (H): Primary | ICD-10-CM

## 2025-05-05 NOTE — NURSING NOTE
Chief Complaint   Patient presents with    RECHECK     Return Subspeciality Neuropathy      /79 (BP Location: Left arm, Patient Position: Sitting, Cuff Size: Adult Regular)   Pulse 58   Resp 16   SpO2 99%         Suzan Hogan

## 2025-05-05 NOTE — PROGRESS NOTES
Son Spenser White MD (PCP)  [place], May 5, 2025    Dear Dr. White,    I had the pleasure to see Emiliano in follow-up at the [facility]/neuromuscular clinic for his CIDP, which manifest predominantly with sensory ataxia.  I have been following Emiliano for almost 10 years in the clinic.  His management has posed significant challenges.  Initially he was not responsive to high-dose of pulse IV Solu-Medrol.  For many years we had been offering him IVIG treatment which was partially helpful.  However, I noted progression of the disease over time with worsening gait ataxia and paresthesias despite taking a high maintenance dose of IVIG every 3 weeks, and furthermore he had very major side effects from the infusions including prolonged arthralgias, myalgias, low-grade fever, headaches, and fatigue that were causing significant disability.  At some point, he could only function well for about 1 week/month because side effects after each infusion would last at least 10 days, and the positive effect of the infusion would wear off after 20 days.  At that point, last year, we decided to switch him to Vyvgart Hytrulo and he is now getting those infusions weekly.  They were much better tolerated and overall he felt subjectively a little better and his RODS CIDP score improved by 2 points in the last visit (26 from 24).    Compared to the last visit, Raymundo is overall neurologically stable. He has not had any falls and does not complain of major gait unsteadiness.  He uses no cane or walker.  His hand function is stable.  He did have some issues with respiratory infections in the last 3 months.  On February 7, three days after his last Vyvgart infusion, he noted itching and redness on his neck.  2 days later it was followed by a fever of 102-103 F, chills, nasal congestion, and cough.  He was seen in the emergency department and by his primary care doctor.  Chest x-ray did not show any acute abnormality but chest CT  showed irregular nodular opacities at both lung bases, likely infectious.  He was initially given antibiotics and prednisone for 6 days but did not improve.  Subsequently his primary gave him 2 more rounds of oral prednisone and this helped a lot.  He used an inhaler for a few weeks but stopped taking inhaled albuterol in March 2025.  He continues to have some nasal congestion and is using Flonase.  This congestion was not present prior to starting Hytrulo.  He will see an ENT and the lung specialist pretty soon.  It does not appear that he has been vaccinated for Strep pneumonia, or booster influenza/COVID19 last year.     RODS CIDP score was 27 reported by patient (previously: 26)    Current Outpatient Medications   Medication Sig Dispense Refill    acetaminophen (TYLENOL) 500 MG tablet Take 1,000 mg by mouth every 6 hours as needed      albuterol (PROAIR HFA/PROVENTIL HFA/VENTOLIN HFA) 108 (90 Base) MCG/ACT inhaler Inhale 2 puffs into the lungs every 4 hours as needed.      amLODIPine (NORVASC) 5 MG tablet Take 5 mg by mouth daily.      atorvastatin (LIPITOR) 20 MG tablet Take 20 mg by mouth daily.      diphenhydrAMINE (BENADRYL ALLERGY) 25 MG tablet Take 1 tablet (25 mg) by mouth every 6 hours as needed for itching or allergies 10 tablet 0    fluticasone (FLONASE) 50 MCG/ACT nasal spray 2 sprays.       No current facility-administered medications for this visit.       VITALS: /79 (BP Location: Left arm, Patient Position: Sitting, Cuff Size: Adult Regular)   Pulse 58   Resp 16   SpO2 99%     EXAM: was not repeated    In summary, Emiliano is a challenging case of CIDP presenting predominantly with sensory ataxia which has responded acceptably well to Vyvgart Hytrulo infusions.  He was also responding to IVIG in the past but with severe and intolerable side effects which are fortunately no more the case. Overall, he likes the Vyvgart Hytrulo much more than the IVIG, and wants to continue it.  Regarding his  upper respiratory and possible lower respiratory infection that he had a few months ago, I told him that Vyvgart Hytrulo is associated with a slight increase in the risk of those infections compared to placebo, but the best way of preventing those infections is to get vaccinated with booster influenza and COVID-19 vaccinations in the fall when required, and he should also definitely get his Strep pneumoniae vaccine, which he has not had to date.  He verbalized understanding to this plan.    We will continue with weekly Vyvgart Hytrulo injections and I will see him in follow-up in 4 months.      Nixon Godoy MD, FAAN      Billing MDM level 4 (moderate) based on 1) Problems assessed: One chronic disorder with progression, exacerbation, or side effects of treatment (CIDP) and 2)Risk: prescription drug management, see above.    The longitudinal plan of care for the diagnosis(es)/condition(s) as documented were addressed during this visit. Due to the added complexity in care, I will continue to support Emiliano in the subsequent management and with ongoing continuity of care.

## 2025-05-05 NOTE — PATIENT INSTRUCTIONS
Follow-up in 4 months    The best way to reduce the risk of respiratory infections to make sure you get vaccinated every year for respiratory pathogens.  You should get your annual influenza and COVID-19 booster vaccinations in the fall, and you should definitely gets Strep pneumoniae vaccination through your primary care doctor's office.  This vaccine is now indicated for all adults over age 50 regardless of the presence of any disease.

## 2025-05-05 NOTE — LETTER
5/5/2025       RE: Emiliano Aguiar  [address] [place] [id number]     Dear Colleague,    Thank you for referring your patient, Emilaino Aguiar, to the [facility] [place] at [facility]. Please see a copy of my visit note below.    Son Spenser White MD (PCP)  [place], May 5, 2025    Dear Dr. White,    I had the pleasure to see Emiliano in follow-up at the [facility]/neuromuscular clinic for his CIDP, which manifest predominantly with sensory ataxia.  I have been following Emiliano for almost 10 years in the clinic.  His management has posed significant challenges.  Initially he was not responsive to high-dose of pulse IV Solu-Medrol.  For many years we had been offering him IVIG treatment which was partially helpful.  However, I noted progression of the disease over time with worsening gait ataxia and paresthesias despite taking a high maintenance dose of IVIG every 3 weeks, and furthermore he had very major side effects from the infusions including prolonged arthralgias, myalgias, low-grade fever, headaches, and fatigue that were causing significant disability.  At some point, he could only function well for about 1 week/month because side effects after each infusion would last at least 10 days, and the positive effect of the infusion would wear off after 20 days.  At that point, last year, we decided to switch him to Vyvgart Hytrulo and he is now getting those infusions weekly.  They were much better tolerated and overall he felt subjectively a little better and his RODS CIDP score improved by 2 points in the last visit (26 from 24).    Compared to the last visit, Raymundo is overall neurologically stable. He has not had any falls and does not complain of major gait unsteadiness.  He uses no cane or walker.  His hand function is stable.  He did have some issues with respiratory infections in the last 3 months.  On February  7, three days after his last Vyvgart infusion, he noted itching and redness on his neck.  2 days later it was followed by a fever of 102-103 F, chills, nasal congestion, and cough.  He was seen in the emergency department and by his primary care doctor.  Chest x-ray did not show any acute abnormality but chest CT showed irregular nodular opacities at both lung bases, likely infectious.  He was initially given antibiotics and prednisone for 6 days but did not improve.  Subsequently his primary gave him 2 more rounds of oral prednisone and this helped a lot.  He used an inhaler for a few weeks but stopped taking inhaled albuterol in March 2025.  He continues to have some nasal congestion and is using Flonase.  This congestion was not present prior to starting Hytrulo.  He will see an ENT and the lung specialist pretty soon.  It does not appear that he has been vaccinated for Strep pneumonia, or booster influenza/COVID19 last year.     RODS CIDP score was 27 reported by patient (previously: 26)    Current Outpatient Medications   Medication Sig Dispense Refill     acetaminophen (TYLENOL) 500 MG tablet Take 1,000 mg by mouth every 6 hours as needed       albuterol (PROAIR HFA/PROVENTIL HFA/VENTOLIN HFA) 108 (90 Base) MCG/ACT inhaler Inhale 2 puffs into the lungs every 4 hours as needed.       amLODIPine (NORVASC) 5 MG tablet Take 5 mg by mouth daily.       atorvastatin (LIPITOR) 20 MG tablet Take 20 mg by mouth daily.       diphenhydrAMINE (BENADRYL ALLERGY) 25 MG tablet Take 1 tablet (25 mg) by mouth every 6 hours as needed for itching or allergies 10 tablet 0     fluticasone (FLONASE) 50 MCG/ACT nasal spray 2 sprays.       No current facility-administered medications for this visit.       VITALS: /79 (BP Location: Left arm, Patient Position: Sitting, Cuff Size: Adult Regular)   Pulse 58   Resp 16   SpO2 99%     EXAM: was not repeated    In summary, Emiliano is a challenging case of CIDP presenting predominantly  with sensory ataxia which has responded acceptably well to Vyvgart Hytrulo infusions.  He was also responding to IVIG in the past but with severe and intolerable side effects which are fortunately no more the case. Overall, he likes the Vyvgart Hytrulo much more than the IVIG, and wants to continue it.  Regarding his upper respiratory and possible lower respiratory infection that he had a few months ago, I told him that Vyvgart Hytrulo is associated with a slight increase in the risk of those infections compared to placebo, but the best way of preventing those infections is to get vaccinated with booster influenza and COVID-19 vaccinations in the fall when required, and he should also definitely get his Strep pneumoniae vaccine, which he has not had to date.  He verbalized understanding to this plan.    We will continue with weekly Vyvgart Hytrulo injections and I will see him in follow-up in 4 months.      Nixon Godoy MD, FAAN      Billing MDM level 4 (moderate) based on 1) Problems assessed: One chronic disorder with progression, exacerbation, or side effects of treatment (CIDP) and 2)Risk: prescription drug management, see above.    The longitudinal plan of care for the diagnosis(es)/condition(s) as documented were addressed during this visit. Due to the added complexity in care, I will continue to support Emiliano in the subsequent management and with ongoing continuity of care.      Again, thank you for allowing me to participate in the care of your patient.      Sincerely,    Nixon Godoy MD

## 2025-05-06 ENCOUNTER — INFUSION THERAPY VISIT (OUTPATIENT)
Dept: INFUSION THERAPY | Facility: HOSPITAL | Age: 60
End: 2025-05-06
Attending: PSYCHIATRY & NEUROLOGY
Payer: COMMERCIAL

## 2025-05-06 VITALS
RESPIRATION RATE: 16 BRPM | HEART RATE: 79 BPM | TEMPERATURE: 98.1 F | SYSTOLIC BLOOD PRESSURE: 131 MMHG | DIASTOLIC BLOOD PRESSURE: 72 MMHG | OXYGEN SATURATION: 98 %

## 2025-05-06 DIAGNOSIS — G61.81 CIDP (CHRONIC INFLAMMATORY DEMYELINATING POLYNEUROPATHY) (H): Primary | ICD-10-CM

## 2025-05-06 PROCEDURE — 96401 CHEMO ANTI-NEOPL SQ/IM: CPT

## 2025-05-06 PROCEDURE — 250N000011 HC RX IP 250 OP 636: Mod: JZ | Performed by: PSYCHIATRY & NEUROLOGY

## 2025-05-06 RX ORDER — DIPHENHYDRAMINE HYDROCHLORIDE 50 MG/ML
25 INJECTION, SOLUTION INTRAMUSCULAR; INTRAVENOUS
Start: 2025-05-13

## 2025-05-06 RX ORDER — DIPHENHYDRAMINE HYDROCHLORIDE 50 MG/ML
50 INJECTION, SOLUTION INTRAMUSCULAR; INTRAVENOUS
Start: 2025-05-13

## 2025-05-06 RX ORDER — METHYLPREDNISOLONE SODIUM SUCCINATE 40 MG/ML
40 INJECTION INTRAMUSCULAR; INTRAVENOUS
Start: 2025-05-13

## 2025-05-06 RX ORDER — EPINEPHRINE 1 MG/ML
0.3 INJECTION, SOLUTION INTRAMUSCULAR; SUBCUTANEOUS EVERY 5 MIN PRN
OUTPATIENT
Start: 2025-05-13

## 2025-05-06 RX ORDER — ALBUTEROL SULFATE 90 UG/1
1-2 INHALANT RESPIRATORY (INHALATION)
Start: 2025-05-13

## 2025-05-06 RX ORDER — MEPERIDINE HYDROCHLORIDE 25 MG/ML
25 INJECTION INTRAMUSCULAR; INTRAVENOUS; SUBCUTANEOUS
OUTPATIENT
Start: 2025-05-13

## 2025-05-06 RX ORDER — ALBUTEROL SULFATE 0.83 MG/ML
2.5 SOLUTION RESPIRATORY (INHALATION)
OUTPATIENT
Start: 2025-05-13

## 2025-05-06 RX ADMIN — EFGARTIGIMOD ALFA AND HYALURONIDASE (HUMAN RECOMBINANT) 1008 MG: 180; 2000 INJECTION, SOLUTION SUBCUTANEOUS at 13:13

## 2025-05-06 NOTE — PROGRESS NOTES
Infusion Nursing Note:  Emiliano Aguiar presents today for Vyvgart 1008mg subcutaneous inj.    Patient seen by provider today: No    Note: Pt arrives ambulatory to [facility]. Pt reports doing well, with no changes in baseline.    /72 (Patient Position: Sitting)   Pulse 79   Temp 98.1  F (36.7  C)   Resp 16   SpO2 98%     Premeds Given: none    Intravenous Access:  No Intravenous access/labs at this visit.    Treatment Conditions:  Biological Infusion Checklist:  ~~~ NOTE: If the patient answers yes to any of the questions below, hold the infusion and contact ordering provider or on-call provider.    Have you recently had an elevated temperature, fever, chills, productive cough, coughing for 3 weeks or longer or hemoptysis,  abnormal vital signs, night sweats,  chest pain or have you noticed a decrease in your appetite, unexplained weight loss or fatigue? No  Do you have any open wounds or new incisions? No  Do you have any upcoming hospitalizations or surgeries? Does not include esophagogastroduodenoscopy, colonoscopy, endoscopic retrograde cholangiopancreatography (ERCP), endoscopic ultrasound (EUS), dental procedures or joint aspiration/steroid injections No  Do you currently have any signs of illness or infection or are you on any antibiotics? No  Have you had any new, sudden or worsening abdominal pain? No  Have you or anyone in your household received a live vaccination in the past 4 weeks? Please note: No live vaccines while on biologic/chemotherapy until 6 months after the last treatment. Patient can receive the flu vaccine (shot only), pneumovax and the Covid vaccine. It is optimal for the patient to get these vaccines mid cycle, but they can be given at any time as long as it is not on the day of the infusion. No  Have you recently been diagnosed with any new nervous system diseases (ie. Multiple sclerosis, Guillain [place], seizures, neurological changes) or cancer diagnosis? Are you on  any form of radiation or chemotherapy? No  Are you pregnant or breast feeding or do you have plans of pregnancy in the future? N/A  Have you been having any signs of worsening depression or suicidal ideations?  (benlysta only) N/A  Have there been any other new onset medical symptoms? No  Have you had any new blood clots? (IVIG only) N/A    Post Infusion Assessment:  Patient tolerated subcutaneous injection without incident in RIGHT lower abdomen.  Patient observed for 30 minutes post Vyvgart per protocol.    Site patent and intact, free from redness, edema or discomfort.     Discharge Plan:   Patient discharged in stable condition accompanied by: self.  Departure Mode: Ambulatory.      Gely Odom RN

## 2025-05-13 ENCOUNTER — INFUSION THERAPY VISIT (OUTPATIENT)
Dept: INFUSION THERAPY | Facility: HOSPITAL | Age: 60
End: 2025-05-13
Attending: PSYCHIATRY & NEUROLOGY
Payer: COMMERCIAL

## 2025-05-13 VITALS
OXYGEN SATURATION: 96 % | HEART RATE: 71 BPM | TEMPERATURE: 98.1 F | DIASTOLIC BLOOD PRESSURE: 71 MMHG | SYSTOLIC BLOOD PRESSURE: 128 MMHG | RESPIRATION RATE: 16 BRPM

## 2025-05-13 DIAGNOSIS — G61.81 CIDP (CHRONIC INFLAMMATORY DEMYELINATING POLYNEUROPATHY) (H): Primary | ICD-10-CM

## 2025-05-13 PROCEDURE — 96401 CHEMO ANTI-NEOPL SQ/IM: CPT

## 2025-05-13 PROCEDURE — 250N000011 HC RX IP 250 OP 636: Mod: JZ | Performed by: PSYCHIATRY & NEUROLOGY

## 2025-05-13 RX ORDER — MEPERIDINE HYDROCHLORIDE 25 MG/ML
25 INJECTION INTRAMUSCULAR; INTRAVENOUS; SUBCUTANEOUS
OUTPATIENT
Start: 2025-05-20

## 2025-05-13 RX ORDER — ALBUTEROL SULFATE 90 UG/1
1-2 INHALANT RESPIRATORY (INHALATION)
Start: 2025-05-20

## 2025-05-13 RX ORDER — EPINEPHRINE 1 MG/ML
0.3 INJECTION, SOLUTION INTRAMUSCULAR; SUBCUTANEOUS EVERY 5 MIN PRN
OUTPATIENT
Start: 2025-05-20

## 2025-05-13 RX ORDER — DIPHENHYDRAMINE HYDROCHLORIDE 50 MG/ML
25 INJECTION, SOLUTION INTRAMUSCULAR; INTRAVENOUS
Start: 2025-05-20

## 2025-05-13 RX ORDER — ALBUTEROL SULFATE 0.83 MG/ML
2.5 SOLUTION RESPIRATORY (INHALATION)
OUTPATIENT
Start: 2025-05-20

## 2025-05-13 RX ORDER — DIPHENHYDRAMINE HYDROCHLORIDE 50 MG/ML
50 INJECTION, SOLUTION INTRAMUSCULAR; INTRAVENOUS
Start: 2025-05-20

## 2025-05-13 RX ORDER — METHYLPREDNISOLONE SODIUM SUCCINATE 40 MG/ML
40 INJECTION INTRAMUSCULAR; INTRAVENOUS
Start: 2025-05-20

## 2025-05-13 RX ADMIN — EFGARTIGIMOD ALFA AND HYALURONIDASE (HUMAN RECOMBINANT) 1008 MG: 180; 2000 INJECTION, SOLUTION SUBCUTANEOUS at 13:37

## 2025-05-13 NOTE — PROGRESS NOTES
Infusion Nursing Note:  Emiliano Aguiar presents today for Vyvgart injection.    Patient seen by provider today: No   present during visit today: Not Applicable.    Note: Vyvgart injection given left lower abdomen. Emiliano tolerated well. He was observed for 30 minutes post injection per treatment plan.      Intravenous Access:  No Intravenous access/labs at this visit.    Treatment Conditions:  Biological Infusion Checklist:  ~~~ NOTE: If the patient answers yes to any of the questions below, hold the infusion and contact ordering provider or on-call provider.    Have you recently had an elevated temperature, fever, chills, productive cough, coughing for 3 weeks or longer or hemoptysis,  abnormal vital signs, night sweats,  chest pain or have you noticed a decrease in your appetite, unexplained weight loss or fatigue? No  Do you have any open wounds or new incisions? No  Do you have any upcoming hospitalizations or surgeries? Does not include esophagogastroduodenoscopy, colonoscopy, endoscopic retrograde cholangiopancreatography (ERCP), endoscopic ultrasound (EUS), dental procedures or joint aspiration/steroid injections No  Do you currently have any signs of illness or infection or are you on any antibiotics? No  Have you had any new, sudden or worsening abdominal pain? No  Have you or anyone in your household received a live vaccination in the past 4 weeks? Please note: No live vaccines while on biologic/chemotherapy until 6 months after the last treatment. Patient can receive the flu vaccine (shot only), pneumovax and the Covid vaccine. It is optimal for the patient to get these vaccines mid cycle, but they can be given at any time as long as it is not on the day of the infusion. No  Have you recently been diagnosed with any new nervous system diseases (ie. Multiple sclerosis, Guillain [place], seizures, neurological changes) or cancer diagnosis? Are you on any form of radiation or chemotherapy? No  Are  you pregnant or breast feeding or do you have plans of pregnancy in the future? NA  Have you been having any signs of worsening depression or suicidal ideations?  (benlysta only) NA  Have there been any other new onset medical symptoms? No  Have you had any new blood clots? (IVIG only) NA      Post Infusion Assessment:  Patient tolerated injection without incident.       Discharge Plan:   Patient and/or family verbalized understanding of discharge instructions and all questions answered.  AVS to patient via [employer]here[employer].  Patient will return 5/20 for next appointment.   Patient discharged in stable condition accompanied by: self.  Departure Mode: Ambulatory.      Daija Reis RN

## 2025-05-20 ENCOUNTER — INFUSION THERAPY VISIT (OUTPATIENT)
Dept: INFUSION THERAPY | Facility: HOSPITAL | Age: 60
End: 2025-05-20
Attending: PSYCHIATRY & NEUROLOGY
Payer: COMMERCIAL

## 2025-05-20 VITALS
DIASTOLIC BLOOD PRESSURE: 77 MMHG | HEART RATE: 68 BPM | OXYGEN SATURATION: 98 % | RESPIRATION RATE: 18 BRPM | TEMPERATURE: 98.2 F | SYSTOLIC BLOOD PRESSURE: 136 MMHG

## 2025-05-20 DIAGNOSIS — G61.81 CIDP (CHRONIC INFLAMMATORY DEMYELINATING POLYNEUROPATHY) (H): Primary | ICD-10-CM

## 2025-05-20 PROCEDURE — 250N000011 HC RX IP 250 OP 636: Mod: JZ | Performed by: PSYCHIATRY & NEUROLOGY

## 2025-05-20 PROCEDURE — 96401 CHEMO ANTI-NEOPL SQ/IM: CPT

## 2025-05-20 RX ORDER — DIPHENHYDRAMINE HYDROCHLORIDE 50 MG/ML
50 INJECTION, SOLUTION INTRAMUSCULAR; INTRAVENOUS
Status: DISCONTINUED | OUTPATIENT
Start: 2025-05-20 | End: 2025-05-20 | Stop reason: HOSPADM

## 2025-05-20 RX ORDER — METHYLPREDNISOLONE SODIUM SUCCINATE 40 MG/ML
40 INJECTION INTRAMUSCULAR; INTRAVENOUS
Start: 2025-05-27

## 2025-05-20 RX ORDER — DIPHENHYDRAMINE HYDROCHLORIDE 50 MG/ML
25 INJECTION, SOLUTION INTRAMUSCULAR; INTRAVENOUS
Start: 2025-05-27

## 2025-05-20 RX ORDER — ALBUTEROL SULFATE 0.83 MG/ML
2.5 SOLUTION RESPIRATORY (INHALATION)
OUTPATIENT
Start: 2025-05-27

## 2025-05-20 RX ORDER — MEPERIDINE HYDROCHLORIDE 25 MG/ML
25 INJECTION INTRAMUSCULAR; INTRAVENOUS; SUBCUTANEOUS
Status: DISCONTINUED | OUTPATIENT
Start: 2025-05-20 | End: 2025-05-20 | Stop reason: HOSPADM

## 2025-05-20 RX ORDER — EPINEPHRINE 1 MG/ML
0.3 INJECTION, SOLUTION INTRAMUSCULAR; SUBCUTANEOUS EVERY 5 MIN PRN
Status: DISCONTINUED | OUTPATIENT
Start: 2025-05-20 | End: 2025-05-20 | Stop reason: HOSPADM

## 2025-05-20 RX ORDER — ALBUTEROL SULFATE 0.83 MG/ML
2.5 SOLUTION RESPIRATORY (INHALATION)
Status: DISCONTINUED | OUTPATIENT
Start: 2025-05-20 | End: 2025-05-20 | Stop reason: HOSPADM

## 2025-05-20 RX ORDER — DIPHENHYDRAMINE HYDROCHLORIDE 50 MG/ML
25 INJECTION, SOLUTION INTRAMUSCULAR; INTRAVENOUS
Status: DISCONTINUED | OUTPATIENT
Start: 2025-05-20 | End: 2025-05-20 | Stop reason: HOSPADM

## 2025-05-20 RX ORDER — ALBUTEROL SULFATE 90 UG/1
1-2 INHALANT RESPIRATORY (INHALATION)
Status: DISCONTINUED | OUTPATIENT
Start: 2025-05-20 | End: 2025-05-20 | Stop reason: HOSPADM

## 2025-05-20 RX ORDER — EPINEPHRINE 1 MG/ML
0.3 INJECTION, SOLUTION INTRAMUSCULAR; SUBCUTANEOUS EVERY 5 MIN PRN
OUTPATIENT
Start: 2025-05-27

## 2025-05-20 RX ORDER — METHYLPREDNISOLONE SODIUM SUCCINATE 40 MG/ML
40 INJECTION INTRAMUSCULAR; INTRAVENOUS
Status: DISCONTINUED | OUTPATIENT
Start: 2025-05-20 | End: 2025-05-20 | Stop reason: HOSPADM

## 2025-05-20 RX ORDER — DIPHENHYDRAMINE HYDROCHLORIDE 50 MG/ML
50 INJECTION, SOLUTION INTRAMUSCULAR; INTRAVENOUS
Start: 2025-05-27

## 2025-05-20 RX ORDER — MEPERIDINE HYDROCHLORIDE 25 MG/ML
25 INJECTION INTRAMUSCULAR; INTRAVENOUS; SUBCUTANEOUS
OUTPATIENT
Start: 2025-05-27

## 2025-05-20 RX ORDER — ALBUTEROL SULFATE 90 UG/1
1-2 INHALANT RESPIRATORY (INHALATION)
Start: 2025-05-27

## 2025-05-20 RX ADMIN — EFGARTIGIMOD ALFA AND HYALURONIDASE (HUMAN RECOMBINANT) 1008 MG: 180; 2000 INJECTION, SOLUTION SUBCUTANEOUS at 13:43

## 2025-05-20 NOTE — PROGRESS NOTES
Infusion Nursing Note:  Emiliano Aguiar presents today for Vyvgart.    Patient seen by provider today: No   present during visit today: Not Applicable.    Note: Emiliano arrived ambulatory by himself for Vyvgart. Plan of care reviewed and biological checklist completed. Ok to proceed with treatment. Emiliano has been tolerating Vyvgart injections well and does not note any side effects afterwards.   Vyvgart administered subcutaneous via winged infusion set as ordered over 1 minute in RIGHT lower abd.     Intravenous Access:  No Intravenous access/labs at this visit.    Treatment Conditions:  Biological Infusion Checklist:  ~~~ NOTE: If the patient answers yes to any of the questions below, hold the infusion and contact ordering provider or on-call provider.    Have you recently had an elevated temperature, fever, chills, productive cough, coughing for 3 weeks or longer or hemoptysis,  abnormal vital signs, night sweats,  chest pain or have you noticed a decrease in your appetite, unexplained weight loss or fatigue? No  Do you have any open wounds or new incisions? No  Do you have any upcoming hospitalizations or surgeries? Does not include esophagogastroduodenoscopy, colonoscopy, endoscopic retrograde cholangiopancreatography (ERCP), endoscopic ultrasound (EUS), dental procedures or joint aspiration/steroid injections No  Do you currently have any signs of illness or infection or are you on any antibiotics? No  Have you had any new, sudden or worsening abdominal pain? No  Have you or anyone in your household received a live vaccination in the past 4 weeks? Please note: No live vaccines while on biologic/chemotherapy until 6 months after the last treatment. Patient can receive the flu vaccine (shot only), pneumovax and the Covid vaccine. It is optimal for the patient to get these vaccines mid cycle, but they can be given at any time as long as it is not on the day of the infusion. No  Have you recently been  diagnosed with any new nervous system diseases (ie. Multiple sclerosis, Guillain [place], seizures, neurological changes) or cancer diagnosis? Are you on any form of radiation or chemotherapy? No  Are you pregnant or breast feeding or do you have plans of pregnancy in the future? NA  Have you been having any signs of worsening depression or suicidal ideations?  (benlysta only) NA  Have there been any other new onset medical symptoms? No  Have you had any new blood clots? (IVIG only) NA    Post Infusion Assessment:  Patient tolerated injection without incident.  Patient observed for 30 minutes post Vyvgart injection per protocol.      Discharge Plan:   Patient will return May 27th for next appointment.   Patient discharged in stable condition accompanied by: self.  Departure Mode: Ambulatory.      Asuncion Esquivel RN

## 2025-05-27 ENCOUNTER — INFUSION THERAPY VISIT (OUTPATIENT)
Dept: INFUSION THERAPY | Facility: HOSPITAL | Age: 60
End: 2025-05-27
Attending: PSYCHIATRY & NEUROLOGY
Payer: COMMERCIAL

## 2025-05-27 VITALS
TEMPERATURE: 97.8 F | RESPIRATION RATE: 18 BRPM | DIASTOLIC BLOOD PRESSURE: 74 MMHG | HEART RATE: 60 BPM | SYSTOLIC BLOOD PRESSURE: 139 MMHG | OXYGEN SATURATION: 98 %

## 2025-05-27 DIAGNOSIS — G61.81 CIDP (CHRONIC INFLAMMATORY DEMYELINATING POLYNEUROPATHY) (H): Primary | ICD-10-CM

## 2025-05-27 PROCEDURE — 96401 CHEMO ANTI-NEOPL SQ/IM: CPT

## 2025-05-27 PROCEDURE — 250N000011 HC RX IP 250 OP 636: Mod: JZ | Performed by: PSYCHIATRY & NEUROLOGY

## 2025-05-27 RX ORDER — ALBUTEROL SULFATE 90 UG/1
1-2 INHALANT RESPIRATORY (INHALATION)
Start: 2025-06-03

## 2025-05-27 RX ORDER — DIPHENHYDRAMINE HYDROCHLORIDE 50 MG/ML
25 INJECTION, SOLUTION INTRAMUSCULAR; INTRAVENOUS
Start: 2025-06-03

## 2025-05-27 RX ORDER — MEPERIDINE HYDROCHLORIDE 25 MG/ML
25 INJECTION INTRAMUSCULAR; INTRAVENOUS; SUBCUTANEOUS
OUTPATIENT
Start: 2025-06-03

## 2025-05-27 RX ORDER — DIPHENHYDRAMINE HYDROCHLORIDE 50 MG/ML
50 INJECTION, SOLUTION INTRAMUSCULAR; INTRAVENOUS
Start: 2025-06-03

## 2025-05-27 RX ORDER — EPINEPHRINE 1 MG/ML
0.3 INJECTION, SOLUTION INTRAMUSCULAR; SUBCUTANEOUS EVERY 5 MIN PRN
OUTPATIENT
Start: 2025-06-03

## 2025-05-27 RX ORDER — ALBUTEROL SULFATE 0.83 MG/ML
2.5 SOLUTION RESPIRATORY (INHALATION)
OUTPATIENT
Start: 2025-06-03

## 2025-05-27 RX ORDER — METHYLPREDNISOLONE SODIUM SUCCINATE 40 MG/ML
40 INJECTION INTRAMUSCULAR; INTRAVENOUS
Start: 2025-06-03

## 2025-05-27 RX ADMIN — EFGARTIGIMOD ALFA AND HYALURONIDASE (HUMAN RECOMBINANT) 1008 MG: 180; 2000 INJECTION, SOLUTION SUBCUTANEOUS at 13:20

## 2025-05-27 NOTE — PROGRESS NOTES
Infusion Nursing Note:  Emiliano Aguiar presents today for Vygart injection.    Patient seen by provider today: No   present during visit today: Not Applicable.    Note: Reviewed plan of care. Patient denies any new complaints. Patient is awaiting insurance company's decision re: receiving injections at home    Premeds Given: none    Intravenous Access:  No Intravenous access/labs at this visit.    Treatment Conditions:  Biological Infusion Checklist:  ~~~ NOTE: If the patient answers yes to any of the questions below, hold the infusion and contact ordering provider or on-call provider.    Have you recently had an elevated temperature, fever, chills, productive cough, coughing for 3 weeks or longer or hemoptysis,  abnormal vital signs, night sweats,  chest pain or have you noticed a decrease in your appetite, unexplained weight loss or fatigue? No  Do you have any open wounds or new incisions? No  Do you have any upcoming hospitalizations or surgeries? Does not include esophagogastroduodenoscopy, colonoscopy, endoscopic retrograde cholangiopancreatography (ERCP), endoscopic ultrasound (EUS), dental procedures or joint aspiration/steroid injections No  Do you currently have any signs of illness or infection or are you on any antibiotics? No  Have you had any new, sudden or worsening abdominal pain? No  Have you or anyone in your household received a live vaccination in the past 4 weeks? Please note: No live vaccines while on biologic/chemotherapy until 6 months after the last treatment. Patient can receive the flu vaccine (shot only), pneumovax and the Covid vaccine. It is optimal for the patient to get these vaccines mid cycle, but they can be given at any time as long as it is not on the day of the infusion. No  Have you recently been diagnosed with any new nervous system diseases (ie. Multiple sclerosis, Guillain [place], seizures, neurological changes) or cancer diagnosis? Are you on any form of  radiation or chemotherapy? No  Are you pregnant or breast feeding or do you have plans of pregnancy in the future? No  Have you been having any signs of worsening depression or suicidal ideations?  (benlysta only) No  Have there been any other new onset medical symptoms? No  Have you had any new blood clots? (IVIG only) No      Post Infusion Assessment:  Patient tolerated injection to LEFT lower abd without incident.  Patient observed for 30 minutes post injection per protocol.       Discharge Plan:   Patient will return 6/3 for next appointment.   Patient discharged in stable condition accompanied by: self.  Departure Mode: Ambulatory.      Sfoie Mehta RN

## 2025-06-03 ENCOUNTER — INFUSION THERAPY VISIT (OUTPATIENT)
Dept: INFUSION THERAPY | Facility: HOSPITAL | Age: 60
End: 2025-06-03
Attending: PSYCHIATRY & NEUROLOGY
Payer: COMMERCIAL

## 2025-06-03 VITALS
DIASTOLIC BLOOD PRESSURE: 79 MMHG | RESPIRATION RATE: 14 BRPM | TEMPERATURE: 98.3 F | SYSTOLIC BLOOD PRESSURE: 141 MMHG | OXYGEN SATURATION: 98 % | HEART RATE: 63 BPM

## 2025-06-03 DIAGNOSIS — G61.81 CIDP (CHRONIC INFLAMMATORY DEMYELINATING POLYNEUROPATHY) (H): Primary | ICD-10-CM

## 2025-06-03 PROCEDURE — 250N000011 HC RX IP 250 OP 636: Mod: JZ | Performed by: PSYCHIATRY & NEUROLOGY

## 2025-06-03 PROCEDURE — 96401 CHEMO ANTI-NEOPL SQ/IM: CPT

## 2025-06-03 RX ORDER — MEPERIDINE HYDROCHLORIDE 25 MG/ML
25 INJECTION INTRAMUSCULAR; INTRAVENOUS; SUBCUTANEOUS
OUTPATIENT
Start: 2025-06-10

## 2025-06-03 RX ORDER — DIPHENHYDRAMINE HYDROCHLORIDE 50 MG/ML
50 INJECTION, SOLUTION INTRAMUSCULAR; INTRAVENOUS
Start: 2025-06-10

## 2025-06-03 RX ORDER — ALBUTEROL SULFATE 90 UG/1
1-2 INHALANT RESPIRATORY (INHALATION)
Start: 2025-06-10

## 2025-06-03 RX ORDER — ALBUTEROL SULFATE 0.83 MG/ML
2.5 SOLUTION RESPIRATORY (INHALATION)
OUTPATIENT
Start: 2025-06-10

## 2025-06-03 RX ORDER — DIPHENHYDRAMINE HYDROCHLORIDE 50 MG/ML
25 INJECTION, SOLUTION INTRAMUSCULAR; INTRAVENOUS
Start: 2025-06-10

## 2025-06-03 RX ORDER — METHYLPREDNISOLONE SODIUM SUCCINATE 40 MG/ML
40 INJECTION INTRAMUSCULAR; INTRAVENOUS
Start: 2025-06-10

## 2025-06-03 RX ORDER — EPINEPHRINE 1 MG/ML
0.3 INJECTION, SOLUTION INTRAMUSCULAR; SUBCUTANEOUS EVERY 5 MIN PRN
OUTPATIENT
Start: 2025-06-10

## 2025-06-03 RX ADMIN — EFGARTIGIMOD ALFA AND HYALURONIDASE (HUMAN RECOMBINANT) 1008 MG: 180; 2000 INJECTION, SOLUTION SUBCUTANEOUS at 13:34

## 2025-06-03 NOTE — PROGRESS NOTES
Infusion Nursing Note:  Emiliano Aguiar presents today for Vyvgart injection.    Patient seen by provider today: No   present during visit today: Not Applicable.    Note: Vyvgart injection given right abdomen. Emiliano was observed for 30 minutes post injection. He tolerated well.      Intravenous Access:  No Intravenous access/labs at this visit.    Treatment Conditions:  Biological Infusion Checklist:  ~~~ NOTE: If the patient answers yes to any of the questions below, hold the infusion and contact ordering provider or on-call provider.    Have you recently had an elevated temperature, fever, chills, productive cough, coughing for 3 weeks or longer or hemoptysis,  abnormal vital signs, night sweats,  chest pain or have you noticed a decrease in your appetite, unexplained weight loss or fatigue? No  Do you have any open wounds or new incisions? No  Do you have any upcoming hospitalizations or surgeries? Does not include esophagogastroduodenoscopy, colonoscopy, endoscopic retrograde cholangiopancreatography (ERCP), endoscopic ultrasound (EUS), dental procedures or joint aspiration/steroid injections No  Do you currently have any signs of illness or infection or are you on any antibiotics? No  Have you had any new, sudden or worsening abdominal pain? No  Have you or anyone in your household received a live vaccination in the past 4 weeks? Please note: No live vaccines while on biologic/chemotherapy until 6 months after the last treatment. Patient can receive the flu vaccine (shot only), pneumovax and the Covid vaccine. It is optimal for the patient to get these vaccines mid cycle, but they can be given at any time as long as it is not on the day of the infusion. No  Have you recently been diagnosed with any new nervous system diseases (ie. Multiple sclerosis, Guillain [place], seizures, neurological changes) or cancer diagnosis? Are you on any form of radiation or chemotherapy? No  Are you pregnant or breast  feeding or do you have plans of pregnancy in the future? NA  Have you been having any signs of worsening depression or suicidal ideations?  (benlysta only) NA  Have there been any other new onset medical symptoms? No  Have you had any new blood clots? (IVIG only) No      Post Infusion Assessment:  Patient tolerated injection without incident.       Discharge Plan:   Patient and/or family verbalized understanding of discharge instructions and all questions answered.  AVS to patient via [employer].  Patient will return 6/10 for next appointment.   Patient discharged in stable condition accompanied by: self.  Departure Mode: Ambulatory.      Daija Reis RN

## 2025-06-10 ENCOUNTER — INFUSION THERAPY VISIT (OUTPATIENT)
Dept: INFUSION THERAPY | Facility: HOSPITAL | Age: 60
End: 2025-06-10
Attending: PSYCHIATRY & NEUROLOGY
Payer: COMMERCIAL

## 2025-06-10 VITALS
OXYGEN SATURATION: 99 % | RESPIRATION RATE: 16 BRPM | SYSTOLIC BLOOD PRESSURE: 140 MMHG | DIASTOLIC BLOOD PRESSURE: 74 MMHG | TEMPERATURE: 98 F | HEART RATE: 64 BPM

## 2025-06-10 DIAGNOSIS — G61.81 CIDP (CHRONIC INFLAMMATORY DEMYELINATING POLYNEUROPATHY) (H): Primary | ICD-10-CM

## 2025-06-10 PROCEDURE — 250N000011 HC RX IP 250 OP 636: Mod: JZ | Performed by: PSYCHIATRY & NEUROLOGY

## 2025-06-10 PROCEDURE — 96401 CHEMO ANTI-NEOPL SQ/IM: CPT

## 2025-06-10 RX ORDER — MEPERIDINE HYDROCHLORIDE 50 MG/ML
25 INJECTION INTRAMUSCULAR; INTRAVENOUS; SUBCUTANEOUS
OUTPATIENT
Start: 2025-06-17

## 2025-06-10 RX ORDER — ALBUTEROL SULFATE 0.83 MG/ML
2.5 SOLUTION RESPIRATORY (INHALATION)
OUTPATIENT
Start: 2025-06-17

## 2025-06-10 RX ORDER — DIPHENHYDRAMINE HYDROCHLORIDE 50 MG/ML
50 INJECTION, SOLUTION INTRAMUSCULAR; INTRAVENOUS
Start: 2025-06-17

## 2025-06-10 RX ORDER — EPINEPHRINE 1 MG/ML
0.3 INJECTION, SOLUTION INTRAMUSCULAR; SUBCUTANEOUS EVERY 5 MIN PRN
OUTPATIENT
Start: 2025-06-17

## 2025-06-10 RX ORDER — DIPHENHYDRAMINE HYDROCHLORIDE 50 MG/ML
25 INJECTION, SOLUTION INTRAMUSCULAR; INTRAVENOUS
Start: 2025-06-17

## 2025-06-10 RX ORDER — ALBUTEROL SULFATE 90 UG/1
1-2 INHALANT RESPIRATORY (INHALATION)
Start: 2025-06-17

## 2025-06-10 RX ORDER — METHYLPREDNISOLONE SODIUM SUCCINATE 40 MG/ML
40 INJECTION INTRAMUSCULAR; INTRAVENOUS
Start: 2025-06-17

## 2025-06-10 RX ADMIN — EFGARTIGIMOD ALFA AND HYALURONIDASE (HUMAN RECOMBINANT) 1008 MG: 180; 2000 INJECTION, SOLUTION SUBCUTANEOUS at 13:12

## 2025-06-10 NOTE — PROGRESS NOTES
"Infusion Nursing Note:  Emiliano Aguiar presents today for Vyvgart.    Patient seen by provider today: No   present during visit today: Not Applicable.    Note: BP (!) 140/74 (Patient Position: Sitting)   Pulse 64   Temp 98  F (36.7  C)   Resp 16   SpO2 99%   .    Premeds Given: none    Intravenous Access:  No Intravenous access/labs at this visit.    Treatment Conditions:  See biologic checklist below.      Post Infusion Assessment:  Patient tolerated injection without incident into LLQ - administered over 1 minute.   Patient elected to have observation time in the lobby today for 30 min. \"I want to free up the room for another patient.\" He has not had any previous issues with reactions in the past.       Discharge Plan:   Discharge instructions reviewed with: Patient.  Patient and/or family verbalized understanding of discharge instructions and all questions answered.  Patient discharged in stable condition accompanied by: self.  Departure Mode: Ambulatory.      Winter Cheung RN              ~~~ NOTE: If the patient answers yes to any of the questions below, hold the infusion and contact ordering provider or on-call provider.    Do you currently have any signs of illness or infection or are you on any antibiotics? No  Have you recently had an elevated temperature, fever, chills, productive cough, coughing for 3 weeks or longer or hemoptysis, abnormal vital signs, night sweats, chest pain or have you noticed a decrease in your appetite, unexplained weight loss or fatigue? No  Have you had any new, sudden, or worsening abdominal pain? No  Do you have any open wounds or new incisions? (exclude for patients with hidradenitis suppurativa) No  Have you recently been diagnosed with any new nervous system diseases (ie. Multiple sclerosis, Guillain [place], seizures, neurological changes) or cancer diagnosis? Are you on any form of radiation or chemotherapy? No  Have there been any other new onset medical " symptoms? No  Are you pregnant or breast feeding or do you have plans of pregnancy in the future? N/A  Do you have any upcoming hospitalizations or surgeries? Does not include esophagogastroduodenoscopy, colonoscopy, endoscopic retrograde cholangiopancreatography (ERCP), endoscopic ultrasound (EUS), dental procedures (including cleanings, fillings, implants, extractions)  or joint aspiration/steroid injections No  Have you or anyone in your household received a live vaccination in the past 4 weeks? Please note: No live vaccines while on biologic/chemotherapy until 6 months after the last treatment. Patient can receive the flu vaccine (shot only).  It is optimal for the patient to get it mid cycle, but it can be given at any time as long as it is not on the day of the infusion. No  If applicable to prescribed medication, confirm negative PPD or quantiferon gold MTB. If positive, verify has negative chest x-ray or the patient is at least 4 weeks post initiation of INH/B6 therapy and have clearance from provider before infusion   If applicable to prescribed medication, confirm negative hepatitis B surface antigen or hepatitis C. If positive, clearance from provider before infusion.   Rheumatology patients receiving tocilizumab (Actemra): If labs were drawn within the past week, hold dosing until cleared to infuse If AST/ALT > 2 X upper limit normal; ANC < 1.0. N/A  Patients receiving belimumab (Benlysta): Have you been having any signs of worsening depression or suicidal ideations?N/A

## 2025-06-17 ENCOUNTER — INFUSION THERAPY VISIT (OUTPATIENT)
Dept: INFUSION THERAPY | Facility: HOSPITAL | Age: 60
End: 2025-06-17
Attending: PSYCHIATRY & NEUROLOGY
Payer: COMMERCIAL

## 2025-06-17 VITALS
TEMPERATURE: 97.5 F | HEART RATE: 61 BPM | OXYGEN SATURATION: 99 % | DIASTOLIC BLOOD PRESSURE: 69 MMHG | RESPIRATION RATE: 14 BRPM | SYSTOLIC BLOOD PRESSURE: 142 MMHG

## 2025-06-17 DIAGNOSIS — G61.81 CIDP (CHRONIC INFLAMMATORY DEMYELINATING POLYNEUROPATHY) (H): Primary | ICD-10-CM

## 2025-06-17 PROCEDURE — 96401 CHEMO ANTI-NEOPL SQ/IM: CPT

## 2025-06-17 PROCEDURE — 250N000011 HC RX IP 250 OP 636: Mod: JZ | Performed by: PSYCHIATRY & NEUROLOGY

## 2025-06-17 RX ORDER — ALBUTEROL SULFATE 90 UG/1
1-2 INHALANT RESPIRATORY (INHALATION)
Start: 2025-06-24

## 2025-06-17 RX ORDER — DIPHENHYDRAMINE HYDROCHLORIDE 50 MG/ML
25 INJECTION, SOLUTION INTRAMUSCULAR; INTRAVENOUS
Start: 2025-06-24

## 2025-06-17 RX ORDER — ALBUTEROL SULFATE 0.83 MG/ML
2.5 SOLUTION RESPIRATORY (INHALATION)
OUTPATIENT
Start: 2025-06-24

## 2025-06-17 RX ORDER — MEPERIDINE HYDROCHLORIDE 50 MG/ML
25 INJECTION INTRAMUSCULAR; INTRAVENOUS; SUBCUTANEOUS
OUTPATIENT
Start: 2025-06-24

## 2025-06-17 RX ORDER — EPINEPHRINE 1 MG/ML
0.3 INJECTION, SOLUTION INTRAMUSCULAR; SUBCUTANEOUS EVERY 5 MIN PRN
OUTPATIENT
Start: 2025-06-24

## 2025-06-17 RX ORDER — DIPHENHYDRAMINE HYDROCHLORIDE 50 MG/ML
50 INJECTION, SOLUTION INTRAMUSCULAR; INTRAVENOUS
Start: 2025-06-24

## 2025-06-17 RX ORDER — METHYLPREDNISOLONE SODIUM SUCCINATE 40 MG/ML
40 INJECTION INTRAMUSCULAR; INTRAVENOUS
Start: 2025-06-24

## 2025-06-17 RX ADMIN — EFGARTIGIMOD ALFA AND HYALURONIDASE (HUMAN RECOMBINANT) 1008 MG: 180; 2000 INJECTION, SOLUTION SUBCUTANEOUS at 13:33

## 2025-06-17 NOTE — PROGRESS NOTES
Infusion Nursing Note:  Emiliano Aguiar presents today for Vyvgart injection.    Patient seen by provider today: No   present during visit today: Not Applicable.    Note: Emiliano has had low back pain, thigh and feet pain and more generalized weakness over the past 5 days. In the past, this has improved with the next injection so he is hoping to feel improvement following today's injection.      Intravenous Access:  No Intravenous access/labs at this visit.    Treatment Conditions:  Biological Infusion Checklist:  ~~~ NOTE: If the patient answers yes to any of the questions below, hold the infusion and contact ordering provider or on-call provider.    Have you recently had an elevated temperature, fever, chills, productive cough, coughing for 3 weeks or longer or hemoptysis,  abnormal vital signs, night sweats,  chest pain or have you noticed a decrease in your appetite, unexplained weight loss or fatigue? No  Do you have any open wounds or new incisions? No  Do you have any upcoming hospitalizations or surgeries? Does not include esophagogastroduodenoscopy, colonoscopy, endoscopic retrograde cholangiopancreatography (ERCP), endoscopic ultrasound (EUS), dental procedures or joint aspiration/steroid injections No  Do you currently have any signs of illness or infection or are you on any antibiotics? No  Have you had any new, sudden or worsening abdominal pain? No  Have you or anyone in your household received a live vaccination in the past 4 weeks? Please note: No live vaccines while on biologic/chemotherapy until 6 months after the last treatment. Patient can receive the flu vaccine (shot only), pneumovax and the Covid vaccine. It is optimal for the patient to get these vaccines mid cycle, but they can be given at any time as long as it is not on the day of the infusion. No  Have you recently been diagnosed with any new nervous system diseases (ie. Multiple sclerosis, Guillain [place], seizures, neurological  changes) or cancer diagnosis? Are you on any form of radiation or chemotherapy? No  Are you pregnant or breast feeding or do you have plans of pregnancy in the future? No  Have you been having any signs of worsening depression or suicidal ideations?  (benlysta only) NA  Have there been any other new onset medical symptoms? No  Have you had any new blood clots? (IVIG only) NA      Post Infusion Assessment:  Patient tolerated injection without incident.       Discharge Plan:   Patient and/or family verbalized understanding of discharge instructions and all questions answered.  AVS to patient via [employer].  Patient will return 6/24 for next appointment.   Patient discharged in stable condition accompanied by: self.  Departure Mode: Ambulatory.      Daija Reis RN

## 2025-06-24 ENCOUNTER — INFUSION THERAPY VISIT (OUTPATIENT)
Dept: INFUSION THERAPY | Facility: HOSPITAL | Age: 60
End: 2025-06-24
Attending: PSYCHIATRY & NEUROLOGY
Payer: COMMERCIAL

## 2025-06-24 VITALS
DIASTOLIC BLOOD PRESSURE: 68 MMHG | RESPIRATION RATE: 16 BRPM | SYSTOLIC BLOOD PRESSURE: 122 MMHG | TEMPERATURE: 98 F | OXYGEN SATURATION: 99 % | HEART RATE: 66 BPM

## 2025-06-24 DIAGNOSIS — G61.81 CIDP (CHRONIC INFLAMMATORY DEMYELINATING POLYNEUROPATHY) (H): Primary | ICD-10-CM

## 2025-06-24 PROCEDURE — 250N000011 HC RX IP 250 OP 636: Mod: JZ | Performed by: PSYCHIATRY & NEUROLOGY

## 2025-06-24 RX ORDER — ALBUTEROL SULFATE 0.83 MG/ML
2.5 SOLUTION RESPIRATORY (INHALATION)
OUTPATIENT
Start: 2025-07-01

## 2025-06-24 RX ORDER — DIPHENHYDRAMINE HYDROCHLORIDE 50 MG/ML
25 INJECTION, SOLUTION INTRAMUSCULAR; INTRAVENOUS
Start: 2025-07-01

## 2025-06-24 RX ORDER — EPINEPHRINE 1 MG/ML
0.3 INJECTION, SOLUTION INTRAMUSCULAR; SUBCUTANEOUS EVERY 5 MIN PRN
OUTPATIENT
Start: 2025-07-01

## 2025-06-24 RX ORDER — ALBUTEROL SULFATE 90 UG/1
1-2 INHALANT RESPIRATORY (INHALATION)
Start: 2025-07-01

## 2025-06-24 RX ORDER — MEPERIDINE HYDROCHLORIDE 50 MG/ML
25 INJECTION INTRAMUSCULAR; INTRAVENOUS; SUBCUTANEOUS
OUTPATIENT
Start: 2025-07-01

## 2025-06-24 RX ORDER — DIPHENHYDRAMINE HYDROCHLORIDE 50 MG/ML
50 INJECTION, SOLUTION INTRAMUSCULAR; INTRAVENOUS
Start: 2025-07-01

## 2025-06-24 RX ORDER — METHYLPREDNISOLONE SODIUM SUCCINATE 40 MG/ML
40 INJECTION INTRAMUSCULAR; INTRAVENOUS
Start: 2025-07-01

## 2025-06-24 RX ADMIN — EFGARTIGIMOD ALFA AND HYALURONIDASE (HUMAN RECOMBINANT) 1008 MG: 180; 2000 INJECTION, SOLUTION SUBCUTANEOUS at 13:37

## 2025-06-24 NOTE — PROGRESS NOTES
Infusion Nursing Note:  Emiliano Aguiar presents today for Treatment number 24 of efgartigimod (Vyvgart).    Patient seen by provider today: No   present during visit today: Not Applicable.    Note: Patient reports he is feeling better today now that the humidity outside has dropped.    Premeds Given: none    Intravenous Access:  No Intravenous access/labs at this visit.    Treatment Conditions:  Not Applicable.      Post Infusion Assessment:  Patient tolerated injection to RIGHT lower abd without incident.  Patient observed for 30 minutes post Vyvgart per protocol.       Discharge Plan:   Patient will return 7/1 for next appointment.   Patient discharged in stable condition accompanied by: self.  Departure Mode: Ambulatory.      Sofie Mehta RN

## 2025-07-01 ENCOUNTER — INFUSION THERAPY VISIT (OUTPATIENT)
Dept: INFUSION THERAPY | Facility: HOSPITAL | Age: 60
End: 2025-07-01
Attending: PSYCHIATRY & NEUROLOGY
Payer: COMMERCIAL

## 2025-07-01 VITALS
RESPIRATION RATE: 18 BRPM | HEART RATE: 62 BPM | SYSTOLIC BLOOD PRESSURE: 139 MMHG | DIASTOLIC BLOOD PRESSURE: 79 MMHG | OXYGEN SATURATION: 98 % | TEMPERATURE: 98.2 F

## 2025-07-01 DIAGNOSIS — G61.81 CIDP (CHRONIC INFLAMMATORY DEMYELINATING POLYNEUROPATHY) (H): Primary | ICD-10-CM

## 2025-07-01 PROCEDURE — 250N000011 HC RX IP 250 OP 636: Mod: JZ | Performed by: PSYCHIATRY & NEUROLOGY

## 2025-07-01 PROCEDURE — 96401 CHEMO ANTI-NEOPL SQ/IM: CPT

## 2025-07-01 RX ORDER — DIPHENHYDRAMINE HYDROCHLORIDE 50 MG/ML
50 INJECTION, SOLUTION INTRAMUSCULAR; INTRAVENOUS
Start: 2025-07-08

## 2025-07-01 RX ORDER — METHYLPREDNISOLONE SODIUM SUCCINATE 40 MG/ML
40 INJECTION INTRAMUSCULAR; INTRAVENOUS
Start: 2025-07-08

## 2025-07-01 RX ORDER — ALBUTEROL SULFATE 90 UG/1
1-2 INHALANT RESPIRATORY (INHALATION)
Start: 2025-07-08

## 2025-07-01 RX ORDER — EPINEPHRINE 1 MG/ML
0.3 INJECTION, SOLUTION INTRAMUSCULAR; SUBCUTANEOUS EVERY 5 MIN PRN
OUTPATIENT
Start: 2025-07-08

## 2025-07-01 RX ORDER — MEPERIDINE HYDROCHLORIDE 50 MG/ML
25 INJECTION INTRAMUSCULAR; INTRAVENOUS; SUBCUTANEOUS
OUTPATIENT
Start: 2025-07-08

## 2025-07-01 RX ORDER — DIPHENHYDRAMINE HYDROCHLORIDE 50 MG/ML
25 INJECTION, SOLUTION INTRAMUSCULAR; INTRAVENOUS
Start: 2025-07-08

## 2025-07-01 RX ORDER — ALBUTEROL SULFATE 0.83 MG/ML
2.5 SOLUTION RESPIRATORY (INHALATION)
OUTPATIENT
Start: 2025-07-08

## 2025-07-01 RX ADMIN — EFGARTIGIMOD ALFA AND HYALURONIDASE (HUMAN RECOMBINANT) 1008 MG: 180; 2000 INJECTION, SOLUTION SUBCUTANEOUS at 13:19

## 2025-07-01 NOTE — PROGRESS NOTES
Infusion Nursing Note:  Emiliano Aguiar presents today for Vyvgart.    Patient seen by provider today: No   present during visit today: Not Applicable.    Note: Emiliano notes he had an increase in fatigue, increase in numbness in toes, feet and fingers starting about 4 days ago. Injection given left lower abdomen. He tolerated well.      Intravenous Access:  No Intravenous access/labs at this visit.    Treatment Conditions:  Biological Infusion Checklist:  ~~~ NOTE: If the patient answers yes to any of the questions below, hold the infusion and contact ordering provider or on-call provider.    Have you recently had an elevated temperature, fever, chills, productive cough, coughing for 3 weeks or longer or hemoptysis,  abnormal vital signs, night sweats,  chest pain or have you noticed a decrease in your appetite, unexplained weight loss or fatigue? No  Do you have any open wounds or new incisions? No  Do you have any upcoming hospitalizations or surgeries? Does not include esophagogastroduodenoscopy, colonoscopy, endoscopic retrograde cholangiopancreatography (ERCP), endoscopic ultrasound (EUS), dental procedures or joint aspiration/steroid injections No  Do you currently have any signs of illness or infection or are you on any antibiotics? No  Have you had any new, sudden or worsening abdominal pain? No  Have you or anyone in your household received a live vaccination in the past 4 weeks? Please note: No live vaccines while on biologic/chemotherapy until 6 months after the last treatment. Patient can receive the flu vaccine (shot only), pneumovax and the Covid vaccine. It is optimal for the patient to get these vaccines mid cycle, but they can be given at any time as long as it is not on the day of the infusion. No  Have you recently been diagnosed with any new nervous system diseases (ie. Multiple sclerosis, Guillain [place], seizures, neurological changes) or cancer diagnosis? Are you on any form of  radiation or chemotherapy? No  Are you pregnant or breast feeding or do you have plans of pregnancy in the future? No  Have you been having any signs of worsening depression or suicidal ideations?  (benlysta only) NA  Have there been any other new onset medical symptoms? No  Have you had any new blood clots? (IVIG only) NA      Post Infusion Assessment:  Patient tolerated injection without incident.       Discharge Plan:   Patient and/or family verbalized understanding of discharge instructions and all questions answered.  AVS to patient via [employer].  Patient will return 7/8 for next appointment.   Patient discharged in stable condition accompanied by: self.  Departure Mode: Ambulatory.      Daija Reis RN

## 2025-07-08 ENCOUNTER — INFUSION THERAPY VISIT (OUTPATIENT)
Dept: INFUSION THERAPY | Facility: HOSPITAL | Age: 60
End: 2025-07-08
Attending: PSYCHIATRY & NEUROLOGY
Payer: COMMERCIAL

## 2025-07-08 VITALS
RESPIRATION RATE: 18 BRPM | TEMPERATURE: 98.1 F | DIASTOLIC BLOOD PRESSURE: 75 MMHG | SYSTOLIC BLOOD PRESSURE: 125 MMHG | HEART RATE: 59 BPM | OXYGEN SATURATION: 98 %

## 2025-07-08 DIAGNOSIS — G61.81 CIDP (CHRONIC INFLAMMATORY DEMYELINATING POLYNEUROPATHY) (H): Primary | ICD-10-CM

## 2025-07-08 PROCEDURE — 250N000011 HC RX IP 250 OP 636: Mod: JZ | Performed by: PSYCHIATRY & NEUROLOGY

## 2025-07-08 PROCEDURE — 96401 CHEMO ANTI-NEOPL SQ/IM: CPT

## 2025-07-08 RX ORDER — DIPHENHYDRAMINE HYDROCHLORIDE 50 MG/ML
50 INJECTION, SOLUTION INTRAMUSCULAR; INTRAVENOUS
Start: 2025-07-15

## 2025-07-08 RX ORDER — ALBUTEROL SULFATE 0.83 MG/ML
2.5 SOLUTION RESPIRATORY (INHALATION)
Status: DISCONTINUED | OUTPATIENT
Start: 2025-07-08 | End: 2025-07-08 | Stop reason: HOSPADM

## 2025-07-08 RX ORDER — DIPHENHYDRAMINE HYDROCHLORIDE 50 MG/ML
25 INJECTION, SOLUTION INTRAMUSCULAR; INTRAVENOUS
Start: 2025-07-15

## 2025-07-08 RX ORDER — DIPHENHYDRAMINE HYDROCHLORIDE 50 MG/ML
25 INJECTION, SOLUTION INTRAMUSCULAR; INTRAVENOUS
Status: DISCONTINUED | OUTPATIENT
Start: 2025-07-08 | End: 2025-07-08 | Stop reason: HOSPADM

## 2025-07-08 RX ORDER — EPINEPHRINE 1 MG/ML
0.3 INJECTION, SOLUTION INTRAMUSCULAR; SUBCUTANEOUS EVERY 5 MIN PRN
OUTPATIENT
Start: 2025-07-15

## 2025-07-08 RX ORDER — ALBUTEROL SULFATE 90 UG/1
1-2 INHALANT RESPIRATORY (INHALATION)
Start: 2025-07-15

## 2025-07-08 RX ORDER — METHYLPREDNISOLONE SODIUM SUCCINATE 40 MG/ML
40 INJECTION INTRAMUSCULAR; INTRAVENOUS
Status: DISCONTINUED | OUTPATIENT
Start: 2025-07-08 | End: 2025-07-08 | Stop reason: HOSPADM

## 2025-07-08 RX ORDER — DIPHENHYDRAMINE HYDROCHLORIDE 50 MG/ML
50 INJECTION, SOLUTION INTRAMUSCULAR; INTRAVENOUS
Status: DISCONTINUED | OUTPATIENT
Start: 2025-07-08 | End: 2025-07-08 | Stop reason: HOSPADM

## 2025-07-08 RX ORDER — MEPERIDINE HYDROCHLORIDE 50 MG/ML
25 INJECTION INTRAMUSCULAR; INTRAVENOUS; SUBCUTANEOUS
OUTPATIENT
Start: 2025-07-15

## 2025-07-08 RX ORDER — EPINEPHRINE 1 MG/ML
0.3 INJECTION, SOLUTION INTRAMUSCULAR; SUBCUTANEOUS EVERY 5 MIN PRN
Status: DISCONTINUED | OUTPATIENT
Start: 2025-07-08 | End: 2025-07-08 | Stop reason: HOSPADM

## 2025-07-08 RX ORDER — MEPERIDINE HYDROCHLORIDE 50 MG/ML
25 INJECTION INTRAMUSCULAR; INTRAVENOUS; SUBCUTANEOUS
Status: DISCONTINUED | OUTPATIENT
Start: 2025-07-08 | End: 2025-07-08 | Stop reason: HOSPADM

## 2025-07-08 RX ORDER — ALBUTEROL SULFATE 0.83 MG/ML
2.5 SOLUTION RESPIRATORY (INHALATION)
OUTPATIENT
Start: 2025-07-15

## 2025-07-08 RX ORDER — ALBUTEROL SULFATE 90 UG/1
1-2 INHALANT RESPIRATORY (INHALATION)
Status: DISCONTINUED | OUTPATIENT
Start: 2025-07-08 | End: 2025-07-08 | Stop reason: HOSPADM

## 2025-07-08 RX ORDER — METHYLPREDNISOLONE SODIUM SUCCINATE 40 MG/ML
40 INJECTION INTRAMUSCULAR; INTRAVENOUS
Start: 2025-07-15

## 2025-07-08 RX ADMIN — EFGARTIGIMOD ALFA AND HYALURONIDASE (HUMAN RECOMBINANT) 1008 MG: 180; 2000 INJECTION, SOLUTION SUBCUTANEOUS at 14:04

## 2025-07-08 NOTE — PROGRESS NOTES
Infusion Nursing Note:  Emiliano Aguiar presents today for Vyvgard.    Patient seen by provider today: No   present during visit today: Not Applicable.    Note: Tolerated injection well, offers no complaints.    Premeds Given: none    Intravenous Access:  No Intravenous access/labs at this visit.    Treatment Conditions:  Not Applicable.      Post Infusion Assessment:  Patient tolerated injection without incident.       Discharge Plan:   Patient and/or family verbalized understanding of discharge instructions and all questions answered.      Zaira Salinas RN

## 2025-07-15 ENCOUNTER — INFUSION THERAPY VISIT (OUTPATIENT)
Dept: INFUSION THERAPY | Facility: HOSPITAL | Age: 60
End: 2025-07-15
Attending: PSYCHIATRY & NEUROLOGY
Payer: COMMERCIAL

## 2025-07-15 VITALS
OXYGEN SATURATION: 99 % | HEART RATE: 57 BPM | TEMPERATURE: 97.6 F | RESPIRATION RATE: 18 BRPM | SYSTOLIC BLOOD PRESSURE: 131 MMHG | DIASTOLIC BLOOD PRESSURE: 76 MMHG

## 2025-07-15 DIAGNOSIS — G61.81 CIDP (CHRONIC INFLAMMATORY DEMYELINATING POLYNEUROPATHY) (H): Primary | ICD-10-CM

## 2025-07-15 PROCEDURE — 250N000011 HC RX IP 250 OP 636: Mod: JZ | Performed by: PSYCHIATRY & NEUROLOGY

## 2025-07-15 PROCEDURE — 96401 CHEMO ANTI-NEOPL SQ/IM: CPT

## 2025-07-15 RX ORDER — ALBUTEROL SULFATE 90 UG/1
1-2 INHALANT RESPIRATORY (INHALATION)
Start: 2025-07-22

## 2025-07-15 RX ORDER — EPINEPHRINE 1 MG/ML
0.3 INJECTION, SOLUTION INTRAMUSCULAR; SUBCUTANEOUS EVERY 5 MIN PRN
OUTPATIENT
Start: 2025-07-22

## 2025-07-15 RX ORDER — ALBUTEROL SULFATE 0.83 MG/ML
2.5 SOLUTION RESPIRATORY (INHALATION)
OUTPATIENT
Start: 2025-07-22

## 2025-07-15 RX ORDER — DIPHENHYDRAMINE HYDROCHLORIDE 50 MG/ML
25 INJECTION, SOLUTION INTRAMUSCULAR; INTRAVENOUS
Start: 2025-07-22

## 2025-07-15 RX ORDER — MEPERIDINE HYDROCHLORIDE 50 MG/ML
25 INJECTION INTRAMUSCULAR; INTRAVENOUS; SUBCUTANEOUS
OUTPATIENT
Start: 2025-07-22

## 2025-07-15 RX ORDER — DIPHENHYDRAMINE HYDROCHLORIDE 50 MG/ML
50 INJECTION, SOLUTION INTRAMUSCULAR; INTRAVENOUS
Start: 2025-07-22

## 2025-07-15 RX ORDER — METHYLPREDNISOLONE SODIUM SUCCINATE 40 MG/ML
40 INJECTION INTRAMUSCULAR; INTRAVENOUS
Start: 2025-07-22

## 2025-07-15 RX ADMIN — EFGARTIGIMOD ALFA AND HYALURONIDASE (HUMAN RECOMBINANT) 1008 MG: 180; 2000 INJECTION, SOLUTION SUBCUTANEOUS at 13:47

## 2025-07-15 NOTE — PROGRESS NOTES
Infusion Nursing Note:  Emiliano Aguiar presents today for Vyvgart.    Patient seen by provider today: No   present during visit today: Not Applicable.    Note: Emiliano arrived ambulatory by himself for Vyvgart. Plan of care reviewed and biological checklist completed. Ok to proceed with treatment. Emiliano has been tolerating Vyvgart injections well and does not note any side effects afterwards.   Vyvgart administered subcutaneous via winged infusion set as ordered over 1 minute in RIGHT lower abd.     Intravenous Access:  No Intravenous access/labs at this visit.    Treatment Conditions:  Biological Infusion Checklist:  ~~~ NOTE: If the patient answers yes to any of the questions below, hold the infusion and contact ordering provider or on-call provider.    Have you recently had an elevated temperature, fever, chills, productive cough, coughing for 3 weeks or longer or hemoptysis,  abnormal vital signs, night sweats,  chest pain or have you noticed a decrease in your appetite, unexplained weight loss or fatigue? No  Do you have any open wounds or new incisions? No  Do you have any upcoming hospitalizations or surgeries? Does not include esophagogastroduodenoscopy, colonoscopy, endoscopic retrograde cholangiopancreatography (ERCP), endoscopic ultrasound (EUS), dental procedures or joint aspiration/steroid injections No  Do you currently have any signs of illness or infection or are you on any antibiotics? No  Have you had any new, sudden or worsening abdominal pain? No  Have you or anyone in your household received a live vaccination in the past 4 weeks? Please note: No live vaccines while on biologic/chemotherapy until 6 months after the last treatment. Patient can receive the flu vaccine (shot only), pneumovax and the Covid vaccine. It is optimal for the patient to get these vaccines mid cycle, but they can be given at any time as long as it is not on the day of the infusion. No  Have you recently been  diagnosed with any new nervous system diseases (ie. Multiple sclerosis, Guillain [place], seizures, neurological changes) or cancer diagnosis? Are you on any form of radiation or chemotherapy? No  Are you pregnant or breast feeding or do you have plans of pregnancy in the future? NA  Have you been having any signs of worsening depression or suicidal ideations?  (benlysta only) NA  Have there been any other new onset medical symptoms? No  Have you had any new blood clots? (IVIG only) NA    Post Infusion Assessment:  Patient tolerated injection without incident.   Patient no longer stays for observation.    Discharge Plan:   Patient will return July 22nd for next appointment.   Patient discharged in stable condition accompanied by: self.  Departure Mode: Ambulatory.      Asuncion Esquivel RN

## 2025-07-22 ENCOUNTER — INFUSION THERAPY VISIT (OUTPATIENT)
Dept: INFUSION THERAPY | Facility: HOSPITAL | Age: 60
End: 2025-07-22
Attending: PSYCHIATRY & NEUROLOGY
Payer: COMMERCIAL

## 2025-07-22 VITALS
TEMPERATURE: 98 F | RESPIRATION RATE: 16 BRPM | SYSTOLIC BLOOD PRESSURE: 116 MMHG | DIASTOLIC BLOOD PRESSURE: 66 MMHG | OXYGEN SATURATION: 98 % | HEART RATE: 66 BPM

## 2025-07-22 DIAGNOSIS — G61.81 CIDP (CHRONIC INFLAMMATORY DEMYELINATING POLYNEUROPATHY) (H): Primary | ICD-10-CM

## 2025-07-22 PROCEDURE — 250N000011 HC RX IP 250 OP 636: Mod: JZ | Performed by: PSYCHIATRY & NEUROLOGY

## 2025-07-22 PROCEDURE — 96401 CHEMO ANTI-NEOPL SQ/IM: CPT

## 2025-07-22 RX ORDER — METHYLPREDNISOLONE SODIUM SUCCINATE 40 MG/ML
40 INJECTION INTRAMUSCULAR; INTRAVENOUS
Start: 2025-07-29

## 2025-07-22 RX ORDER — ALBUTEROL SULFATE 90 UG/1
1-2 INHALANT RESPIRATORY (INHALATION)
Start: 2025-07-29

## 2025-07-22 RX ORDER — ALBUTEROL SULFATE 0.83 MG/ML
2.5 SOLUTION RESPIRATORY (INHALATION)
OUTPATIENT
Start: 2025-07-29

## 2025-07-22 RX ORDER — DIPHENHYDRAMINE HYDROCHLORIDE 50 MG/ML
25 INJECTION, SOLUTION INTRAMUSCULAR; INTRAVENOUS
Start: 2025-07-29

## 2025-07-22 RX ORDER — MEPERIDINE HYDROCHLORIDE 50 MG/ML
25 INJECTION INTRAMUSCULAR; INTRAVENOUS; SUBCUTANEOUS
OUTPATIENT
Start: 2025-07-29

## 2025-07-22 RX ORDER — EPINEPHRINE 1 MG/ML
0.3 INJECTION, SOLUTION INTRAMUSCULAR; SUBCUTANEOUS EVERY 5 MIN PRN
OUTPATIENT
Start: 2025-07-29

## 2025-07-22 RX ORDER — DIPHENHYDRAMINE HYDROCHLORIDE 50 MG/ML
50 INJECTION, SOLUTION INTRAMUSCULAR; INTRAVENOUS
Start: 2025-07-29

## 2025-07-22 RX ADMIN — EFGARTIGIMOD ALFA AND HYALURONIDASE (HUMAN RECOMBINANT) 1008 MG: 180; 2000 INJECTION, SOLUTION SUBCUTANEOUS at 13:50

## 2025-07-22 NOTE — PROGRESS NOTES
Infusion Nursing Note:  Emiliano Aguiar presents today for Vyvgart.    Patient seen by provider today: No   present during visit today: Not Applicable.     Note: Emiliano arrived ambulatory by himself for Vyvgart. Plan of care reviewed and biological checklist completed. Ok to proceed with treatment. Emiliano has been tolerating Vyvgart injections well and does not note any side effects afterwards.   Vyvgart administered subcutaneous via winged infusion set as ordered over 1 minute in LEFT abd.      Intravenous Access:  No Intravenous access/labs at this visit.     Treatment Conditions:  Biological Infusion Checklist:  ~~~ NOTE: If the patient answers yes to any of the questions below, hold the infusion and contact ordering provider or on-call provider.    Have you recently had an elevated temperature, fever, chills, productive cough, coughing for 3 weeks or longer or hemoptysis,  abnormal vital signs, night sweats,  chest pain or have you noticed a decrease in your appetite, unexplained weight loss or fatigue? No  Do you have any open wounds or new incisions? No  Do you have any upcoming hospitalizations or surgeries? Does not include esophagogastroduodenoscopy, colonoscopy, endoscopic retrograde cholangiopancreatography (ERCP), endoscopic ultrasound (EUS), dental procedures or joint aspiration/steroid injections No  Do you currently have any signs of illness or infection or are you on any antibiotics? No  Have you had any new, sudden or worsening abdominal pain? No  Have you or anyone in your household received a live vaccination in the past 4 weeks? Please note: No live vaccines while on biologic/chemotherapy until 6 months after the last treatment. Patient can receive the flu vaccine (shot only), pneumovax and the Covid vaccine. It is optimal for the patient to get these vaccines mid cycle, but they can be given at any time as long as it is not on the day of the infusion. No  Have you recently been  diagnosed with any new nervous system diseases (ie. Multiple sclerosis, Guillain [place], seizures, neurological changes) or cancer diagnosis? Are you on any form of radiation or chemotherapy? No  Are you pregnant or breast feeding or do you have plans of pregnancy in the future? NA  Have you been having any signs of worsening depression or suicidal ideations?  (benlysta only) NA  Have there been any other new onset medical symptoms? No  Have you had any new blood clots? (IVIG only) NA     Post Infusion Assessment:  Patient tolerated injection without incident.   Patient no longer stays for observation.     Discharge Plan:   Patient will return July 29th for next appointment.   Patient discharged in stable condition accompanied by: self.  Departure Mode: Ambulatory.        Asuncion Esquivel RN

## 2025-07-29 ENCOUNTER — INFUSION THERAPY VISIT (OUTPATIENT)
Dept: INFUSION THERAPY | Facility: HOSPITAL | Age: 60
End: 2025-07-29
Attending: PSYCHIATRY & NEUROLOGY
Payer: COMMERCIAL

## 2025-07-29 VITALS
DIASTOLIC BLOOD PRESSURE: 61 MMHG | OXYGEN SATURATION: 96 % | TEMPERATURE: 98.1 F | HEART RATE: 79 BPM | RESPIRATION RATE: 16 BRPM | SYSTOLIC BLOOD PRESSURE: 127 MMHG

## 2025-07-29 DIAGNOSIS — G61.81 CIDP (CHRONIC INFLAMMATORY DEMYELINATING POLYNEUROPATHY) (H): Primary | ICD-10-CM

## 2025-07-29 PROCEDURE — 96401 CHEMO ANTI-NEOPL SQ/IM: CPT

## 2025-07-29 PROCEDURE — 250N000011 HC RX IP 250 OP 636: Mod: JZ | Performed by: PSYCHIATRY & NEUROLOGY

## 2025-07-29 RX ORDER — ALBUTEROL SULFATE 90 UG/1
1-2 INHALANT RESPIRATORY (INHALATION)
Start: 2025-08-05

## 2025-07-29 RX ORDER — MEPERIDINE HYDROCHLORIDE 50 MG/ML
25 INJECTION INTRAMUSCULAR; INTRAVENOUS; SUBCUTANEOUS
OUTPATIENT
Start: 2025-08-05

## 2025-07-29 RX ORDER — DIPHENHYDRAMINE HYDROCHLORIDE 50 MG/ML
50 INJECTION, SOLUTION INTRAMUSCULAR; INTRAVENOUS
Start: 2025-08-05

## 2025-07-29 RX ORDER — EPINEPHRINE 1 MG/ML
0.3 INJECTION, SOLUTION INTRAMUSCULAR; SUBCUTANEOUS EVERY 5 MIN PRN
OUTPATIENT
Start: 2025-08-05

## 2025-07-29 RX ORDER — METHYLPREDNISOLONE SODIUM SUCCINATE 40 MG/ML
40 INJECTION INTRAMUSCULAR; INTRAVENOUS
Start: 2025-08-05

## 2025-07-29 RX ORDER — ALBUTEROL SULFATE 0.83 MG/ML
2.5 SOLUTION RESPIRATORY (INHALATION)
OUTPATIENT
Start: 2025-08-05

## 2025-07-29 RX ORDER — DIPHENHYDRAMINE HYDROCHLORIDE 50 MG/ML
25 INJECTION, SOLUTION INTRAMUSCULAR; INTRAVENOUS
Start: 2025-08-05

## 2025-07-29 RX ADMIN — EFGARTIGIMOD ALFA AND HYALURONIDASE (HUMAN RECOMBINANT) 1008 MG: 180; 2000 INJECTION, SOLUTION SUBCUTANEOUS at 13:46

## 2025-07-29 NOTE — PROGRESS NOTES
Infusion Nursing Note:  Emiliano Aguiar presents today for Vyvgart injection.    Patient seen by provider today: No   present during visit today: Not Applicable.    Note: Vyvgart given right side of abdomen over 90 seconds. Emiliano tolerated well.      Intravenous Access:  No Intravenous access/labs at this visit.    Treatment Conditions:  Biological Infusion Checklist:  ~~~ NOTE: If the patient answers yes to any of the questions below, hold the infusion and contact ordering provider or on-call provider.    Have you recently had an elevated temperature, fever, chills, productive cough, coughing for 3 weeks or longer or hemoptysis,  abnormal vital signs, night sweats,  chest pain or have you noticed a decrease in your appetite, unexplained weight loss or fatigue? No  Do you have any open wounds or new incisions? No  Do you have any upcoming hospitalizations or surgeries? Does not include esophagogastroduodenoscopy, colonoscopy, endoscopic retrograde cholangiopancreatography (ERCP), endoscopic ultrasound (EUS), dental procedures or joint aspiration/steroid injections No  Do you currently have any signs of illness or infection or are you on any antibiotics? No  Have you had any new, sudden or worsening abdominal pain? No  Have you or anyone in your household received a live vaccination in the past 4 weeks? Please note: No live vaccines while on biologic/chemotherapy until 6 months after the last treatment. Patient can receive the flu vaccine (shot only), pneumovax and the Covid vaccine. It is optimal for the patient to get these vaccines mid cycle, but they can be given at any time as long as it is not on the day of the infusion. No  Have you recently been diagnosed with any new nervous system diseases (ie. Multiple sclerosis, Guillain [place], seizures, neurological changes) or cancer diagnosis? Are you on any form of radiation or chemotherapy? No  Are you pregnant or breast feeding or do you have plans of  pregnancy in the future? No  Have you been having any signs of worsening depression or suicidal ideations?  NA  Have there been any other new onset medical symptoms? No  Have you had any new blood clots? (IVIG only) NA      Post Infusion Assessment:  Patient tolerated injection without incident.   Emiliano no longer stays for observation period.      Discharge Plan:   Patient and/or family verbalized understanding of discharge instructions and all questions answered.  AVS to patient via [facility].  Patient will return 8/5 for next appointment.   Patient discharged in stable condition accompanied by: self.  Departure Mode: Ambulatory.      Daija Reis RN

## 2025-08-05 ENCOUNTER — INFUSION THERAPY VISIT (OUTPATIENT)
Dept: INFUSION THERAPY | Facility: HOSPITAL | Age: 60
End: 2025-08-05
Attending: PSYCHIATRY & NEUROLOGY
Payer: COMMERCIAL

## 2025-08-05 VITALS
DIASTOLIC BLOOD PRESSURE: 67 MMHG | RESPIRATION RATE: 16 BRPM | SYSTOLIC BLOOD PRESSURE: 142 MMHG | OXYGEN SATURATION: 97 % | HEART RATE: 63 BPM | BODY MASS INDEX: 27.38 KG/M2 | HEIGHT: 71 IN | TEMPERATURE: 97.7 F

## 2025-08-05 DIAGNOSIS — G61.81 CIDP (CHRONIC INFLAMMATORY DEMYELINATING POLYNEUROPATHY) (H): Primary | ICD-10-CM

## 2025-08-05 PROCEDURE — 96401 CHEMO ANTI-NEOPL SQ/IM: CPT

## 2025-08-05 PROCEDURE — 250N000011 HC RX IP 250 OP 636: Mod: JZ | Performed by: PSYCHIATRY & NEUROLOGY

## 2025-08-05 RX ORDER — EPINEPHRINE 1 MG/ML
0.3 INJECTION, SOLUTION INTRAMUSCULAR; SUBCUTANEOUS EVERY 5 MIN PRN
Status: DISCONTINUED | OUTPATIENT
Start: 2025-08-05 | End: 2025-08-05 | Stop reason: HOSPADM

## 2025-08-05 RX ORDER — METHYLPREDNISOLONE SODIUM SUCCINATE 40 MG/ML
40 INJECTION INTRAMUSCULAR; INTRAVENOUS
Status: DISCONTINUED | OUTPATIENT
Start: 2025-08-05 | End: 2025-08-05 | Stop reason: HOSPADM

## 2025-08-05 RX ORDER — ALBUTEROL SULFATE 90 UG/1
1-2 INHALANT RESPIRATORY (INHALATION)
Status: DISCONTINUED | OUTPATIENT
Start: 2025-08-05 | End: 2025-08-05 | Stop reason: HOSPADM

## 2025-08-05 RX ORDER — EPINEPHRINE 1 MG/ML
0.3 INJECTION, SOLUTION INTRAMUSCULAR; SUBCUTANEOUS EVERY 5 MIN PRN
OUTPATIENT
Start: 2025-08-12

## 2025-08-05 RX ORDER — ALBUTEROL SULFATE 0.83 MG/ML
2.5 SOLUTION RESPIRATORY (INHALATION)
Status: DISCONTINUED | OUTPATIENT
Start: 2025-08-05 | End: 2025-08-05 | Stop reason: HOSPADM

## 2025-08-05 RX ORDER — DIPHENHYDRAMINE HYDROCHLORIDE 50 MG/ML
50 INJECTION, SOLUTION INTRAMUSCULAR; INTRAVENOUS
Start: 2025-08-12

## 2025-08-05 RX ORDER — ALBUTEROL SULFATE 90 UG/1
1-2 INHALANT RESPIRATORY (INHALATION)
Start: 2025-08-12

## 2025-08-05 RX ORDER — METHYLPREDNISOLONE SODIUM SUCCINATE 40 MG/ML
40 INJECTION INTRAMUSCULAR; INTRAVENOUS
Start: 2025-08-12

## 2025-08-05 RX ORDER — MEPERIDINE HYDROCHLORIDE 50 MG/ML
25 INJECTION INTRAMUSCULAR; INTRAVENOUS; SUBCUTANEOUS
OUTPATIENT
Start: 2025-08-12

## 2025-08-05 RX ORDER — ALBUTEROL SULFATE 0.83 MG/ML
2.5 SOLUTION RESPIRATORY (INHALATION)
OUTPATIENT
Start: 2025-08-12

## 2025-08-05 RX ORDER — DIPHENHYDRAMINE HYDROCHLORIDE 50 MG/ML
25 INJECTION, SOLUTION INTRAMUSCULAR; INTRAVENOUS
Start: 2025-08-12

## 2025-08-05 RX ORDER — DIPHENHYDRAMINE HYDROCHLORIDE 50 MG/ML
25 INJECTION, SOLUTION INTRAMUSCULAR; INTRAVENOUS
Status: DISCONTINUED | OUTPATIENT
Start: 2025-08-05 | End: 2025-08-05 | Stop reason: HOSPADM

## 2025-08-05 RX ORDER — DIPHENHYDRAMINE HYDROCHLORIDE 50 MG/ML
50 INJECTION, SOLUTION INTRAMUSCULAR; INTRAVENOUS
Status: DISCONTINUED | OUTPATIENT
Start: 2025-08-05 | End: 2025-08-05 | Stop reason: HOSPADM

## 2025-08-05 RX ORDER — MEPERIDINE HYDROCHLORIDE 50 MG/ML
25 INJECTION INTRAMUSCULAR; INTRAVENOUS; SUBCUTANEOUS
Status: DISCONTINUED | OUTPATIENT
Start: 2025-08-05 | End: 2025-08-05 | Stop reason: HOSPADM

## 2025-08-05 RX ADMIN — EFGARTIGIMOD ALFA AND HYALURONIDASE (HUMAN RECOMBINANT) 1008 MG: 180; 2000 INJECTION, SOLUTION SUBCUTANEOUS at 13:38

## 2025-08-12 ENCOUNTER — INFUSION THERAPY VISIT (OUTPATIENT)
Dept: INFUSION THERAPY | Facility: HOSPITAL | Age: 60
End: 2025-08-12
Attending: PSYCHIATRY & NEUROLOGY
Payer: COMMERCIAL

## 2025-08-12 VITALS
OXYGEN SATURATION: 98 % | RESPIRATION RATE: 16 BRPM | DIASTOLIC BLOOD PRESSURE: 63 MMHG | SYSTOLIC BLOOD PRESSURE: 139 MMHG | TEMPERATURE: 98.1 F | HEART RATE: 62 BPM

## 2025-08-12 DIAGNOSIS — G61.81 CIDP (CHRONIC INFLAMMATORY DEMYELINATING POLYNEUROPATHY) (H): Primary | ICD-10-CM

## 2025-08-12 PROCEDURE — 250N000011 HC RX IP 250 OP 636: Mod: JZ | Performed by: PSYCHIATRY & NEUROLOGY

## 2025-08-12 PROCEDURE — 96401 CHEMO ANTI-NEOPL SQ/IM: CPT

## 2025-08-12 RX ORDER — DIPHENHYDRAMINE HYDROCHLORIDE 50 MG/ML
50 INJECTION, SOLUTION INTRAMUSCULAR; INTRAVENOUS
Start: 2025-08-19

## 2025-08-12 RX ORDER — ALBUTEROL SULFATE 90 UG/1
1-2 INHALANT RESPIRATORY (INHALATION)
Start: 2025-08-19

## 2025-08-12 RX ORDER — METHYLPREDNISOLONE SODIUM SUCCINATE 40 MG/ML
40 INJECTION INTRAMUSCULAR; INTRAVENOUS
Start: 2025-08-19

## 2025-08-12 RX ORDER — MEPERIDINE HYDROCHLORIDE 50 MG/ML
25 INJECTION INTRAMUSCULAR; INTRAVENOUS; SUBCUTANEOUS
OUTPATIENT
Start: 2025-08-19

## 2025-08-12 RX ORDER — EPINEPHRINE 1 MG/ML
0.3 INJECTION, SOLUTION INTRAMUSCULAR; SUBCUTANEOUS EVERY 5 MIN PRN
OUTPATIENT
Start: 2025-08-19

## 2025-08-12 RX ORDER — ALBUTEROL SULFATE 0.83 MG/ML
2.5 SOLUTION RESPIRATORY (INHALATION)
OUTPATIENT
Start: 2025-08-19

## 2025-08-12 RX ORDER — DIPHENHYDRAMINE HYDROCHLORIDE 50 MG/ML
25 INJECTION, SOLUTION INTRAMUSCULAR; INTRAVENOUS
Start: 2025-08-19

## 2025-08-12 RX ADMIN — EFGARTIGIMOD ALFA AND HYALURONIDASE (HUMAN RECOMBINANT) 1008 MG: 180; 2000 INJECTION, SOLUTION SUBCUTANEOUS at 13:32

## 2025-08-12 ASSESSMENT — PAIN SCALES - GENERAL: PAINLEVEL_OUTOF10: MILD PAIN (3)

## 2025-08-19 ENCOUNTER — INFUSION THERAPY VISIT (OUTPATIENT)
Dept: INFUSION THERAPY | Facility: HOSPITAL | Age: 60
End: 2025-08-19
Attending: PSYCHIATRY & NEUROLOGY
Payer: COMMERCIAL

## 2025-08-19 VITALS
HEART RATE: 63 BPM | SYSTOLIC BLOOD PRESSURE: 134 MMHG | TEMPERATURE: 97.9 F | DIASTOLIC BLOOD PRESSURE: 76 MMHG | RESPIRATION RATE: 16 BRPM | OXYGEN SATURATION: 99 %

## 2025-08-19 DIAGNOSIS — G61.81 CIDP (CHRONIC INFLAMMATORY DEMYELINATING POLYNEUROPATHY) (H): Primary | ICD-10-CM

## 2025-08-19 PROCEDURE — 250N000011 HC RX IP 250 OP 636: Mod: JZ | Performed by: PSYCHIATRY & NEUROLOGY

## 2025-08-19 PROCEDURE — 96401 CHEMO ANTI-NEOPL SQ/IM: CPT

## 2025-08-19 RX ORDER — ALBUTEROL SULFATE 0.83 MG/ML
2.5 SOLUTION RESPIRATORY (INHALATION)
OUTPATIENT
Start: 2025-08-26

## 2025-08-19 RX ORDER — EPINEPHRINE 1 MG/ML
0.3 INJECTION, SOLUTION INTRAMUSCULAR; SUBCUTANEOUS EVERY 5 MIN PRN
Status: DISCONTINUED | OUTPATIENT
Start: 2025-08-19 | End: 2025-08-19 | Stop reason: HOSPADM

## 2025-08-19 RX ORDER — MEPERIDINE HYDROCHLORIDE 50 MG/ML
25 INJECTION INTRAMUSCULAR; INTRAVENOUS; SUBCUTANEOUS
OUTPATIENT
Start: 2025-08-26

## 2025-08-19 RX ORDER — METHYLPREDNISOLONE SODIUM SUCCINATE 40 MG/ML
40 INJECTION INTRAMUSCULAR; INTRAVENOUS
Status: DISCONTINUED | OUTPATIENT
Start: 2025-08-19 | End: 2025-08-19 | Stop reason: HOSPADM

## 2025-08-19 RX ORDER — MEPERIDINE HYDROCHLORIDE 50 MG/ML
25 INJECTION INTRAMUSCULAR; INTRAVENOUS; SUBCUTANEOUS
Status: DISCONTINUED | OUTPATIENT
Start: 2025-08-19 | End: 2025-08-19 | Stop reason: HOSPADM

## 2025-08-19 RX ORDER — ALBUTEROL SULFATE 90 UG/1
1-2 INHALANT RESPIRATORY (INHALATION)
Start: 2025-08-26

## 2025-08-19 RX ORDER — DIPHENHYDRAMINE HYDROCHLORIDE 50 MG/ML
50 INJECTION, SOLUTION INTRAMUSCULAR; INTRAVENOUS
Status: DISCONTINUED | OUTPATIENT
Start: 2025-08-19 | End: 2025-08-19 | Stop reason: HOSPADM

## 2025-08-19 RX ORDER — ALBUTEROL SULFATE 90 UG/1
1-2 INHALANT RESPIRATORY (INHALATION)
Status: DISCONTINUED | OUTPATIENT
Start: 2025-08-19 | End: 2025-08-19 | Stop reason: HOSPADM

## 2025-08-19 RX ORDER — DIPHENHYDRAMINE HYDROCHLORIDE 50 MG/ML
25 INJECTION, SOLUTION INTRAMUSCULAR; INTRAVENOUS
Start: 2025-08-26

## 2025-08-19 RX ORDER — ALBUTEROL SULFATE 0.83 MG/ML
2.5 SOLUTION RESPIRATORY (INHALATION)
Status: DISCONTINUED | OUTPATIENT
Start: 2025-08-19 | End: 2025-08-19 | Stop reason: HOSPADM

## 2025-08-19 RX ORDER — EPINEPHRINE 1 MG/ML
0.3 INJECTION, SOLUTION INTRAMUSCULAR; SUBCUTANEOUS EVERY 5 MIN PRN
OUTPATIENT
Start: 2025-08-26

## 2025-08-19 RX ORDER — DIPHENHYDRAMINE HYDROCHLORIDE 50 MG/ML
25 INJECTION, SOLUTION INTRAMUSCULAR; INTRAVENOUS
Status: DISCONTINUED | OUTPATIENT
Start: 2025-08-19 | End: 2025-08-19 | Stop reason: HOSPADM

## 2025-08-19 RX ORDER — DIPHENHYDRAMINE HYDROCHLORIDE 50 MG/ML
50 INJECTION, SOLUTION INTRAMUSCULAR; INTRAVENOUS
Start: 2025-08-26

## 2025-08-19 RX ORDER — METHYLPREDNISOLONE SODIUM SUCCINATE 40 MG/ML
40 INJECTION INTRAMUSCULAR; INTRAVENOUS
Start: 2025-08-26

## 2025-08-19 RX ADMIN — EFGARTIGIMOD ALFA AND HYALURONIDASE (HUMAN RECOMBINANT) 1008 MG: 180; 2000 INJECTION, SOLUTION SUBCUTANEOUS at 13:29

## 2025-08-26 ENCOUNTER — INFUSION THERAPY VISIT (OUTPATIENT)
Dept: INFUSION THERAPY | Facility: HOSPITAL | Age: 60
End: 2025-08-26
Attending: PSYCHIATRY & NEUROLOGY
Payer: COMMERCIAL

## 2025-08-26 VITALS
DIASTOLIC BLOOD PRESSURE: 76 MMHG | SYSTOLIC BLOOD PRESSURE: 147 MMHG | HEART RATE: 65 BPM | OXYGEN SATURATION: 98 % | RESPIRATION RATE: 18 BRPM | TEMPERATURE: 97.7 F

## 2025-08-26 DIAGNOSIS — G61.81 CIDP (CHRONIC INFLAMMATORY DEMYELINATING POLYNEUROPATHY) (H): Primary | ICD-10-CM

## 2025-08-26 PROCEDURE — 250N000011 HC RX IP 250 OP 636: Mod: JZ | Performed by: PSYCHIATRY & NEUROLOGY

## 2025-08-26 RX ORDER — ALBUTEROL SULFATE 0.83 MG/ML
2.5 SOLUTION RESPIRATORY (INHALATION)
OUTPATIENT
Start: 2025-09-02

## 2025-08-26 RX ORDER — METHYLPREDNISOLONE SODIUM SUCCINATE 40 MG/ML
40 INJECTION INTRAMUSCULAR; INTRAVENOUS
Start: 2025-09-02

## 2025-08-26 RX ORDER — EPINEPHRINE 1 MG/ML
0.3 INJECTION, SOLUTION INTRAMUSCULAR; SUBCUTANEOUS EVERY 5 MIN PRN
OUTPATIENT
Start: 2025-09-02

## 2025-08-26 RX ORDER — ALBUTEROL SULFATE 90 UG/1
1-2 INHALANT RESPIRATORY (INHALATION)
Start: 2025-09-02

## 2025-08-26 RX ORDER — DIPHENHYDRAMINE HYDROCHLORIDE 50 MG/ML
25 INJECTION, SOLUTION INTRAMUSCULAR; INTRAVENOUS
Start: 2025-09-02

## 2025-08-26 RX ORDER — MEPERIDINE HYDROCHLORIDE 50 MG/ML
25 INJECTION INTRAMUSCULAR; INTRAVENOUS; SUBCUTANEOUS
OUTPATIENT
Start: 2025-09-02

## 2025-08-26 RX ORDER — DIPHENHYDRAMINE HYDROCHLORIDE 50 MG/ML
50 INJECTION, SOLUTION INTRAMUSCULAR; INTRAVENOUS
Start: 2025-09-02

## 2025-08-26 RX ADMIN — EFGARTIGIMOD ALFA AND HYALURONIDASE (HUMAN RECOMBINANT) 1008 MG: 180; 2000 INJECTION, SOLUTION SUBCUTANEOUS at 13:14

## 2025-09-02 ENCOUNTER — INFUSION THERAPY VISIT (OUTPATIENT)
Dept: INFUSION THERAPY | Facility: HOSPITAL | Age: 60
End: 2025-09-02
Attending: PSYCHIATRY & NEUROLOGY
Payer: COMMERCIAL

## 2025-09-02 VITALS
SYSTOLIC BLOOD PRESSURE: 131 MMHG | TEMPERATURE: 98.1 F | HEART RATE: 61 BPM | DIASTOLIC BLOOD PRESSURE: 80 MMHG | OXYGEN SATURATION: 99 %

## 2025-09-02 DIAGNOSIS — G61.81 CIDP (CHRONIC INFLAMMATORY DEMYELINATING POLYNEUROPATHY) (H): Primary | ICD-10-CM

## 2025-09-02 PROCEDURE — 96401 CHEMO ANTI-NEOPL SQ/IM: CPT

## 2025-09-02 PROCEDURE — 250N000011 HC RX IP 250 OP 636: Mod: JZ | Performed by: PSYCHIATRY & NEUROLOGY

## 2025-09-02 RX ORDER — DIPHENHYDRAMINE HYDROCHLORIDE 50 MG/ML
50 INJECTION INTRAMUSCULAR; INTRAVENOUS
Status: DISCONTINUED | OUTPATIENT
Start: 2025-09-02 | End: 2025-09-02 | Stop reason: HOSPADM

## 2025-09-02 RX ORDER — DIPHENHYDRAMINE HYDROCHLORIDE 50 MG/ML
25 INJECTION INTRAMUSCULAR; INTRAVENOUS
Start: 2025-09-09

## 2025-09-02 RX ORDER — MEPERIDINE HYDROCHLORIDE 50 MG/ML
25 INJECTION INTRAMUSCULAR; INTRAVENOUS; SUBCUTANEOUS
Status: DISCONTINUED | OUTPATIENT
Start: 2025-09-02 | End: 2025-09-02 | Stop reason: HOSPADM

## 2025-09-02 RX ORDER — DIPHENHYDRAMINE HYDROCHLORIDE 50 MG/ML
25 INJECTION INTRAMUSCULAR; INTRAVENOUS
Status: DISCONTINUED | OUTPATIENT
Start: 2025-09-02 | End: 2025-09-02 | Stop reason: HOSPADM

## 2025-09-02 RX ORDER — METHYLPREDNISOLONE SODIUM SUCCINATE 40 MG/ML
40 INJECTION INTRAMUSCULAR; INTRAVENOUS
Start: 2025-09-09

## 2025-09-02 RX ORDER — METHYLPREDNISOLONE SODIUM SUCCINATE 40 MG/ML
40 INJECTION INTRAMUSCULAR; INTRAVENOUS
Status: DISCONTINUED | OUTPATIENT
Start: 2025-09-02 | End: 2025-09-02 | Stop reason: HOSPADM

## 2025-09-02 RX ORDER — ALBUTEROL SULFATE 90 UG/1
1-2 INHALANT RESPIRATORY (INHALATION)
Start: 2025-09-09

## 2025-09-02 RX ORDER — EPINEPHRINE 1 MG/ML
0.3 INJECTION, SOLUTION INTRAMUSCULAR; SUBCUTANEOUS EVERY 5 MIN PRN
Status: DISCONTINUED | OUTPATIENT
Start: 2025-09-02 | End: 2025-09-02 | Stop reason: HOSPADM

## 2025-09-02 RX ORDER — ALBUTEROL SULFATE 0.83 MG/ML
2.5 SOLUTION RESPIRATORY (INHALATION)
OUTPATIENT
Start: 2025-09-09

## 2025-09-02 RX ORDER — MEPERIDINE HYDROCHLORIDE 50 MG/ML
25 INJECTION INTRAMUSCULAR; INTRAVENOUS; SUBCUTANEOUS
OUTPATIENT
Start: 2025-09-09

## 2025-09-02 RX ORDER — ALBUTEROL SULFATE 90 UG/1
1-2 INHALANT RESPIRATORY (INHALATION)
Status: DISCONTINUED | OUTPATIENT
Start: 2025-09-02 | End: 2025-09-02 | Stop reason: HOSPADM

## 2025-09-02 RX ORDER — EPINEPHRINE 1 MG/ML
0.3 INJECTION, SOLUTION INTRAMUSCULAR; SUBCUTANEOUS EVERY 5 MIN PRN
OUTPATIENT
Start: 2025-09-09

## 2025-09-02 RX ORDER — ALBUTEROL SULFATE 0.83 MG/ML
2.5 SOLUTION RESPIRATORY (INHALATION)
Status: DISCONTINUED | OUTPATIENT
Start: 2025-09-02 | End: 2025-09-02 | Stop reason: HOSPADM

## 2025-09-02 RX ORDER — DIPHENHYDRAMINE HYDROCHLORIDE 50 MG/ML
50 INJECTION INTRAMUSCULAR; INTRAVENOUS
Start: 2025-09-09

## 2025-09-02 RX ADMIN — EFGARTIGIMOD ALFA AND HYALURONIDASE (HUMAN RECOMBINANT) 1008 MG: 180; 2000 INJECTION, SOLUTION SUBCUTANEOUS at 13:40
